# Patient Record
Sex: FEMALE | Race: WHITE | NOT HISPANIC OR LATINO | Employment: OTHER | URBAN - METROPOLITAN AREA
[De-identification: names, ages, dates, MRNs, and addresses within clinical notes are randomized per-mention and may not be internally consistent; named-entity substitution may affect disease eponyms.]

---

## 2023-06-19 ENCOUNTER — TELEPHONE (OUTPATIENT)
Dept: PRIMARY CARE | Facility: CLINIC | Age: 78
End: 2023-06-19
Payer: MEDICARE

## 2023-06-19 DIAGNOSIS — F41.9 ANXIETY: Primary | ICD-10-CM

## 2023-06-19 RX ORDER — VENLAFAXINE 75 MG/1
TABLET ORAL
COMMUNITY
Start: 2020-11-30 | End: 2023-06-21 | Stop reason: SDUPTHER

## 2023-06-19 NOTE — TELEPHONE ENCOUNTER
Patient needs her Venlafaxine sent in to [facility] in [place]. She has an appt coming up July 14 but has really bad poison ivy right now so she would like this refilled before she comes in. Thank you

## 2023-06-21 ENCOUNTER — TELEPHONE (OUTPATIENT)
Dept: PRIMARY CARE | Facility: CLINIC | Age: 78
End: 2023-06-21
Payer: MEDICARE

## 2023-06-21 RX ORDER — VENLAFAXINE 75 MG/1
150 TABLET ORAL NIGHTLY
Qty: 180 TABLET | Refills: 1 | Status: SHIPPED | OUTPATIENT
Start: 2023-06-21 | End: 2023-10-02

## 2023-07-14 ENCOUNTER — OFFICE VISIT (OUTPATIENT)
Dept: PRIMARY CARE | Facility: CLINIC | Age: 78
End: 2023-07-14
Payer: MEDICARE

## 2023-07-14 VITALS
HEART RATE: 76 BPM | HEIGHT: 63 IN | RESPIRATION RATE: 14 BRPM | SYSTOLIC BLOOD PRESSURE: 132 MMHG | WEIGHT: 205 LBS | BODY MASS INDEX: 36.32 KG/M2 | DIASTOLIC BLOOD PRESSURE: 76 MMHG

## 2023-07-14 DIAGNOSIS — Z00.00 ROUTINE MEDICAL EXAM: ICD-10-CM

## 2023-07-14 DIAGNOSIS — C82.90 FOLLICULAR NON-HODGKIN'S LYMPHOMA (MULTI): ICD-10-CM

## 2023-07-14 DIAGNOSIS — Z12.31 BREAST CANCER SCREENING BY MAMMOGRAM: ICD-10-CM

## 2023-07-14 DIAGNOSIS — G25.81 RESTLESS LEG SYNDROME: ICD-10-CM

## 2023-07-14 DIAGNOSIS — E11.9 DIET-CONTROLLED TYPE 2 DIABETES MELLITUS (MULTI): ICD-10-CM

## 2023-07-14 DIAGNOSIS — H53.8 BLURRY VISION, BILATERAL: ICD-10-CM

## 2023-07-14 DIAGNOSIS — F33.1 MODERATE EPISODE OF RECURRENT MAJOR DEPRESSIVE DISORDER (MULTI): ICD-10-CM

## 2023-07-14 DIAGNOSIS — G40.909 SEIZURE DISORDER (MULTI): ICD-10-CM

## 2023-07-14 DIAGNOSIS — G47.00 INSOMNIA, UNSPECIFIED TYPE: Primary | ICD-10-CM

## 2023-07-14 DIAGNOSIS — E66.01 MORBID OBESITY (MULTI): ICD-10-CM

## 2023-07-14 PROBLEM — F41.1 GENERALIZED ANXIETY DISORDER WITH PANIC ATTACKS: Status: ACTIVE | Noted: 2023-07-14

## 2023-07-14 PROBLEM — H50.00 ESOTROPIA: Status: ACTIVE | Noted: 2023-07-14

## 2023-07-14 PROBLEM — R26.81 UNSTEADY GAIT: Status: ACTIVE | Noted: 2023-07-14

## 2023-07-14 PROBLEM — F43.10 PTSD (POST-TRAUMATIC STRESS DISORDER): Status: ACTIVE | Noted: 2023-07-14

## 2023-07-14 PROBLEM — G25.0 BENIGN ESSENTIAL TREMOR: Status: ACTIVE | Noted: 2023-07-14

## 2023-07-14 PROBLEM — H93.13 TINNITUS, SUBJECTIVE, BILATERAL: Status: ACTIVE | Noted: 2023-07-14

## 2023-07-14 PROBLEM — M16.11 PRIMARY OSTEOARTHRITIS OF RIGHT HIP: Status: ACTIVE | Noted: 2023-07-14

## 2023-07-14 PROBLEM — F41.0 GENERALIZED ANXIETY DISORDER WITH PANIC ATTACKS: Status: ACTIVE | Noted: 2023-07-14

## 2023-07-14 PROBLEM — R27.0 ATAXIA: Status: ACTIVE | Noted: 2023-07-14

## 2023-07-14 PROBLEM — F41.9 ANXIETY: Status: ACTIVE | Noted: 2023-07-14

## 2023-07-14 PROBLEM — I25.2 HISTORY OF MYOCARDIAL INFARCTION: Status: ACTIVE | Noted: 2023-07-14

## 2023-07-14 PROBLEM — H90.3 SENSORINEURAL HEARING LOSS, BILATERAL: Status: ACTIVE | Noted: 2023-07-14

## 2023-07-14 PROCEDURE — 1036F TOBACCO NON-USER: CPT | Performed by: FAMILY MEDICINE

## 2023-07-14 PROCEDURE — 1170F FXNL STATUS ASSESSED: CPT | Performed by: FAMILY MEDICINE

## 2023-07-14 PROCEDURE — 1160F RVW MEDS BY RX/DR IN RCRD: CPT | Performed by: FAMILY MEDICINE

## 2023-07-14 PROCEDURE — 99214 OFFICE O/P EST MOD 30 MIN: CPT | Performed by: FAMILY MEDICINE

## 2023-07-14 PROCEDURE — 1159F MED LIST DOCD IN RCRD: CPT | Performed by: FAMILY MEDICINE

## 2023-07-14 PROCEDURE — G0439 PPPS, SUBSEQ VISIT: HCPCS | Performed by: FAMILY MEDICINE

## 2023-07-14 RX ORDER — DOXEPIN HYDROCHLORIDE 10 MG/1
10 CAPSULE ORAL NIGHTLY
Qty: 30 CAPSULE | Refills: 2 | Status: SHIPPED | OUTPATIENT
Start: 2023-07-14 | End: 2023-07-17 | Stop reason: SDUPTHER

## 2023-07-14 RX ORDER — VALPROIC ACID 250 MG/1
CAPSULE, LIQUID FILLED ORAL 2 TIMES DAILY
COMMUNITY
Start: 2016-06-06 | End: 2023-07-14 | Stop reason: ALTCHOICE

## 2023-07-14 RX ORDER — VALPROIC ACID 250 MG/1
CAPSULE, LIQUID FILLED ORAL
COMMUNITY
Start: 2007-06-20 | End: 2024-04-29 | Stop reason: SDUPTHER

## 2023-07-14 RX ORDER — GABAPENTIN 300 MG/1
1 CAPSULE ORAL NIGHTLY
COMMUNITY
Start: 2023-01-16 | End: 2023-10-24 | Stop reason: SDUPTHER

## 2023-07-14 RX ORDER — BUSPIRONE HYDROCHLORIDE 10 MG/1
TABLET ORAL
COMMUNITY
Start: 2022-12-03 | End: 2023-07-14 | Stop reason: ALTCHOICE

## 2023-07-14 ASSESSMENT — PATIENT HEALTH QUESTIONNAIRE - PHQ9
2. FEELING DOWN, DEPRESSED OR HOPELESS: NOT AT ALL
1. LITTLE INTEREST OR PLEASURE IN DOING THINGS: NOT AT ALL
SUM OF ALL RESPONSES TO PHQ9 QUESTIONS 1 AND 2: 0

## 2023-07-14 ASSESSMENT — ACTIVITIES OF DAILY LIVING (ADL)
BATHING: INDEPENDENT
DRESSING: INDEPENDENT
DOING_HOUSEWORK: INDEPENDENT
GROCERY_SHOPPING: NEEDS ASSISTANCE
TAKING_MEDICATION: INDEPENDENT
MANAGING_FINANCES: INDEPENDENT

## 2023-07-14 NOTE — PROGRESS NOTES
"Subjective   Patient ID: Sue Vail is a 77 y.o. female who presents for wellness and fu    HPI   Patient has been compliant with taking all  current medications. No polydipsia, polyuria or significant weight changes. No lower extremities skin lesion. No LE numbness or tingling. Worse  blurry vision lately. Normal appetite. No CP, HA, dizziness, heart palpitation.  pt had trouble falling and staying asleep lately. Pt felt nonrefreshed in am. No snores. No fatigue or gland enlargement. No jaundice. RLS was partially controlled. No confusion, imbalance or memory loss  Review of Systems    Objective   /76   Pulse 76   Resp 14   Ht 1.588 m (5' 2.5\")   Wt 93 kg (205 lb)   BMI 36.90 kg/m²     Physical Exam  NAD, well groomed, eyes: PERRLA, No sclera icterus. neck: supple, no cervical or axillary lymphadenopathy,  lungs: CTA b/l, heart: RRR, No LE edema, normal pedal pulses, abd: soft, no tenderness, BS+, normal strength and sensation  at bilateral lower extremities. No skin lesions at bilateral feet. Good balance. CNII-XII were grossly intact, good judgment and memory. No depressed mood.    Assessment/Plan   Problem List Items Addressed This Visit       Diet-controlled type 2 diabetes mellitus (CMS/HCC)     DMII, well controlled. Cont life style change.  Check A1C, urine albumin. advise eye exam by an OD yearly and checking bilateral feet for skin lesions qhs. Healthy diet and regular exercise. Decrease calorie intake to lose wt.             Relevant Orders    Comprehensive Metabolic Panel    Lipid Panel    Albumin , Urine Random    Hemoglobin A1C    Moderate episode of recurrent major depressive disorder (CMS/HCC)     Pt declined meds or psychology counseling. Will monitor.          Follicular non-Hodgkin's lymphoma (CMS/HCC)     Stable. Fu with with hematology         Restless leg syndrome     Partially controlled. Cont neurontin         Seizure disorder (CMS/HCC)     Controlled. Cont depakote. Fu with " neuro         Blurry vision, bilateral     Ophthalmology eval         Relevant Orders    Referral to Ophthalmology    Comprehensive Metabolic Panel    TSH with reflex to Free T4 if abnormal    Insomnia - Primary     Insomnia, start doxepin trazodone. Recommend psychology counseling.  Recommend good sleep hygiene. Will monitor.           Relevant Medications    doxepin (SINEquan) 10 mg capsule    Other Relevant Orders    CBC    TSH with reflex to Free T4 if abnormal    Referral to Psychology    Morbid obesity (CMS/[facility])     Recommend regular aerobic exercise with low fat and low cholesterol diet. Will monitor weight, blood glucose and cholesterol regularly.  bariatric evaluation           Relevant Orders    Referral to Bariatric Surgery     Other Visit Diagnoses       Routine medical exam        Breast cancer screening by mammogram        Relevant Orders    BI mammo bilateral screening tomosynthesis             Medicare wellness visit: pt was capable of performing all his ADLs and most IADLs. Pt has good memory and cognitive function. pt is morbidly obese. Recommend healthy diet and regular exercise. pt declined to see a nutritionist for eval. Advise eye exam by an OD yearly for glaucoma screen and dental exam every 6 months. check lipids.   will monitor blood pressure, cholesterol levels and weight regularly. Recommend sunscreen application if exposed to the sun when the UV index is over 2. Recommend shingle vaccines, hep B vaccine, tetanus vaccine and covid shot. Pt requested mammogram.  Recommend pt to clear throw rugs at home and keep good lighting at night to avoid falls. Keep hot water for shower below 120F to avoid burn injury.  recommend grab bar at bathtub.   recommend to update living will and DPOA  pt stated that he had been taking all current  medications as prescribed.

## 2023-07-14 NOTE — ASSESSMENT & PLAN NOTE
Insomnia, start doxepin trazodone. Recommend psychology counseling.  Recommend good sleep hygiene. Will monitor.

## 2023-07-14 NOTE — ASSESSMENT & PLAN NOTE
DMII, well controlled. Cont life style change.  Check A1C, urine albumin. advise eye exam by an OD yearly and checking bilateral feet for skin lesions qhs. Healthy diet and regular exercise. Decrease calorie intake to lose wt.

## 2023-07-14 NOTE — ASSESSMENT & PLAN NOTE
Recommend regular aerobic exercise with low fat and low cholesterol diet. Will monitor weight, blood glucose and cholesterol regularly.  bariatric evaluation

## 2023-07-17 ENCOUNTER — TELEPHONE (OUTPATIENT)
Dept: PRIMARY CARE | Facility: CLINIC | Age: 78
End: 2023-07-17
Payer: MEDICARE

## 2023-07-17 DIAGNOSIS — E66.01 MORBID OBESITY (MULTI): Primary | ICD-10-CM

## 2023-07-17 DIAGNOSIS — G47.00 INSOMNIA, UNSPECIFIED TYPE: ICD-10-CM

## 2023-07-17 RX ORDER — DOXEPIN HYDROCHLORIDE 10 MG/1
10 CAPSULE ORAL NIGHTLY
Qty: 90 CAPSULE | Refills: 1 | Status: SHIPPED | OUTPATIENT
Start: 2023-07-17 | End: 2024-05-14 | Stop reason: SDUPTHER

## 2023-07-17 NOTE — TELEPHONE ENCOUNTER
Patient would like a referral put in for nutrition consult instead of bariatric surgery consult. Can we put this in for her? Thank you

## 2023-07-27 ENCOUNTER — LAB (OUTPATIENT)
Dept: LAB | Facility: LAB | Age: 78
End: 2023-07-27
Payer: MEDICARE

## 2023-07-27 DIAGNOSIS — G47.00 INSOMNIA, UNSPECIFIED TYPE: ICD-10-CM

## 2023-07-27 DIAGNOSIS — H53.8 BLURRY VISION, BILATERAL: ICD-10-CM

## 2023-07-27 DIAGNOSIS — E11.9 DIET-CONTROLLED TYPE 2 DIABETES MELLITUS (MULTI): ICD-10-CM

## 2023-07-27 LAB
ALANINE AMINOTRANSFERASE (SGPT) (U/L) IN SER/PLAS: 12 U/L (ref 7–45)
ALBUMIN (G/DL) IN SER/PLAS: 3.7 G/DL (ref 3.4–5)
ALKALINE PHOSPHATASE (U/L) IN SER/PLAS: 109 U/L (ref 33–136)
ANION GAP IN SER/PLAS: 12 MMOL/L (ref 10–20)
ASPARTATE AMINOTRANSFERASE (SGOT) (U/L) IN SER/PLAS: 15 U/L (ref 9–39)
BILIRUBIN TOTAL (MG/DL) IN SER/PLAS: 0.3 MG/DL (ref 0–1.2)
CALCIUM (MG/DL) IN SER/PLAS: 8.5 MG/DL (ref 8.6–10.3)
CARBON DIOXIDE, TOTAL (MMOL/L) IN SER/PLAS: 27 MMOL/L (ref 21–32)
CHLORIDE (MMOL/L) IN SER/PLAS: 107 MMOL/L (ref 98–107)
CHOLESTEROL (MG/DL) IN SER/PLAS: 191 MG/DL (ref 0–199)
CHOLESTEROL IN HDL (MG/DL) IN SER/PLAS: 57.9 MG/DL
CHOLESTEROL/HDL RATIO: 3.3
CREATININE (MG/DL) IN SER/PLAS: 0.66 MG/DL (ref 0.5–1.05)
ERYTHROCYTE DISTRIBUTION WIDTH (RATIO) BY AUTOMATED COUNT: 14.1 % (ref 11.5–14.5)
ERYTHROCYTE MEAN CORPUSCULAR HEMOGLOBIN CONCENTRATION (G/DL) BY AUTOMATED: 32.2 G/DL (ref 32–36)
ERYTHROCYTE MEAN CORPUSCULAR VOLUME (FL) BY AUTOMATED COUNT: 94 FL (ref 80–100)
ERYTHROCYTES (10*6/UL) IN BLOOD BY AUTOMATED COUNT: 4.27 X10E12/L (ref 4–5.2)
ESTIMATED AVERAGE GLUCOSE FOR HBA1C: 131 MG/DL
GFR FEMALE: 89 ML/MIN/1.73M2
GLUCOSE (MG/DL) IN SER/PLAS: 83 MG/DL (ref 74–99)
HEMATOCRIT (%) IN BLOOD BY AUTOMATED COUNT: 40.1 % (ref 36–46)
HEMOGLOBIN (G/DL) IN BLOOD: 12.9 G/DL (ref 12–16)
HEMOGLOBIN A1C/HEMOGLOBIN TOTAL IN BLOOD: 6.2 %
LDL: 112 MG/DL (ref 0–99)
LEUKOCYTES (10*3/UL) IN BLOOD BY AUTOMATED COUNT: 6.3 X10E9/L (ref 4.4–11.3)
PLATELETS (10*3/UL) IN BLOOD AUTOMATED COUNT: 256 X10E9/L (ref 150–450)
POTASSIUM (MMOL/L) IN SER/PLAS: 4.3 MMOL/L (ref 3.5–5.3)
PROTEIN TOTAL: 5.7 G/DL (ref 6.4–8.2)
SODIUM (MMOL/L) IN SER/PLAS: 142 MMOL/L (ref 136–145)
THYROTROPIN (MIU/L) IN SER/PLAS BY DETECTION LIMIT <= 0.05 MIU/L: 4.19 MIU/L (ref 0.44–3.98)
THYROXINE (T4) FREE (NG/DL) IN SER/PLAS: 0.71 NG/DL (ref 0.61–1.12)
TRIGLYCERIDE (MG/DL) IN SER/PLAS: 104 MG/DL (ref 0–149)
UREA NITROGEN (MG/DL) IN SER/PLAS: 10 MG/DL (ref 6–23)
VLDL: 21 MG/DL (ref 0–40)

## 2023-07-27 PROCEDURE — 82043 UR ALBUMIN QUANTITATIVE: CPT

## 2023-07-27 PROCEDURE — 82570 ASSAY OF URINE CREATININE: CPT

## 2023-07-27 PROCEDURE — 84443 ASSAY THYROID STIM HORMONE: CPT

## 2023-07-27 PROCEDURE — 85027 COMPLETE CBC AUTOMATED: CPT

## 2023-07-27 PROCEDURE — 36415 COLL VENOUS BLD VENIPUNCTURE: CPT

## 2023-07-27 PROCEDURE — 80061 LIPID PANEL: CPT

## 2023-07-27 PROCEDURE — 84439 ASSAY OF FREE THYROXINE: CPT

## 2023-07-27 PROCEDURE — 83036 HEMOGLOBIN GLYCOSYLATED A1C: CPT

## 2023-07-27 PROCEDURE — 80053 COMPREHEN METABOLIC PANEL: CPT

## 2023-07-28 LAB
ALBUMIN (MG/L) IN URINE: 10.9 MG/L
ALBUMIN/CREATININE (UG/MG) IN URINE: 24.3 UG/MG CRT (ref 0–30)
CREATININE (MG/DL) IN URINE: 44.9 MG/DL (ref 20–320)

## 2023-08-30 ENCOUNTER — TELEPHONE (OUTPATIENT)
Dept: PRIMARY CARE | Facility: CLINIC | Age: 78
End: 2023-08-30
Payer: MEDICARE

## 2023-08-30 NOTE — TELEPHONE ENCOUNTER
Patient states that the sleeping pill you gave her is too strong for her and would like a lower dose or pill she can cut in half. She also has a constant cough where she is trying to clear mucus, she says she would like to get something for that as she has had it for a while.

## 2023-10-01 DIAGNOSIS — F41.9 ANXIETY: ICD-10-CM

## 2023-10-02 RX ORDER — VENLAFAXINE 75 MG/1
TABLET ORAL
Qty: 180 TABLET | Refills: 1 | Status: SHIPPED | OUTPATIENT
Start: 2023-10-02

## 2023-10-20 PROBLEM — K86.9 LESION OF PANCREAS (HHS-HCC): Status: ACTIVE | Noted: 2023-10-20

## 2023-10-20 PROBLEM — D50.9 IRON DEFICIENCY ANEMIA: Status: ACTIVE | Noted: 2023-10-20

## 2023-10-20 PROBLEM — H50.21 HYPOTROPIA OF RIGHT EYE: Status: ACTIVE | Noted: 2023-10-20

## 2023-10-20 PROBLEM — R63.4 ABNORMAL WEIGHT LOSS: Status: ACTIVE | Noted: 2023-10-20

## 2023-10-20 PROBLEM — H51.8 DIVERGENCE INSUFFICIENCY: Status: ACTIVE | Noted: 2023-10-20

## 2023-10-20 PROBLEM — R90.89 ABNORMAL BRAIN MRI: Status: ACTIVE | Noted: 2023-10-20

## 2023-10-20 PROBLEM — D22.4 NEVUS OF NECK: Status: ACTIVE | Noted: 2023-10-20

## 2023-10-20 PROBLEM — Z79.899 CHRONIC PRESCRIPTION BENZODIAZEPINE USE: Status: ACTIVE | Noted: 2023-10-20

## 2023-10-20 RX ORDER — BLOOD SUGAR DIAGNOSTIC
STRIP MISCELLANEOUS
COMMUNITY

## 2023-10-20 RX ORDER — CHOLECALCIFEROL (VITAMIN D3) 125 MCG
CAPSULE ORAL
COMMUNITY
Start: 2021-12-10

## 2023-10-22 NOTE — PROGRESS NOTES
"Reason for Nutrition Visit:  Pt is a 78 y.o. female referred for   1. Morbid obesity (CMS/HCC)        Pt was referred by Bettie Garza on 7/17/23     Past Medical Hx:  Patient Active Problem List   Diagnosis    Anxiety    Ataxia    Benign essential tremor    Diet-controlled type 2 diabetes mellitus (CMS/HCC)    Esotropia    Generalized anxiety disorder with panic attacks    PTSD (post-traumatic stress disorder)    History of myocardial infarction    Moderate episode of recurrent major depressive disorder (CMS/HCC)    Follicular non-Hodgkin's lymphoma (CMS/HCC)    Primary osteoarthritis of right hip    Restless leg syndrome    Seizure disorder (CMS/HCC)    Sensorineural hearing loss, bilateral    Tinnitus, subjective, bilateral    Unsteady gait    Blurry vision, bilateral    Insomnia    Morbid obesity (CMS/HCC)    Abnormal brain MRI    Hypotropia of right eye    Iron deficiency anemia    Lesion of pancreas    Nevus of neck    Abnormal weight loss    Chronic prescription benzodiazepine use    Divergence insufficiency        Food and Nutrition Hx:  Pt is very fatigued, very tired. She went to [facility] for 6 months and lost 30#, she ate vegetarian, 2015. She ate that way for a while when she returned. Her  has a heart condition and did low fat, low salt, low sugar so she did follow his diet for a while.  She had gastric bypass over 10 years, started at 286# down to 160#. Pt states her weakness has gotten worse will start to sway after standing for 10 min. She will sweat after doing errands. She describes \"crashing\" from eating as well completing errands, specifically potatoes and cookies.  She is eating more chicken now. Does eat cheese as well.      24 Diet Recall:  Meal 1: biscotti with coffee w/whole milk 5-7:30 am  Meal 2: 11:45 am Lunch - tuna w/celery, onions, apples, 1 piece of rye bread  Meal 3:  7:30 pm - leftovers or oatmeal w/nuts,  salads - greens, spinach, radishes, kale, celery, onion, pumpkin seeds, " olive garden dressing, grated parmesan   Snacks: 2 wasa crackers, 1 oz cheddar cheese - after dinner  Beverages: OJ - occasionally    Weight change:    Significant Weight Change: No  CW: (7/14/23) 205#, BMI: 36.9     Lab Results   Component Value Date    HGBA1C 6.2 (A) 07/27/2023    CHOL 191 07/27/2023    LDLF 112 (H) 07/27/2023    TRIG 104 07/27/2023    BUN 10 07/27/2023          Food Preparation: Partner/Spouse  Cooking Skills/Barriers: None reported  Grocery Shopping: Partner/Spouse        Allergies: None  Intolerance: sugar- crashes  Appetite: Poor  Intake: 50-75%  GI Symptoms : None   Swallowing Difficulty: takes big pills and it's been harder recently  Dentition : own    Eating Out Type: Denies/Unknown   Convenience Foods: Denies     Types of Activities: Mostly Sedentary      Sleep duration/quality : 7+ hours and continuous sleep, over 10 hours.   Sleep disorders: none    Supplements: Multivitamin daily      Nutrition Focused Physical Exam:    Performed/Deferred: Deferred as pt visually appears well-nourished with no signs of malnutrition      Malnutrition Present: No    Estimated Energy Needs:    Weight Maintanence: MSJ=1366x1.2  Weight Loss Needs: MSJ: 1639-500      Estimated Needs: 5881-5436 kcal and 80 protein for weight maintenance  1350 for weight loss    Nutrition Diagnosis:    Diagnosis Statement 1:  Diagnosis Status: New  Diagnosis : Physical inactivity related to  current medical complications  as evidenced by  diet and nutrition history recall          Nutrition Interventions:    1)     We reviewed the importance of having consistent meals and snacks throughout the day to improve or maintain good glycemic control and to increase metabolism to aid with weight loss. Pt should aim to consume a meal or snack every 3-4 hours which contains a lean protein (lean chicken, turkey, fish, eggs, low fat yogurt, nuts, peanut butter, bean) and healthy starch (fruits, whole grains)    2)  We reviewed the food  plate method to help improve healthy eating habits. We discussed that half of the plate should contain 2 non-starchy vegetables (all vegetable besides peas, corn and potatoes), 1/4 of the plate should contain lean protein and 1/4 of the plate should contain a healthy starch.       Nutrition Goals:  Nutrition Goals : Carbohydrate consistency  Consistent meal/snack pattern  Consume prescribed supplements  Decrease intake of added sugars    Nutrition Recommendations:  1)  Rec re-starting gastric bypass vitamins - MVI, B12, Vit D, Calcium  2) Look into physical therapy     Educational Handouts: ADA Placemat, High Protein Snack Ideas, and High Protein Meal Ideas

## 2023-10-23 ENCOUNTER — NUTRITION (OUTPATIENT)
Dept: NUTRITION | Facility: CLINIC | Age: 78
End: 2023-10-23
Payer: MEDICARE

## 2023-10-23 VITALS — HEIGHT: 63 IN | BODY MASS INDEX: 36.9 KG/M2

## 2023-10-23 DIAGNOSIS — E66.01 MORBID OBESITY (MULTI): Primary | ICD-10-CM

## 2023-10-23 PROCEDURE — 97802 MEDICAL NUTRITION INDIV IN: CPT | Performed by: FAMILY MEDICINE

## 2023-10-24 ENCOUNTER — TELEPHONE (OUTPATIENT)
Dept: NEUROLOGY | Facility: HOSPITAL | Age: 78
End: 2023-10-24
Payer: MEDICARE

## 2023-10-24 DIAGNOSIS — G25.81 RESTLESS LEG SYNDROME: Primary | ICD-10-CM

## 2023-10-24 RX ORDER — GABAPENTIN 300 MG/1
300 CAPSULE ORAL NIGHTLY
Qty: 30 CAPSULE | Refills: 4 | Status: SHIPPED | OUTPATIENT
Start: 2023-10-24 | End: 2024-03-18

## 2023-11-14 ASSESSMENT — EXTERNAL EXAM - RIGHT EYE: OD_EXAM: NORMAL

## 2023-11-14 ASSESSMENT — EXTERNAL EXAM - LEFT EYE: OS_EXAM: NORMAL

## 2023-11-14 ASSESSMENT — SLIT LAMP EXAM - LIDS
COMMENTS: GOOD POSITION
COMMENTS: GOOD POSITION

## 2023-11-14 NOTE — PROGRESS NOTES
Diplopia  Esotropia  Divergence insufficiency  Hypotropia right eye  -S/p [facility] 12/5/19 (Bruno) - does not feel that surgery improved her diplopia.   -Previously saw Dr. Hernadez for prisms 3/9/20.   -Patient with diplopia mostly controlled with prisms correction. Patient does not wish to change glasses at this time, but will consider changing in the future if unable to tolerate current level of diplopia. Will consider referral back to strabismus surgeon as needed.     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 No history of tobacco use.   -Doesn't drive anymore   -F/u 3-4 months - amsler grid and OCT macula.     Combined form of age-related cataract, right eyeH25.811  Combined form of age-related cataract, left eyeH25.812  -Not visually significant at this time. Monitor.     Myopia  Presbyopia  Amblyopia OD  -Per patient, likely history of amblyopia OD - since childhood OD has never seen as well as OS.   -Current glasses with prisms are 2-3 years old - 15pd base-out prism (BRY) OD and 2BD base-down prism (BD) OS. Over refraction - ET neutralized with 8pd base-out prism (BRY) over OD and 1pd base-out prism (BRY) over OS. Did best with symptomatic improvement in diplopia with 5-6 additional pd base-out prism (BRY) over OD.   -Patient has executive bifocal with current glasses, does not like this. In the future, she would like DVO with prisms. Uses OTC reading glasses for reading - doesn't have diplopia when reading.   -Declines new Rx at this time. In the future, patient would like to see Optometry service for glasses with prism.     No history of intraocular surgery/refractive surgery.   No FH of glaucoma

## 2023-11-15 ENCOUNTER — OFFICE VISIT (OUTPATIENT)
Dept: OPHTHALMOLOGY | Facility: CLINIC | Age: 78
End: 2023-11-15
Payer: MEDICARE

## 2023-11-15 DIAGNOSIS — H25.812 COMBINED FORM OF AGE-RELATED CATARACT, LEFT EYE: ICD-10-CM

## 2023-11-15 DIAGNOSIS — H53.2 DIPLOPIA: Primary | ICD-10-CM

## 2023-11-15 DIAGNOSIS — H51.8 DIVERGENCE INSUFFICIENCY: ICD-10-CM

## 2023-11-15 DIAGNOSIS — H50.21 HYPOTROPIA OF RIGHT EYE: ICD-10-CM

## 2023-11-15 DIAGNOSIS — H35.3131 EARLY DRY STAGE NONEXUDATIVE AGE-RELATED MACULAR DEGENERATION OF BOTH EYES: ICD-10-CM

## 2023-11-15 DIAGNOSIS — H50.00 ESOTROPIA: ICD-10-CM

## 2023-11-15 DIAGNOSIS — H25.811 COMBINED FORM OF AGE-RELATED CATARACT, RIGHT EYE: ICD-10-CM

## 2023-11-15 DIAGNOSIS — H52.13 MYOPIA OF BOTH EYES: ICD-10-CM

## 2023-11-15 DIAGNOSIS — H52.4 PRESBYOPIA: ICD-10-CM

## 2023-11-15 PROCEDURE — 1160F RVW MEDS BY RX/DR IN RCRD: CPT | Performed by: OPHTHALMOLOGY

## 2023-11-15 PROCEDURE — 1159F MED LIST DOCD IN RCRD: CPT | Performed by: OPHTHALMOLOGY

## 2023-11-15 PROCEDURE — 92004 COMPRE OPH EXAM NEW PT 1/>: CPT | Performed by: OPHTHALMOLOGY

## 2023-11-15 PROCEDURE — 1036F TOBACCO NON-USER: CPT | Performed by: OPHTHALMOLOGY

## 2023-11-15 ASSESSMENT — REFRACTION_MANIFEST
OS_SPHERE: -1.00
OS_CYLINDER: -0.50
OD_SPHERE: -1.50
OD_ADD: +3.00
OD_CYLINDER: SPHERE
OS_AXIS: 120
OS_ADD: +3.00

## 2023-11-15 ASSESSMENT — VISUAL ACUITY
OS_CC: 20/25
OD_CC+: +1
CORRECTION_TYPE: GLASSES
METHOD: SNELLEN - LINEAR
OS_CC+: -2
OD_CC: 20/60

## 2023-11-15 ASSESSMENT — ENCOUNTER SYMPTOMS
HEMATOLOGIC/LYMPHATIC NEGATIVE: 0
CARDIOVASCULAR NEGATIVE: 0
NEUROLOGICAL NEGATIVE: 0
RESPIRATORY NEGATIVE: 0
PSYCHIATRIC NEGATIVE: 0
ENDOCRINE NEGATIVE: 0
GASTROINTESTINAL NEGATIVE: 0
MUSCULOSKELETAL NEGATIVE: 0
ALLERGIC/IMMUNOLOGIC NEGATIVE: 0
CONSTITUTIONAL NEGATIVE: 0
EYES NEGATIVE: 0

## 2023-11-15 ASSESSMENT — CUP TO DISC RATIO
OD_RATIO: 0.3
OS_RATIO: 0.3

## 2023-11-15 ASSESSMENT — CONF VISUAL FIELD
OS_INFERIOR_TEMPORAL_RESTRICTION: 0
OD_SUPERIOR_TEMPORAL_RESTRICTION: 0
OS_INFERIOR_NASAL_RESTRICTION: 0
OD_INFERIOR_NASAL_RESTRICTION: 0
OD_NORMAL: 1
OS_SUPERIOR_TEMPORAL_RESTRICTION: 0
OD_SUPERIOR_NASAL_RESTRICTION: 0
OD_INFERIOR_TEMPORAL_RESTRICTION: 0
OS_NORMAL: 1
OS_SUPERIOR_NASAL_RESTRICTION: 0

## 2023-11-15 ASSESSMENT — REFRACTION_WEARINGRX
OD_CYLINDER: SPHERE
OS_CYLINDER: SPHERE
SPECS_TYPE: BIFOCAL
OS_VPRISM: 2BD
OD_SPHERE: -1.25
OS_SPHERE: -1.00

## 2023-11-15 ASSESSMENT — TONOMETRY
OS_IOP_MMHG: 15
IOP_METHOD: GOLDMANN APPLANATION
OD_IOP_MMHG: 15

## 2023-12-11 ENCOUNTER — APPOINTMENT (OUTPATIENT)
Dept: SURGERY | Facility: CLINIC | Age: 78
End: 2023-12-11
Payer: MEDICARE

## 2023-12-12 ENCOUNTER — APPOINTMENT (OUTPATIENT)
Dept: NEUROLOGY | Facility: HOSPITAL | Age: 78
End: 2023-12-12
Payer: MEDICARE

## 2024-02-04 DIAGNOSIS — C82.90 FOLLICULAR NON-HODGKIN'S LYMPHOMA (MULTI): Primary | ICD-10-CM

## 2024-02-07 ENCOUNTER — LAB (OUTPATIENT)
Dept: LAB | Facility: LAB | Age: 79
End: 2024-02-07
Payer: MEDICARE

## 2024-02-07 ENCOUNTER — APPOINTMENT (OUTPATIENT)
Dept: LAB | Facility: HOSPITAL | Age: 79
End: 2024-02-07
Payer: MEDICARE

## 2024-02-07 ENCOUNTER — OFFICE VISIT (OUTPATIENT)
Dept: HEMATOLOGY/ONCOLOGY | Facility: CLINIC | Age: 79
End: 2024-02-07
Payer: MEDICARE

## 2024-02-07 VITALS
OXYGEN SATURATION: 95 % | HEIGHT: 63 IN | RESPIRATION RATE: 16 BRPM | TEMPERATURE: 97.7 F | HEART RATE: 82 BPM | BODY MASS INDEX: 38.59 KG/M2 | DIASTOLIC BLOOD PRESSURE: 89 MMHG | WEIGHT: 217.81 LBS | SYSTOLIC BLOOD PRESSURE: 156 MMHG

## 2024-02-07 DIAGNOSIS — C82.90 FOLLICULAR NON-HODGKIN'S LYMPHOMA (MULTI): ICD-10-CM

## 2024-02-07 DIAGNOSIS — C82.90 FOLLICULAR NON-HODGKIN'S LYMPHOMA (MULTI): Primary | ICD-10-CM

## 2024-02-07 LAB
ALBUMIN SERPL BCP-MCNC: 3.9 G/DL (ref 3.4–5)
ALP SERPL-CCNC: 87 U/L (ref 33–136)
ALT SERPL W P-5'-P-CCNC: 9 U/L (ref 7–45)
ANION GAP SERPL CALC-SCNC: 12 MMOL/L (ref 10–20)
AST SERPL W P-5'-P-CCNC: 13 U/L (ref 9–39)
BASOPHILS # BLD AUTO: 0.05 X10*3/UL (ref 0–0.1)
BASOPHILS NFR BLD AUTO: 0.7 %
BILIRUB SERPL-MCNC: 0.4 MG/DL (ref 0–1.2)
BUN SERPL-MCNC: 14 MG/DL (ref 6–23)
CALCIUM SERPL-MCNC: 9 MG/DL (ref 8.6–10.3)
CHLORIDE SERPL-SCNC: 106 MMOL/L (ref 98–107)
CO2 SERPL-SCNC: 28 MMOL/L (ref 21–32)
CREAT SERPL-MCNC: 0.71 MG/DL (ref 0.5–1.05)
EGFRCR SERPLBLD CKD-EPI 2021: 87 ML/MIN/1.73M*2
EOSINOPHIL # BLD AUTO: 0.27 X10*3/UL (ref 0–0.4)
EOSINOPHIL NFR BLD AUTO: 3.7 %
ERYTHROCYTE [DISTWIDTH] IN BLOOD BY AUTOMATED COUNT: 15.4 % (ref 11.5–14.5)
GLUCOSE SERPL-MCNC: 96 MG/DL (ref 74–99)
HCT VFR BLD AUTO: 40.4 % (ref 36–46)
HGB BLD-MCNC: 12.6 G/DL (ref 12–16)
IMM GRANULOCYTES # BLD AUTO: 0.01 X10*3/UL (ref 0–0.5)
IMM GRANULOCYTES NFR BLD AUTO: 0.1 % (ref 0–0.9)
LYMPHOCYTES # BLD AUTO: 2.48 X10*3/UL (ref 0.8–3)
LYMPHOCYTES NFR BLD AUTO: 34.2 %
MCH RBC QN AUTO: 28.9 PG (ref 26–34)
MCHC RBC AUTO-ENTMCNC: 31.2 G/DL (ref 32–36)
MCV RBC AUTO: 93 FL (ref 80–100)
MONOCYTES # BLD AUTO: 0.68 X10*3/UL (ref 0.05–0.8)
MONOCYTES NFR BLD AUTO: 9.4 %
NEUTROPHILS # BLD AUTO: 3.77 X10*3/UL (ref 1.6–5.5)
NEUTROPHILS NFR BLD AUTO: 51.9 %
NRBC BLD-RTO: 0 /100 WBCS (ref 0–0)
PLATELET # BLD AUTO: 280 X10*3/UL (ref 150–450)
POTASSIUM SERPL-SCNC: 4.7 MMOL/L (ref 3.5–5.3)
PROT SERPL-MCNC: 6 G/DL (ref 6.4–8.2)
RBC # BLD AUTO: 4.36 X10*6/UL (ref 4–5.2)
SODIUM SERPL-SCNC: 141 MMOL/L (ref 136–145)
WBC # BLD AUTO: 7.3 X10*3/UL (ref 4.4–11.3)

## 2024-02-07 PROCEDURE — 80053 COMPREHEN METABOLIC PANEL: CPT

## 2024-02-07 PROCEDURE — 36415 COLL VENOUS BLD VENIPUNCTURE: CPT

## 2024-02-07 PROCEDURE — 1160F RVW MEDS BY RX/DR IN RCRD: CPT | Performed by: INTERNAL MEDICINE

## 2024-02-07 PROCEDURE — 99213 OFFICE O/P EST LOW 20 MIN: CPT | Performed by: INTERNAL MEDICINE

## 2024-02-07 PROCEDURE — 1159F MED LIST DOCD IN RCRD: CPT | Performed by: INTERNAL MEDICINE

## 2024-02-07 PROCEDURE — 1126F AMNT PAIN NOTED NONE PRSNT: CPT | Performed by: INTERNAL MEDICINE

## 2024-02-07 PROCEDURE — 85025 COMPLETE CBC W/AUTO DIFF WBC: CPT

## 2024-02-07 PROCEDURE — 1036F TOBACCO NON-USER: CPT | Performed by: INTERNAL MEDICINE

## 2024-02-07 ASSESSMENT — PATIENT HEALTH QUESTIONNAIRE - PHQ9
2. FEELING DOWN, DEPRESSED OR HOPELESS: NOT AT ALL
SUM OF ALL RESPONSES TO PHQ9 QUESTIONS 1 AND 2: 0
1. LITTLE INTEREST OR PLEASURE IN DOING THINGS: NOT AT ALL

## 2024-02-07 ASSESSMENT — PAIN SCALES - GENERAL: PAINLEVEL: 0-NO PAIN

## 2024-02-07 NOTE — PROGRESS NOTES
Patient Visit Information:   Visit Type: Follow Up Visit      Cancer History:   Treatment Synopsis:    Follicular non-Hodgkin lymphoma, stage IVb diagnosed July 2018: Found have enlarged right neck nodes June 2018.   Needle biopsy nondiagnostic.  PET/CT: Nonspecific changes right neck, otherwise negative.    7/18/18: BM/Bx done by Dr. Freedman: Positive for follicular lymphoma involving 20% of marrow.   8/9/18: Right neck node resections: Reactive nodes.  Not sufficient for diagnosis of lymphoma.  Flow cytometry negative.  Since minimally symptomatic, patient was observed.     1/29/19: CT neck and chest: Nonspecific 0.7 cm right mid lung nodule.  Nonspecific right breast changes.  1.6 cm nodule at pancreatic head.  No other adenopathy.     4/12/19: Started rituximab infusions: Weekly x 4, then every 8 weeks times 12 doses.  10/18/19: To start 3/12 maintenance doses of rituximab  10/30/19: CT chest/abdomen: No evidence of progressive adenopathy/ malignancy.  Stable.  12/13/19: To start fourth of 12 cycles maintenance rituximab  6/5/20: PET CT: Marked decrease right neck adenopathy.  No other evidence of malignancy.  (Deauville 2)  7/27/20: Maintenance rituximab, cycle #8/12.  9/21/20: #9/12 rituximab.  3/10/2021: #12/12 rituximab.  3/10/2020: #12/12 rituximab.  1/11/21: #11/12 rituximab.  3/10/2021: #12/12 rituximab.   7/2/2021: PET/CT: No evidence of malignancy.  Mild uptake in BM c/w.  CBC normal.  Remission.  9/30/2022: CT abdomen pelvis: Hernia.  Stable borderline adenopathy (max 1.9 cm).  Clinically in remission.           History of Present Illness:      ID Statement:    NATALIE MASTERS is a 77 year old Female        Chief Complaint: Continuation of care, follicular  lymphoma   Interval History:    Ms. Masters returns today for follow-up for non-Hodgkin's lymphoma.  She feels relatively well.  She complains of some fatigue and decreased energy.  No B symptom     Past medical history: Follicular non-Hodgkin lymphoma,  stage IV, diagnosed July 2018.  April 2019 she was started on rituximab infusions given  weekly ×4 followed by 1 cycle every 8 weeks . PET/CT 7/2/2021 shows no definite evidence of malignancy.  In remission.     Multiple sclerosis diagnosed in her 30s, history of seizure disorder, last seizure occurred in 2014 when she decreased her anticonvulsant medications.  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  Right axillary node biopsies were  reportedly negative.  No adjuvant therapy given.  History of gastric bypass surgery, Cecilio-en-Y, 7 years ago.  Restless leg syndrome, arthritis.  She denies history of other malignancy, blood disorder, MI, CVA or VT E disease.  Bilateral eye surgery to  correct motor function.  Chronic fatigue syndrome. 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  She is seeing a neurologist.      Family medical history: Mother had bone cancer.  Paternal grandfather had colon cancer.  Multiple paternal relatives had prostate cancer.  Paternal grandfather had cancer of his knee.     Social history: Never smoker.  Drinks alcohol rarely.  No exposure to toxic chemicals.      Review of Systems:   Review of Systems:    Constitutional: No fever, chills, night sweats.  Fatigue.  Head and neck: No headaches or dizziness.  No pain, stiffness.   HEENT: No sore throat, sinusitis.  Hearing is adequate.  History of eye surgery.  Cardiac: No chest pain, palpitations, lightheadedness.   Respiratory: No increased dyspnea, cough, hemoptysis.  Mild TOBIN.  GI: Appetite is good, weight stable.  Has occasional soft bowel movements after eating, but no melena, hematochezia. No abdominal pain, nausea, vomiting.  Genitourinary: No frequency, urgency.  No polyuria, dysuria, hematuria.  Musculoskeletal: Complains of right hip pain, stable.  No other worsening bone or joint pain.  Chronic arthralgias.  Endocrine: History hyperglycemia, followed by endocrinology.  No thyroid disease.  Skin: No  rash, new skin lesions.  Complains of itching, prickly skin.  Neuromuscular: No lightheadedness, dizziness.  History of seizure, MS. reportedly has POTS and ME-CFS                  Allergies and Intolerances:       Allergies:         penicillin: Drug, Hives/Urticaria, Active         selective serotonin reuptake inhibitors: Drug Category, Itching,  Hives/Urticaria, Active         Tape - Adhesive, Bandaids, Paper: Environment, Itching, Hives/Urticaria,  Active         fluoxetine: Drug, Unknown, Active         Adhesive Tape TAPE: Drug, Unknown, Active         Adhesive Bandages MISC: Drug, Unknown, Active       Intolerances:         ropinirole: Drug, Gastritis, Diarrhea, Nausea/Vomiting, GI  Upset, Active     Outpatient Medication Profile:  * Patient Currently Takes Medications as of 05-May-2023 10:26 documented in Structured Notes         traMADol 50 mg oral tablet : Last Dose Taken:  , 1 tab(s) orally every 6 hours, As Needed for pain Dx: G89.18, Start Date: 09-Aug-2018         Vitamin B Complex 100: Last Dose Taken:           Centrum Silver oral tablet: Last Dose Taken:  , 1 tab(s) orally once a  day         calcium citrate: Last Dose Taken:           turmeric 500 mg oral capsule: Last Dose Taken:  , 1 cap(s) orally once  a day         magnesium citrate: Last Dose Taken:           Depakote 250 mg oral delayed release tablet: Last Dose Taken:  , 1 tab(s)  orally 3 times a day         Omega-3: Last Dose Taken:           Vitamin D3 1000 intl units oral tablet: Last Dose Taken:  , 1 tab(s) orally  once a day         Tylenol 500 mg oral tablet: Last Dose Taken:  , 2 tab(s) orally every 6  hours         venlafaxine 75 mg oral tablet: Last Dose Taken:  , 1 tab(s) orally 2 times  a day         valproic acid 250 mg oral capsule: Last Dose Taken:  , orally once a day  (at bedtime)         aspirin 81 mg oral delayed release capsule: Last Dose Taken:  , orally  once a day         pramipexole 0.125 mg oral tablet: Last Dose  Taken:  , orally once a day  (at bedtime)         riTUXimab: Last Dose Taken:  , 375 mg/m2 intravenous over 90 minutes every  8 weeks         famotidine: Last Dose Taken:  , 20 milligram(s) intravenous prior to rituximab         Tylenol 325 mg oral tablet: Last Dose Taken:  , 650 milligram(s) orally   prior to rituximab         dexamethasone: Last Dose Taken:  , 16 milligram(s) intravenous  prior to  rituximab         Benadryl: Last Dose Taken:  , 25 milligram(s) injectable  prior to rituximab         chlorpheniramine 4 mg oral tablet: Last Dose Taken:  , 4 milligram(s) orally   prior to rituximab         Vitamin K1: Last Dose Taken:  , orally once a day         cinnamon extract: Last Dose Taken:  , 2 times a day         tiZANidine 2 mg oral tablet: Last Dose Taken:  , 1 tab(s) orally every  4 hours             Medical History:         H/O malignant melanoma of skin: ICD-10: Z85.820, Status:  Active         Epilepsy: ICD-10: G40.909, Status: Active         Depression, major: ICD-10: F32.9, Status: Active         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, Status: Active         Restless legs syndrome: ICD-10: G25.81, Status: Active         Multiple sclerosis: ICD-10: G35, Status: Active         Follicular lymphoma: ICD-10: C82.90, Status: Active       Surg History:         History of gastric bypass: ICD-10: Z98.84, Status: Active        Social History:   Social Substance History:  ·  Smoking Status never smoker   ·  Additional History     [employer]  retired   (Anup)  (1)           Vitals and Measurements:   Vitals: Temp: 78  HR: 78  RR: 16  BP: 144/89  SPO2%:   98   Measurements: HT(cm): 157.4  WT(kg): 95.9  BSA: 2.04   BMI:  38.7      Physical Exam:      Constitutional: Well developed, awake/alert/oriented  x3.      Eyes: PERRL, EOMI, clear sclera.   ENMT: Wearing a mask .   Head/Neck: No apparent injury.  No mass, adenopathy  or tenderness.  No JVD. Trachea midline.  Surgical scar right neck.  No palpable nodes in the  neck or elsewhere.   Respiratory/Thorax: Thorax symmetric. Clear to auscultation.   No wheezes, rales, rhonchi.   Cardiovascular: Regular, rate and rhythm. No murmur.   No rubs or gallops.   Gastrointestinal: Nondistended.  Protuberant.  Normal  bowel sounds.  Soft, non-tender. No rebound or guarding. No masses palpable.  No palpable hepatomegaly, splenomegaly.   Musculoskeletal: ROM intact.  No joint swelling.  Adequate strength. No significant deformity.   Extremities: Unremarkable extremities.   Neurological: Alert and oriented x3. Senses and cranial  nerves grossly intact. No focal motor deficits noted. No focal sensory deficits.   Lymphatic: No palpably significant lymphadenopathy  in the neck, supraclavicular, axillary areas.   Psychological: Appropriate mood and behavior.   Skin: Warm and dry, no rashes, no suspicious lesions.   Chronic changes.         Lab Results:      Units 6 mo ago  (7/27/23) 9 mo ago  (5/5/23) 1 yr ago  (1/13/23) 1 yr ago  (5/6/22) 2 yr ago  (12/3/21) 2 yr ago  (7/2/21) 2 yr ago  (3/10/21)   WBC  4.4 - 11.3 x10E9/L 6.3 7.1 7.1 7.3 9.6 6.7 7.2   RBC  4.00 - 5.20 x10E12/L 4.27 4.28 4.84 4.72 4.53 4.57 4.42   Hemoglobin  12.0 - 16.0 g/dL 12.9 13.0 14.8 13.6 13.2 12.4 10.8 Low    Hematocrit  36.0 - 46.0 % 40.1 40.5 45.8 42.7 40.8 39.7 35.5 Low    MCV  80 - 100 fL 94 95 95 90 90 87 80   MCHC  32.0 - 36.0 g/dL 32.2 32.1 32.3 31.9 Low  32.4 31.2 Low  30.4 Low    Platelets  150 - 450 x10E9/L 256           ·  Results              Assessment and Plan:   Assessment:    1.  Follicular non-Hodgkin lymphoma, stage IVb (bone marrow), status post induction therapy with rituximab and 12 cycles of maintenance rituximab.  Clinically improved.   PET/CT shows no definite evidence of recurrence.  Minimal uptake in bone marrow may be due to hyperplasia, though residual malignancy cannot be excluded.   CBC and LDH are normal.     2.  History of fatigue, lightheadedness, weakness with prolonged standing,  relieved with rest.  Being managed by neurologist.     3.  History of MS, seizure disorder, arthritis and other medical problems.     4.  History of pancreatic had nodule seen on CT, but not seen on PET/CT.  Likely benign.     5.  History of melanoma s/p resection of right show  6.  Borderline iron deficiency, but no evidence of bleeding.  Normal hemoglobin.  Taking iron tablet daily.     Plan: Findings and follow-up were discussed.  Clinically doing very well patient is asymptomatic I will continue to monitor clinically.  Imaging will be done when patient has symptoms.  Follow-up after 6 months    Greater than 30 minutes spent discussing her condition, natural history of the disease, treatment, laboratory and imaging  results, follow-up and documentation in EMR.

## 2024-02-07 NOTE — PATIENT INSTRUCTIONS
Follow up with Dr. Padron in 6 months.     Lab appointments are no longer scheduled. Please arrive at least 15 minutes before scheduled appointment time for blood work.

## 2024-03-14 ASSESSMENT — CUP TO DISC RATIO
OS_RATIO: 0.3
OD_RATIO: 0.3

## 2024-03-14 ASSESSMENT — SLIT LAMP EXAM - LIDS
COMMENTS: GOOD POSITION
COMMENTS: GOOD POSITION

## 2024-03-14 ASSESSMENT — EXTERNAL EXAM - RIGHT EYE: OD_EXAM: NORMAL

## 2024-03-14 ASSESSMENT — EXTERNAL EXAM - LEFT EYE: OS_EXAM: NORMAL

## 2024-03-15 NOTE — PROGRESS NOTES
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 No history of tobacco use.   -Doesn't drive anymore, but needs to start driving again.   -OCT macula (3/20/24) - SS: 9/10 OU. Normal thickness and contour OU. Intact EZ OU. No edema OU.   Small-med drusen OS>OD. 273/274.  -F/u 6-8 months with retina service.     Diplopia  Esotropia  Divergence insufficiency  Hypotropia right eye  -S/p [facility] 12/5/19 (Brnuo) - does not feel that surgery improved her diplopia.   -Previously saw Dr. Hernadez for prisms 3/9/20.   -Patient with diplopia mostly controlled with prisms correction. Will consider referral back to strabismus surgeon as needed.     Combined form of age-related cataract, right eyeH25.811  Combined form of age-related cataract, left eyeH25.812  -Not visually significant at this time. Monitor.     Myopia  Presbyopia  Amblyopia OD  -Per patient, likely history of amblyopia OD - since childhood OD has never seen as well as OS.   -Current glasses with prisms are 2-3 years old - 15pd base-out prism (BRY) OD and 2BD base-down prism (BD) OS. Over refraction - ET neutralized with 8pd base-out prism (BRY) over OD and 1pd base-out prism (BRY) over OS. Did best with symptomatic improvement in diplopia with 5-6 additional pd base-out prism (BRY) over OD.   -Patient has executive bifocal with current glasses, does not like this. In the future, she would like DVO with prisms. Uses OTC reading glasses for reading - doesn't have diplopia when reading.   -Will refer to Optometry service for repeat refraction with prism (complex prism). Patient prefers no dilation at visit with Optometrist.       No history of intraocular surgery/refractive surgery.   No FH of glaucoma

## 2024-03-18 ENCOUNTER — TELEPHONE (OUTPATIENT)
Dept: PRIMARY CARE | Facility: CLINIC | Age: 79
End: 2024-03-18
Payer: MEDICARE

## 2024-03-18 DIAGNOSIS — G25.81 RESTLESS LEG SYNDROME: ICD-10-CM

## 2024-03-18 RX ORDER — GABAPENTIN 300 MG/1
CAPSULE ORAL
Qty: 30 CAPSULE | Refills: 0 | Status: SHIPPED | OUTPATIENT
Start: 2024-03-18 | End: 2024-04-05 | Stop reason: SDUPTHER

## 2024-03-20 ENCOUNTER — OFFICE VISIT (OUTPATIENT)
Dept: OPHTHALMOLOGY | Facility: CLINIC | Age: 79
End: 2024-03-20
Payer: MEDICARE

## 2024-03-20 DIAGNOSIS — H25.811 COMBINED FORM OF AGE-RELATED CATARACT, RIGHT EYE: ICD-10-CM

## 2024-03-20 DIAGNOSIS — H50.21 HYPOTROPIA OF RIGHT EYE: ICD-10-CM

## 2024-03-20 DIAGNOSIS — H35.3131 EARLY DRY STAGE NONEXUDATIVE AGE-RELATED MACULAR DEGENERATION OF BOTH EYES: Primary | ICD-10-CM

## 2024-03-20 DIAGNOSIS — H25.812 COMBINED FORM OF AGE-RELATED CATARACT, LEFT EYE: ICD-10-CM

## 2024-03-20 DIAGNOSIS — H52.13 MYOPIA OF BOTH EYES: ICD-10-CM

## 2024-03-20 DIAGNOSIS — H51.8 DIVERGENCE INSUFFICIENCY: ICD-10-CM

## 2024-03-20 DIAGNOSIS — H52.4 PRESBYOPIA: ICD-10-CM

## 2024-03-20 DIAGNOSIS — H50.00 ESOTROPIA: ICD-10-CM

## 2024-03-20 DIAGNOSIS — H53.2 DIPLOPIA: ICD-10-CM

## 2024-03-20 PROCEDURE — 92134 CPTRZ OPH DX IMG PST SGM RTA: CPT | Performed by: OPHTHALMOLOGY

## 2024-03-20 PROCEDURE — 99213 OFFICE O/P EST LOW 20 MIN: CPT | Performed by: OPHTHALMOLOGY

## 2024-03-20 ASSESSMENT — REFRACTION_MANIFEST
OS_ADD: +2.50
OS_CYLINDER: -0.50
OS_SPHERE: -2.50
OD_CYLINDER: SPHERE
OD_SPHERE: -1.25
OS_VPRISM: 2BD
OD_ADD: +2.50
OS_AXIS: 120

## 2024-03-20 ASSESSMENT — REFRACTION_FINALRX: OS_VPRISM: 2BD

## 2024-03-20 ASSESSMENT — ENCOUNTER SYMPTOMS
MUSCULOSKELETAL NEGATIVE: 0
CARDIOVASCULAR NEGATIVE: 0
CONSTITUTIONAL NEGATIVE: 0
GASTROINTESTINAL NEGATIVE: 0
PSYCHIATRIC NEGATIVE: 0
NEUROLOGICAL NEGATIVE: 0
ALLERGIC/IMMUNOLOGIC NEGATIVE: 0
RESPIRATORY NEGATIVE: 0
HEMATOLOGIC/LYMPHATIC NEGATIVE: 0
EYES NEGATIVE: 1
ENDOCRINE NEGATIVE: 0

## 2024-03-20 ASSESSMENT — REFRACTION_WEARINGRX
OS_SPHERE: -1.00
SPECS_TYPE: BIFOCAL
OS_VPRISM: 2BD
OD_CYLINDER: SPHERE
OS_CYLINDER: SPHERE
OD_SPHERE: -1.25

## 2024-03-20 ASSESSMENT — TONOMETRY
OS_IOP_MMHG: 12
OD_IOP_MMHG: 13
IOP_METHOD: GOLDMANN APPLANATION

## 2024-03-20 ASSESSMENT — VISUAL ACUITY
CORRECTION_TYPE: GLASSES
OS_CC: 20/200
OD_CC: 20/60-
METHOD: SNELLEN - LINEAR

## 2024-04-05 ENCOUNTER — OFFICE VISIT (OUTPATIENT)
Dept: PRIMARY CARE | Facility: CLINIC | Age: 79
End: 2024-04-05
Payer: MEDICARE

## 2024-04-05 VITALS
WEIGHT: 210 LBS | HEART RATE: 76 BPM | BODY MASS INDEX: 38.64 KG/M2 | HEIGHT: 62 IN | RESPIRATION RATE: 14 BRPM | SYSTOLIC BLOOD PRESSURE: 122 MMHG | DIASTOLIC BLOOD PRESSURE: 67 MMHG

## 2024-04-05 DIAGNOSIS — F41.9 ANXIETY: ICD-10-CM

## 2024-04-05 DIAGNOSIS — Z00.00 ROUTINE MEDICAL EXAM: ICD-10-CM

## 2024-04-05 DIAGNOSIS — E66.01 MORBID OBESITY (MULTI): ICD-10-CM

## 2024-04-05 DIAGNOSIS — G40.909 SEIZURE DISORDER (MULTI): ICD-10-CM

## 2024-04-05 DIAGNOSIS — F32.2 CURRENT SEVERE EPISODE OF MAJOR DEPRESSIVE DISORDER WITHOUT PSYCHOTIC FEATURES WITHOUT PRIOR EPISODE (MULTI): ICD-10-CM

## 2024-04-05 DIAGNOSIS — M25.551 BILATERAL HIP PAIN: ICD-10-CM

## 2024-04-05 DIAGNOSIS — G25.81 RESTLESS LEG SYNDROME: ICD-10-CM

## 2024-04-05 DIAGNOSIS — M25.552 BILATERAL HIP PAIN: ICD-10-CM

## 2024-04-05 DIAGNOSIS — E11.9 DIET-CONTROLLED TYPE 2 DIABETES MELLITUS (MULTI): Primary | ICD-10-CM

## 2024-04-05 PROBLEM — F33.1 MODERATE EPISODE OF RECURRENT MAJOR DEPRESSIVE DISORDER (MULTI): Status: RESOLVED | Noted: 2023-07-14 | Resolved: 2024-04-05

## 2024-04-05 PROBLEM — Z79.899 CHRONIC PRESCRIPTION BENZODIAZEPINE USE: Status: RESOLVED | Noted: 2023-10-20 | Resolved: 2024-04-05

## 2024-04-05 PROBLEM — H91.93 BILATERAL HEARING LOSS: Status: ACTIVE | Noted: 2022-08-04

## 2024-04-05 PROCEDURE — 1159F MED LIST DOCD IN RCRD: CPT | Performed by: FAMILY MEDICINE

## 2024-04-05 PROCEDURE — 99214 OFFICE O/P EST MOD 30 MIN: CPT | Performed by: FAMILY MEDICINE

## 2024-04-05 PROCEDURE — G0439 PPPS, SUBSEQ VISIT: HCPCS | Performed by: FAMILY MEDICINE

## 2024-04-05 PROCEDURE — 1160F RVW MEDS BY RX/DR IN RCRD: CPT | Performed by: FAMILY MEDICINE

## 2024-04-05 PROCEDURE — 1170F FXNL STATUS ASSESSED: CPT | Performed by: FAMILY MEDICINE

## 2024-04-05 PROCEDURE — 1036F TOBACCO NON-USER: CPT | Performed by: FAMILY MEDICINE

## 2024-04-05 PROCEDURE — 1124F ACP DISCUSS-NO DSCNMKR DOCD: CPT | Performed by: FAMILY MEDICINE

## 2024-04-05 RX ORDER — GABAPENTIN 300 MG/1
CAPSULE ORAL
Qty: 100 CAPSULE | Refills: 3 | Status: SHIPPED | OUTPATIENT
Start: 2024-04-05 | End: 2024-06-05 | Stop reason: SDUPTHER

## 2024-04-05 RX ORDER — BUSPIRONE HYDROCHLORIDE 5 MG/1
5 TABLET ORAL 2 TIMES DAILY
Qty: 60 TABLET | Refills: 0 | Status: SHIPPED | OUTPATIENT
Start: 2024-04-05 | End: 2024-05-06 | Stop reason: SDUPTHER

## 2024-04-05 ASSESSMENT — PATIENT HEALTH QUESTIONNAIRE - PHQ9
SUM OF ALL RESPONSES TO PHQ QUESTIONS 1-9: 27
3. TROUBLE FALLING OR STAYING ASLEEP OR SLEEPING TOO MUCH: NEARLY EVERY DAY
SUM OF ALL RESPONSES TO PHQ9 QUESTIONS 1 AND 2: 6
1. LITTLE INTEREST OR PLEASURE IN DOING THINGS: NEARLY EVERY DAY
4. FEELING TIRED OR HAVING LITTLE ENERGY: NEARLY EVERY DAY
2. FEELING DOWN, DEPRESSED OR HOPELESS: NEARLY EVERY DAY
7. TROUBLE CONCENTRATING ON THINGS, SUCH AS READING THE NEWSPAPER OR WATCHING TELEVISION: NEARLY EVERY DAY
9. THOUGHTS THAT YOU WOULD BE BETTER OFF DEAD, OR OF HURTING YOURSELF: NEARLY EVERY DAY
8. MOVING OR SPEAKING SO SLOWLY THAT OTHER PEOPLE COULD HAVE NOTICED. OR THE OPPOSITE, BEING SO FIGETY OR RESTLESS THAT YOU HAVE BEEN MOVING AROUND A LOT MORE THAN USUAL: NEARLY EVERY DAY
5. POOR APPETITE OR OVEREATING: NEARLY EVERY DAY
6. FEELING BAD ABOUT YOURSELF - OR THAT YOU ARE A FAILURE OR HAVE LET YOURSELF OR YOUR FAMILY DOWN: NEARLY EVERY DAY
10. IF YOU CHECKED OFF ANY PROBLEMS, HOW DIFFICULT HAVE THESE PROBLEMS MADE IT FOR YOU TO DO YOUR WORK, TAKE CARE OF THINGS AT HOME, OR GET ALONG WITH OTHER PEOPLE: VERY DIFFICULT

## 2024-04-05 ASSESSMENT — ACTIVITIES OF DAILY LIVING (ADL)
DRESSING: NEEDS ASSISTANCE
BATHING: NEEDS ASSISTANCE
GROCERY_SHOPPING: TOTAL CARE
MANAGING_FINANCES: TOTAL CARE
TAKING_MEDICATION: NEEDS ASSISTANCE
DOING_HOUSEWORK: TOTAL CARE

## 2024-04-05 NOTE — PROGRESS NOTES
"Subjective   Patient ID: Sue Vail is a 78 y.o. female who presents for Medicare Annual Wellness Visit Subsequent (fu).    HPI   Anxiety was controlled. pt had worse depressed mood, fatigue, lack of interests, Poor appetite  but  over eating while on effexor 75mg qd. No HI/SI.  good sleep. No cp, heart palpitation or LE edema. No HA, dizziness, imbalance. B/l hip pain was worse lately. No recent injury. Patient has been compliant with taking all  current medications. No polydipsia, polyuria or significant weight changes. No lower extremities skin lesion. + BLE numbness and  tingling. +double and  blurry vision. No CP, HA, dizziness, heart palpitation. No claudication or falls. No seizure, RLS was controlled.  Review of Systems    Objective   /67   Pulse 76   Resp 14   Ht 1.575 m (5' 2\")   Wt 95.3 kg (210 lb)   BMI 38.41 kg/m²     Physical Exam  NAD, well groomed, eyes: , No sclera icterus. neck: supple, no cervical lymphadenopathy, no thyroid tenderness, nodules or enlargement.  lungs: CTA b/l, heart: RRR, no LE edema, abd: soft, no tenderness, BS+, normal strength and sensation  at bilateral lower extremities. DTR were normal at knees, No dry skin or dry hair. No tremor. Tenderness at b/l hips, Good balance. CNII-XII were grossly intact, + depressed mood, no delusion or hallucination    Assessment/Plan   Problem List Items Addressed This Visit             ICD-10-CM    Anxiety F41.9     Controlled. Cont effexor         Relevant Orders    TSH with reflex to Free T4 if abnormal    Diet-controlled type 2 diabetes mellitus (CMS/[facility]) - Primary E11.9     DMII, controlled without medications. Check A1C, urine albumin. advise eye exam by an OD yearly and checking bilateral feet for skin lesions qhs. Healthy diet and regular exercise. Decrease calorie intake to lose wt.             Relevant Orders    Hemoglobin A1C    Albumin , Urine Random    Lipid Panel    Restless leg syndrome G25.81     Controlled. Cont " neurontin.         Relevant Medications    gabapentin (Neurontin) 300 mg capsule    Seizure disorder (CMS/[facility]) G40.909     Controlled. Cont current seizure med and fu with neurology         Relevant Orders    Referral to Neurology    Morbid obesity (CMS/[facility]) E66.01     Recommend regular aerobic exercise with low fat and low cholesterol diet. Will monitor weight, blood glucose and cholesterol regularly. Pt declined bariatric evaluation           Relevant Orders    Referral to Nutrition Services    Bilateral hip pain M25.551, M25.552    Relevant Orders    XR hips bilateral 3 or 4 VW w pelvis when performed    Current severe episode of major depressive disorder without psychotic features without prior episode (CMS/[facility]) F32.2     Not controlled. Add buspar and cont effexor. Recommend light therapy. Fu in 1 mos         Relevant Medications    busPIRone (Buspar) 5 mg tablet    Other Relevant Orders    Referral to Social Work    Referral to Psychiatry        Medicare wellness visit:  Pt has good memory and cognitive function. pt is morbidly obese. Recommend healthy diet and regular exercise. Advise eye exam by an OD yearly for glaucoma screen and dental exam every 6 months. check lipids.   will monitor blood pressure, cholesterol levels and weight regularly. Recommend sunscreen application if exposed to the sun when the UV index is over 2. Recommend shingle vaccines, rsv and covid shot. Recommend pt to clear throw rugs at home and keep good lighting at night to avoid falls. Keep hot water for shower below 120F to avoid burn injury.  recommend grab bar at bathtub. recommend to update living will and DPOA

## 2024-04-06 NOTE — ASSESSMENT & PLAN NOTE
DMII, controlled without medications. Check A1C, urine albumin. advise eye exam by an OD yearly and checking bilateral feet for skin lesions qhs. Healthy diet and regular exercise. Decrease calorie intake to lose wt.

## 2024-04-24 ENCOUNTER — OFFICE VISIT (OUTPATIENT)
Dept: PRIMARY CARE | Facility: CLINIC | Age: 79
End: 2024-04-24
Payer: MEDICARE

## 2024-04-24 VITALS — RESPIRATION RATE: 14 BRPM | HEART RATE: 76 BPM | SYSTOLIC BLOOD PRESSURE: 126 MMHG | DIASTOLIC BLOOD PRESSURE: 65 MMHG

## 2024-04-24 DIAGNOSIS — J40 BRONCHITIS: Primary | ICD-10-CM

## 2024-04-24 PROCEDURE — 1159F MED LIST DOCD IN RCRD: CPT | Performed by: FAMILY MEDICINE

## 2024-04-24 PROCEDURE — 99213 OFFICE O/P EST LOW 20 MIN: CPT | Performed by: FAMILY MEDICINE

## 2024-04-24 PROCEDURE — 1160F RVW MEDS BY RX/DR IN RCRD: CPT | Performed by: FAMILY MEDICINE

## 2024-04-24 RX ORDER — DOXYCYCLINE 100 MG/1
100 CAPSULE ORAL 2 TIMES DAILY
Qty: 20 CAPSULE | Refills: 0 | Status: SHIPPED | OUTPATIENT
Start: 2024-04-24

## 2024-04-24 RX ORDER — ALBUTEROL SULFATE 90 UG/1
2 AEROSOL, METERED RESPIRATORY (INHALATION) EVERY 4 HOURS PRN
Qty: 8 G | Refills: 5 | Status: SHIPPED | OUTPATIENT
Start: 2024-04-24 | End: 2025-04-24

## 2024-04-24 NOTE — ASSESSMENT & PLAN NOTE
Start antibiotics and albuterol inhaler  as above. call office if symptoms do not improve in 3-4 days.

## 2024-04-24 NOTE — PROGRESS NOTES
Subjective   Patient ID: Sue Vail is a 78 y.o. female who presents for cough     HPI   Patient started to have  cough, phlegm and slight wheezing 3 days ago. No sob, fever or chills. No Cp, heart palpitation, hemoptysis, HA, dizziness, neck stiffness or confusion. Pt has had normal appetite. Symptoms persisted today.      Review of Systems    Objective   /65   Pulse 76   Resp 14     Physical Exam  No  distress, well groomed, eyes: PERRLA, No sclera icterus. No sinus tenderness or nasal discharge, neck: supple, no cervical lymphadenopathy, lungs: mild wheezing b/l, no rales, heart: RRR, abd: soft, no tenderness, BS+, Good balance.     Assessment/Plan   Problem List Items Addressed This Visit             ICD-10-CM    Bronchitis - Primary J40     Start antibiotics and albuterol inhaler  as above. call office if symptoms do not improve in 3-4 days.           Relevant Medications    doxycycline (Vibramycin) 100 mg capsule    albuterol (Ventolin HFA) 90 mcg/actuation inhaler

## 2024-04-29 ENCOUNTER — TELEMEDICINE (OUTPATIENT)
Dept: NEUROLOGY | Facility: HOSPITAL | Age: 79
End: 2024-04-29
Payer: MEDICARE

## 2024-04-29 DIAGNOSIS — F41.9 ANXIETY: ICD-10-CM

## 2024-04-29 DIAGNOSIS — R06.89 GASPING FOR BREATH: ICD-10-CM

## 2024-04-29 DIAGNOSIS — G25.81 RESTLESS LEG SYNDROME: ICD-10-CM

## 2024-04-29 DIAGNOSIS — R27.0 ATAXIA: ICD-10-CM

## 2024-04-29 DIAGNOSIS — G40.909 SEIZURE DISORDER (MULTI): Primary | ICD-10-CM

## 2024-04-29 PROCEDURE — 1159F MED LIST DOCD IN RCRD: CPT | Performed by: NURSE PRACTITIONER

## 2024-04-29 PROCEDURE — 1036F TOBACCO NON-USER: CPT | Performed by: NURSE PRACTITIONER

## 2024-04-29 PROCEDURE — 99214 OFFICE O/P EST MOD 30 MIN: CPT | Mod: 95 | Performed by: NURSE PRACTITIONER

## 2024-04-29 PROCEDURE — 1160F RVW MEDS BY RX/DR IN RCRD: CPT | Performed by: NURSE PRACTITIONER

## 2024-04-29 PROCEDURE — 99214 OFFICE O/P EST MOD 30 MIN: CPT | Performed by: NURSE PRACTITIONER

## 2024-04-29 RX ORDER — VALPROIC ACID 250 MG/1
750 CAPSULE, LIQUID FILLED ORAL NIGHTLY
Qty: 270 CAPSULE | Refills: 3 | Status: SHIPPED | OUTPATIENT
Start: 2024-04-29 | End: 2025-04-29

## 2024-04-29 NOTE — ASSESSMENT & PLAN NOTE
Getting GBP from PCP per review of rx.   Again suspect lumbar radiculopathy contribution, pt c/o BLE pain from hips down.

## 2024-04-29 NOTE — PROGRESS NOTES
"SUBJECTIVE    Sue Vail is a 78 y.o.   female who presents with   Chief Complaint   Patient presents with    Seizures    Restless Legs        Presents for follow up visit  Visit type: virtual visit Virtual or Telephone Consent    An interactive audio and video telecommunication system which permits real time communications between the patient (at the originating site) and provider (at the distant site) was utilized to provide this telehealth service.   Verbal consent was requested and obtained from Sue Vail on this date, 04/29/24 for a telehealth visit.     HISTORY OF PRESENT ILLNESS    RECAP:  Former patient of Dr. Justice, last seen April 2023.   Prev pt of Dr Tomlinson, being seen for tremors, imbalance, sz disorder. States sz since 2 yo, had been incredibly well-controlled until last month or so. States she needed clonazepam prescription. \"Last sz 1 week ago\". She may wake up passed out on the floor. Mouth chewed out. Had been on Depakote for many years. Last clonazepam in 8/2022 or 9/2022. PCP apparently refused to fill it further--this was being filled by her previous PCP. Pt states this was being filled by Dr Tomlinson but records stated otherwise. Pt had been on this at the time she saw Dr Tomlinson in 2018. States she had been on Dilantin. Had gum and teeth problems. On \"[place]\" in the past, had teeth problems. Multiple complaints. Has had double vision. Hearing got worse. Concentration worse. Has POTS. Can't do any tasks. States memory bad from sz and from \"being scrambled\". Shaking of UE. Was not a big deal now. Starts sometimes. Tremors comes and goes. No pattern. States clonazepam was for \"RLS\". Gets in bed. Thighs feel full. Legs feel jumpy. Sleep pattern changed. For years. Has LBP. Feels like electricity. s/p GBP. Discussed doesn't quite sound like primary RLS, may be from lumbar radiculopathy. Knows what pinched nerve looks like and feels like and it's not that. States nobody had figured out the " "imbalance. Nobody had figured out double vision. States falling. Imbalance. Did not do PT--Dr Tomlinson referred. Never gotten around to do it. On venlafaxine 75mg 3x/day. On VPA 250mg 3 caps qhs. s/p gastric bypass--can't take extended release meds. Has lymphoma, s/p rituximab. 2020 MRI brain wwo showed non-specific WM disease, vol loss. Never smoked. Almost never etoh. No street drug use. No marijuana use. I previously advised change VPA to 250mg 2 caps bid, lower venlafaxine if able (to help with RLS), take ferrous sulfate once daily. She reported she wasn't taking GBP then. CBC/CMP/NH3 ok. VPA 42 low. Ferritin 29. During last visit, GBP 300mg qhs started, continue VPA 250mg 3 tabs qhs,and check B12 and refer to PT.    Sz ok. No sz. On VPA 250mg 3 tabs qhs. Not sleepy. No SE.     RLS \"still here\", but \"not a major factor\". \"It's manageable\". On GBP 300mg qhs--thinks has helped. No SE.     Did not do PT--haven't felt good enough to do it.     Brain fog \"lifted 100% now\". Pt unsure why. Discussed, ? due to clonazepam she was on previously.     She is worried about a L forehead bump. 2 mo now. Where she fell. Shrinking now, not painful.     Lymphoma is in remission. Will see doctor in 5/2023.     04/29/24 -     Patient denies any auras, shaking, jerking, convulsions, loss of time, zoning out, waking up with tongue or cheek bites, incontinence or unexplained bruising.    Waking up gasping x hours. States no history of anxiety issues. Concerned may be panic attacks but no other symptoms. Started Buspar about 1 month ago. Has upcoming appt with Dr. Garza this week. No other medication changes. 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     Has MECFS - chronic fatigue syndrome. States she has been \"in bed\" from this x 4 years.    Upcoming xray of hips. States BLE pain has been increasing.     Still having RLS symptoms per pt. States she does not think gabapentin is " helping. States only thing that helped was the controlled substance she was on in the past.     REVIEW OF SYSTEMS:  10 point review of systems performed and is negative except as noted in the HPI.     Past Medical History:   Diagnosis Date    Bitten or stung by nonvenomous insect and other nonvenomous arthropods, initial encounter 07/03/2018    Tick bite    Epilepsy, unspecified, not intractable, without status epilepticus (Multi)     Epilepsy    Localized enlarged lymph nodes 08/31/2018    Cervical lymphadenopathy    Multiple sclerosis (Multi)     History of multiple sclerosis    Old myocardial infarction 12/23/2019    History of myocardial infarction    Personal history of malignant melanoma of skin 04/13/2017    History of malignant melanoma    Personal history of malignant melanoma of skin 11/10/2020    History of malignant melanoma of skin    Personal history of malignant melanoma of skin 11/10/2020    History of malignant melanoma of skin    Personal history of non-Hodgkin lymphomas 12/23/2019    History of lymphoma    Personal history of other diseases of the nervous system and sense organs     History of benign essential tremor    Personal history of other diseases of the nervous system and sense organs 04/24/2018    History of tinnitus    Personal history of other diseases of the nervous system and sense organs 12/17/2019    History of diplopia    Personal history of other endocrine, nutritional and metabolic disease 07/02/2018    History of vitamin D deficiency    Personal history of other mental and behavioral disorders 10/28/2019    History of depression    Urge incontinence 04/13/2017    Urge incontinence of urine       Family medical history includes dementia in father.    Past Surgical History:   Procedure Laterality Date    GASTRIC BYPASS  04/11/2017    Gastric Surgery For Morbid Obesity Bypass With Cecilio-en-Y    OTHER SURGICAL HISTORY  04/11/2017    Axillary Lymphadenectomy    OTHER SURGICAL HISTORY   12/23/2019    Eye surgery    OTHER SURGICAL HISTORY  12/23/2019    Neck surgery       Social History     Substance and Sexual Activity   Drug Use Never     Tobacco Use: Low Risk  (4/29/2024)    Patient History     Smoking Tobacco Use: Never     Smokeless Tobacco Use: Never     Passive Exposure: Not on file     Alcohol Use: Not on file       Current Outpatient Medications   Medication Instructions    albuterol (Ventolin HFA) 90 mcg/actuation inhaler 2 puffs, inhalation, Every 4 hours PRN    blood sugar diagnostic (Contour Next Test Strips) strip miscellaneous    busPIRone (BUSPAR) 5 mg, oral, 2 times daily    cholecalciferol (Vitamin D-3) 125 MCG (5000 UT) capsule Take by mouth q 24 HR.    doxepin (SINEQUAN) 10 mg, oral, Nightly    doxycycline (VIBRAMYCIN) 100 mg, oral, 2 times daily    gabapentin (Neurontin) 300 mg capsule TAKE 1 CAPSULE(300 MG) BY MOUTH EVERY DAY AT BEDTIME    MAGNESIUM ORAL chew    multivit-min/iron/folic acid/K (ADULTS MULTIVITAMIN ORAL) 1 tablet, oral, Daily    valproic acid (DEPAKENE) 750 mg, oral, Nightly    venlafaxine (Effexor) 75 mg tablet TAKE 2 TABLETS(150 MG) BY MOUTH EVERY DAY AT BEDTIME    ZINC ORAL No dose, route, or frequency recorded.         OBJECTIVE    There were no vitals taken for this visit.      Physical Exam  Eyes:      General: Lids are normal.      Extraocular Movements: Extraocular movements intact.   Psychiatric:         Speech: Speech normal.       Neurological Exam  Mental Status  Awake, alert and oriented to person, place and time. Oriented to person, place, time and situation. Recent and remote memory are intact. Speech is normal. Language is fluent with no aphasia. Attention and concentration are normal. Fund of knowledge is appropriate for level of education.    Cranial Nerves  CN III, IV, VI: Extraocular movements intact bilaterally. Normal lids and orbits bilaterally.  CN VII: Full and symmetric facial movement.  CN VIII: Hearing is normal.    Motor   No abnormal  involuntary movements.        MR BRAIN W AND WO IV CONTRAST 01/02/2020    Narrative  MRN: [id number]  Patient Name: NATALIE MASTERS    STUDY:  MRI BRAIN W/WO CONTRAST; 1/2/2020 3:27 pm    INDICATION:  Seizure disorder, lymphoma, abnormal MRI brain with skull  abnormalities.  Evaluate for interim change.. History of  non-Hodgkin's lymphoma    COMPARISON:  CT brain dated 03/29/2018. PET/CT dated 07/13/2018 and CT neck dated  12/20/2019. MRI brain dated 03/29/2018 and CT head dated 04/26/2018    ACCESSION NUMBER(S):  [id number]    ORDERING CLINICIAN:  KAROLINE FAM    TECHNIQUE:  Axial T2, FLAIR, DWI, gradient echo T2 and sagittal and coronal T1  weighted images of brain were acquired. Post contrast T1 weighted  images were acquired after administration of 20 mL MultiHance  gadolinium based intravenous contrast and images were reformatted in  multiple planes.    FINDINGS:  There is no restricted diffusion to suggest acute ischemia.    There are non enhancing confluent and patchy foci of T2 and FLAIR  hyperintensity in the periventricular, subcortical white matter,  posterior limb of the left internal capsule, slightly progressed when  compared to the prior exam dated 03/29/2018. There is no mass effect  or midline shift. There is no abnormal enhancement.    There is diffuse parenchymal volume loss with compensatory dilatation  of ventricles, sulci and basal cisterns, unchanged.    There is mild mucosal thickening in the left maxillary sinus.  Remaining visualized paranasal sinuses are clear. There is partial  empty sella.    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    The calvarial bone marrow is heterogeneous, similar to the prior exam  with foci of diminished signal on T1, FLAIR and T2 weighted imaging.    The paranasal sinuses are clear. There is minimal fluid signal in the  bilateral mastoid air cells, right greater than left.    Impression  1. No  "acute ischemia or enhancing mass or intracranial hemorrhage.  2. Stable nonspecific inhomogeneous signal in the bilateral calvarium.  3. There has been slight progression of T2 and FLAIR hyperintense  signal in the bilateral periventricular and subcortical white matter  which is most likely consistent with chronic microvascular ischemic  disease in the population.    I personally reviewed the images/study and I agree with the findings  as stated. This study was interpreted at [facility], [place], [place].      Lab Results   Component Value Date    WBC 7.3 02/07/2024    RBC 4.36 02/07/2024    HGB 12.6 02/07/2024    HCT 40.4 02/07/2024     02/07/2024     02/07/2024    K 4.7 02/07/2024     02/07/2024    BUN 14 02/07/2024    CREATININE 0.71 02/07/2024    EGFR 87 02/07/2024    CALCIUM 9.0 02/07/2024    ALKPHOS 87 02/07/2024    AST 13 02/07/2024    ALT 9 02/07/2024    PVNXDUVT99 366 02/06/2023    VITD25 25 (A) 07/02/2018    HGBA1C 6.2 (A) 07/27/2023    CHOL 191 07/27/2023    HDL 57.9 07/27/2023    TRIG 104 07/27/2023    TSH 4.19 (H) 07/27/2023          ASSESSMENT & PLAN  Problem List Items Addressed This Visit       Anxiety    Ataxia    Restless leg syndrome    Overview     s/p lowering venlafaxine--helped with overall situation but did not help with RLS  Suspect contribution of possible lumbar radiculopathy--no back pain  Ferritin 29--having constipation on ferrous sulfate 325mg daily  Of note, pt was taking \"5-6\" doses of iron supplement per month at the instruction apparently of hematologist in past  On GBP 300mg qhs, continue  No clonazepam         Current Assessment & Plan     Getting GBP from PCP per review of rx.   Again suspect lumbar radiculopathy contribution, pt c/o BLE pain from hips down.          Relevant Orders    Follow Up In Neurology    Seizure disorder (Multi) - Primary    Overview     Stated hx  s/p clonazepam--stopped by PCP-- not favortable to be " "on this due to age and SE  On VPA 250mg 3 caps qhs, changed to 250mg 2 caps bid but was sleepy  VPA level was low--reports no sz         Current Assessment & Plan     Refills of VPA sent. Level ordered to get done with next PCP labs.          Relevant Medications    valproic acid (Depakene) 250 mg capsule    Other Relevant Orders    Valproic Acid    Follow Up In Neurology     Other Visit Diagnoses       Gasping for breath              C/o prolonged gasping episodes at night. ? Panic attacks but denies \"any other symptoms of panic attacks.\" Recently started Buspar with PCP. Defer changing to PCP. Description is not typical for a sleep related breathing issue such as KENIA. Can consider sleep study in the future, pt defers as well at this time.     FU in 1 year         "

## 2024-05-01 ENCOUNTER — HOSPITAL ENCOUNTER (OUTPATIENT)
Dept: RADIOLOGY | Facility: CLINIC | Age: 79
Discharge: HOME | End: 2024-05-01
Payer: MEDICARE

## 2024-05-01 DIAGNOSIS — M25.552 BILATERAL HIP PAIN: ICD-10-CM

## 2024-05-01 DIAGNOSIS — M25.551 BILATERAL HIP PAIN: ICD-10-CM

## 2024-05-01 PROCEDURE — 73522 X-RAY EXAM HIPS BI 3-4 VIEWS: CPT | Mod: BILATERAL PROCEDURE | Performed by: RADIOLOGY

## 2024-05-01 PROCEDURE — 73522 X-RAY EXAM HIPS BI 3-4 VIEWS: CPT

## 2024-05-02 ENCOUNTER — LAB (OUTPATIENT)
Dept: LAB | Facility: LAB | Age: 79
End: 2024-05-02
Payer: MEDICARE

## 2024-05-02 DIAGNOSIS — E11.9 DIET-CONTROLLED TYPE 2 DIABETES MELLITUS (MULTI): ICD-10-CM

## 2024-05-02 DIAGNOSIS — G40.909 SEIZURE DISORDER (MULTI): ICD-10-CM

## 2024-05-02 DIAGNOSIS — F41.9 ANXIETY: ICD-10-CM

## 2024-05-02 LAB
CHOLEST SERPL-MCNC: 204 MG/DL (ref 0–199)
CHOLESTEROL/HDL RATIO: 3.2
CREAT UR-MCNC: 36.1 MG/DL (ref 20–320)
HDLC SERPL-MCNC: 63.4 MG/DL
LDLC SERPL CALC-MCNC: 120 MG/DL
MICROALBUMIN UR-MCNC: <7 MG/L
MICROALBUMIN/CREAT UR: NORMAL MG/G{CREAT}
NON HDL CHOLESTEROL: 141 MG/DL (ref 0–149)
TRIGL SERPL-MCNC: 104 MG/DL (ref 0–149)
TSH SERPL-ACNC: 2.21 MIU/L (ref 0.44–3.98)
VALPROATE SERPL-MCNC: 24 UG/ML (ref 50–100)
VLDL: 21 MG/DL (ref 0–40)

## 2024-05-02 PROCEDURE — 80164 ASSAY DIPROPYLACETIC ACD TOT: CPT

## 2024-05-02 PROCEDURE — 84443 ASSAY THYROID STIM HORMONE: CPT

## 2024-05-02 PROCEDURE — 82570 ASSAY OF URINE CREATININE: CPT

## 2024-05-02 PROCEDURE — 36415 COLL VENOUS BLD VENIPUNCTURE: CPT

## 2024-05-02 PROCEDURE — 80061 LIPID PANEL: CPT

## 2024-05-02 PROCEDURE — 83036 HEMOGLOBIN GLYCOSYLATED A1C: CPT

## 2024-05-02 PROCEDURE — 82043 UR ALBUMIN QUANTITATIVE: CPT

## 2024-05-03 LAB
EST. AVERAGE GLUCOSE BLD GHB EST-MCNC: 134 MG/DL
HBA1C MFR BLD: 6.3 %

## 2024-05-06 ENCOUNTER — OFFICE VISIT (OUTPATIENT)
Dept: PRIMARY CARE | Facility: CLINIC | Age: 79
End: 2024-05-06
Payer: MEDICARE

## 2024-05-06 VITALS — DIASTOLIC BLOOD PRESSURE: 68 MMHG | HEART RATE: 72 BPM | RESPIRATION RATE: 14 BRPM | SYSTOLIC BLOOD PRESSURE: 132 MMHG

## 2024-05-06 DIAGNOSIS — F32.2 CURRENT SEVERE EPISODE OF MAJOR DEPRESSIVE DISORDER WITHOUT PSYCHOTIC FEATURES WITHOUT PRIOR EPISODE (MULTI): ICD-10-CM

## 2024-05-06 DIAGNOSIS — M25.551 BILATERAL HIP PAIN: Primary | ICD-10-CM

## 2024-05-06 DIAGNOSIS — M25.552 BILATERAL HIP PAIN: Primary | ICD-10-CM

## 2024-05-06 DIAGNOSIS — R06.02 SOB (SHORTNESS OF BREATH): Primary | ICD-10-CM

## 2024-05-06 PROCEDURE — 1159F MED LIST DOCD IN RCRD: CPT | Performed by: FAMILY MEDICINE

## 2024-05-06 PROCEDURE — 99214 OFFICE O/P EST MOD 30 MIN: CPT | Performed by: FAMILY MEDICINE

## 2024-05-06 PROCEDURE — 1160F RVW MEDS BY RX/DR IN RCRD: CPT | Performed by: FAMILY MEDICINE

## 2024-05-06 RX ORDER — BUSPIRONE HYDROCHLORIDE 5 MG/1
5 TABLET ORAL 2 TIMES DAILY
Qty: 200 TABLET | Refills: 0 | Status: SHIPPED | OUTPATIENT
Start: 2024-05-06

## 2024-05-06 NOTE — PROGRESS NOTES
Subjective   Patient ID: Sue Vail is a 78 y.o. female who presents for fu    HPI   pt denies depressed mood or anxiety while on buspar and effexor. No HI/SI. Normal appetite with good sleep. Pt noticed sob lately. Sob improved with rest. + PND, no LE edema. No cp, heart palpitation or LE edema. No HA, dizziness, imbalance.    Review of Systems    Objective   /68   Pulse 72   Resp 14     Physical Exam  NAD, well groomed, No sclera icterus. No eyelid edema,  lungs: CTA b/l, heart: RRR, no JVD, no LE edema, abd: soft, no tenderness, BS+,  Good balance. No depressed mood, flat affect, HI/SI.    Assessment/Plan   Problem List Items Addressed This Visit             ICD-10-CM    Current severe episode of major depressive disorder without psychotic features without prior episode (Multi) F32.2     Depression, well controlled with effexor and buspar. No HI/SI. Cont. the same. f/u in 3 mos           Relevant Medications    busPIRone (Buspar) 5 mg tablet    SOB (shortness of breath) - Primary R06.02     Low salt diet and check labs for eval         Relevant Orders    B-type natriuretic peptide    Comprehensive metabolic panel

## 2024-05-14 DIAGNOSIS — G47.00 INSOMNIA, UNSPECIFIED TYPE: ICD-10-CM

## 2024-05-14 RX ORDER — DOXEPIN HYDROCHLORIDE 10 MG/1
10 CAPSULE ORAL NIGHTLY
Qty: 90 CAPSULE | Refills: 1 | Status: SHIPPED | OUTPATIENT
Start: 2024-05-14 | End: 2025-05-14

## 2024-06-05 ENCOUNTER — TELEPHONE (OUTPATIENT)
Dept: PRIMARY CARE | Facility: CLINIC | Age: 79
End: 2024-06-05
Payer: MEDICARE

## 2024-06-05 DIAGNOSIS — G25.81 RESTLESS LEG SYNDROME: ICD-10-CM

## 2024-06-05 RX ORDER — GABAPENTIN 300 MG/1
CAPSULE ORAL
Qty: 200 CAPSULE | Refills: 0 | Status: SHIPPED | OUTPATIENT
Start: 2024-06-05

## 2024-06-05 NOTE — TELEPHONE ENCOUNTER
Pt stated that her right leg has been in pain lately and she has been taking gabapentin with tylenol and this is allowing her to be able to walk.   Pt stated that she will be running out of gabapentin and would like her refill to reflect the fact that she is taking 2 a day, pls.   gabapentin (Neurontin) 300 mg capsule//Restless leg syndrome  Cinda Gleason

## 2024-06-06 ENCOUNTER — OFFICE VISIT (OUTPATIENT)
Dept: ORTHOPEDIC SURGERY | Facility: CLINIC | Age: 79
End: 2024-06-06
Payer: MEDICARE

## 2024-06-06 VITALS — HEIGHT: 70 IN | WEIGHT: 210 LBS | BODY MASS INDEX: 30.06 KG/M2

## 2024-06-06 DIAGNOSIS — M25.552 BILATERAL HIP PAIN: ICD-10-CM

## 2024-06-06 DIAGNOSIS — M25.551 BILATERAL HIP PAIN: ICD-10-CM

## 2024-06-06 PROCEDURE — 99204 OFFICE O/P NEW MOD 45 MIN: CPT | Performed by: SPECIALIST

## 2024-06-06 PROCEDURE — 1159F MED LIST DOCD IN RCRD: CPT | Performed by: SPECIALIST

## 2024-06-06 NOTE — PROGRESS NOTES
New patient referred by Dr. Garza her PCP for  bilateral hip pain, denies any injury  Left is worse than right   Pain for a year  Xr 5/1/24HPI  Pain location lateral hip and buttock regions.    GENERAL: A/Ox3, NAD. Appears healthy, well nourished  SKIN: no erythema, rashes, or ecchymoses     MUSCULOSKELETAL:  Laterality: Right lateral hip Exam  - ROM, Extension: full, no flexion contracture  - Strength: Abduction 5/5, Flexion 5/5. Abductor pain against resistance: Intact without significant pain  - Palpation:  TTP along greater trochanter, posterolateral border  - Log roll/IR exam: non painful, good IR  - Straight leg raise: Positive  - EHL/PF/DF motor intact  - Gait: normal, negative Trendelenburg  - Special Tests: None    NEUROVASCULAR:  - Neurovascular Status: sensation intact to light touch distally  - Capillary refill brisk at extremities, Bilateral dorsalis pedis pulse 2+    RADIOGRAPHS: Bilateral hip radiographs show very minimal degenerative change, no significant changes from previous radiograph in 2021.    ASSESSMENT probable lumbar spondylosis/mild radiculopathy.    PLAN patient would benefit from assessment of her back with up-to-date x-rays per Dr. Anand.  I do not sense there is significant pathology from her hip joints.    This note was dictated using speech recognition software and was not corrected for spelling or grammatical errors

## 2024-06-07 ENCOUNTER — TELEPHONE (OUTPATIENT)
Dept: PRIMARY CARE | Facility: CLINIC | Age: 79
End: 2024-06-07

## 2024-06-07 ENCOUNTER — APPOINTMENT (OUTPATIENT)
Dept: OPHTHALMOLOGY | Facility: CLINIC | Age: 79
End: 2024-06-07
Payer: MEDICARE

## 2024-06-07 NOTE — TELEPHONE ENCOUNTER
Pt called and stated that she sat down to go to bed last night and started coughing and could not catch her breath.

## 2024-06-25 ENCOUNTER — TELEPHONE (OUTPATIENT)
Dept: PRIMARY CARE | Facility: CLINIC | Age: 79
End: 2024-06-25

## 2024-06-25 ENCOUNTER — APPOINTMENT (OUTPATIENT)
Dept: RADIOLOGY | Facility: HOSPITAL | Age: 79
DRG: 292 | End: 2024-06-25
Payer: MEDICARE

## 2024-06-25 ENCOUNTER — TELEPHONE (OUTPATIENT)
Dept: PRIMARY CARE | Facility: CLINIC | Age: 79
End: 2024-06-25
Payer: MEDICARE

## 2024-06-25 ENCOUNTER — APPOINTMENT (OUTPATIENT)
Dept: CARDIOLOGY | Facility: HOSPITAL | Age: 79
DRG: 292 | End: 2024-06-25
Payer: MEDICARE

## 2024-06-25 ENCOUNTER — HOSPITAL ENCOUNTER (INPATIENT)
Facility: HOSPITAL | Age: 79
LOS: 5 days | Discharge: HOME | End: 2024-06-30
Attending: EMERGENCY MEDICINE | Admitting: INTERNAL MEDICINE
Payer: MEDICARE

## 2024-06-25 DIAGNOSIS — I50.21 ACUTE SYSTOLIC HEART FAILURE (MULTI): Primary | ICD-10-CM

## 2024-06-25 DIAGNOSIS — I42.9 CARDIOMYOPATHY, UNSPECIFIED (MULTI): ICD-10-CM

## 2024-06-25 DIAGNOSIS — R06.02 SOB (SHORTNESS OF BREATH): ICD-10-CM

## 2024-06-25 DIAGNOSIS — R07.89 OTHER CHEST PAIN: ICD-10-CM

## 2024-06-25 PROBLEM — C85.90 LYMPHOMA (MULTI): Status: ACTIVE | Noted: 2023-05-05

## 2024-06-25 PROBLEM — Z85.72 HISTORY OF LYMPHOMA: Status: ACTIVE | Noted: 2024-06-25

## 2024-06-25 PROBLEM — D64.89 OTHER SPECIFIED ANEMIAS: Status: ACTIVE | Noted: 2024-06-25

## 2024-06-25 LAB
ALBUMIN SERPL BCP-MCNC: 3.8 G/DL (ref 3.4–5)
ALP SERPL-CCNC: 76 U/L (ref 33–136)
ALT SERPL W P-5'-P-CCNC: 13 U/L (ref 7–45)
ANION GAP SERPL CALC-SCNC: 12 MMOL/L (ref 10–20)
AST SERPL W P-5'-P-CCNC: 15 U/L (ref 9–39)
BASOPHILS # BLD AUTO: 0.05 X10*3/UL (ref 0–0.1)
BASOPHILS NFR BLD AUTO: 0.7 %
BILIRUB SERPL-MCNC: 0.4 MG/DL (ref 0–1.2)
BNP SERPL-MCNC: 960 PG/ML (ref 0–99)
BUN SERPL-MCNC: 16 MG/DL (ref 6–23)
CALCIUM SERPL-MCNC: 8.6 MG/DL (ref 8.6–10.3)
CARDIAC TROPONIN I PNL SERPL HS: 17 NG/L (ref 0–13)
CARDIAC TROPONIN I PNL SERPL HS: 21 NG/L (ref 0–13)
CHLORIDE SERPL-SCNC: 106 MMOL/L (ref 98–107)
CO2 SERPL-SCNC: 25 MMOL/L (ref 21–32)
CREAT SERPL-MCNC: 0.71 MG/DL (ref 0.5–1.05)
EGFRCR SERPLBLD CKD-EPI 2021: 87 ML/MIN/1.73M*2
EOSINOPHIL # BLD AUTO: 0.25 X10*3/UL (ref 0–0.4)
EOSINOPHIL NFR BLD AUTO: 3.6 %
ERYTHROCYTE [DISTWIDTH] IN BLOOD BY AUTOMATED COUNT: 15.7 % (ref 11.5–14.5)
GLUCOSE SERPL-MCNC: 118 MG/DL (ref 74–99)
HCT VFR BLD AUTO: 35.5 % (ref 36–46)
HGB BLD-MCNC: 11.6 G/DL (ref 12–16)
IMM GRANULOCYTES # BLD AUTO: 0.01 X10*3/UL (ref 0–0.5)
IMM GRANULOCYTES NFR BLD AUTO: 0.1 % (ref 0–0.9)
INR PPP: 1 (ref 0.9–1.1)
LYMPHOCYTES # BLD AUTO: 2.3 X10*3/UL (ref 0.8–3)
LYMPHOCYTES NFR BLD AUTO: 33.6 %
MAGNESIUM SERPL-MCNC: 2.1 MG/DL (ref 1.6–2.4)
MCH RBC QN AUTO: 28.6 PG (ref 26–34)
MCHC RBC AUTO-ENTMCNC: 32.7 G/DL (ref 32–36)
MCV RBC AUTO: 87 FL (ref 80–100)
MONOCYTES # BLD AUTO: 0.57 X10*3/UL (ref 0.05–0.8)
MONOCYTES NFR BLD AUTO: 8.3 %
NEUTROPHILS # BLD AUTO: 3.67 X10*3/UL (ref 1.6–5.5)
NEUTROPHILS NFR BLD AUTO: 53.7 %
NRBC BLD-RTO: 0 /100 WBCS (ref 0–0)
PLATELET # BLD AUTO: 319 X10*3/UL (ref 150–450)
POTASSIUM SERPL-SCNC: 4 MMOL/L (ref 3.5–5.3)
PROT SERPL-MCNC: 6.2 G/DL (ref 6.4–8.2)
PROTHROMBIN TIME: 11.5 SECONDS (ref 9.8–12.8)
RBC # BLD AUTO: 4.06 X10*6/UL (ref 4–5.2)
SODIUM SERPL-SCNC: 139 MMOL/L (ref 136–145)
WBC # BLD AUTO: 6.9 X10*3/UL (ref 4.4–11.3)

## 2024-06-25 PROCEDURE — 99223 1ST HOSP IP/OBS HIGH 75: CPT | Performed by: NURSE PRACTITIONER

## 2024-06-25 PROCEDURE — 85025 COMPLETE CBC W/AUTO DIFF WBC: CPT | Performed by: EMERGENCY MEDICINE

## 2024-06-25 PROCEDURE — 36415 COLL VENOUS BLD VENIPUNCTURE: CPT | Performed by: EMERGENCY MEDICINE

## 2024-06-25 PROCEDURE — 2500000004 HC RX 250 GENERAL PHARMACY W/ HCPCS (ALT 636 FOR OP/ED): Performed by: NURSE PRACTITIONER

## 2024-06-25 PROCEDURE — 83735 ASSAY OF MAGNESIUM: CPT | Performed by: EMERGENCY MEDICINE

## 2024-06-25 PROCEDURE — 71045 X-RAY EXAM CHEST 1 VIEW: CPT

## 2024-06-25 PROCEDURE — 93308 TTE F-UP OR LMTD: CPT | Performed by: EMERGENCY MEDICINE

## 2024-06-25 PROCEDURE — 85610 PROTHROMBIN TIME: CPT | Performed by: EMERGENCY MEDICINE

## 2024-06-25 PROCEDURE — 93005 ELECTROCARDIOGRAM TRACING: CPT

## 2024-06-25 PROCEDURE — 96374 THER/PROPH/DIAG INJ IV PUSH: CPT | Mod: 59

## 2024-06-25 PROCEDURE — 2060000001 HC INTERMEDIATE ICU ROOM DAILY

## 2024-06-25 PROCEDURE — 2500000001 HC RX 250 WO HCPCS SELF ADMINISTERED DRUGS (ALT 637 FOR MEDICARE OP): Performed by: NURSE PRACTITIONER

## 2024-06-25 PROCEDURE — 99285 EMERGENCY DEPT VISIT HI MDM: CPT | Mod: 25

## 2024-06-25 PROCEDURE — 2500000004 HC RX 250 GENERAL PHARMACY W/ HCPCS (ALT 636 FOR OP/ED): Performed by: EMERGENCY MEDICINE

## 2024-06-25 PROCEDURE — 84484 ASSAY OF TROPONIN QUANT: CPT | Mod: 91 | Performed by: EMERGENCY MEDICINE

## 2024-06-25 PROCEDURE — 84484 ASSAY OF TROPONIN QUANT: CPT | Performed by: EMERGENCY MEDICINE

## 2024-06-25 PROCEDURE — 71045 X-RAY EXAM CHEST 1 VIEW: CPT | Mod: FOREIGN READ | Performed by: RADIOLOGY

## 2024-06-25 PROCEDURE — 83880 ASSAY OF NATRIURETIC PEPTIDE: CPT | Performed by: EMERGENCY MEDICINE

## 2024-06-25 PROCEDURE — 80053 COMPREHEN METABOLIC PANEL: CPT | Performed by: EMERGENCY MEDICINE

## 2024-06-25 RX ORDER — ENOXAPARIN SODIUM 100 MG/ML
40 INJECTION SUBCUTANEOUS EVERY 24 HOURS
Status: DISCONTINUED | OUTPATIENT
Start: 2024-06-25 | End: 2024-06-27

## 2024-06-25 RX ORDER — BISACODYL 5 MG
10 TABLET, DELAYED RELEASE (ENTERIC COATED) ORAL DAILY PRN
Status: DISCONTINUED | OUTPATIENT
Start: 2024-06-25 | End: 2024-06-30 | Stop reason: HOSPADM

## 2024-06-25 RX ORDER — GABAPENTIN 300 MG/1
300 CAPSULE ORAL DAILY
Status: DISCONTINUED | OUTPATIENT
Start: 2024-06-25 | End: 2024-06-30 | Stop reason: HOSPADM

## 2024-06-25 RX ORDER — ACETAMINOPHEN 325 MG/1
650 TABLET ORAL EVERY 6 HOURS PRN
Status: DISCONTINUED | OUTPATIENT
Start: 2024-06-25 | End: 2024-06-30 | Stop reason: HOSPADM

## 2024-06-25 RX ORDER — FUROSEMIDE 10 MG/ML
20 INJECTION INTRAMUSCULAR; INTRAVENOUS ONCE
Status: COMPLETED | OUTPATIENT
Start: 2024-06-25 | End: 2024-06-25

## 2024-06-25 RX ORDER — FUROSEMIDE 10 MG/ML
20 INJECTION INTRAMUSCULAR; INTRAVENOUS 2 TIMES DAILY
Status: DISCONTINUED | OUTPATIENT
Start: 2024-06-25 | End: 2024-06-28

## 2024-06-25 RX ORDER — VENLAFAXINE 37.5 MG/1
75 TABLET ORAL NIGHTLY
Status: DISCONTINUED | OUTPATIENT
Start: 2024-06-25 | End: 2024-06-30 | Stop reason: HOSPADM

## 2024-06-25 RX ORDER — POLYETHYLENE GLYCOL 3350 17 G/17G
17 POWDER, FOR SOLUTION ORAL DAILY
Status: DISCONTINUED | OUTPATIENT
Start: 2024-06-25 | End: 2024-06-30 | Stop reason: HOSPADM

## 2024-06-25 RX ORDER — ATORVASTATIN CALCIUM 40 MG/1
80 TABLET, FILM COATED ORAL DAILY
Status: DISCONTINUED | OUTPATIENT
Start: 2024-06-25 | End: 2024-06-30 | Stop reason: HOSPADM

## 2024-06-25 RX ORDER — BISACODYL 10 MG/1
10 SUPPOSITORY RECTAL DAILY PRN
Status: DISCONTINUED | OUTPATIENT
Start: 2024-06-25 | End: 2024-06-30 | Stop reason: HOSPADM

## 2024-06-25 RX ORDER — ACETAMINOPHEN 160 MG/5ML
650 SOLUTION ORAL EVERY 6 HOURS PRN
Status: DISCONTINUED | OUTPATIENT
Start: 2024-06-25 | End: 2024-06-30 | Stop reason: HOSPADM

## 2024-06-25 RX ORDER — DOXEPIN HYDROCHLORIDE 10 MG/1
10 CAPSULE ORAL NIGHTLY
Status: CANCELLED | OUTPATIENT
Start: 2024-06-25

## 2024-06-25 RX ORDER — VALPROIC ACID 250 MG/5ML
750 SOLUTION ORAL NIGHTLY
Status: DISCONTINUED | OUTPATIENT
Start: 2024-06-25 | End: 2024-06-25

## 2024-06-25 RX ORDER — TALC
6 POWDER (GRAM) TOPICAL NIGHTLY PRN
Status: DISCONTINUED | OUTPATIENT
Start: 2024-06-25 | End: 2024-06-30 | Stop reason: HOSPADM

## 2024-06-25 RX ORDER — ACETAMINOPHEN 650 MG/1
650 SUPPOSITORY RECTAL EVERY 6 HOURS PRN
Status: DISCONTINUED | OUTPATIENT
Start: 2024-06-25 | End: 2024-06-30 | Stop reason: HOSPADM

## 2024-06-25 RX ORDER — DIVALPROEX SODIUM 125 MG/1
750 CAPSULE, COATED PELLETS ORAL NIGHTLY
Status: DISCONTINUED | OUTPATIENT
Start: 2024-06-25 | End: 2024-06-30 | Stop reason: HOSPADM

## 2024-06-25 RX ORDER — BUSPIRONE HYDROCHLORIDE 5 MG/1
5 TABLET ORAL 2 TIMES DAILY
Status: DISCONTINUED | OUTPATIENT
Start: 2024-06-25 | End: 2024-06-30 | Stop reason: HOSPADM

## 2024-06-25 SDOH — SOCIAL STABILITY: SOCIAL INSECURITY: HAVE YOU HAD THOUGHTS OF HARMING ANYONE ELSE?: NO

## 2024-06-25 SDOH — SOCIAL STABILITY: SOCIAL INSECURITY: WERE YOU ABLE TO COMPLETE ALL THE BEHAVIORAL HEALTH SCREENINGS?: YES

## 2024-06-25 ASSESSMENT — ACTIVITIES OF DAILY LIVING (ADL)
PATIENT'S MEMORY ADEQUATE TO SAFELY COMPLETE DAILY ACTIVITIES?: YES
LACK_OF_TRANSPORTATION: NO
ASSISTIVE_DEVICE: EYEGLASSES
PATIENT'S MEMORY ADEQUATE TO SAFELY COMPLETE DAILY ACTIVITIES?: YES
ADEQUATE_TO_COMPLETE_ADL: YES
WALKS IN HOME: INDEPENDENT
GROOMING: INDEPENDENT
HEARING - LEFT EAR: FUNCTIONAL
ADEQUATE_TO_COMPLETE_ADL: YES
FEEDING YOURSELF: INDEPENDENT
WALKS IN HOME: INDEPENDENT
FEEDING YOURSELF: INDEPENDENT
JUDGMENT_ADEQUATE_SAFELY_COMPLETE_DAILY_ACTIVITIES: YES
LACK_OF_TRANSPORTATION: NO
GROOMING: INDEPENDENT
HEARING - RIGHT EAR: FUNCTIONAL
JUDGMENT_ADEQUATE_SAFELY_COMPLETE_DAILY_ACTIVITIES: YES
DRESSING YOURSELF: INDEPENDENT
BATHING: INDEPENDENT
DRESSING YOURSELF: INDEPENDENT
HEARING - RIGHT EAR: DIFFICULTY WITH NOISE
HEARING - LEFT EAR: DIFFICULTY WITH NOISE
BATHING: INDEPENDENT
TOILETING: INDEPENDENT
TOILETING: INDEPENDENT

## 2024-06-25 ASSESSMENT — COLUMBIA-SUICIDE SEVERITY RATING SCALE - C-SSRS
6. HAVE YOU EVER DONE ANYTHING, STARTED TO DO ANYTHING, OR PREPARED TO DO ANYTHING TO END YOUR LIFE?: NO
2. HAVE YOU ACTUALLY HAD ANY THOUGHTS OF KILLING YOURSELF?: NO
1. IN THE PAST MONTH, HAVE YOU WISHED YOU WERE DEAD OR WISHED YOU COULD GO TO SLEEP AND NOT WAKE UP?: NO
6. HAVE YOU EVER DONE ANYTHING, STARTED TO DO ANYTHING, OR PREPARED TO DO ANYTHING TO END YOUR LIFE?: NO

## 2024-06-25 ASSESSMENT — ENCOUNTER SYMPTOMS
DIFFICULTY URINATING: 0
ABDOMINAL PAIN: 0
FLANK PAIN: 0
SINUS PAIN: 0
FATIGUE: 1
NECK STIFFNESS: 0
SHORTNESS OF BREATH: 1
POLYDIPSIA: 0
ARTHRALGIAS: 0
DYSPHORIC MOOD: 0
SORE THROAT: 0
ADENOPATHY: 0
SLEEP DISTURBANCE: 0
COUGH: 0
PALPITATIONS: 0
WHEEZING: 0
DIARRHEA: 0
VOICE CHANGE: 0
DIZZINESS: 0
PHOTOPHOBIA: 0
HEMATURIA: 0
CHEST TIGHTNESS: 0
FEVER: 0
CONSTIPATION: 0
ABDOMINAL DISTENTION: 0
CHOKING: 0
SPEECH DIFFICULTY: 0
WOUND: 0
EYE PAIN: 0
BLOOD IN STOOL: 0
EYE ITCHING: 0
APNEA: 0
HEADACHES: 0
NERVOUS/ANXIOUS: 0
SEIZURES: 0
DYSURIA: 0
TROUBLE SWALLOWING: 0
LIGHT-HEADEDNESS: 0
WEAKNESS: 0
APPETITE CHANGE: 0
NAUSEA: 0
HALLUCINATIONS: 0
BACK PAIN: 0
TREMORS: 0
NUMBNESS: 0
POLYPHAGIA: 0
MYALGIAS: 0
CHILLS: 0
NECK PAIN: 0
BRUISES/BLEEDS EASILY: 0
UNEXPECTED WEIGHT CHANGE: 0
FREQUENCY: 0
VOMITING: 0
CONFUSION: 0
COLOR CHANGE: 0
EYE DISCHARGE: 0

## 2024-06-25 ASSESSMENT — PATIENT HEALTH QUESTIONNAIRE - PHQ9
1. LITTLE INTEREST OR PLEASURE IN DOING THINGS: NOT AT ALL
2. FEELING DOWN, DEPRESSED OR HOPELESS: NOT AT ALL
SUM OF ALL RESPONSES TO PHQ9 QUESTIONS 1 & 2: 0

## 2024-06-25 ASSESSMENT — COGNITIVE AND FUNCTIONAL STATUS - GENERAL
PATIENT BASELINE BEDBOUND: NO
DAILY ACTIVITIY SCORE: 24
MOBILITY SCORE: 24

## 2024-06-25 ASSESSMENT — PAIN SCALES - GENERAL
PAINLEVEL_OUTOF10: 0 - NO PAIN

## 2024-06-25 ASSESSMENT — LIFESTYLE VARIABLES
PRESCIPTION_ABUSE_PAST_12_MONTHS: NO
SUBSTANCE_ABUSE_PAST_12_MONTHS: NO
HOW MANY STANDARD DRINKS CONTAINING ALCOHOL DO YOU HAVE ON A TYPICAL DAY: PATIENT DOES NOT DRINK
HOW OFTEN DO YOU HAVE A DRINK CONTAINING ALCOHOL: NEVER
HOW OFTEN DO YOU HAVE 6 OR MORE DRINKS ON ONE OCCASION: NEVER
AUDIT-C TOTAL SCORE: 0
SKIP TO QUESTIONS 9-10: 1
AUDIT-C TOTAL SCORE: 0

## 2024-06-25 ASSESSMENT — PAIN - FUNCTIONAL ASSESSMENT
PAIN_FUNCTIONAL_ASSESSMENT: 0-10
PAIN_FUNCTIONAL_ASSESSMENT: 0-10

## 2024-06-25 NOTE — ED PROVIDER NOTES
HPI   Chief Complaint   Patient presents with    Shortness of Breath       HPI     HISTORY OF PRESENT ILLNESS:  Patient is a 78-year-old female presents the emergency department with progressive shortness of breath has been ongoing for the last 2 months.  Patient states that her primary care physician has been performing a workup which included providing her with a rescue inhaler which had improved her shortness of breath.  She states that it is worse with exertion and relieved with rest.  The patient    Patient states that she did have a prior mild heart attack after the September 11 attack when she was at a speaking engagement in [place].  No other known cardiac medical problems    Past Medical History: Depressions, seizures well-controlled on valproic acid  Social History: Never cigarette smoker    __________________________________________________________  PHYSICAL EXAM:    Appearance: Alert, oriented , cooperative   Skin: Intact,  dry skin, no lesions, rash, petechiae or purpura.   Eyes: PERRLA, EOMs intact,  Conjunctiva pink with no redness or exudates.    HENT: Normocephalic, atraumatic. Nares patent   Neck: Supple. Trachea at midline.   Pulmonary: Lung sounds are clear bilaterally.  There is no rales, rhonchi, or wheezing.  Cardiac: Regular rate and rhythm, no rubs, murmurs, or gallops. No JVD,   Abdomen: Abdomen is soft, nontender, and nondistended.  No palpable organomegaly.  No rebound or guarding.  No CVA tenderness. Nonsurgical abdomen  Genitourinary: Exam deferred.  Musculoskeletal: no edema, pain, cyanosis, or deformity in extremities. Pulses full and equal.   Neurological:  Cranial nerves are grossly intact, grossly normal sensation, no weakness, no focal findings identified.    __________________________________________________________  MEDICAL DECISION MAKING:    Patient was seen and examined. Differential diagnosis for [ ]        Chronic Medical Conditions Significantly Affecting Care:  Depression    External Records Reviewed: I reviewed recent and relevant outside records including: Primary care physician note from May 6, 2020    Al Watts  Emergency Medicine               Tuan Coma Scale Score: 15                     Patient History   Past Medical History:   Diagnosis Date    Bitten or stung by nonvenomous insect and other nonvenomous arthropods, initial encounter 07/03/2018    Tick bite    Epilepsy, unspecified, not intractable, without status epilepticus (Multi)     Epilepsy    Localized enlarged lymph nodes 08/31/2018    Cervical lymphadenopathy    Multiple sclerosis (Multi)     History of multiple sclerosis    Old myocardial infarction 12/23/2019    History of myocardial infarction    Personal history of malignant melanoma of skin 04/13/2017    History of malignant melanoma    Personal history of malignant melanoma of skin 11/10/2020    History of malignant melanoma of skin    Personal history of malignant melanoma of skin 11/10/2020    History of malignant melanoma of skin    Personal history of non-Hodgkin lymphomas 12/23/2019    History of lymphoma    Personal history of other diseases of the nervous system and sense organs     History of benign essential tremor    Personal history of other diseases of the nervous system and sense organs 04/24/2018    History of tinnitus    Personal history of other diseases of the nervous system and sense organs 12/17/2019    History of diplopia    Personal history of other endocrine, nutritional and metabolic disease 07/02/2018    History of vitamin D deficiency    Personal history of other mental and behavioral disorders 10/28/2019    History of depression    Urge incontinence 04/13/2017    Urge incontinence of urine     Past Surgical History:   Procedure Laterality Date    GASTRIC BYPASS  04/11/2017    Gastric Surgery For Morbid Obesity Bypass With Cecilio-en-Y    OTHER SURGICAL HISTORY  04/11/2017    Axillary Lymphadenectomy    OTHER SURGICAL  HISTORY  12/23/2019    Eye surgery    OTHER SURGICAL HISTORY  12/23/2019    Neck surgery     Family History   Problem Relation Name Age of Onset    Breast cancer Mother      Dementia Father      Alzheimer's disease Father      Prostate cancer Father      Tremor Father      Pulmonary embolism Daughter      Pulmonary embolism Daughter      Other (cardiomegaly) Maternal Grandmother       Social History     Tobacco Use    Smoking status: Never    Smokeless tobacco: Never   Vaping Use    Vaping status: Never Used   Substance Use Topics    Alcohol use: Not Currently    Drug use: Never       Physical Exam   ED Triage Vitals [06/25/24 1140]   Temperature Heart Rate Respirations BP   36.2 °C (97.1 °F) (!) 109 16 (!) 150/93      Pulse Ox Temp src Heart Rate Source Patient Position   97 % -- -- --      BP Location FiO2 (%)     -- --       Physical Exam    ED Course & MDM        Medical Decision Making      Procedure    Performed by: Al Watts DO  Authorized by: Al Watts DO    Procedure: Cardiac Ultrasound    Findings:   Views: parasternal long, parasternal short, apical four and subxiphoid  The pericardial space was visualized and was NEGATIVE for a significant pericardial effusion.  Activity: Ventricular contractions were visualized.  LV: LV systolic function was DECREASED.  RV: RV size was NORMAL.    Impression:  Cardiac: The focused cardiac ultrasound exam had ABNORMAL findings as specified.    Comments: Newly severely decreased ejection fraction compared to echo from 5 years ago.       Al Watts DO  06/25/24 1201

## 2024-06-25 NOTE — CARE PLAN
The patient's goals for the shift include      The clinical goals for the shift include Patient will be free from falls       Problem: Fall/Injury  Goal: Not fall by end of shift  Outcome: Progressing  Goal: Be free from injury by end of the shift  Outcome: Progressing  Goal: Verbalize understanding of personal risk factors for fall in the hospital  Outcome: Progressing  Goal: Verbalize understanding of risk factor reduction measures to prevent injury from fall in the home  Outcome: Progressing  Goal: Pace activities to prevent fatigue by end of the shift  Outcome: Progressing     Problem: Heart Failure  Goal: Report improvement of dyspnea/breathlessness this shift  Outcome: Progressing  Goal: Weight from fluid excess reduced over 2-3 days, then stabilize  Outcome: Progressing  Goal: Increase self care and/or family involvement in 24 hours  Outcome: Progressing

## 2024-06-25 NOTE — H&P
History Obtained From: Patient    History Of Present Illness:  Sue Vail is a 78 y.o. female with PMHx s/f melanoma, lymphoma treated with Rituxan reportedly in remission now presenting with shortness of breath.  For the last couple of months patient has had progressively worsening shortness of breath with activity.  She has also had some associated restless when trying to lay flat she is night stating that she becomes very short of breath and gets better when she sits up but she does appear to be somewhat symptomatic when laying down.  She denies any lower extremity edema any increased abdominal girth any chest pain palpitations.  On presentation patient had temperature of 97.1 heart rate 109 respiratory rate 16 blood pressure 150/93 SpO2 97%, lab work shows BN peptide 960 troponin 21 with repeat troponin 17 total protein slightly low at 6.2 glucose 118 remainder of chemistry panel within normal limits INR is 1 CBC has no leukocytosis hemoglobin 11.6 hematocrit 35.5 platelets 319.  Chest x-ray showing mild cardiomegaly and interstitial prominence with compatible edema the EKG shows sinus rhythm with left bundle branch block which is new versus prior EKG in 2018.  The patient was given a dose of IV furosemide and referred for admission due to her new onset of congestive heart failure a bedside echocardiogram was done by the ER provider initial reading from his perspective was there was significant reduced ejection fraction.          ED Course:  ED Course as of 06/25/24 1507   Tue Jun 25, 2024   1257 Patient twelve-lead EKG interpreted by myself shows sinus rhythm, ventricular rate 90, normal OH interval, left axis deviation, left bundle branch present, prolonged QT with QTc of 493 ms, no STEMI/Sgarbossa's criteria negative.  Bundle branch block is new when compared to August 8, 2018 [WJ]   1259 BNP(!): 960  Consistent with new heart failure. [WJ]   1308 XR chest 1 view  Independently reviewed and appeared to have  pulmonary edema. No PTX.  [WJ]      ED Course User Index  [WJ] Al Watts DO         Diagnoses as of 06/25/24 1507   Acute systolic heart failure (Multi)     Relevant Results  Results for orders placed or performed during the hospital encounter of 06/25/24 (from the past 24 hour(s))   CBC and Auto Differential   Result Value Ref Range    WBC 6.9 4.4 - 11.3 x10*3/uL    nRBC 0.0 0.0 - 0.0 /100 WBCs    RBC 4.06 4.00 - 5.20 x10*6/uL    Hemoglobin 11.6 (L) 12.0 - 16.0 g/dL    Hematocrit 35.5 (L) 36.0 - 46.0 %    MCV 87 80 - 100 fL    MCH 28.6 26.0 - 34.0 pg    MCHC 32.7 32.0 - 36.0 g/dL    RDW 15.7 (H) 11.5 - 14.5 %    Platelets 319 150 - 450 x10*3/uL    Neutrophils % 53.7 40.0 - 80.0 %    Immature Granulocytes %, Automated 0.1 0.0 - 0.9 %    Lymphocytes % 33.6 13.0 - 44.0 %    Monocytes % 8.3 2.0 - 10.0 %    Eosinophils % 3.6 0.0 - 6.0 %    Basophils % 0.7 0.0 - 2.0 %    Neutrophils Absolute 3.67 1.60 - 5.50 x10*3/uL    Immature Granulocytes Absolute, Automated 0.01 0.00 - 0.50 x10*3/uL    Lymphocytes Absolute 2.30 0.80 - 3.00 x10*3/uL    Monocytes Absolute 0.57 0.05 - 0.80 x10*3/uL    Eosinophils Absolute 0.25 0.00 - 0.40 x10*3/uL    Basophils Absolute 0.05 0.00 - 0.10 x10*3/uL   Protime-INR   Result Value Ref Range    Protime 11.5 9.8 - 12.8 seconds    INR 1.0 0.9 - 1.1   Comprehensive Metabolic Panel   Result Value Ref Range    Glucose 118 (H) 74 - 99 mg/dL    Sodium 139 136 - 145 mmol/L    Potassium 4.0 3.5 - 5.3 mmol/L    Chloride 106 98 - 107 mmol/L    Bicarbonate 25 21 - 32 mmol/L    Anion Gap 12 10 - 20 mmol/L    Urea Nitrogen 16 6 - 23 mg/dL    Creatinine 0.71 0.50 - 1.05 mg/dL    eGFR 87 >60 mL/min/1.73m*2    Calcium 8.6 8.6 - 10.3 mg/dL    Albumin 3.8 3.4 - 5.0 g/dL    Alkaline Phosphatase 76 33 - 136 U/L    Total Protein 6.2 (L) 6.4 - 8.2 g/dL    AST 15 9 - 39 U/L    Bilirubin, Total 0.4 0.0 - 1.2 mg/dL    ALT 13 7 - 45 U/L   Magnesium   Result Value Ref Range    Magnesium 2.10 1.60 - 2.40 mg/dL    B-Type Natriuretic Peptide   Result Value Ref Range     (H) 0 - 99 pg/mL   Troponin I, High Sensitivity, Initial   Result Value Ref Range    Troponin I, High Sensitivity 21 (H) 0 - 13 ng/L   Troponin, High Sensitivity, 1 Hour   Result Value Ref Range    Troponin I, High Sensitivity 17 (H) 0 - 13 ng/L      XR chest 1 view    Result Date: 6/25/2024  STUDY: Chest Radiograph;  6/25/2024, 13:08 INDICATION: Shortness of breath for 2 months. COMPARISON: None Available ACCESSION NUMBER(S): [id number] ORDERING CLINICIAN: JUDY WATTS TECHNIQUE:  Frontal chest was obtained at 13:08 hours. FINDINGS: CARDIOMEDIASTINAL SILHOUETTE: Heart is mildly enlarged. LUNGS: Interstitial prominence compatible with edema versus pneumonitis. Probable small bilateral pleural effusions.  ABDOMEN: No remarkable upper abdominal findings.  BONES: No acute osseous changes.    Mild cardiomegaly and interstitial prominence compatible with edema versus pneumonitis. Probable small bilateral pleural effusions. Signed by Caleb Chaudhary MD    Point of Care Ultrasound    Result Date: 6/25/2024  Judy Watts DO     6/25/2024  2:04 PM Performed by: Judy Watts DO Authorized by: Judy Watts DO  Procedure: Cardiac Ultrasound Findings:  Views: parasternal long, parasternal short, apical four and subxiphoid The pericardial space was visualized and was NEGATIVE for a significant pericardial effusion. Activity: Ventricular contractions were visualized. LV: LV systolic function was DECREASED. RV: RV size was NORMAL. Impression: Cardiac: The focused cardiac ultrasound exam had ABNORMAL findings as specified. Comments: Severely decreased ejection fraction when compared to the echocardiogram from 5 years ago.     Scheduled medications:    Continuous medications:    PRN medications:        Past Medical History  She has a past medical history of Bitten or stung by nonvenomous insect and other nonvenomous arthropods, initial encounter (07/03/2018),  Epilepsy, unspecified, not intractable, without status epilepticus (Multi), Localized enlarged lymph nodes (08/31/2018), Multiple sclerosis (Multi), 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), Personal history of malignant melanoma of skin (04/13/2017), Personal history of malignant melanoma of skin (11/10/2020), Personal history of malignant melanoma of skin (11/10/2020), 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    Surgical History  She has a past surgical history that includes Other surgical history (04/11/2017); Gastric bypass (04/11/2017); Other surgical history (12/23/2019); and Other surgical history (12/23/2019).     Social History  She reports that she has never smoked. She has never used smokeless tobacco. She reports that she does not currently use alcohol. She reports that she does not use drugs.    Family History  Family History   Problem Relation Name Age of Onset    Breast cancer Mother      Dementia Father      Alzheimer's disease Father      Prostate cancer Father      Tremor Father      Pulmonary embolism Daughter      Pulmonary embolism Daughter      Other (cardiomegaly) Maternal Grandmother          Allergies  Ace inhibitors, Adhesive tape-silicones, Beta-blockers (beta-adrenergic blocking agts), Fluoxetine, Nsaids (non-steroidal anti-inflammatory drug), Olanzapine, Other omega-3s, and Penicillins    Code Status  No Order     Review of Systems   Constitutional:  Positive for fatigue. Negative for appetite change, chills, fever and unexpected weight  change.   HENT:  Negative for congestion, ear discharge, ear pain, mouth sores, nosebleeds, sinus pain, sore throat, trouble swallowing and voice change.    Eyes:  Negative for photophobia, pain, discharge, itching and visual disturbance.   Respiratory:  Positive for shortness of breath. Negative for apnea, cough, choking, chest tightness and wheezing.    Cardiovascular:  Negative for chest pain, palpitations and leg swelling.   Gastrointestinal:  Negative for abdominal distention, abdominal pain, blood in stool, constipation, diarrhea, nausea and vomiting.   Endocrine: Negative for cold intolerance, heat intolerance, polydipsia, polyphagia and polyuria.   Genitourinary:  Negative for decreased urine volume, difficulty urinating, dysuria, flank pain, frequency, hematuria and urgency.   Musculoskeletal:  Negative for arthralgias, back pain, gait problem, myalgias, neck pain and neck stiffness.   Skin:  Negative for color change, pallor and wound.   Allergic/Immunologic: Negative for food allergies and immunocompromised state.   Neurological:  Negative for dizziness, tremors, seizures, syncope, speech difficulty, weakness, light-headedness, numbness and headaches.   Hematological:  Negative for adenopathy. Does not bruise/bleed easily.   Psychiatric/Behavioral:  Negative for confusion, dysphoric mood, hallucinations, sleep disturbance and suicidal ideas. The patient is not nervous/anxious.        Last Recorded Vitals  BP (!) 159/91   Pulse 89   Temp 36.2 °C (97.1 °F)   Resp 16   Wt 95.3 kg (210 lb)   SpO2 97%      Physical Exam  Vitals reviewed.   Constitutional:       General: She is not in acute distress.  HENT:      Head: Normocephalic and atraumatic.      Right Ear: External ear normal.      Left Ear: External ear normal.      Nose: Nose normal.      Mouth/Throat:      Mouth: Mucous membranes are moist.      Pharynx: Oropharynx is clear.   Eyes:      General: No scleral icterus.     Extraocular Movements:  Extraocular movements intact.      Conjunctiva/sclera: Conjunctivae normal.      Pupils: Pupils are equal, round, and reactive to light.   Cardiovascular:      Rate and Rhythm: Normal rate and regular rhythm.      Pulses: Normal pulses.      Heart sounds: Normal heart sounds.   Pulmonary:      Effort: Pulmonary effort is normal. No respiratory distress.      Breath sounds: Normal breath sounds. No wheezing, rhonchi or rales.   Chest:      Chest wall: No tenderness.   Abdominal:      General: Bowel sounds are normal. There is no distension.      Palpations: Abdomen is soft. There is no mass.      Tenderness: There is no abdominal tenderness. There is no rebound.   Musculoskeletal:         General: No swelling or deformity. Normal range of motion.      Cervical back: Normal range of motion.      Right lower leg: Edema present.      Left lower leg: Edema present.   Skin:     General: Skin is warm and dry.      Capillary Refill: Capillary refill takes less than 2 seconds.   Neurological:      General: No focal deficit present.      Mental Status: She is alert and oriented to person, place, and time.      Cranial Nerves: No cranial nerve deficit.      Sensory: No sensory deficit.   Psychiatric:         Mood and Affect: Mood normal.         Behavior: Behavior normal.         Assessment/Plan   Principal Problem:    Acute systolic heart failure (Multi)    Acute systolic CHF new onset  Continue diuretics,   Patient has allergies listed to beta-blockers and ACE inhibitors with resultant hypotension  Echocardiogram and cardiology consult requested    Hx lymphoma treated w Rituxan and now reportedly  in remission    History of seizure disorder restless legs  Continue home medications    Anemia  appears to be intermittent and somewhat chronic  We will monitor for now and defer to outpatient therapy        No new Assessment & Plan notes have been filed under this hospital service since the last note was generated.  Service:  Internal Medicine          Thomas Wise, APRN-CNP    Dragon dictation software was used to dictate this note and thus there may be minor errors in translation/transcription including garbled speech or misspellings. Please contact for clarification if needed.

## 2024-06-25 NOTE — PROGRESS NOTES
Sue Vail is a 78 y.o. female admitted for No Principal Problem: There is no principal problem currently on the Problem List. Please update the Problem List and refresh.. Pharmacy reviewed the patient's [id number] medications and allergies for accuracy.    The list below reflects the PTA list prior to pharmacy medication history. A summary a changes to the PTA medication list has been listed below. Please review each medication in order reconciliation for additional clarification and justification.    Source of information:  T2P    Medications added:    Medications modified:  Gabapentin 300mg bid --> 300mg every day   Magnesium --> every day prn  Venlafaxine 75mg 2c every day --> 75mg every day     Medications to be removed:  Doxycycline 100mg  Multivitamin   Zinc     Medications of concern:      Prior to Admission Medications   Prescriptions Last Dose Informant Patient Reported? Taking?   MAGNESIUM ORAL   Yes No   Sig: chew   ZINC ORAL   Yes No   albuterol (Ventolin HFA) 90 mcg/actuation inhaler   No No   Sig: Inhale 2 puffs every 4 hours if needed for wheezing or shortness of breath.   blood sugar diagnostic (Contour Next Test Strips) strip   Yes No   busPIRone (Buspar) 5 mg tablet   No No   Sig: Take 1 tablet (5 mg) by mouth 2 times a day.   cholecalciferol (Vitamin D-3) 125 MCG (5000 UT) capsule   Yes No   Sig: Take by mouth q 24 HR.   doxepin (SINEquan) 10 mg capsule   No No   Sig: Take 1 capsule (10 mg) by mouth once daily at bedtime.   doxycycline (Vibramycin) 100 mg capsule   No No   Sig: Take 1 capsule (100 mg) by mouth 2 times a day.   gabapentin (Neurontin) 300 mg capsule   No No   Sig: TAKE 1 CAPSULE(300 MG) BY MOUTH bid   multivit-min/iron/folic acid/K (ADULTS MULTIVITAMIN ORAL)   Yes No   Sig: Take 1 tablet by mouth once daily.   valproic acid (Depakene) 250 mg capsule   No No   Sig: Take 3 capsules (750 mg) by mouth once daily at bedtime.   venlafaxine (Effexor) 75 mg tablet   No No   Sig:  TAKE 2 TABLETS(150 MG) BY MOUTH EVERY DAY AT BEDTIME      Facility-Administered Medications: None       Solange Medina

## 2024-06-25 NOTE — ED PROVIDER NOTES
HPI   Chief Complaint   Patient presents with    Shortness of Breath       HPI  HISTORY OF PRESENT ILLNESS:  Patient is a 78-year-old female presents the emergency department with progressive shortness of breath has been ongoing for the last 2 months.  Patient states that her primary care physician has been performing a workup which included providing her with a rescue inhaler which had improved her shortness of breath.  She states that it is worse with exertion and relieved with rest.  The patient    Patient states that she did have a prior mild heart attack after the September 11 attack when she was at a speaking engagement in [place].  No other known cardiac medical problems    Past Medical History: Depressions, seizures well-controlled on valproic acid  Social History: Never cigarette smoker    __________________________________________________________  PHYSICAL EXAM:    Appearance: Alert, oriented , cooperative   Skin: Intact,  dry skin, no lesions, rash, petechiae or purpura.   Eyes: PERRLA, EOMs intact,  Conjunctiva pink with no redness or exudates.    HENT: Normocephalic, atraumatic. Nares patent   Neck: Supple. Trachea at midline.   Pulmonary: Lung sounds are clear bilaterally.  There is no rales, rhonchi, or wheezing.  Cardiac: Regular rate and rhythm, no rubs, murmurs, or gallops. No JVD,   Abdomen: Abdomen is soft, nontender, and nondistended.  No palpable organomegaly.  No rebound or guarding.  No CVA tenderness. Nonsurgical abdomen  Genitourinary: Exam deferred.  Musculoskeletal: no edema, pain, cyanosis, or deformity in extremities. Pulses full and equal.   Neurological:  Cranial nerves are grossly intact, grossly normal sensation, no weakness, no focal findings identified.    __________________________________________________________  MEDICAL DECISION MAKING:    Patient was seen and examined. Differential diagnosis for shortness of breath includes pneumonia, viral illness, new onset heart  failure, arrhythmia.  Patient has been having progressive worsening shortness of breath for the last 2 months that acutely worsened last evening. No recent chest pain episodes that the patient recalls. I performed a point-of-care ultrasound upon my initial evaluation.  Is concerning for severely decreased ejection fraction.  This would explain the patient's symptoms.  EKG showed new left bundle branch block present.  Cardiac workup was performed.  Showed consistent findings with heart failure.  Patient had a BNP elevation of 960.  Patient was given IV dose of Lasix.  [facility] who agreed that patient admitted.    Chronic Medical Conditions Significantly Affecting Care: Depression    External Records Reviewed: I reviewed recent and relevant outside records including: Primary care physician note from May 6, 2020    Al Watts  Emergency Medicine                  [place] Coma Scale Score: 15                     Patient History   Past Medical History:   Diagnosis Date    Bitten or stung by nonvenomous insect and other nonvenomous arthropods, initial encounter 07/03/2018    Tick bite    Epilepsy, unspecified, not intractable, without status epilepticus (Multi)     Epilepsy    Localized enlarged lymph nodes 08/31/2018    Cervical lymphadenopathy    Multiple sclerosis (Multi)     History of multiple sclerosis    Old myocardial infarction 12/23/2019    History of myocardial infarction    Personal history of malignant melanoma of skin 04/13/2017    History of malignant melanoma    Personal history of malignant melanoma of skin 11/10/2020    History of malignant melanoma of skin    Personal history of malignant melanoma of skin 11/10/2020    History of malignant melanoma of skin    Personal history of non-Hodgkin lymphomas 12/23/2019    History of lymphoma    Personal history of other diseases of the nervous system and sense organs     History of benign essential tremor    Personal history of other diseases of  the nervous system and sense organs 04/24/2018    History of tinnitus    Personal history of other diseases of the nervous system and sense organs 12/17/2019    History of diplopia    Personal history of other endocrine, nutritional and metabolic disease 07/02/2018    History of vitamin D deficiency    Personal history of other mental and behavioral disorders 10/28/2019    History of depression    Urge incontinence 04/13/2017    Urge incontinence of urine     Past Surgical History:   Procedure Laterality Date    GASTRIC BYPASS  04/11/2017    Gastric Surgery For Morbid Obesity Bypass With Cecilio-en-Y    OTHER SURGICAL HISTORY  04/11/2017    Axillary Lymphadenectomy    OTHER SURGICAL HISTORY  12/23/2019    Eye surgery    OTHER SURGICAL HISTORY  12/23/2019    Neck surgery     Family History   Problem Relation Name Age of Onset    Breast cancer Mother      Dementia Father      Alzheimer's disease Father      Prostate cancer Father      Tremor Father      Pulmonary embolism Daughter      Pulmonary embolism Daughter      Other (cardiomegaly) Maternal Grandmother       Social History     Tobacco Use    Smoking status: Never    Smokeless tobacco: Never   Vaping Use    Vaping status: Never Used   Substance Use Topics    Alcohol use: Not Currently    Drug use: Never       Physical Exam   ED Triage Vitals [06/25/24 1140]   Temperature Heart Rate Respirations BP   36.2 °C (97.1 °F) (!) 109 16 (!) 150/93      Pulse Ox Temp src Heart Rate Source Patient Position   97 % -- -- --      BP Location FiO2 (%)     -- --       Physical Exam    ED Course & MDM   ED Course as of 06/25/24 1403   Tue Jun 25, 2024   1257 Patient twelve-lead EKG interpreted by myself shows sinus rhythm, ventricular rate 90, normal DC interval, left axis deviation, left bundle branch present, prolonged QT with QTc of 493 ms, no STEMI/Sgarbossa's criteria negative.  Bundle branch block is new when compared to August 8, 2018 [WJ]   1257 BNP(!): 960  Consistent with  new heart failure. [WJ]   1308 XR chest 1 view  Independently reviewed and appeared to have pulmonary edema. No PTX.  [WJ]      ED Course User Index  [WJ] Al Watts DO         Diagnoses as of 06/25/24 1403   Acute systolic heart failure (Multi)       Medical Decision Making      Procedure    Performed by: Al Watts DO  Authorized by: Al Watts DO    Procedure: Cardiac Ultrasound    Findings:   Views: parasternal long, parasternal short, apical four and subxiphoid  The pericardial space was visualized and was NEGATIVE for a significant pericardial effusion.  Activity: Ventricular contractions were visualized.  LV: LV systolic function was DECREASED.  RV: RV size was NORMAL.    Impression:  Cardiac: The focused cardiac ultrasound exam had ABNORMAL findings as specified.    Comments: Severely decreased ejection fraction when compared to the echocardiogram from 5 years ago.       Al Watts DO  06/25/24 1404

## 2024-06-25 NOTE — TELEPHONE ENCOUNTER
Patient stated that she had a particularly hard time breathing last night and the inhaler has been working up until last night. Pt stated that while walking a short way from living room to bedroom she almost collapsed. Pt would like to know she should see a pulmonologist?

## 2024-06-26 ENCOUNTER — APPOINTMENT (OUTPATIENT)
Dept: CARDIOLOGY | Facility: HOSPITAL | Age: 79
End: 2024-06-26
Payer: MEDICARE

## 2024-06-26 LAB
ANION GAP SERPL CALC-SCNC: 11 MMOL/L (ref 10–20)
AORTIC VALVE MEAN GRADIENT: 3 MMHG
AORTIC VALVE PEAK VELOCITY: 1.22 M/S
AV PEAK GRADIENT: 6 MMHG
AVA (PEAK VEL): 2.6 CM2
AVA (VTI): 2.15 CM2
BODY SURFACE AREA: 1.94 M2
BUN SERPL-MCNC: 14 MG/DL (ref 6–23)
CALCIUM SERPL-MCNC: 8.5 MG/DL (ref 8.6–10.3)
CHLORIDE SERPL-SCNC: 108 MMOL/L (ref 98–107)
CO2 SERPL-SCNC: 25 MMOL/L (ref 21–32)
CREAT SERPL-MCNC: 0.77 MG/DL (ref 0.5–1.05)
EGFRCR SERPLBLD CKD-EPI 2021: 79 ML/MIN/1.73M*2
EJECTION FRACTION APICAL 4 CHAMBER: 25.9
EJECTION FRACTION: 22 %
ERYTHROCYTE [DISTWIDTH] IN BLOOD BY AUTOMATED COUNT: 15.7 % (ref 11.5–14.5)
GLUCOSE SERPL-MCNC: 179 MG/DL (ref 74–99)
HCT VFR BLD AUTO: 33.6 % (ref 36–46)
HGB BLD-MCNC: 10.9 G/DL (ref 12–16)
LEFT ATRIUM VOLUME AREA LENGTH INDEX BSA: 31.7 ML/M2
LEFT VENTRICLE INTERNAL DIMENSION DIASTOLE: 4.7 CM (ref 3.5–6)
LEFT VENTRICULAR OUTFLOW TRACT DIAMETER: 2 CM
LV EJECTION FRACTION BIPLANE: 22 %
MCH RBC QN AUTO: 28.5 PG (ref 26–34)
MCHC RBC AUTO-ENTMCNC: 32.4 G/DL (ref 32–36)
MCV RBC AUTO: 88 FL (ref 80–100)
NRBC BLD-RTO: 0 /100 WBCS (ref 0–0)
PLATELET # BLD AUTO: 286 X10*3/UL (ref 150–450)
POTASSIUM SERPL-SCNC: 3.8 MMOL/L (ref 3.5–5.3)
RBC # BLD AUTO: 3.83 X10*6/UL (ref 4–5.2)
RIGHT VENTRICLE FREE WALL PEAK S': 18.1 CM/S
RIGHT VENTRICLE PEAK SYSTOLIC PRESSURE: 49 MMHG
SODIUM SERPL-SCNC: 140 MMOL/L (ref 136–145)
TRICUSPID ANNULAR PLANE SYSTOLIC EXCURSION: 2.3 CM
WBC # BLD AUTO: 6.4 X10*3/UL (ref 4.4–11.3)

## 2024-06-26 PROCEDURE — 97165 OT EVAL LOW COMPLEX 30 MIN: CPT | Mod: GO

## 2024-06-26 PROCEDURE — 97161 PT EVAL LOW COMPLEX 20 MIN: CPT | Mod: GP

## 2024-06-26 PROCEDURE — 99232 SBSQ HOSP IP/OBS MODERATE 35: CPT | Performed by: INTERNAL MEDICINE

## 2024-06-26 PROCEDURE — 99223 1ST HOSP IP/OBS HIGH 75: CPT | Performed by: INTERNAL MEDICINE

## 2024-06-26 PROCEDURE — 85027 COMPLETE CBC AUTOMATED: CPT | Performed by: NURSE PRACTITIONER

## 2024-06-26 PROCEDURE — 80048 BASIC METABOLIC PNL TOTAL CA: CPT | Performed by: NURSE PRACTITIONER

## 2024-06-26 PROCEDURE — 2500000001 HC RX 250 WO HCPCS SELF ADMINISTERED DRUGS (ALT 637 FOR MEDICARE OP): Performed by: INTERNAL MEDICINE

## 2024-06-26 PROCEDURE — 2500000001 HC RX 250 WO HCPCS SELF ADMINISTERED DRUGS (ALT 637 FOR MEDICARE OP): Performed by: NURSE PRACTITIONER

## 2024-06-26 PROCEDURE — 93306 TTE W/DOPPLER COMPLETE: CPT

## 2024-06-26 PROCEDURE — 36415 COLL VENOUS BLD VENIPUNCTURE: CPT | Performed by: NURSE PRACTITIONER

## 2024-06-26 PROCEDURE — 2500000004 HC RX 250 GENERAL PHARMACY W/ HCPCS (ALT 636 FOR OP/ED): Performed by: NURSE PRACTITIONER

## 2024-06-26 PROCEDURE — 97530 THERAPEUTIC ACTIVITIES: CPT | Mod: GO

## 2024-06-26 PROCEDURE — 93306 TTE W/DOPPLER COMPLETE: CPT | Performed by: INTERNAL MEDICINE

## 2024-06-26 PROCEDURE — 97530 THERAPEUTIC ACTIVITIES: CPT | Mod: GP

## 2024-06-26 PROCEDURE — 2500000004 HC RX 250 GENERAL PHARMACY W/ HCPCS (ALT 636 FOR OP/ED): Performed by: INTERNAL MEDICINE

## 2024-06-26 PROCEDURE — 2500000001 HC RX 250 WO HCPCS SELF ADMINISTERED DRUGS (ALT 637 FOR MEDICARE OP): Performed by: STUDENT IN AN ORGANIZED HEALTH CARE EDUCATION/TRAINING PROGRAM

## 2024-06-26 PROCEDURE — 2060000001 HC INTERMEDIATE ICU ROOM DAILY

## 2024-06-26 RX ORDER — ISOSORBIDE DINITRATE 10 MG/1
10 TABLET ORAL
Status: DISCONTINUED | OUTPATIENT
Start: 2024-06-26 | End: 2024-06-30 | Stop reason: HOSPADM

## 2024-06-26 RX ORDER — HYDRALAZINE HYDROCHLORIDE 10 MG/1
10 TABLET, FILM COATED ORAL 3 TIMES DAILY
Status: DISCONTINUED | OUTPATIENT
Start: 2024-06-26 | End: 2024-06-30 | Stop reason: HOSPADM

## 2024-06-26 RX ORDER — DOXEPIN HYDROCHLORIDE 10 MG/1
10 CAPSULE ORAL NIGHTLY
Status: DISCONTINUED | OUTPATIENT
Start: 2024-06-26 | End: 2024-06-30 | Stop reason: HOSPADM

## 2024-06-26 ASSESSMENT — PAIN - FUNCTIONAL ASSESSMENT
PAIN_FUNCTIONAL_ASSESSMENT: 0-10

## 2024-06-26 ASSESSMENT — PAIN SCALES - GENERAL
PAINLEVEL_OUTOF10: 0 - NO PAIN
PAINLEVEL_OUTOF10: 3

## 2024-06-26 ASSESSMENT — COGNITIVE AND FUNCTIONAL STATUS - GENERAL
DAILY ACTIVITIY SCORE: 24
CLIMB 3 TO 5 STEPS WITH RAILING: TOTAL
WALKING IN HOSPITAL ROOM: A LITTLE
MOBILITY SCORE: 20

## 2024-06-26 ASSESSMENT — ACTIVITIES OF DAILY LIVING (ADL)
BATHING_ASSISTANCE: INDEPENDENT
ADL_ASSISTANCE: INDEPENDENT
ADL_ASSISTANCE: INDEPENDENT

## 2024-06-26 ASSESSMENT — PAIN DESCRIPTION - LOCATION: LOCATION: HEAD

## 2024-06-26 NOTE — CONSULTS
"Inpatient consult to Cardiology  Consult performed by: Kate Cazares MD  Consult ordered by: Thomas Wise, APRN-CNP  Reason for consult: acute systolic heart failure        History Of Present Illness:    Sue Vail is a 78 y.o. female presenting with shortness of breath.  The patient has a history of melanoma, lymphoma treated with Rituxan (from [id number]) reportedly in remission now presenting with shortness of breath.      The patient tells me years ago (in the 2000's) she had a cardiac workup including a nuclear stress test, after which she was asked \"when did you have a heart attack?\"  She was told then she had a scar on her heart, but tells me she did not receive any stents or heart surgery.  She does recall on 9/11 she had a very stressful event while on a business trip, and is wondering if she had a \"heart attack\" at that time.  She otherwise denies any prior history of CHF, MI, CAD.    She did have an echo in our system in 2018 which demonstrated an LVEF of 55-60%.    The patient tells me that since she received rituximab between [id number].  Following this she developed severe fatigue to the point that she is virtually bedridden.  In the last two months, however she lso developed progressively worsening shortness of breath with associated orthopnea, PND.  Denies LE edema.  No chest pain/pressure.  Prior to presentation she was so short of breath while feeding the dog and doing the dishes that she thought she was having a heart attack.  On presentation to ED , /93.  Sat 97% on RA.  HSTI 21 > 17.  HB 11.6, Plt 319.  CXR demonstrated mild cardiomegaly and interstitial prominence with compatible edema.  EKG demonstrated LBBB (new since prior EKG 2018).  The patient rec'd IV lasix and was admitted for further management.    TTE 6/26/24 demonstrated:   1. Multiple segmental abnormalities exist. See findings. (RCA distribution, entire septum, entire apex are akinetic, basal and mid anterior " wall, basal mid anterolateral wall, and basal mid inferolateral wall HK)   2. The left ventricular systolic function is severely decreased, with a Sorenson's biplane calculated ejection fraction of 22%.   3. There are multiple wall motion abnormalities.   4. Spectral Doppler shows an abnormal pattern of left ventricular diastolic filling.   5. There is normal right ventricular global systolic function.   6. Moderate mitral valve regurgitation.   7. Moderately elevated right ventricular systolic pressure.    TTE 2018:  CONCLUSIONS:   1. The left ventricular systolic function is normal with a 55-60% estimated ejection fraction.   2. Spectral Doppler shows an impaired relaxation pattern of left ventricular diastolic filling.       Fhx:  maternal grandmother with enlarged heart  SocHx:  lifetime nonsmoker, rare EtOH, denies illicit drug use    ROS:  The remainder of the review of systems was obtained, as was negative as pertains to the chief complaint.      Last Recorded Vitals:  Vitals:    06/25/24 2315 06/26/24 0332 06/26/24 0926 06/26/24 1204   BP: 138/82 157/87 144/86 (!) 145/91   BP Location: Right arm Left arm Left arm Left arm   Patient Position: Lying Lying Lying Lying   Pulse: 90 99 91 85   Resp: 20 20 20 18   Temp: 36.2 °C (97.2 °F) 36.4 °C (97.6 °F) 36.1 °C (96.9 °F) 36.4 °C (97.5 °F)   TempSrc: Temporal Temporal Temporal Temporal   SpO2: 95% 98% 94% 93%   Weight:  86.2 kg (190 lb 0.6 oz)     Height:           Last Labs:  CBC - 6/26/2024:  4:23 AM  6.4 10.9 286    33.6      CMP - 6/26/2024:  4:23 AM  8.5 6.2 15 --- 0.4   _ 3.8 13 76      PTT - No results in last year.  1.0   11.5 _     Troponin I, High Sensitivity   Date/Time Value Ref Range Status   06/25/2024 01:36 PM 17 (H) 0 - 13 ng/L Final   06/25/2024 12:22 PM 21 (H) 0 - 13 ng/L Final     BNP   Date/Time Value Ref Range Status   06/25/2024 12:22  (H) 0 - 99 pg/mL Final     Hemoglobin A1C   Date/Time Value Ref Range Status   05/02/2024 11:17 AM 6.3  (H) see below % Final   07/27/2023 11:13 AM 6.2 (A) % Final     Comment:          Diagnosis of Diabetes-Adults   Non-Diabetic: < or = 5.6%   Increased risk for developing diabetes: 5.7-6.4%   Diagnostic of diabetes: > or = 6.5%  .       Monitoring of Diabetes                Age (y)     Therapeutic Goal (%)   Adults:          >18           <7.0   Pediatrics:    13-18           <7.5                   7-12           <8.0                   0- 6            7.5-8.5   American Diabetes Association. Diabetes Care 33(S1), Jan 2010.   11/02/2021 03:08 PM 5.8 (A) % Final     Comment:          Diagnosis of Diabetes-Adults   Non-Diabetic: < or = 5.6%   Increased risk for developing diabetes: 5.7-6.4%   Diagnostic of diabetes: > or = 6.5%  .       Monitoring of Diabetes                Age (y)     Therapeutic Goal (%)   Adults:          >18           <7.0   Pediatrics:    13-18           <7.5                   7-12           <8.0                   0- 6            7.5-8.5   American Diabetes Association. Diabetes Care 33(S1), Jan 2010.       LDL Calculated   Date/Time Value Ref Range Status   05/02/2024 11:17  (H) <=99 mg/dL Final     Comment:                                 Near   Borderline      AGE      Desirable  Optimal    High     High     Very High     0-19 Y     0 - 109     ---    110-129   >/= 130     ----    20-24 Y     0 - 119     ---    120-159   >/= 160     ----      >24 Y     0 -  99   100-129  130-159   160-189     >/=190       VLDL   Date/Time Value Ref Range Status   05/02/2024 11:17 AM 21 0 - 40 mg/dL Final   07/27/2023 11:13 AM 21 0 - 40 mg/dL Final      Last I/O:  I/O last 3 completed shifts:  In: 320 (3.7 mL/kg) [P.O.:320]  Out: 1550 (18 mL/kg) [Urine:1550 (0.5 mL/kg/hr)]  Weight: 86.2 kg     Past Cardiology Tests (Last 3 Years):  EKG:  ECG 12 Lead 06/25/2024 (Preliminary)    Echo:  Transthoracic echo (TTE) complete 06/26/2024    Ejection Fractions:  EF   Date/Time Value Ref Range Status   06/26/2024  08:30 AM 22 %      Cath:  No results found for this or any previous visit from the past 1095 days.    Stress Test:  No results found for this or any previous visit from the past 1095 days.    Cardiac Imaging:  No results found for this or any previous visit from the past 1095 days.      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    Past Surgical History:  She has a past surgical history that includes Other surgical history (04/11/2017); Gastric bypass (04/11/2017); Other surgical history (12/23/2019); and Other surgical history (12/23/2019).      Social History:  She reports that she has never smoked. She has never used smokeless tobacco. She reports that she does not currently use alcohol. She reports that she does not use drugs.    Family History:  Family History   Problem Relation Name Age of Onset    Breast cancer Mother      Dementia Father      Alzheimer's disease Father      Prostate cancer Father      Tremor Father      Pulmonary  embolism Daughter      Pulmonary embolism Daughter      Other (cardiomegaly) Maternal Grandmother          Allergies:  Ace inhibitors, Adhesive tape-silicones, Beta-blockers (beta-adrenergic blocking agts), Fluoxetine, Nsaids (non-steroidal anti-inflammatory drug), Olanzapine, Other omega-3s, and Penicillins    Inpatient Medications:  Scheduled medications   Medication Dose Route Frequency    atorvastatin  80 mg oral Daily    busPIRone  5 mg oral BID    divalproex sprinkle  750 mg oral Nightly    enoxaparin  40 mg subcutaneous q24h    furosemide  20 mg intravenous BID    gabapentin  300 mg oral Daily    polyethylene glycol  17 g oral Daily    venlafaxine  75 mg oral Nightly     PRN medications   Medication    acetaminophen    Or    acetaminophen    Or    acetaminophen    bisacodyl    bisacodyl    melatonin     Continuous Medications   Medication Dose Last Rate     Outpatient Medications:  Current Outpatient Medications   Medication Instructions    albuterol (Ventolin HFA) 90 mcg/actuation inhaler 2 puffs, inhalation, Every 4 hours PRN    blood sugar diagnostic (Contour Next Test Strips) strip miscellaneous    busPIRone (BUSPAR) 5 mg, oral, 2 times daily    cholecalciferol (Vitamin D-3) 125 MCG (5000 UT) capsule Take by mouth q 24 HR.    doxepin (SINEQUAN) 10 mg, oral, Nightly    doxycycline (VIBRAMYCIN) 100 mg, oral, 2 times daily    gabapentin (Neurontin) 300 mg capsule TAKE 1 CAPSULE(300 MG) BY MOUTH bid    MAGNESIUM ORAL 1 tablet, oral, Nightly PRN, chew    valproic acid (DEPAKENE) 750 mg, oral, Nightly    venlafaxine (Effexor) 75 mg tablet TAKE 2 TABLETS(150 MG) BY MOUTH EVERY DAY AT BEDTIME       Physical Exam:  Physical Exam  HENT:      Head: Normocephalic.      Mouth/Throat:      Mouth: Mucous membranes are moist.   Eyes:      Extraocular Movements: Extraocular movements intact.   Cardiovascular:      Rate and Rhythm: Normal rate.      Comments: +2/6 systolic murmur at apex  + JVD above clavicle at 45  "deg  Pulmonary:      Comments: Mild decreased BS at bases  Abdominal:      Palpations: Abdomen is soft.   Musculoskeletal:      Cervical back: Neck supple.   Skin:     General: Skin is warm.   Neurological:      General: No focal deficit present.      Mental Status: She is alert.   Psychiatric:         Mood and Affect: Mood normal.            Assessment/Plan   Acute systolic HF EF 22%:  TTE with multiple WMA.  Unclear duration, however TTE in 2018 demonstrated low normal LV fcn.  Unclear etiology, however patient reporting significant change (fatigue) after being treated with rituximab (last in 2021).  We had a long discussion about causes of cardiomyopathy, including CAD.  I recommended a RLHC/cor angio for further evaluation once she is able to lie flat.  She has documented intolerances/allergies to both ACE inhibitors and beta blockers, both with side effects of \"hypotension.\"  -diuresis with lasix 20mg IV BID  -strict I/O's and daily weights  -serial Cr  -monitor and replete lytes  -initiate isordil/hydralazine for afterload reduction  -keep NPO in case of RLHC/cor angio tomorrow (should be comfortable lying flat)  -she would benefit from follow up in heart failure clinic, and potentially seeing an advanced HF cardiologist for assistance in optimal medical management  -once she is initiated on GDMT, recommend rpt TTE in 3 mos and evaluation for ? ICD for primary prevention    Peripheral IV 06/25/24 18 G Left Antecubital (Active)   Site Assessment Clean;Dry;Intact 06/26/24 1132   Dressing Type Transparent 06/26/24 1132   Line Status Saline locked;Flushed 06/26/24 1132   Dressing Status Clean;Dry;Occlusive 06/26/24 1132   Number of days: 1       Code Status:  Full Code      Kate Cazares MD  "

## 2024-06-26 NOTE — PROGRESS NOTES
06/26/24 1001   Discharge Planning   Living Arrangements Spouse/significant other   Support Systems Spouse/significant other   Assistance Needed min assist   Type of Residence Private residence   Home or Post Acute Services None   Patient expects to be discharged to: Home   Does the patient need discharge transport arranged? No     Met with patient and spouse at bedside, introduced self and role as RN TCC. Patient lives at home with spouse. She is independent in her own care, showers, light cleaning, manages her own medications, she has access to them without difficulty. Spouse assists with transportation to doctors, stores, etc. Patient is admitted for new Dx of CHF, she is interested in [facility] post hospital stay to assist in managing her new Dx. Will place referral in [facility]. Patient denies other discharge needs at this time. TCC to continue to follow.

## 2024-06-26 NOTE — PROGRESS NOTES
Occupational Therapy    Evaluation    Patient Name: Sue Vail  MRN: [id number]  Today's Date: 6/26/2024  Time Calculation  Start Time: 1455  Stop Time: 1531  Time Calculation (min): 36 min        Assessment:  OT Assessment: OT  eval completed. Pt independent with ADLs no further OT acute needs at this time  Prognosis: Excellent  Barriers to Discharge: None  End of Session Communication: Bedside nurse  End of Session Patient Position: Bed, 3 rail up, Alarm on  Prognosis: Excellent  Barriers to Discharge: None    Past Medical History:   Diagnosis Date    Bitten or stung by nonvenomous insect and other nonvenomous arthropods, initial encounter 07/03/2018    Tick bite    Epilepsy, unspecified, not intractable, without status epilepticus (Multi)     Epilepsy    Localized enlarged lymph nodes 08/31/2018    Cervical lymphadenopathy    Multiple sclerosis (Multi)     History of multiple sclerosis    Old myocardial infarction 12/23/2019    History of myocardial infarction    Personal history of malignant melanoma of skin 04/13/2017    History of malignant melanoma    Personal history of malignant melanoma of skin 11/10/2020    History of malignant melanoma of skin    Personal history of malignant melanoma of skin 11/10/2020    History of malignant melanoma of skin    Personal history of non-Hodgkin lymphomas 12/23/2019    History of lymphoma    Personal history of other diseases of the nervous system and sense organs     History of benign essential tremor    Personal history of other diseases of the nervous system and sense organs 04/24/2018    History of tinnitus    Personal history of other diseases of the nervous system and sense organs 12/17/2019    History of diplopia    Personal history of other endocrine, nutritional and metabolic disease 07/02/2018    History of vitamin D deficiency    Personal history of other mental and behavioral disorders 10/28/2019    History of depression    Urge incontinence 04/13/2017    Urge  incontinence of urine     Past Surgical History:   Procedure Laterality Date    GASTRIC BYPASS  04/11/2017    Gastric Surgery For Morbid Obesity Bypass With Cecilio-en-Y    OTHER SURGICAL HISTORY  04/11/2017    Axillary Lymphadenectomy    OTHER SURGICAL HISTORY  12/23/2019    Eye surgery    OTHER SURGICAL HISTORY  12/23/2019    Neck surgery       Plan:  No Skilled OT: Independent with ADLs  Equipment Recommended upon Discharge: Wheeled walker (or rollator)  OT - OK to Discharge: Yes (Ok to dc to next level of care once medically cleared)       Subjective   Current Problem:  1. Acute systolic heart failure (Multi)  Referral to [facility]    Case Request Cath Lab: Left And Right Heart Cath, With LV    Case Request Cath Lab: Left And Right Heart Cath, With LV      2. SOB (shortness of breath)  Transthoracic echo (TTE) complete    Transthoracic echo (TTE) complete      3. Other chest pain  Transthoracic echo (TTE) complete        General:  General  Reason for Referral: Impaired ADLs and Functional Mobility - 77 y/o female presenting with shortness of breath.  For the last couple of months patient has had progressively worsening shortness of breath with activity. (Dx of acute systolic heart failure with EF 22%, moderate mitral valve regurgitation per transthoracic echo. Hemoglobin 10.9.)  Referred By: Fay Hogan MD  Past Medical History Relevant to Rehab: PMH: Melanoma, lymphoma treated with Rituxan (from [id number]) reportedly in remission now  Family/Caregiver Present: Yes (Spouse)  Caregiver Feedback: confirm PLOF information as well as provided additional info to assist with DC planning  Co-Treatment: PT  Co-Treatment Reason: Co-eval to maximize safety with mobility &  conserve energy  Prior to Session Communication: Bedside nurse (RN approved therapy)  Patient Position Received: Bed, 3 rail up, Alarm on (Upon arrival sitting at EOB, Gowned, IV)  General Comment: Pt  seen in room 2014. Pt alert and  "motivated to participate in therapy. Pt eager to ambulate.  Precautions:  Hearing/Visual Limitations: [place] & Double vision  Precautions Comment: Fluid restriction 2L.  Vital Signs:  Heart Rate:  (at rest HR in the 90's  after functional amb HR increase to 120's - 130 returns to 90's with seated rest break recovery time 2  minutes)  SpO2:  (oxygen sat remain above 90's however Mod TOBIN  during short distance amb w/out AD and Mild TOBIN during amb with FWW. Pt express \" much easier\" ambulating with AD)  Pain:  Pain Assessment  Pain Assessment: 0-10  0-10 (Numeric) Pain Score: 0 - No pain    Objective   Cognition:  Overall Cognitive Status: Within Functional Limits           Home Living:  Type of Home: House  Lives With: Spouse  Home Adaptive Equipment: None  Home Layout: One level  Bathroom Shower/Tub: Tub/shower unit  Bathroom Equipment: None  Prior Function:  Level of [place]: Independent with ADLs and functional transfers, Independent with homemaking with ambulation  Receives Help From: Family  ADL Assistance: Independent (sponge baths only)  Homemaking Assistance: Needs assistance (spouse does most chores however pt reports she cooks occasionally)  Driving/Transportation:  (spouse)  Pet Care:  (both pt and spouse)  Ambulatory Assistance: Independent  IADL History:  Mode of Transportation: Family (spouse drives)  ADL:  Eating Assistance: Independent (anticipated)  Grooming Assistance: Independent (anticipated)  Bathing Assistance: Independent (anticipated in seated position + sponge bathing)  UE Dressing Assistance: Independent (anticipated)  LE Dressing Assistance: Independent  LE Dressing Deficit: Don/doff R sock, Don/doff L sock, Don/doff R shoe, Don/doff L shoe (seated at EOB don /doff socks using fig 4 technique)  Toileting Assistance with Device: Independent (pt reports she been tp bathroom toileting self without diffculty)  Activity Tolerance:  Endurance: Decreased tolerance for upright activites  Activity " Tolerance Comments: Pt. independent with use of compensatory breathing strategies during recovery periods  Rate of Perceived Exertion (RPE): 10/10 after ambulation without device . 5-6/10 after ambulation with WW  Bed Mobility/Transfers: Bed Mobility  Bed Mobility: Yes (Supine <> sit independent.)    Transfers  Transfer: Yes (Sit <> stand MOD I.cues for hand placement)      Functional Mobility:     Sitting Balance:  Static Sitting Balance  Static Sitting-Balance Support: Feet supported, No upper extremity supported  Static Sitting-Level of Assistance: Independent  Standing Balance:  Static Standing Balance  Static Standing-Balance Support: Bilateral upper extremity supported (w/ FWW)  Static Standing-Level of Assistance: Independent   Modalities:     Vision:Vision - Basic Assessment  Current Vision: Other (Comment) (Double vision since fall years ago as a result of seizure (corrected by glasses appt in Sept to address))  Sensation:  Light Touch: No apparent deficits  Strength:  Strength Comments: BUE WFL  Perception:  Inattention/Neglect: Appears intact  Coordination:  Movements are Fluid and Coordinated: Yes   Hand Function:  Gross Grasp: Functional  Coordination: Functional  Extremities: RUE   RUE : Within Functional Limits and LUE   LUE: Within Functional Limits        Outcome Measures:[facility] Daily Activity  Putting on and taking off regular lower body clothing: None  Bathing (including washing, rinsing, drying): None  Putting on and taking off regular upper body clothing: None  Toileting, which includes using toilet, bedpan or urinal: None  Taking care of personal grooming such as brushing teeth: None  Eating Meals: None  Daily Activity - Total Score: 24        Education Documentation  Body Mechanics, taught by Luz Maria Reyna OT at 6/26/2024  5:24 PM.  Learner: Patient  Readiness: Acceptance  Method: Demonstration  Response: Needs Reinforcement    Precautions, taught by Luz Maria Reyna OT at 6/26/2024  5:24  PM.  Learner: Patient  Readiness: Acceptance  Method: Demonstration  Response: Needs Reinforcement    Education Comments  No comments found.        OP EDUCATION:       Goals: No further Acute OT needs at this time- Pt independent with ADLs    ANNA Garcia, OTR/L

## 2024-06-26 NOTE — PROGRESS NOTES
Social work consult placed for discharge planning. SW reviewed pt's chart and communicated with TCC. No SW needs foreseen at this time. SW signing off; available upon request.    ERIC Thomas ([id number])   Care Transitions    Consult placed to case management for home care. SW reviewed pt's chart and communicated with T[facility]. Per T[facility], pt interested in [facility] and [facility] to place referral. No SW needs forseen at this time. SW signing off; available upon request.    ERIC Thomas ([id number])   Care Transitions

## 2024-06-26 NOTE — CARE PLAN
The patient's goals for the shift include      The clinical goals for the shift include Pt will report less SOB with exertion.    Over the shift, the patient did not make progress toward the following goals. Barriers to progression include history of SOB with exertion. Recommendations to address these barriers include maintain the Pt as a standby assist to monitor for SOB.

## 2024-06-26 NOTE — PROGRESS NOTES
Physical Therapy    Physical Therapy Evaluation    Patient Name: Sue Vail  MRN: [id number]  Today's Date: 6/26/2024   Time Calculation  Start Time: 1453  Stop Time: 1531  Time Calculation (min): 38 min  2014/2014-A    Assessment/Plan   PT Assessment  PT Assessment Results: Decreased endurance, Decreased mobility  Rehab Prognosis: Good  Barriers to Discharge: None  Evaluation/Treatment Tolerance: Patient tolerated treatment well, Patient limited by fatigue  Medical Staff Made Aware: Yes  End of Session Communication: Bedside nurse  Assessment Comment: Patient reports increasing SOB and is open to therapist recommendations re: improving activity tolerance with use of assistive device. Patient would benefit from PT intervention to trial a rollator and stair negotiation; for training in energy conservation techniques.  End of Session Patient Position: Bed, 3 rail up, Alarm on  IP OR SWING BED PT PLAN  Inpatient or Swing Bed: Inpatient     06/26/24 1453   PT Plan   Treatment/Interventions Gait training;Stair training;Therapeutic activity   PT Plan Ongoing PT   PT Frequency 3 times per week   PT Discharge Recommendations Low intensity level of continued care  (Possible outpatient cardiac or aquatic PT)   Equipment Recommended upon Discharge Wheeled walker  (or rollator)   PT Recommended Transfer Status Independent   PT - OK to Discharge Yes  (Once medically cleared.)   Subjective     Current Problem:  1. Acute systolic heart failure (Multi)  Referral to [facility]      2. SOB (shortness of breath)  Transthoracic echo (TTE) complete    Transthoracic echo (TTE) complete      3. Other chest pain  Transthoracic echo (TTE) complete        Patient Active Problem List   Diagnosis    Anxiety    Ataxia    Benign essential tremor    Diet-controlled type 2 diabetes mellitus (Multi)    Esotropia    Generalized anxiety disorder with panic attacks    PTSD (post-traumatic stress disorder)    History of myocardial infarction  "   Follicular non-Hodgkin's lymphoma (Multi)    Primary osteoarthritis of right hip    Restless leg syndrome    Seizure disorder (Multi)    Sensorineural hearing loss, bilateral    Tinnitus, subjective, bilateral    Unsteady gait    Blurry vision, bilateral    Insomnia    Morbid obesity (Multi)    Abnormal brain MRI    Hypotropia of right eye    Iron deficiency anemia    Lesion of pancreas ([facility]HS-HCC)    Nevus of neck    Abnormal weight loss    Divergence insufficiency    Diplopia    Combined form of age-related cataract, right eye    Myopia of both eyes    Combined form of age-related cataract, left eye    Presbyopia    Early dry stage nonexudative age-related macular degeneration of both eyes    Bilateral hearing loss    Bilateral hip pain    Current severe episode of major depressive disorder without psychotic features without prior episode (Multi)    Bronchitis    SOB (shortness of breath)    Acute systolic heart failure (Multi)    History of malignant melanoma of skin    Hx of gastric bypass    Lymphoma (Multi)    History of lymphoma    Other specified anemias       General Visit Information:  General  Reason for Referral: Decreased mobility due to endurance deficit.  Referred By: Fay Hogan MD  Past Medical History Relevant to Rehab: 79 y/o female admitted with SOB increasing over past 2 months, but noted over past 3 years. Dx of acute systolic heart failure with EF 22%, moderate mitral valve regurgitation per transthoracic echo. Hemoglobin 10.9.  Family/Caregiver Present: Yes (Spouse)  Caregiver Feedback: Very supportive of patient.  Co-Treatment: OT  Co-Treatment Reason: To conserve energy based on current complaints and diagnoses.  Prior to Session Communication: Bedside nurse  Patient Position Received: Bed, 3 rail up, Alarm on  General Comment: Patient alert, motivated to participate in PT - \"bored\" from sitting in hospital room.    Home Living:  Home Living  Type of Home: House  Lives With: " Spouse  Home Adaptive Equipment: None  Home Layout: One level  Bathroom Shower/Tub: Tub/shower unit  Bathroom Equipment: None  Home Living Comments: Patient reports small living space; not enough room for a walker- after WW trial and therapist asking about moving furniture for more room for a walker, patient affirmed that could be an option.    Prior Level of Function:  Prior Function Per Pt/Caregiver Report  Level of [place]: Independent with ADLs and functional transfers, Independent with homemaking with ambulation  Receives Help From: Family  ADL Assistance: Independent  Ambulatory Assistance: Independent  Prior Function Comments: Patient reports that spouse provides transportation, cares for 5 dogs. Pt cares for cats, I ADLs, light meal prep. Furniture walks.    Precautions:  Precautions  Precautions Comment: Fluid restriction 2L. Strict I & O. Hemoglobin 10.9 - low activity tolerance. Double vision (assisted by glasses - next appt October) since seizure and fall a few years ago.    Vital Signs:  Vital Signs  Heart Rate: (!) 133 (After ambulation 100 feet without device; gasping for breath. With VC for pursed lip breathing, able to control and calm breathing - -pt vocalized surprise with improved control. HR decreased to 94 BPM with seated rest.  after amb with WW; no gasp.)  Heart Rate Source: Monitor  SpO2: 94 %  Patient Position: Sitting  Objective     Pain:  Pain Assessment  Pain Assessment: 0-10  0-10 (Numeric) Pain Score: 0 - No pain    Cognition:  Cognition  Overall Cognitive Status: Within Functional Limits    General Assessments:  General Observation  General Observation: Instructed pt in pursed lip breathing after ambulation which caused gasping. Pt calmed breathing, voiced surprise that technique was so helpful. Educated pt in potential benefits of use of a rollator. Patient asking about swimming - referred patient to cardiologist re: cardiac rehab or pool therapy and discussed benefits of  WW vs rollator for energy conservation. Pt surprised about improvement in breathing with use of WW.   Activity Tolerance  Endurance: Decreased tolerance for upright activites  Rate of Perceived Exertion (RPE): 10/10 after ambulation without device (100 feet). 5-6/10 after ambulation with WW (100 feet).  Sensation  Light Touch: No apparent deficits  Strength  Strength Comments: B LE WNL  Perception  Inattention/Neglect: Appears intact  Coordination  Movements are Fluid and Coordinated: Yes     Static Sitting Balance  Static Sitting-Balance Support: No upper extremity supported  Static Sitting-Level of Assistance: Independent  Dynamic Sitting Balance  Dynamic Sitting-Balance Support: No upper extremity supported  Dynamic Sitting-Balance: Forward lean, Reaching for objects (S/MOD I.)  Static Standing Balance  Static Standing-Balance Support: No upper extremity supported  Static Standing-Level of Assistance: Close supervision    Functional Assessments:     Bed Mobility  Bed Mobility: Yes (Supine <> sit independent.)  Transfers  Transfer: Yes (Sit <> stand MOD I.  Supervision with use of WW, VC for push to stand.)  Ambulation/Gait Training  Ambulation/Gait Training Performed:  (Ambulated 100 feet without device with pt gasping for breath, excessive bilateral weight shift and increased NISHANT, SBA/S. Ambulated 100 feet with WW without gasping, improved fluidity of gait, S/MOD I.)          Extremity/Trunk Assessments:        RLE   RLE : Within Functional Limits  LLE   LLE : Within Functional Limits    Outcome Measures:     [facility] Basic Mobility  Turning from your back to your side while in a flat bed without using bedrails: None  Moving from lying on your back to sitting on the side of a flat bed without using bedrails: None  Moving to and from bed to chair (including a wheelchair): None  Standing up from a chair using your arms (e.g. wheelchair or bedside chair): None  To walk in hospital room: A little  Climbing 3-5 steps  with railing: Total  Basic Mobility - Total Score: 20                                        Goals:  Encounter Problems       Encounter Problems (Active)       Pain - Adult          To improve activity tolerance for improved quality of mobility:       Patient will trial rollator and compare with WW ambulating 100+ feet with S/MOD I using pursed lip breathing and KIMBERLY RPE < or = 5/10.       Start:  06/26/24    Expected End:  07/10/24            Patient will negotiate 4 steps with 1-2 rail (determining if helpful for SOB) and S/MOD I. She will complete the TUG in < or = 15 seconds with device and S/MOD I to reduce fall risk.       Start:  06/26/24    Expected End:  07/10/24                 Education Documentation  Mobility Training, taught by Stephanie Diana PT at 6/26/2024  4:17 PM.  Learner: Significant Other, Patient  Readiness: Eager  Method: Explanation  Response: Verbalizes Understanding    Education Comments  No comments found.

## 2024-06-26 NOTE — PROGRESS NOTES
Natalie Vail is a 78 y.o. female on day 1 of admission presenting with Acute systolic heart failure (Multi).      Subjective   Patient seen earlier this morning.  She states she has progressively worsening of her shortness of breath.  She states has been ongoing for past couple months.  He denies any chest pain fever chills nausea vomiting or cough at this time.       Objective     Last Recorded Vitals  BP (!) 145/91 (BP Location: Left arm, Patient Position: Lying) Comment: RN informed  Pulse 85   Temp 36.4 °C (97.5 °F) (Temporal)   Resp 18   Wt 86.2 kg (190 lb 0.6 oz)   SpO2 93%   Intake/Output last 3 Shifts:    Intake/Output Summary (Last 24 hours) at 6/26/2024 1309  Last data filed at 6/26/2024 1204  Gross per 24 hour   Intake 918 ml   Output 2400 ml   Net -1482 ml       Admission Weight  Weight: 95.3 kg (210 lb) (06/25/24 1140)    Daily Weight  06/26/24 : 86.2 kg (190 lb 0.6 oz)    Image Results  Transthoracic echo (TTE) [place] [address]       Phone [phone] Fax [phone]    TRANSTHORACIC ECHOCARDIOGRAM REPORT    Patient Name:      NATALIE VAIL            Reading Physician:    [id number] Bryn Polo DO  Study Date:        6/26/2024             Ordering Provider:    [id number] GABBIE ALBA  MRN/PID:           [id number]              Fellow:  Accession#:        [id number]          Nurse:                Li Sagastume  Date of Birth/Age: 1945 / 78 years  Sonographer:          Viky Christopher [facility]  Gender:            F                     Additional Staff:  Height:            157.48 cm             Admit Date:           6/25/2024  Weight:            95.26 kg              Admission Status:     Inpatient -                                                                 Routine  BSA / BMI:         1.95 m2 / 38.41  kg/m2 Department Location:  [facility] Echo                                                                 Lab  Blood Pressure: 153 /93 mmHg    Study Type:    TRANSTHORACIC ECHO (TTE) COMPLETE  Diagnosis/ICD: Other chest pain-R07.89  Indication:    Chest Pain  CPT Codes:     Echo Complete w Full Doppler-[id number]   Study Detail: The following Echo studies were performed: 2D, M-Mode, Doppler and                color flow. Optison used as a contrast agent for endocardial                border definition. Total contrast used for this procedure was 3 mL                via IV push.       PHYSICIAN INTERPRETATION:  Left Ventricle: The left ventricular systolic function is severely decreased, with a Sorenson's biplane calculated ejection fraction of 22%. There are multiple wall motion abnormalities. The left ventricular cavity size is normal. The left ventricular septal wall thickness is normal. There is normal left ventricular posterior wall thickness. Spectral Doppler shows an abnormal pattern of left ventricular diastolic filling.  LV Wall Scoring:  The RCA distribution, entire septum, and entire apex are akinetic. The basal and  mid anterior wall, basal and mid anterolateral wall, and basal and mid  inferolateral wall are hypokinetic.    Left Atrium: The left atrium is upper limits of normal in size.  Right Ventricle: The right ventricle is normal in size. There is normal right ventricular global systolic function.  Right Atrium: The right atrium is normal in size.  Aortic Valve: The aortic valve is trileaflet. The aortic valve dimensionless index is 0.69. There is trace to mild aortic valve regurgitation. The peak instantaneous gradient of the aortic valve is 6.0 mmHg. The mean gradient of the aortic valve is 3.0 mmHg.  Mitral Valve: The mitral valve is mildly thickened. There is moderate mitral valve regurgitation.  Tricuspid Valve: The tricuspid valve is structurally normal. There is mild tricuspid regurgitation. The  Doppler estimated RVSP is moderately elevated at 49.0 mmHg.  Pulmonic Valve: The pulmonic valve is structurally normal. There is no indication of pulmonic valve regurgitation.  Pericardium: There is no pericardial effusion noted.  Aorta: The aortic root is normal.       CONCLUSIONS:   1. Multiple segmental abnormalities exist. See findings.   2. The left ventricular systolic function is severely decreased, with a Sorenson's biplane calculated ejection fraction of 22%.   3. There are multiple wall motion abnormalities.   4. Spectral Doppler shows an abnormal pattern of left ventricular diastolic filling.   5. There is normal right ventricular global systolic function.   6. Moderate mitral valve regurgitation.   7. Moderately elevated right ventricular systolic pressure.    QUANTITATIVE DATA SUMMARY:  2D MEASUREMENTS:                           Normal Ranges:  Ao Root d:     3.20 cm   (2.0-3.7cm)  LAs:           3.60 cm   (2.7-4.0cm)  IVSd:          1.20 cm   (0.6-1.1cm)  LVPWd:         0.90 cm   (0.6-1.1cm)  LVIDd:         4.70 cm   (3.9-5.9cm)  LVIDs:         4.40 cm  LV Mass Index: 90.1 g/m2  LV % FS        6.4 %    LA VOLUME:                                Normal Ranges:  LA Vol A4C:        52.0 ml    (22+/-6mL/m2)  LA Vol A2C:        68.9 ml  LA Vol BP:         61.9 ml  LA Vol Index A4C:  26.7ml/m2  LA Vol Index A2C:  35.3 ml/m2  LA Vol Index BP:   31.7 ml/m2  LA Area A4C:       19.5 cm2  LA Area A2C:       21.7 cm2  LA Major Axis A4C: 6.2 cm  LA Major Axis A2C: 5.8 cm  LA Volume Index:   25.3 ml/m2    RA VOLUME BY A/L METHOD:                        Normal Ranges:  RA Area A4C: 12.5 cm2    AORTA MEASUREMENTS:                       Normal Ranges:  Ao Sinus, d: 3.20 cm (2.1-3.5cm)  Asc Ao, d:   3.11 cm (2.1-3.4cm)    LV SYSTOLIC FUNCTION BY 2D PLANIMETRY (MOD):                       Normal Ranges:  EF-A4C View:    26 % (>=55%)  EF-A2C View:    22 %  EF-Biplane:     22 %  LV EF Reported: 22 %    LV DIASTOLIC  FUNCTION:                          Normal Ranges:  MV Peak E:    1.54 m/s  (0.7-1.2 m/s)  MV e'         0.103 m/s (>8.0)  MV lateral e' 0.13 m/s  MV medial e'  0.07 m/s  E/e' Ratio:   14.97     (<8.0)    MITRAL VALVE:                  Normal Ranges:  MV DT: 138 msec (150-240msec)    MITRAL INSUFFICIENCY:                            Normal Ranges:  PISA Radius:  0.5 cm  MR VTI:       159.00 cm  MR Vmax:      484.00 cm/s  MR Alias Ángel: 38.5 cm/s  MR Volume:    19.87 ml  MR Flow Rt:   60.48 ml/s  MR EROA:      0.12 cm2    AORTIC VALVE:                                    Normal Ranges:  AoV Vmax:                1.22 m/s (<=1.7m/s)  AoV Peak P.0 mmHg (<20mmHg)  AoV Mean PG:             3.0 mmHg (1.7-11.5mmHg)  LVOT Max Ángel:            1.01 m/s (<=1.1m/s)  AoV VTI:                 22.60 cm (18-25cm)  LVOT VTI:                15.50 cm  LVOT Diameter:           2.00 cm  (1.8-2.4cm)  AoV Area, VTI:           2.15 cm2 (2.5-5.5cm2)  AoV Area,Vmax:           2.60 cm2 (2.5-4.5cm2)  AoV Dimensionless Index: 0.69       RIGHT VENTRICLE:  RV Basal 3.00 cm  RV Mid   2.50 cm  RV Major 7.6 cm  TAPSE:   23.3 mm  RV s'    0.18 m/s    TRICUSPID VALVE/RVSP:                              Normal Ranges:  Peak TR Velocity: 3.39 m/s  RV Syst Pressure: 49.0 mmHg (< 30mmHg)  IVC Diam:         1.60 cm       [id number] Bryn Polo DO  Electronically signed on 2024 at 10:25:55 AM       Wall Scoring       ** Final **  ECG 12 Lead  Sinus rhythm  Left bundle branch block      Physical Exam  Constitutional:       Appearance: She is obese.   HENT:      Head: Normocephalic and atraumatic.      Nose: Nose normal.      Mouth/Throat:      Mouth: Mucous membranes are dry.   Eyes:      Extraocular Movements: Extraocular movements intact.      Conjunctiva/sclera: Conjunctivae normal.   Cardiovascular:      Rate and Rhythm: Normal rate and regular rhythm.      Pulses: Normal pulses.      Heart sounds: Normal heart sounds.   Pulmonary:      Effort:  Pulmonary effort is normal.      Breath sounds: Normal breath sounds.   Abdominal:      General: Bowel sounds are normal.      Palpations: Abdomen is soft.   Musculoskeletal:         General: Normal range of motion.      Cervical back: Normal range of motion and neck supple.      Right lower leg: Edema present.      Left lower leg: Edema present.   Skin:     General: Skin is warm and dry.   Neurological:      General: No focal deficit present.      Mental Status: She is alert and oriented to person, place, and time. Mental status is at baseline.   Psychiatric:         Mood and Affect: Mood normal.         Behavior: Behavior normal.         Thought Content: Thought content normal.         Judgment: Judgment normal.         Relevant Results               Assessment/Plan   This patient currently has cardiac telemetry ordered; if you would like to modify or discontinue the telemetry order, click here to go to the orders activity to modify/discontinue the order.        Sue Vail is a 78 y.o. female with PMHx s/f melanoma, lymphoma treated with Rituxan reportedly in remission now presenting with shortness of breath.  For the last couple of months patient has had progressively worsening shortness of breath with activity.       Principal Problem:    Acute systolic heart failure (Multi)  Active Problems:    Seizure disorder (Multi)    History of lymphoma    Other specified anemias    Acute systolic CHF new onset  -Echo done today shows EF of 22%.  There are multiple wall motion abnormalities.  There is also left ventricular diastolic dysfunction.  Also moderate mitral valve regurgitation.  Echo from 2018 showed preserved left ventricular ejection fraction  -Patient has allergies listed to beta-blockers and ACE inhibitors with resultant hypotension  -Continue with IV Lasix twice a day  -Strict JAXSON's  -Monitor renal functions and replace lites as indicated.  -Further workup per cardiology.       Hx lymphoma treated w Rituxan  and now reportedly  in remission     History of seizure disorder restless legs  Continue home medications     Normocytic anemia  appears to be intermittent and somewhat chronic  Check iron studies    VTE prophylaxis-Lovenox              Fay Hogan MD

## 2024-06-27 ENCOUNTER — APPOINTMENT (OUTPATIENT)
Dept: CARDIOLOGY | Facility: HOSPITAL | Age: 79
DRG: 292 | End: 2024-06-27
Payer: MEDICARE

## 2024-06-27 LAB
ANION GAP SERPL CALC-SCNC: 12 MMOL/L (ref 10–20)
ATRIAL RATE: 102 BPM
BASE EXCESS BLDA CALC-SCNC: 2.1 MMOL/L (ref -2–3)
BASE EXCESS BLDMV CALC-SCNC: 1.9 MMOL/L (ref -2–3)
BASE EXCESS BLDV CALC-SCNC: 1 MMOL/L (ref -2–3)
BODY TEMPERATURE: 37 DEGREES CELSIUS
BUN SERPL-MCNC: 15 MG/DL (ref 6–23)
CALCIUM SERPL-MCNC: 8.6 MG/DL (ref 8.6–10.3)
CHLORIDE SERPL-SCNC: 105 MMOL/L (ref 98–107)
CO2 SERPL-SCNC: 26 MMOL/L (ref 21–32)
CREAT SERPL-MCNC: 0.76 MG/DL (ref 0.5–1.05)
EGFRCR SERPLBLD CKD-EPI 2021: 80 ML/MIN/1.73M*2
FERRITIN SERPL-MCNC: 23 NG/ML (ref 8–150)
GLUCOSE SERPL-MCNC: 127 MG/DL (ref 74–99)
HCO3 BLDA-SCNC: 25.5 MMOL/L (ref 22–26)
HCO3 BLDMV-SCNC: 26.6 MMOL/L (ref 22–26)
HCO3 BLDV-SCNC: 26 MMOL/L (ref 22–26)
IRON SATN MFR SERPL: 8 % (ref 25–45)
IRON SERPL-MCNC: 33 UG/DL (ref 35–150)
MAGNESIUM SERPL-MCNC: 2.01 MG/DL (ref 1.6–2.4)
OXYHGB MFR BLDA: 96.7 % (ref 94–98)
OXYHGB MFR BLDMV: 59.7 % (ref 45–75)
OXYHGB MFR BLDV: 59.4 % (ref 45–75)
P AXIS: 18 DEGREES
P OFFSET: 180 MS
P ONSET: 120 MS
PCO2 BLDA: 35 MM HG (ref 38–42)
PCO2 BLDMV: 41 MM HG (ref 41–51)
PCO2 BLDV: 42 MM HG (ref 41–51)
PH BLDA: 7.47 PH (ref 7.38–7.42)
PH BLDMV: 7.42 PH (ref 7.33–7.43)
PH BLDV: 7.4 PH (ref 7.33–7.43)
PO2 BLDA: 94 MM HG (ref 85–95)
PO2 BLDMV: 40 MM HG (ref 35–45)
PO2 BLDV: 41 MM HG (ref 35–45)
POTASSIUM SERPL-SCNC: 3.6 MMOL/L (ref 3.5–5.3)
PR INTERVAL: 184 MS
Q ONSET: 212 MS
QRS COUNT: 17 BEATS
QRS DURATION: 148 MS
QT INTERVAL: 396 MS
QTC CALCULATION(BAZETT): 516 MS
QTC FREDERICIA: 472 MS
R AXIS: -40 DEGREES
SAO2 % BLDA: 99 % (ref 94–100)
SAO2 % BLDMV: 61 % (ref 45–75)
SAO2 % BLDV: 61 % (ref 45–75)
SODIUM SERPL-SCNC: 139 MMOL/L (ref 136–145)
T AXIS: 104 DEGREES
T OFFSET: 410 MS
TIBC SERPL-MCNC: 415 UG/DL (ref 240–445)
UIBC SERPL-MCNC: 382 UG/DL (ref 110–370)
VENTRICULAR RATE: 102 BPM

## 2024-06-27 PROCEDURE — 82728 ASSAY OF FERRITIN: CPT | Performed by: INTERNAL MEDICINE

## 2024-06-27 PROCEDURE — 2500000004 HC RX 250 GENERAL PHARMACY W/ HCPCS (ALT 636 FOR OP/ED): Performed by: NURSE PRACTITIONER

## 2024-06-27 PROCEDURE — 2500000001 HC RX 250 WO HCPCS SELF ADMINISTERED DRUGS (ALT 637 FOR MEDICARE OP): Performed by: NURSE PRACTITIONER

## 2024-06-27 PROCEDURE — 82805 BLOOD GASES W/O2 SATURATION: CPT

## 2024-06-27 PROCEDURE — 93460 R&L HRT ART/VENTRICLE ANGIO: CPT | Performed by: INTERNAL MEDICINE

## 2024-06-27 PROCEDURE — 99152 MOD SED SAME PHYS/QHP 5/>YRS: CPT | Performed by: INTERNAL MEDICINE

## 2024-06-27 PROCEDURE — 93005 ELECTROCARDIOGRAM TRACING: CPT

## 2024-06-27 PROCEDURE — 2500000001 HC RX 250 WO HCPCS SELF ADMINISTERED DRUGS (ALT 637 FOR MEDICARE OP): Performed by: INTERNAL MEDICINE

## 2024-06-27 PROCEDURE — C1751 CATH, INF, PER/CENT/MIDLINE: HCPCS | Performed by: INTERNAL MEDICINE

## 2024-06-27 PROCEDURE — 82805 BLOOD GASES W/O2 SATURATION: CPT | Mod: 91

## 2024-06-27 PROCEDURE — 2020000001 HC ICU ROOM DAILY

## 2024-06-27 PROCEDURE — 93010 ELECTROCARDIOGRAM REPORT: CPT | Performed by: INTERNAL MEDICINE

## 2024-06-27 PROCEDURE — 2500000004 HC RX 250 GENERAL PHARMACY W/ HCPCS (ALT 636 FOR OP/ED): Performed by: INTERNAL MEDICINE

## 2024-06-27 PROCEDURE — 99153 MOD SED SAME PHYS/QHP EA: CPT | Performed by: INTERNAL MEDICINE

## 2024-06-27 PROCEDURE — 83540 ASSAY OF IRON: CPT | Performed by: INTERNAL MEDICINE

## 2024-06-27 PROCEDURE — 2550000001 HC RX 255 CONTRASTS: Performed by: INTERNAL MEDICINE

## 2024-06-27 PROCEDURE — 2720000007 HC OR 272 NO HCPCS: Performed by: INTERNAL MEDICINE

## 2024-06-27 PROCEDURE — C1894 INTRO/SHEATH, NON-LASER: HCPCS | Performed by: INTERNAL MEDICINE

## 2024-06-27 PROCEDURE — B2111ZZ FLUOROSCOPY OF MULTIPLE CORONARY ARTERIES USING LOW OSMOLAR CONTRAST: ICD-10-PCS | Performed by: INTERNAL MEDICINE

## 2024-06-27 PROCEDURE — 4A023N8 MEASUREMENT OF CARDIAC SAMPLING AND PRESSURE, BILATERAL, PERCUTANEOUS APPROACH: ICD-10-PCS | Performed by: INTERNAL MEDICINE

## 2024-06-27 PROCEDURE — 36415 COLL VENOUS BLD VENIPUNCTURE: CPT | Performed by: INTERNAL MEDICINE

## 2024-06-27 PROCEDURE — 2500000002 HC RX 250 W HCPCS SELF ADMINISTERED DRUGS (ALT 637 FOR MEDICARE OP, ALT 636 FOR OP/ED): Performed by: INTERNAL MEDICINE

## 2024-06-27 PROCEDURE — 2500000005 HC RX 250 GENERAL PHARMACY W/O HCPCS: Performed by: INTERNAL MEDICINE

## 2024-06-27 PROCEDURE — 80048 BASIC METABOLIC PNL TOTAL CA: CPT | Performed by: INTERNAL MEDICINE

## 2024-06-27 PROCEDURE — 99233 SBSQ HOSP IP/OBS HIGH 50: CPT | Performed by: INTERNAL MEDICINE

## 2024-06-27 PROCEDURE — 83735 ASSAY OF MAGNESIUM: CPT | Performed by: STUDENT IN AN ORGANIZED HEALTH CARE EDUCATION/TRAINING PROGRAM

## 2024-06-27 RX ORDER — LORAZEPAM 0.5 MG/1
0.5 TABLET ORAL EVERY 6 HOURS PRN
Status: DISCONTINUED | OUTPATIENT
Start: 2024-06-27 | End: 2024-06-30 | Stop reason: HOSPADM

## 2024-06-27 RX ORDER — METOPROLOL TARTRATE 25 MG/1
12.5 TABLET, FILM COATED ORAL 2 TIMES DAILY
Status: DISCONTINUED | OUTPATIENT
Start: 2024-06-27 | End: 2024-06-28

## 2024-06-27 RX ORDER — SODIUM CHLORIDE 9 MG/ML
1000 INJECTION, SOLUTION INTRAVENOUS ONCE AS NEEDED
Status: DISCONTINUED | OUTPATIENT
Start: 2024-06-27 | End: 2024-06-30 | Stop reason: HOSPADM

## 2024-06-27 RX ORDER — LIDOCAINE HYDROCHLORIDE 20 MG/ML
INJECTION, SOLUTION INFILTRATION; PERINEURAL AS NEEDED
Status: DISCONTINUED | OUTPATIENT
Start: 2024-06-27 | End: 2024-06-27 | Stop reason: HOSPADM

## 2024-06-27 RX ORDER — SODIUM CHLORIDE 9 MG/ML
75 INJECTION, SOLUTION INTRAVENOUS CONTINUOUS
Status: ACTIVE | OUTPATIENT
Start: 2024-06-27 | End: 2024-06-27

## 2024-06-27 RX ORDER — NITROGLYCERIN 20 MG/100ML
INJECTION INTRAVENOUS CONTINUOUS PRN
Status: COMPLETED | OUTPATIENT
Start: 2024-06-27 | End: 2024-06-27

## 2024-06-27 RX ORDER — NITROGLYCERIN 20 MG/100ML
5-200 INJECTION INTRAVENOUS CONTINUOUS
Status: DISCONTINUED | OUTPATIENT
Start: 2024-06-27 | End: 2024-06-28

## 2024-06-27 RX ORDER — FERROUS SULFATE 325(65) MG
65 TABLET ORAL
Status: DISCONTINUED | OUTPATIENT
Start: 2024-06-27 | End: 2024-06-30 | Stop reason: HOSPADM

## 2024-06-27 RX ORDER — ONDANSETRON HYDROCHLORIDE 2 MG/ML
INJECTION, SOLUTION INTRAVENOUS AS NEEDED
Status: DISCONTINUED | OUTPATIENT
Start: 2024-06-27 | End: 2024-06-27 | Stop reason: HOSPADM

## 2024-06-27 RX ORDER — MORPHINE SULFATE 2 MG/ML
2 INJECTION, SOLUTION INTRAMUSCULAR; INTRAVENOUS EVERY 6 HOURS PRN
Status: DISCONTINUED | OUTPATIENT
Start: 2024-06-27 | End: 2024-06-30 | Stop reason: HOSPADM

## 2024-06-27 RX ORDER — FENTANYL CITRATE 50 UG/ML
INJECTION, SOLUTION INTRAMUSCULAR; INTRAVENOUS AS NEEDED
Status: DISCONTINUED | OUTPATIENT
Start: 2024-06-27 | End: 2024-06-27 | Stop reason: HOSPADM

## 2024-06-27 RX ORDER — MIDAZOLAM HYDROCHLORIDE 1 MG/ML
INJECTION, SOLUTION INTRAMUSCULAR; INTRAVENOUS AS NEEDED
Status: DISCONTINUED | OUTPATIENT
Start: 2024-06-27 | End: 2024-06-27 | Stop reason: HOSPADM

## 2024-06-27 RX ORDER — POTASSIUM CHLORIDE 750 MG/1
20 TABLET, FILM COATED, EXTENDED RELEASE ORAL ONCE
Status: COMPLETED | OUTPATIENT
Start: 2024-06-27 | End: 2024-06-27

## 2024-06-27 RX ORDER — METOPROLOL SUCCINATE 25 MG/1
12.5 TABLET, EXTENDED RELEASE ORAL DAILY
Status: DISCONTINUED | OUTPATIENT
Start: 2024-06-27 | End: 2024-06-27

## 2024-06-27 ASSESSMENT — COGNITIVE AND FUNCTIONAL STATUS - GENERAL
MOBILITY SCORE: 20
DAILY ACTIVITIY SCORE: 24
CLIMB 3 TO 5 STEPS WITH RAILING: A LITTLE
WALKING IN HOSPITAL ROOM: TOTAL

## 2024-06-27 ASSESSMENT — PAIN - FUNCTIONAL ASSESSMENT
PAIN_FUNCTIONAL_ASSESSMENT: 0-10

## 2024-06-27 ASSESSMENT — PAIN SCALES - GENERAL
PAINLEVEL_OUTOF10: 3
PAINLEVEL_OUTOF10: 0 - NO PAIN
PAINLEVEL_OUTOF10: 8
PAINLEVEL_OUTOF10: 0 - NO PAIN
PAINLEVEL_OUTOF10: 3
PAINLEVEL_OUTOF10: 0 - NO PAIN

## 2024-06-27 ASSESSMENT — PAIN DESCRIPTION - LOCATION: LOCATION: HEAD

## 2024-06-27 NOTE — CARE PLAN
The patient's goals for the shift include      The clinical goals for the shift include Pt will tolerate activity without complaints of SOB.    Over the shift, the patient did not make progress toward the following goals. Barriers to progression include dyspnea with exertion. Recommendations to address these barriers include use of diuretics.

## 2024-06-27 NOTE — PROGRESS NOTES
Patient was seen by PT OT, recommendations for low level therapy, patient will be utilizing [facility] [place], she would also like  Home Care as well. Discharging doctor to place internal referral once medically ready to discharge. TCC to continue to follow.

## 2024-06-27 NOTE — POST-PROCEDURE NOTE
Physician Transition of Care Summary  Invasive Cardiovascular Lab    Procedure Date: 6/27/2024  Attending:    * Kate Cazares - Primary  Resident/Fellow/Other Assistant: Surgeons and Role:  * No surgeons found with a matching role *    Indications:   Pre-op Diagnosis     * Acute systolic heart failure (Multi) [I50.21]    Post-procedure diagnosis:   Post-op Diagnosis     * Acute systolic heart failure (Multi) [I50.21]    Procedure(s):   Left And Right Heart Cath, With LV  [id number] - FL R & L HRT CATH W/NJX L VENTRICULOG IMG S&I        Procedure Findings:   no signficant CAD.  Elevated filling pressures, normal CO.  SVT > 2000    Description of the Procedure:   L&RHC  RCFA>manual pressure   RCFV>manual pressure   Vasovagal response during manual hold, small hematoma. Given 150 mL bolus, zofran 4 mg for nausea.     Complications:   None     Stents/Implants:   Implants       No implant documentation for this case.            Anticoagulation/Antiplatelet Plan:   NA    Estimated Blood Loss:   5 mL    Anesthesia: Moderate Sedation Anesthesia Staff: No anesthesia staff entered.    Any Specimen(s) Removed:   No specimens collected during this procedure.    Disposition:   ICU-close monitoring of right groin site     Recs:  nonischemic CM  Recommend adjusting PO meds, diuresis  Refer to Dr. Antonieta Hewitt for NICM  Wean nitroglycerin as tolerated. Keep systolic BP less than 160 mmHg   Femostop @ 60 mmHg for 4 hours       Electronically signed by: PRISCILA Chacon, 6/27/2024 1:53 PM

## 2024-06-27 NOTE — PROGRESS NOTES
Physical Therapy                 Therapy Communication Note    Patient Name: Sue Vail  MRN: [id number]  Today's Date: 6/27/2024     Discipline: Physical Therapy    Missed Visit Reason: Missed Visit Reason:  (patient at cardiac cath)    Missed Time: Attempt    Comment:13:24 patient still out of room at  procedure

## 2024-06-27 NOTE — PROGRESS NOTES
Sue Vail is a 78 y.o. female on day 2 of admission presenting with Acute systolic heart failure (Multi).      Subjective     Sue Vail is a 78 y.o. female with PMHx s/f melanoma, lymphoma treated with Rituxan reportedly in remission now presenting with shortness of breath.  For the last couple of months patient has had progressively worsening shortness of breath with activity.  She has also had some associated restless when trying to lay flat she is night stating that she becomes very short of breath and gets better when she sits up but she does appear to be somewhat symptomatic when laying down.  She denies any lower extremity edema any increased abdominal girth any chest pain palpitations.  On presentation patient had temperature of 97.1 heart rate 109 respiratory rate 16 blood pressure 150/93 SpO2 97%, lab work shows BN peptide 960 troponin 21 with repeat troponin 17 total protein slightly low at 6.2 glucose 118 remainder of chemistry panel within normal limits INR is 1 CBC has no leukocytosis hemoglobin 11.6 hematocrit 35.5 platelets 319.  Chest x-ray showing mild cardiomegaly and interstitial prominence with compatible edema the EKG shows sinus rhythm with left bundle branch block which is new versus prior EKG in 2018.  The patient was given a dose of IV furosemide and referred for admission due to her new onset of congestive heart failure a bedside echocardiogram was done by the ER provider initial reading from his perspective was there was significant reduced ejection fraction.   Patient was admitted with a diagnosis of acute on chronic congestive heart failure-started on appropriate medication.     06/27: Patient continues to have shortness of breath, no chest pain at rest, no fever or chills, no nausea or vomiting.  Cardiology on board and planning for left/right heart cath today    Objective     Last Recorded Vitals  /77   Pulse 76   Temp 36.9 °C (98.5 °F) (Temporal)   Resp 12   Wt 86.2 kg  (190 lb 0.6 oz)   SpO2 95%   Intake/Output last 3 Shifts:    Intake/Output Summary (Last 24 hours) at 6/27/2024 1407  Last data filed at 6/27/2024 1241  Gross per 24 hour   Intake 760 ml   Output 2230 ml   Net -1470 ml       Admission Weight  Weight: 95.3 kg (210 lb) (06/25/24 1140)    Daily Weight  06/27/24 : 86.2 kg (190 lb 0.6 oz)    Image Results  Electrocardiogram, 12-lead PRN ACS symtpoms  Sinus tachycardia  Left axis deviation  Left bundle branch block  Abnormal ECG  When compared with ECG of 25-JUN-2024 12:10,  PREVIOUS ECG IS PRESENT  Confirmed by Bryn Polo ([id number]) on 6/27/2024 8:12:20 AM      Physical Exam  Constitutional:       Appearance: She is obese.   HENT:      Head: Normocephalic and atraumatic.      Nose: Nose normal.      Mouth/Throat:      Mouth: Mucous membranes are dry.   Eyes:      Extraocular Movements: Extraocular movements intact.      Conjunctiva/sclera: Conjunctivae normal.   Cardiovascular:      Rate and Rhythm: Normal rate and regular rhythm.      Pulses: Normal pulses.      Heart sounds: Normal heart sounds.   Pulmonary:      Effort: Pulmonary effort is normal.      Breath sounds: Normal breath sounds.   Abdominal:      General: Bowel sounds are normal.      Palpations: Abdomen is soft.   Musculoskeletal:         General: Normal range of motion.      Cervical back: Normal range of motion and neck supple.      Right lower leg: Edema present.      Left lower leg: Edema present.   Skin:     General: Skin is warm and dry.   Neurological:      General: No focal deficit present.      Mental Status: She is alert and oriented to person, place, and time. Mental status is at baseline.   Psychiatric:         Mood and Affect: Mood normal.         Behavior: Behavior normal.         Thought Content: Thought content normal.         Judgment: Judgment normal.         Relevant Results               Assessment/Plan   This patient currently has cardiac telemetry ordered; if you would like to modify or  discontinue the telemetry order, click here to go to the orders activity to modify/discontinue the order.        Sue Vail is a 78 y.o. female with PMHx s/f melanoma, lymphoma treated with Rituxan reportedly in remission now presenting with shortness of breath.  For the last couple of months patient has had progressively worsening shortness of breath with activity.       Principal Problem:    Acute systolic heart failure (Multi)  Active Problems:    Seizure disorder (Multi)    History of lymphoma    Other specified anemias    Acute systolic CHF new onset  -Echo done  on admission shows EF of 22%.  There are multiple wall motion abnormalities.  There is also left ventricular diastolic dysfunction.  Also moderate mitral valve regurgitation.  Echo from 2018 showed preserved left ventricular ejection fraction  -Continue with IV Lasix twice a day,  -Strict JAXSON's  -Monitor renal functions and replace lites as indicated.  -Cardiology on board-plan for left/right heart cath today    Hx lymphoma treated w Rituxan and now reportedly  in remission     History of seizure disorder\ restless legs  Continue home medications     Normocytic anemia  appears to be intermittent and somewhat chronic  Check iron studies    VTE prophylaxis-Lovenox              Ezequiel Castillo MD

## 2024-06-28 LAB
ALBUMIN SERPL BCP-MCNC: 3.4 G/DL (ref 3.4–5)
ANION GAP SERPL CALC-SCNC: 11 MMOL/L (ref 10–20)
ATRIAL RATE: 67 BPM
BUN SERPL-MCNC: 21 MG/DL (ref 6–23)
CALCIUM SERPL-MCNC: 8.3 MG/DL (ref 8.6–10.3)
CHLORIDE SERPL-SCNC: 105 MMOL/L (ref 98–107)
CO2 SERPL-SCNC: 26 MMOL/L (ref 21–32)
CREAT SERPL-MCNC: 1.01 MG/DL (ref 0.5–1.05)
EGFRCR SERPLBLD CKD-EPI 2021: 57 ML/MIN/1.73M*2
ERYTHROCYTE [DISTWIDTH] IN BLOOD BY AUTOMATED COUNT: 15.3 % (ref 11.5–14.5)
GLUCOSE SERPL-MCNC: 103 MG/DL (ref 74–99)
HCT VFR BLD AUTO: 33.1 % (ref 36–46)
HGB BLD-MCNC: 10.6 G/DL (ref 12–16)
MAGNESIUM SERPL-MCNC: 2.04 MG/DL (ref 1.6–2.4)
MCH RBC QN AUTO: 28.2 PG (ref 26–34)
MCHC RBC AUTO-ENTMCNC: 32 G/DL (ref 32–36)
MCV RBC AUTO: 88 FL (ref 80–100)
NRBC BLD-RTO: 0 /100 WBCS (ref 0–0)
P AXIS: 9 DEGREES
P OFFSET: 181 MS
P ONSET: 123 MS
PHOSPHATE SERPL-MCNC: 5.6 MG/DL (ref 2.5–4.9)
PLATELET # BLD AUTO: 273 X10*3/UL (ref 150–450)
POTASSIUM SERPL-SCNC: 4.2 MMOL/L (ref 3.5–5.3)
PR INTERVAL: 174 MS
Q ONSET: 210 MS
QRS COUNT: 11 BEATS
QRS DURATION: 152 MS
QT INTERVAL: 488 MS
QTC CALCULATION(BAZETT): 515 MS
QTC FREDERICIA: 506 MS
R AXIS: -50 DEGREES
RBC # BLD AUTO: 3.76 X10*6/UL (ref 4–5.2)
SODIUM SERPL-SCNC: 138 MMOL/L (ref 136–145)
T AXIS: 106 DEGREES
T OFFSET: 454 MS
VENTRICULAR RATE: 67 BPM
WBC # BLD AUTO: 7.9 X10*3/UL (ref 4.4–11.3)

## 2024-06-28 PROCEDURE — 2500000001 HC RX 250 WO HCPCS SELF ADMINISTERED DRUGS (ALT 637 FOR MEDICARE OP): Performed by: INTERNAL MEDICINE

## 2024-06-28 PROCEDURE — 2500000004 HC RX 250 GENERAL PHARMACY W/ HCPCS (ALT 636 FOR OP/ED): Performed by: NURSE PRACTITIONER

## 2024-06-28 PROCEDURE — 2060000001 HC INTERMEDIATE ICU ROOM DAILY

## 2024-06-28 PROCEDURE — 36415 COLL VENOUS BLD VENIPUNCTURE: CPT | Performed by: INTERNAL MEDICINE

## 2024-06-28 PROCEDURE — 2500000001 HC RX 250 WO HCPCS SELF ADMINISTERED DRUGS (ALT 637 FOR MEDICARE OP): Performed by: NURSE PRACTITIONER

## 2024-06-28 PROCEDURE — 97116 GAIT TRAINING THERAPY: CPT | Mod: GP,CQ

## 2024-06-28 PROCEDURE — 97530 THERAPEUTIC ACTIVITIES: CPT | Mod: GP,CQ

## 2024-06-28 PROCEDURE — 85027 COMPLETE CBC AUTOMATED: CPT | Performed by: INTERNAL MEDICINE

## 2024-06-28 PROCEDURE — 83735 ASSAY OF MAGNESIUM: CPT | Performed by: INTERNAL MEDICINE

## 2024-06-28 PROCEDURE — 99232 SBSQ HOSP IP/OBS MODERATE 35: CPT | Performed by: NURSE PRACTITIONER

## 2024-06-28 PROCEDURE — 99233 SBSQ HOSP IP/OBS HIGH 50: CPT | Performed by: INTERNAL MEDICINE

## 2024-06-28 PROCEDURE — 80069 RENAL FUNCTION PANEL: CPT | Performed by: NURSE PRACTITIONER

## 2024-06-28 RX ORDER — CARVEDILOL 3.12 MG/1
3.12 TABLET ORAL 2 TIMES DAILY
Status: DISCONTINUED | OUTPATIENT
Start: 2024-06-28 | End: 2024-06-30 | Stop reason: HOSPADM

## 2024-06-28 RX ORDER — FUROSEMIDE 20 MG/1
20 TABLET ORAL DAILY
Status: DISCONTINUED | OUTPATIENT
Start: 2024-06-29 | End: 2024-06-30 | Stop reason: HOSPADM

## 2024-06-28 ASSESSMENT — PAIN SCALES - GENERAL
PAINLEVEL_OUTOF10: 0 - NO PAIN

## 2024-06-28 ASSESSMENT — COGNITIVE AND FUNCTIONAL STATUS - GENERAL
WALKING IN HOSPITAL ROOM: A LITTLE
CLIMB 3 TO 5 STEPS WITH RAILING: A LITTLE
MOBILITY SCORE: 20
MOVING TO AND FROM BED TO CHAIR: A LITTLE
STANDING UP FROM CHAIR USING ARMS: A LITTLE

## 2024-06-28 ASSESSMENT — PAIN - FUNCTIONAL ASSESSMENT
PAIN_FUNCTIONAL_ASSESSMENT: 0-10

## 2024-06-28 NOTE — PROGRESS NOTES
"[facility] Heart and Vascular Cardiology  Patient Name: Sue Vail  Patient : 1945  Room/Bed: -A    HPI:  Sue Vail is a 78 y.o. female presenting with shortness of breath.  The patient has a history of melanoma, lymphoma treated with Rituxan (from [id number]) reportedly in remission now presenting with shortness of breath.       The patient tells me years ago (in the ) she had a cardiac workup including a nuclear stress test, after which she was asked \"when did you have a heart attack?\"  She was told then she had a scar on her heart, but tells me she did not receive any stents or heart surgery.  She does recall on  she had a very stressful event while on a business trip, and is wondering if she had a \"heart attack\" at that time.  She otherwise denies any prior history of CHF, MI, CAD.    She did have an echo in our system in 2018 which demonstrated an LVEF of 55-60%.     The patient tells me that since she received rituximab between 6329-8322.  Following this she developed severe fatigue to the point that she is virtually bedridden.  In the last two months, however she lso developed progressively worsening shortness of breath with associated orthopnea, PND.  Denies LE edema.  No chest pain/pressure.  Prior to presentation she was so short of breath while feeding the dog and doing the dishes that she thought she was having a heart attack.  On presentation to ED , /93.  Sat 97% on RA.  HSTI 21 > 17.  HB 11.6, Plt 319.  CXR demonstrated mild cardiomegaly and interstitial prominence with compatible edema.  EKG demonstrated LBBB (new since prior EKG 2018).  The patient rec'd IV lasix and was admitted for further management.    Allergies:  Ace inhibitors, Adhesive tape-silicones, Beta-blockers (beta-adrenergic blocking agts), Fluoxetine, Nsaids (non-steroidal anti-inflammatory drug), Olanzapine, Other omega-3s, and Penicillins    Subjective Data:  24:  Denies any chest " "pain/pressure.  Chronic orthopnea and TOBIN that she feels is similar.  No BLE edema.  SR on telemetry with HR 65 bpm.    Overnight Events:  None    Objective Data:  Vitals:    06/28/24 0815 06/28/24 0846 06/28/24 0900 06/28/24 1000   BP: 110/57 112/61 95/52 (!) 85/48   BP Location:       Patient Position:       Pulse: 71 71 66 86   Resp: 16 17 12 18   Temp:       TempSrc:       SpO2: 95% 93% 92% 95%   Weight:       Height:           Last Labs:  Results from last 7 days   Lab Units 06/28/24  0437 06/26/24  0423 06/25/24  1222   WBC AUTO x10*3/uL 7.9 6.4 6.9   HEMOGLOBIN g/dL 10.6* 10.9* 11.6*   HEMATOCRIT % 33.1* 33.6* 35.5*   PLATELETS AUTO x10*3/uL 273 286 319       Results from last 7 days   Lab Units 06/28/24  0437 06/27/24  0501 06/26/24  0423 06/25/24  1222   SODIUM mmol/L 138 139 140 139   POTASSIUM mmol/L 4.2 3.6 3.8 4.0   CHLORIDE mmol/L 105 105 108* 106   CO2 mmol/L 26 26 25 25   BUN mg/dL 21 15 14 16   CREATININE mg/dL 1.01 0.76 0.77 0.71   CALCIUM mg/dL 8.3* 8.6 8.5* 8.6   PROTEIN TOTAL g/dL  --   --   --  6.2*   BILIRUBIN TOTAL mg/dL  --   --   --  0.4   ALK PHOS U/L  --   --   --  76   ALT U/L  --   --   --  13   AST U/L  --   --   --  15   GLUCOSE mg/dL 103* 127* 179* 118*       Results from last 7 days   Lab Units 06/28/24  0437 06/27/24  0501 06/25/24  1222   MAGNESIUM mg/dL 2.04 2.01 2.10       No results found for: \"LDLF\"     Lab Results   Component Value Date     (H) 06/25/2024       Lab Results   Component Value Date    TROPHS 17 (H) 06/25/2024    TROPHS 21 (H) 06/25/2024        No results found for: \"TSH\", \"FT4\"    Results from last 7 days   Lab Units 06/25/24  1222   INR  1.0     Lab Results   Component Value Date    HGBA1C 6.3 (H) 05/02/2024       Intake/Output    Intake/Output Summary (Last 24 hours) at 6/28/2024 1103  Last data filed at 6/27/2024 2130  Gross per 24 hour   Intake 340.5 ml   Output 5 ml   Net 335.5 ml      I/O last 2 completed shifts:  In: 700.5 (8.1 mL/kg) [P.O.:360; " I.V.:340.5 (4 mL/kg)]  Out: 455 (5.3 mL/kg) [Urine:450 (0.2 mL/kg/hr); Blood:5]  Weight: 86.2 kg     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), Personal history of malignant melanoma of skin (04/13/2017), Personal history of malignant melanoma of skin (11/10/2020), Personal history of malignant melanoma of skin (11/10/2020), 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    Past Surgical History:  She has a past surgical history that includes Other surgical history (04/11/2017); Gastric bypass (04/11/2017); Other surgical history (12/23/2019); and Other surgical history (12/23/2019).      Social History:  She reports that she has never smoked. She has never used smokeless tobacco. She reports that she does not currently use alcohol. She reports that she does not use drugs.    Family History:  Family History   Problem Relation Name Age of Onset    Breast cancer Mother      Dementia Father      Alzheimer's disease Father      Prostate cancer Father      Tremor Father      Pulmonary embolism Daughter      Pulmonary embolism Daughter      Other (cardiomegaly) Maternal Grandmother         Outpatient Medications  No current facility-administered medications on file prior to  encounter.     Current Outpatient Medications on File Prior to Encounter   Medication Sig Dispense Refill    albuterol (Ventolin HFA) 90 mcg/actuation inhaler Inhale 2 puffs every 4 hours if needed for wheezing or shortness of breath. 8 g 5    busPIRone (Buspar) 5 mg tablet Take 1 tablet (5 mg) by mouth 2 times a day. 200 tablet 0    cholecalciferol (Vitamin D-3) 125 MCG (5000 UT) capsule Take by mouth q 24 HR.      doxepin (SINEquan) 10 mg capsule Take 1 capsule (10 mg) by mouth once daily at bedtime. 90 capsule 1    gabapentin (Neurontin) 300 mg capsule TAKE 1 CAPSULE(300 MG) BY MOUTH bid (Patient taking differently: Take 1 capsule (300 mg) by mouth once daily.) 200 capsule 0    MAGNESIUM ORAL Take 1 tablet by mouth as needed at bedtime. chew      valproic acid (Depakene) 250 mg capsule Take 3 capsules (750 mg) by mouth once daily at bedtime. 270 capsule 3    venlafaxine (Effexor) 75 mg tablet TAKE 2 TABLETS(150 MG) BY MOUTH EVERY DAY AT BEDTIME (Patient taking differently: Take 1 tablet (75 mg) by mouth once daily at bedtime.) 180 tablet 1    blood sugar diagnostic (Contour Next Test Strips) strip       doxycycline (Vibramycin) 100 mg capsule Take 1 capsule (100 mg) by mouth 2 times a day. (Patient not taking: Reported on 6/25/2024) 20 capsule 0    [DISCONTINUED] multivit-min/iron/folic acid/K (ADULTS MULTIVITAMIN ORAL) Take 1 tablet by mouth once daily.      [DISCONTINUED] ZINC ORAL          Scheduled medications  atorvastatin, 80 mg, oral, Daily  busPIRone, 5 mg, oral, BID  divalproex sprinkle, 750 mg, oral, Nightly  doxepin, 10 mg, oral, Nightly  ferrous sulfate (325 mg ferrous sulfate), 65 mg of iron, oral, Daily with breakfast  furosemide, 20 mg, intravenous, BID  gabapentin, 300 mg, oral, Daily  hydrALAZINE, 10 mg, oral, TID  isosorbide dinitrate, 10 mg, oral, TID  metoprolol tartrate, 12.5 mg, oral, BID  polyethylene glycol, 17 g, oral, Daily  venlafaxine, 75 mg, oral, Nightly      Continuous  medications  nitroglycerin in 5 % dextrose, 5-200 mcg/min, Last Rate: Stopped (06/27/24 1920)      PRN medications  PRN medications: acetaminophen **OR** acetaminophen **OR** acetaminophen, bisacodyl, bisacodyl, LORazepam, melatonin, morphine, oxygen, sodium chloride 0.9%   Medications Prior to Admission   Medication Sig Dispense Refill Last Dose    albuterol (Ventolin HFA) 90 mcg/actuation inhaler Inhale 2 puffs every 4 hours if needed for wheezing or shortness of breath. 8 g 5 6/25/2024    busPIRone (Buspar) 5 mg tablet Take 1 tablet (5 mg) by mouth 2 times a day. 200 tablet 0 6/24/2024    cholecalciferol (Vitamin D-3) 125 MCG (5000 UT) capsule Take by mouth q 24 HR.   6/24/2024    doxepin (SINEquan) 10 mg capsule Take 1 capsule (10 mg) by mouth once daily at bedtime. 90 capsule 1 6/24/2024    gabapentin (Neurontin) 300 mg capsule TAKE 1 CAPSULE(300 MG) BY MOUTH bid (Patient taking differently: Take 1 capsule (300 mg) by mouth once daily.) 200 capsule 0 6/24/2024    MAGNESIUM ORAL Take 1 tablet by mouth as needed at bedtime. chew   Past Week    valproic acid (Depakene) 250 mg capsule Take 3 capsules (750 mg) by mouth once daily at bedtime. 270 capsule 3 6/24/2024    venlafaxine (Effexor) 75 mg tablet TAKE 2 TABLETS(150 MG) BY MOUTH EVERY DAY AT BEDTIME (Patient taking differently: Take 1 tablet (75 mg) by mouth once daily at bedtime.) 180 tablet 1 6/24/2024    blood sugar diagnostic (Contour Next Test Strips) strip    More than a month    doxycycline (Vibramycin) 100 mg capsule Take 1 capsule (100 mg) by mouth 2 times a day. (Patient not taking: Reported on 6/25/2024) 20 capsule 0 Not Taking       Echo 6/26/24:    1. Multiple segmental abnormalities exist. See findings.   2. The left ventricular systolic function is severely decreased, with a Sorenson's biplane calculated ejection fraction of 22%.   3. There are multiple wall motion abnormalities.   4. Spectral Doppler shows an abnormal pattern of left ventricular  "diastolic filling.   5. There is normal right ventricular global systolic function.   6. Moderate mitral valve regurgitation.   7. Moderately elevated right ventricular systolic pressure.  EF   Date/Time Value Ref Range Status   06/26/2024 08:30 AM 22 %         R/[facility] 6/27/24- Final report PENDING:    Physical Exam:  Vitals reviewed.   Constitutional:       Appearance: Not in distress. Chronically ill-appearing.   Neck:      Vascular: No carotid bruit.   Pulmonary:      Effort: Pulmonary effort is normal.      Breath sounds: Normal breath sounds.   Cardiovascular:      PMI at left midclavicular line. Normal rate. Regular rhythm. S1 with normal intensity. S2 with normal intensity.       Murmurs: There is no murmur.      Comments: Right groin cath site without swelling, hematoma or bleeding.  Dressing dry and intact.  No bruit noted. Mild ecchymosis to site.  Edema:     Peripheral edema absent.   Abdominal:      General: Bowel sounds are normal.   Neurological:      Mental Status: Alert and oriented to person, place and time.         Assessment/Plan:   Acute systolic HF EF 22%:  TTE with multiple WMA.  Unclear duration, however TTE in 2018 demonstrated low normal LV fcn.  Unclear etiology, however patient reporting significant change (fatigue) after being treated with rituximab (last in 2021).  We had a long discussion about causes of cardiomyopathy, including CAD.  I recommended a RLHC/cor angio for further evaluation once she is able to lie flat.  She has documented intolerances/allergies to both ACE inhibitors and beta blockers, both with side effects of \"hypotension.\"  -diuresis with lasix 20mg IV BID  -strict I/O's and daily weights  -serial Cr  -monitor and replete lytes  -initiate isordil/hydralazine for afterload reduction  -keep NPO in case of RLHC/cor angio tomorrow (should be comfortable lying flat)  -she would benefit from follow up in heart failure clinic, and potentially seeing an advanced HF cardiologist " for assistance in optimal medical management  -once she is initiated on GDMT, recommend rpt TTE in 3 mos and evaluation for ? ICD for primary prevention    6/28/24: S/p R/LHC yesterday and final report is pending in EMR. Post procedure note from cath lab NP - no signficant CAD. Elevated filling pressures, normal CO. SVR > 2000.  Renal function stable.  She continues to have orthopnea and TOBIN.  Brief episode of hypotension after morning medications, but patient was asymptomatic and BP has improved.  Continue hydralazine, isosorbide as ordered.  Recommend changing to metoprolol tartrate to carvedilol. She has had gastric bypass in the past so will not use metoprolol succinate.  Okay from cardiac standpoint to transfer to SDU. Discussed with Dr. Kate Cazares and will stop furosemide IV d/t intermittent hypotension.  Will start furosemide 20 mg daily PO tomorrow.  Consider adding ACEi/ARB/ARNI tomorrow if BP will tolerate (has allergy listed d/t hypotension).    Code Status  Full Code      Danyell Mixon, APRN-CNP

## 2024-06-28 NOTE — PROGRESS NOTES
Physical Therapy    Physical Therapy Treatment    Patient Name: Sue Vail  MRN: [id number]  Today's Date: 6/28/2024  Time Calculation  Start Time: 1310  Stop Time: 1334  Time Calculation (min): 24 min    Assessment/Plan   PT Assessment  PT Assessment Results: Decreased strength, Decreased endurance, Impaired balance, Decreased mobility  End of Session Communication: Bedside nurse  Assessment Comment: patient increased gait  back to 120 feet, did have one LOb requiring min assist to correct, was unsteady on feet initially but improved with gait  End of Session Patient Position: Bed, 3 rail up, Alarm off, not on at start of session  PT Plan  Inpatient/Swing Bed or Outpatient: Inpatient  PT Plan  Treatment/Interventions: Bed mobility, Transfer training, Gait training, Therapeutic activity  PT Plan: Ongoing PT  PT Frequency: 3 times per week  PT Discharge Recommendations: Low intensity level of continued care (Possible outpatient cardiac or aquatic PT)  Equipment Recommended upon Discharge: Wheeled walker (or rollator)  PT Recommended Transfer Status: Independent  PT - OK to Discharge: Yes (Once medically cleared.)      General Visit Information:   PT  Visit  PT Received On: 06/28/24  Response to Previous Treatment: Patient reporting fatigue but able to participate.  General  Prior to Session Communication: Bedside nurse  Patient Position Received: Bed, 3 rail up, Alarm on  General Comment: patient ok for therapy by nurse and ok to disconnect monitor forgait and bathroom.  present    Subjective   Precautions:     Vital Signs:       Objective   Pain:  Pain Assessment  Pain Assessment: 0-10  0-10 (Numeric) Pain Score:  (no complaints of pain)  Cognition:  Cognition  Overall Cognitive Status: Within Functional Limits  Coordination:     Postural Control:     Extremity/Trunk Assessments:    Activity Tolerance:  Activity Tolerance  Endurance: Tolerates 30 min exercise with multiple rests  Treatments:  Therapeutic  Exercise  Therapeutic Exercise Performed: No    Therapeutic Activity  Therapeutic Activity Performed: Yes  Therapeutic Activity 1: static standing to wash hands x2 mins. static standing with one hand onwall to fix sox with min assist due to one smal LoB    Bed Mobility  Bed Mobility: Yes  Bed Mobility 1  Bed Mobility 1: Supine to sitting, Sitting to supine  Level of Assistance 1: Set up, Distant supervision (requires assitance to remove  monitor lines , BP cuff etc)    Ambulation/Gait Training  Ambulation/Gait Training Performed: Yes  Ambulation/Gait Training 1  Comments/Distance (ft) 1: gait training without device 10 feet in and out of bathroom wiht cGA,  then gait trianing additional 120 feet around IUC with CGa plus min assist for one LOB to correct  Transfers  Transfer: Yes  Transfer 1  Transfer From 1: Sit to  Transfer to 1: Stand, Toilet, Bed  Transfer Level of Assistance 1: Contact guard    Outcome Measures:  [facility] Basic Mobility  Turning from your back to your side while in a flat bed without using bedrails: None  Moving from lying on your back to sitting on the side of a flat bed without using bedrails: None  Moving to and from bed to chair (including a wheelchair): A little  Standing up from a chair using your arms (e.g. wheelchair or bedside chair): A little  To walk in hospital room: A little  Climbing 3-5 steps with railing: A little  Basic Mobility - Total Score: 20    Education Documentation  Mobility Training, taught by Gladis Horton PTA at 6/28/2024  1:44 PM.  Learner: Patient  Readiness: Acceptance  Method: Explanation  Response: Verbalizes Understanding    Education Comments  No comments found.        OP EDUCATION:       Encounter Problems       Encounter Problems (Active)       Pain - Adult          To improve activity tolerance for improved quality of mobility:       Patient will trial rollator and compare with WW ambulating 100+ feet with S/MOD I using pursed lip breathing and KIMBERLY RPE < or =  5/10. (Met)       Start:  06/26/24    Expected End:  07/10/24    Resolved:  06/28/24         Patient will negotiate 4 steps with 1-2 rail (determining if helpful for SOB) and S/MOD I. She will complete the TUG in < or = 15 seconds with device and S/MOD I to reduce fall risk. (Progressing)       Start:  06/26/24    Expected End:  07/10/24

## 2024-06-28 NOTE — PROGRESS NOTES
Sue Vail is a 78 y.o. female on day 3 of admission presenting with Acute systolic heart failure (Multi).      Subjective     Sue Vail is a 78 y.o. female with PMHx s/f melanoma, lymphoma treated with Rituxan reportedly in remission now presenting with shortness of breath.  For the last couple of months patient has had progressively worsening shortness of breath with activity.  She has also had some associated restless when trying to lay flat she is night stating that she becomes very short of breath and gets better when she sits up but she does appear to be somewhat symptomatic when laying down.  She denies any lower extremity edema any increased abdominal girth any chest pain palpitations.  On presentation patient had temperature of 97.1 heart rate 109 respiratory rate 16 blood pressure 150/93 SpO2 97%, lab work shows BN peptide 960 troponin 21 with repeat troponin 17 total protein slightly low at 6.2 glucose 118 remainder of chemistry panel within normal limits INR is 1 CBC has no leukocytosis hemoglobin 11.6 hematocrit 35.5 platelets 319.  Chest x-ray showing mild cardiomegaly and interstitial prominence with compatible edema the EKG shows sinus rhythm with left bundle branch block which is new versus prior EKG in 2018.  The patient was given a dose of IV furosemide and referred for admission due to her new onset of congestive heart failure a bedside echocardiogram was done by the ER provider initial reading from his perspective was there was significant reduced ejection fraction.   Patient was admitted with a diagnosis of acute on chronic congestive heart failure-started on appropriate medication.     06/27: Patient continues to have shortness of breath, no chest pain at rest, no fever or chills, no nausea or vomiting.  Cardiology on board and planning for left/right heart cath today  06/28: Patient was evaluated this a.m., shortness of breath is improving, slept better last night.  No chest pain at rest.   Left heart cath shows no significant CAD.    Objective     Last Recorded Vitals  /51   Pulse 68   Temp 37 °C (98.6 °F)   Resp 21   Wt 86.2 kg (190 lb 0.6 oz)   SpO2 93%   Intake/Output last 3 Shifts:    Intake/Output Summary (Last 24 hours) at 6/28/2024 1310  Last data filed at 6/27/2024 2130  Gross per 24 hour   Intake 340.5 ml   Output --   Net 340.5 ml       Admission Weight  Weight: 95.3 kg (210 lb) (06/25/24 1140)    Daily Weight  06/27/24 : 86.2 kg (190 lb 0.6 oz)    Image Results  ECG 12 Lead  Normal sinus rhythm  Left axis deviation  Left bundle branch block  Abnormal ECG  When compared with ECG of 27-JUN-2024 06:53,  Vent. rate has decreased BY  35 BPM      Physical Exam  Constitutional:       Appearance: She is obese.   HENT:      Head: Normocephalic and atraumatic.      Nose: Nose normal.      Mouth/Throat:      Mouth: Mucous membranes are dry.   Eyes:      Extraocular Movements: Extraocular movements intact.      Conjunctiva/sclera: Conjunctivae normal.   Cardiovascular:      Rate and Rhythm: Normal rate and regular rhythm.      Pulses: Normal pulses.      Heart sounds: Normal heart sounds.   Pulmonary:      Effort: Pulmonary effort is normal.      Breath sounds: Normal breath sounds.   Abdominal:      General: Bowel sounds are normal.      Palpations: Abdomen is soft.   Musculoskeletal:         General: Normal range of motion.      Cervical back: Normal range of motion and neck supple.      Right lower leg: Edema present.      Left lower leg: Edema present.   Skin:     General: Skin is warm and dry.   Neurological:      General: No focal deficit present.      Mental Status: She is alert and oriented to person, place, and time. Mental status is at baseline.   Psychiatric:         Mood and Affect: Mood normal.         Behavior: Behavior normal.         Thought Content: Thought content normal.         Judgment: Judgment normal.         Relevant Results               Assessment/Plan   This  patient currently has cardiac telemetry ordered; if you would like to modify or discontinue the telemetry order, click here to go to the orders activity to modify/discontinue the order.        Sue Vail is a 78 y.o. female with PMHx s/f melanoma, lymphoma treated with Rituxan reportedly in remission now presenting with shortness of breath.  For the last couple of months patient has had progressively worsening shortness of breath with activity.       Principal Problem:    Acute systolic heart failure (Multi)  Active Problems:    Seizure disorder (Multi)    History of lymphoma    Other specified anemias    Acute systolic CHF new onset  -Echo done  on admission shows EF of 22%.  There are multiple wall motion abnormalities.  There is also left ventricular diastolic dysfunction.  Also moderate mitral valve regurgitation.  Echo from 2018 showed preserved left ventricular ejection fraction  -Continue with IV Lasix twice a day,  -Strict JAXSON's  -Monitor renal functions and replace lites as indicated.  -Cardiology on board-underwent left heart cath on 06/27 showing normal coronaries    Nonischemic cardiomyopathy  Continue on goal-directed medical therapy  Cardiology follow-up    Hx lymphoma treated w Rituxan and now reportedly  in remission     History of seizure disorder\ restless legs  Continue home medications     Normocytic anemia  appears to be intermittent and somewhat chronic  Anemia workup suggestive of iron deficiency  On iron supplementation    VTE prophylaxis-Lovenox              Ezequiel Castillo MD

## 2024-06-28 NOTE — CARE PLAN
The patient's goals for the shift include      The clinical goals for the shift include Pt's cath site will be free from complications this shift    Pt's cath site remained free from complications all shift. Pt was weaned of nitroglycerin drip at start of shift and remained off all shift.     Problem: Fall/Injury  Goal: Not fall by end of shift  Outcome: Progressing  Goal: Be free from injury by end of the shift  Outcome: Progressing  Goal: Verbalize understanding of personal risk factors for fall in the hospital  Outcome: Progressing  Goal: Verbalize understanding of risk factor reduction measures to prevent injury from fall in the home  Outcome: Progressing  Goal: Pace activities to prevent fatigue by end of the shift  Outcome: Progressing     Problem: Heart Failure  Goal: Report improvement of dyspnea/breathlessness this shift  Outcome: Progressing  Goal: Weight from fluid excess reduced over 2-3 days, then stabilize  Outcome: Progressing  Goal: Increase self care and/or family involvement in 24 hours  Outcome: Progressing     Problem: Nutrition  Goal: Oral intake greater 75%  Outcome: Progressing  Goal: BG  mg/dL  Outcome: Progressing  Goal: Lab values WNL  Outcome: Progressing  Goal: Electrolytes WNL  Outcome: Progressing  Goal: Maintain stable weight  Outcome: Progressing  Goal: Reduce weight from edema/fluid  Outcome: Progressing     Problem: Pain - Adult  Goal: Verbalizes/displays adequate comfort level or baseline comfort level  Outcome: Progressing     Problem: Safety - Adult  Goal: Free from fall injury  Outcome: Progressing     Problem: Discharge Planning  Goal: Discharge to home or other facility with appropriate resources  Outcome: Progressing     Problem: Chronic Conditions and Co-morbidities  Goal: Patient's chronic conditions and co-morbidity symptoms are monitored and maintained or improved  Outcome: Progressing     Problem: Diabetes  Goal: Achieve decreasing blood glucose levels by end of  shift  Outcome: Progressing  Goal: Increase stability of blood glucose readings by end of shift  Outcome: Progressing  Goal: Maintain electrolyte levels within acceptable range throughout shift  Outcome: Progressing  Goal: Maintain glucose levels >70mg/dl to <250mg/dl throughout shift  Outcome: Progressing  Goal: No changes in neurological exam by end of shift  Outcome: Progressing  Goal: Learn about and adhere to nutrition recommendations by end of shift  Outcome: Progressing  Goal: Vital signs within normal range for age by end of shift  Outcome: Progressing  Goal: Increase self care and/or family involovement by end of shift  Outcome: Progressing

## 2024-06-29 LAB
ANION GAP SERPL CALC-SCNC: 12 MMOL/L (ref 10–20)
ATRIAL RATE: 91 BPM
BUN SERPL-MCNC: 24 MG/DL (ref 6–23)
CALCIUM SERPL-MCNC: 8.1 MG/DL (ref 8.6–10.3)
CHLORIDE SERPL-SCNC: 105 MMOL/L (ref 98–107)
CO2 SERPL-SCNC: 22 MMOL/L (ref 21–32)
CREAT SERPL-MCNC: 0.71 MG/DL (ref 0.5–1.05)
EGFRCR SERPLBLD CKD-EPI 2021: 87 ML/MIN/1.73M*2
GLUCOSE SERPL-MCNC: 106 MG/DL (ref 74–99)
P AXIS: 22 DEGREES
POTASSIUM SERPL-SCNC: 4.4 MMOL/L (ref 3.5–5.3)
PR INTERVAL: 179 MS
Q ONSET: 249 MS
QRS COUNT: 15 BEATS
QRS DURATION: 152 MS
QT INTERVAL: 403 MS
QTC CALCULATION(BAZETT): 493 MS
QTC FREDERICIA: 461 MS
R AXIS: -34 DEGREES
SODIUM SERPL-SCNC: 135 MMOL/L (ref 136–145)
T AXIS: 120 DEGREES
T OFFSET: 451 MS
VENTRICULAR RATE: 90 BPM

## 2024-06-29 PROCEDURE — 2500000001 HC RX 250 WO HCPCS SELF ADMINISTERED DRUGS (ALT 637 FOR MEDICARE OP): Performed by: INTERNAL MEDICINE

## 2024-06-29 PROCEDURE — 99232 SBSQ HOSP IP/OBS MODERATE 35: CPT | Performed by: PHYSICIAN ASSISTANT

## 2024-06-29 PROCEDURE — 36415 COLL VENOUS BLD VENIPUNCTURE: CPT | Performed by: INTERNAL MEDICINE

## 2024-06-29 PROCEDURE — 99233 SBSQ HOSP IP/OBS HIGH 50: CPT | Performed by: INTERNAL MEDICINE

## 2024-06-29 PROCEDURE — 2500000001 HC RX 250 WO HCPCS SELF ADMINISTERED DRUGS (ALT 637 FOR MEDICARE OP): Performed by: PHYSICIAN ASSISTANT

## 2024-06-29 PROCEDURE — 2060000001 HC INTERMEDIATE ICU ROOM DAILY

## 2024-06-29 PROCEDURE — 80048 BASIC METABOLIC PNL TOTAL CA: CPT | Performed by: INTERNAL MEDICINE

## 2024-06-29 ASSESSMENT — ENCOUNTER SYMPTOMS
WEAKNESS: 0
SHORTNESS OF BREATH: 1
DYSURIA: 0
PALPITATIONS: 0
WHEEZING: 0
ORTHOPNEA: 0
ABDOMINAL PAIN: 0
DIARRHEA: 0
NAUSEA: 0
FEVER: 0
VOMITING: 0

## 2024-06-29 ASSESSMENT — PAIN SCALES - GENERAL
PAINLEVEL_OUTOF10: 1
PAINLEVEL_OUTOF10: 0 - NO PAIN

## 2024-06-29 ASSESSMENT — COGNITIVE AND FUNCTIONAL STATUS - GENERAL
WALKING IN HOSPITAL ROOM: A LITTLE
CLIMB 3 TO 5 STEPS WITH RAILING: A LITTLE
STANDING UP FROM CHAIR USING ARMS: A LITTLE
MOBILITY SCORE: 20
DAILY ACTIVITIY SCORE: 24
MOVING TO AND FROM BED TO CHAIR: A LITTLE

## 2024-06-29 ASSESSMENT — PAIN - FUNCTIONAL ASSESSMENT: PAIN_FUNCTIONAL_ASSESSMENT: 0-10

## 2024-06-29 NOTE — PROGRESS NOTES
"[facility]  Patient Name: Sue Vail  Patient : 1945  Room/Bed: -A    HPI:  Sue Vail is a 78 y.o. female presenting with shortness of breath.  The patient has a history of melanoma, lymphoma treated with Rituxan (from [id number]) reportedly in remission now presenting with shortness of breath.       The patient tells me years ago (in the ) she had a cardiac workup including a nuclear stress test, after which she was asked \"when did you have a heart attack?\"  She was told then she had a scar on her heart, but tells me she did not receive any stents or heart surgery.  She does recall on  she had a very stressful event while on a business trip, and is wondering if she had a \"heart attack\" at that time.  She otherwise denies any prior history of CHF, MI, CAD.    She did have an echo in our system in 2018 which demonstrated an LVEF of 55-60%.     The patient tells me that since she received rituximab between [id number].  Following this she developed severe fatigue to the point that she is virtually bedridden.  In the last two months, however she lso developed progressively worsening shortness of breath with associated orthopnea, PND.  Denies LE edema.  No chest pain/pressure.  Prior to presentation she was so short of breath while feeding the dog and doing the dishes that she thought she was having a heart attack.  On presentation to ED , /93.  Sat 97% on RA.  HSTI 21 > 17.  HB 11.6, Plt 319.  CXR demonstrated mild cardiomegaly and interstitial prominence with compatible edema.  EKG demonstrated LBBB (new since prior EKG ).  The patient rec'd IV lasix and was admitted for further management.    Allergies:  Ace inhibitors, Adhesive tape-silicones, Beta-blockers (beta-adrenergic blocking agts), Fluoxetine, Nsaids (non-steroidal anti-inflammatory drug), Olanzapine, Other omega-3s, and Penicillins    Subjective Data:  24:  Denies any chest " "pain/pressure.  Chronic orthopnea and TOBIN that she feels is similar.  No BLE edema.  SR on telemetry with HR 65 bpm.  6-29-24: Overall patient is feeling better with her breathing.  She has been laying on her back without significant shortness of breath.  Admits that she has not been very active in her room.  Telemetry sinus rhythm at 73.  Overnight Events:  None    Objective Data:  Vitals:    06/28/24 2255 06/28/24 2338 06/29/24 0345 06/29/24 0724   BP: 121/77 115/66 126/67 125/77   BP Location: Left arm Left arm Left arm Left arm   Patient Position: Lying Lying Lying Lying   Pulse: 82 84 76 90   Resp:  17 17 18   Temp:  37.2 °C (99 °F) 36.4 °C (97.5 °F) 37.1 °C (98.8 °F)   TempSrc:  Temporal Temporal Temporal   SpO2:  94% 95% 94%   Weight:   98.3 kg (216 lb 11.4 oz)    Height:           Last Labs:  Results from last 7 days   Lab Units 06/28/24  0437 06/26/24  0423 06/25/24  1222   WBC AUTO x10*3/uL 7.9 6.4 6.9   HEMOGLOBIN g/dL 10.6* 10.9* 11.6*   HEMATOCRIT % 33.1* 33.6* 35.5*   PLATELETS AUTO x10*3/uL 273 286 319       Results from last 7 days   Lab Units 06/29/24  0346 06/28/24  0437 06/27/24  0501 06/26/24  0423 06/25/24  1222   SODIUM mmol/L 135* 138 139   < > 139   POTASSIUM mmol/L 4.4 4.2 3.6   < > 4.0   CHLORIDE mmol/L 105 105 105   < > 106   CO2 mmol/L 22 26 26   < > 25   BUN mg/dL 24* 21 15   < > 16   CREATININE mg/dL 0.71 1.01 0.76   < > 0.71   CALCIUM mg/dL 8.1* 8.3* 8.6   < > 8.6   PROTEIN TOTAL g/dL  --   --   --   --  6.2*   BILIRUBIN TOTAL mg/dL  --   --   --   --  0.4   ALK PHOS U/L  --   --   --   --  76   ALT U/L  --   --   --   --  13   AST U/L  --   --   --   --  15   GLUCOSE mg/dL 106* 103* 127*   < > 118*    < > = values in this interval not displayed.       Results from last 7 days   Lab Units 06/28/24  0437 06/27/24  0501 06/25/24  1222   MAGNESIUM mg/dL 2.04 2.01 2.10       No results found for: \"LDLF\"     No results found for: \"BNP\"      No results found for: \"TROPHS\"       No results " "found for: \"TSH\", \"FT4\"    Results from last 7 days   Lab Units 06/25/24  1222   INR  1.0     Lab Results   Component Value Date    HGBA1C 6.3 (H) 05/02/2024       Intake/Output  No intake or output data in the 24 hours ending 06/29/24 0802     No intake/output data recorded.    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    Past Surgical History:  She has a past surgical history that includes Other surgical history (04/11/2017); Gastric bypass (04/11/2017); Other surgical history (12/23/2019); and Other surgical history (12/23/2019).      Social History:  She reports that she has never smoked. She has never used smokeless tobacco. She reports that she does not currently use alcohol. She reports that she does not use drugs.    Family History:  Family History   Problem Relation Name Age of Onset    Breast cancer Mother      Dementia Father      Alzheimer's disease Father      Prostate cancer Father      Tremor Father      Pulmonary embolism " Daughter      Pulmonary embolism Daughter      Other (cardiomegaly) Maternal Grandmother         Scheduled medications  atorvastatin, 80 mg, oral, Daily  busPIRone, 5 mg, oral, BID  carvedilol, 3.125 mg, oral, BID  divalproex sprinkle, 750 mg, oral, Nightly  doxepin, 10 mg, oral, Nightly  ferrous sulfate (325 mg ferrous sulfate), 65 mg of iron, oral, Daily with breakfast  furosemide, 20 mg, oral, Daily  gabapentin, 300 mg, oral, Daily  hydrALAZINE, 10 mg, oral, TID  isosorbide dinitrate, 10 mg, oral, TID  polyethylene glycol, 17 g, oral, Daily  venlafaxine, 75 mg, oral, Nightly          Medications Prior to Admission   Medication Sig Dispense Refill Last Dose    albuterol (Ventolin HFA) 90 mcg/actuation inhaler Inhale 2 puffs every 4 hours if needed for wheezing or shortness of breath. 8 g 5 6/25/2024    busPIRone (Buspar) 5 mg tablet Take 1 tablet (5 mg) by mouth 2 times a day. 200 tablet 0 6/24/2024    cholecalciferol (Vitamin D-3) 125 MCG (5000 UT) capsule Take by mouth q 24 HR.   6/24/2024    doxepin (SINEquan) 10 mg capsule Take 1 capsule (10 mg) by mouth once daily at bedtime. 90 capsule 1 6/24/2024    gabapentin (Neurontin) 300 mg capsule TAKE 1 CAPSULE(300 MG) BY MOUTH bid (Patient taking differently: Take 1 capsule (300 mg) by mouth once daily.) 200 capsule 0 6/24/2024    MAGNESIUM ORAL Take 1 tablet by mouth as needed at bedtime. chew   Past Week    valproic acid (Depakene) 250 mg capsule Take 3 capsules (750 mg) by mouth once daily at bedtime. 270 capsule 3 6/24/2024    venlafaxine (Effexor) 75 mg tablet TAKE 2 TABLETS(150 MG) BY MOUTH EVERY DAY AT BEDTIME (Patient taking differently: Take 1 tablet (75 mg) by mouth once daily at bedtime.) 180 tablet 1 6/24/2024    blood sugar diagnostic (Contour Next Test Strips) strip    More than a month    doxycycline (Vibramycin) 100 mg capsule Take 1 capsule (100 mg) by mouth 2 times a day. (Patient not taking: Reported on 6/25/2024) 20 capsule 0 Not Taking        Echo 6/26/24:    1. Multiple segmental abnormalities exist. See findings.   2. The left ventricular systolic function is severely decreased, with a Sorenson's biplane calculated ejection fraction of 22%.   3. There are multiple wall motion abnormalities.   4. Spectral Doppler shows an abnormal pattern of left ventricular diastolic filling.   5. There is normal right ventricular global systolic function.   6. Moderate mitral valve regurgitation.   7. Moderately elevated right ventricular systolic pressure.  EF   Date/Time Value Ref Range Status   06/26/2024 08:30 AM 22 %         R/LHC 6/27/24-Procedure Findings:   no signficant CAD.  Elevated filling pressures, normal CO.  SVT > 2000    Review of Systems   Constitutional: Negative for fever and malaise/fatigue.   Cardiovascular:  Negative for chest pain, orthopnea and palpitations.   Respiratory:  Positive for shortness of breath. Negative for wheezing.         Although still some mild shortness of breath overall she does feel better.   Skin:  Negative for itching and rash.   Gastrointestinal:  Negative for abdominal pain, diarrhea, nausea and vomiting.   Genitourinary:  Negative for dysuria.   Neurological:  Negative for weakness.         Physical Exam:  Vitals reviewed.   Constitutional:       Appearance: Not in distress. Chronically ill-appearing.   Neck:      Vascular: No carotid bruit.   Pulmonary:      Effort: Pulmonary effort is normal.      Breath sounds: Normal breath sounds.   Cardiovascular:      PMI at left midclavicular line. Normal rate. Regular rhythm. S1 with normal intensity. S2 with normal intensity.       Murmurs: There is no murmur.      Comments: Right groin cath site without swelling, hematoma or bleeding.  Dressing dry and intact.  No bruit noted. Mild ecchymosis to site.  Edema:     Peripheral edema absent.   Abdominal:      General: Bowel sounds are normal.   Neurological:      Mental Status: Alert and oriented to person, place and time.  "        Assessment/Plan:   Acute systolic HF EF 22%:  TTE with multiple WMA.  Unclear duration, however TTE in 2018 demonstrated low normal LV fcn.  Unclear etiology, however patient reporting significant change (fatigue) after being treated with rituximab (last in 2021).  We had a long discussion about causes of cardiomyopathy, including CAD.  I recommended a RLHC/cor angio for further evaluation once she is able to lie flat.  She has documented intolerances/allergies to both ACE inhibitors and beta blockers, both with side effects of \"hypotension.\"  -diuresis with lasix 20mg IV BID  -strict I/O's and daily weights  -serial Cr  -monitor and replete lytes  -initiate isordil/hydralazine for afterload reduction  -keep NPO in case of RLHC/cor angio tomorrow (should be comfortable lying flat)  -she would benefit from follow up in heart failure clinic, and potentially seeing an advanced HF cardiologist for assistance in optimal medical management  -once she is initiated on GDMT, recommend rpt TTE in 3 mos and evaluation for ? ICD for primary prevention    6/28/24: S/p R/LHC yesterday and final report is pending in EMR. Post procedure note from cath lab NP - no signficant CAD. Elevated filling pressures, normal CO. SVR > 2000.  Renal function stable.  She continues to have orthopnea and TOBIN.  Brief episode of hypotension after morning medications, but patient was asymptomatic and BP has improved.  Continue hydralazine, isosorbide as ordered.  Recommend changing to metoprolol tartrate to carvedilol. She has had gastric bypass in the past so will not use metoprolol succinate.  Okay from cardiac standpoint to transfer to SDU. Discussed with Dr. Kate Cazares and will stop furosemide IV d/t intermittent hypotension.  Will start furosemide 20 mg daily PO tomorrow.  Consider adding ACEi/ARB/ARNI tomorrow if BP will tolerate (has allergy listed d/t hypotension).  6-29-24: Overall patient is feeling improved.  She had a lay flat " in bed without significant shortness of breath and she is not requiring oxygen.  She denies chest pain or anginal symptoms.  Her telemetry shows sinus rhythm.  Blood pressures are still up in the 120s so we will add Entresto to her regimen considering the EF of 20%.  Again heart catheterization showed no obstructive CAD.  Will add the Entresto monitor vitals and repeat labs in the a.m.    Recommendations--add lowest dose of Entresto and monitor vitals and labs.  If she continues to do well and remains well compensated could consider discharge on Sunday.      Caleb Trotter PA-C   6/29/2024  8:07 AM

## 2024-06-29 NOTE — CARE PLAN
The patient's goals for the shift include      The clinical goals for the shift include No bleeding or hematma from cath site      Problem: Fall/Injury  Goal: Not fall by end of shift  Outcome: Progressing  Goal: Be free from injury by end of the shift  Outcome: Progressing  Goal: Verbalize understanding of personal risk factors for fall in the hospital  Outcome: Progressing  Goal: Verbalize understanding of risk factor reduction measures to prevent injury from fall in the home  Outcome: Progressing  Goal: Pace activities to prevent fatigue by end of the shift  Outcome: Progressing     Problem: Heart Failure  Goal: Report improvement of dyspnea/breathlessness this shift  Outcome: Progressing  Goal: Weight from fluid excess reduced over 2-3 days, then stabilize  Outcome: Progressing  Goal: Increase self care and/or family involvement in 24 hours  Outcome: Progressing     Problem: Nutrition  Goal: Oral intake greater 75%  Outcome: Progressing  Goal: BG  mg/dL  Outcome: Progressing  Goal: Lab values WNL  Outcome: Progressing  Goal: Electrolytes WNL  Outcome: Progressing  Goal: Maintain stable weight  Outcome: Progressing  Goal: Reduce weight from edema/fluid  Outcome: Progressing     Problem: Pain - Adult  Goal: Verbalizes/displays adequate comfort level or baseline comfort level  Outcome: Progressing     Problem: Safety - Adult  Goal: Free from fall injury  Outcome: Progressing     Problem: Discharge Planning  Goal: Discharge to home or other facility with appropriate resources  Outcome: Progressing     Problem: Chronic Conditions and Co-morbidities  Goal: Patient's chronic conditions and co-morbidity symptoms are monitored and maintained or improved  Outcome: Progressing     Problem: Diabetes  Goal: Achieve decreasing blood glucose levels by end of shift  Outcome: Progressing  Goal: Increase stability of blood glucose readings by end of shift  Outcome: Progressing  Goal: Decrease in ketones present in urine by  end of shift  Outcome: Progressing  Goal: Maintain electrolyte levels within acceptable range throughout shift  Outcome: Progressing  Goal: Maintain glucose levels >70mg/dl to <250mg/dl throughout shift  Outcome: Progressing  Goal: No changes in neurological exam by end of shift  Outcome: Progressing  Goal: Learn about and adhere to nutrition recommendations by end of shift  Outcome: Progressing  Goal: Vital signs within normal range for age by end of shift  Outcome: Progressing  Goal: Increase self care and/or family involovement by end of shift  Outcome: Progressing  Goal: Receive DSME education by end of shift  Outcome: Progressing

## 2024-06-29 NOTE — PROGRESS NOTES
Sue Vail is a 78 y.o. female on day 4 of admission presenting with Acute systolic heart failure (Multi).      Subjective     Sue Vail is a 78 y.o. female with PMHx s/f melanoma, lymphoma treated with Rituxan reportedly in remission now presenting with shortness of breath.  For the last couple of months patient has had progressively worsening shortness of breath with activity.  She has also had some associated restless when trying to lay flat she is night stating that she becomes very short of breath and gets better when she sits up but she does appear to be somewhat symptomatic when laying down.  She denies any lower extremity edema any increased abdominal girth any chest pain palpitations.  On presentation patient had temperature of 97.1 heart rate 109 respiratory rate 16 blood pressure 150/93 SpO2 97%, lab work shows BN peptide 960 troponin 21 with repeat troponin 17 total protein slightly low at 6.2 glucose 118 remainder of chemistry panel within normal limits INR is 1 CBC has no leukocytosis hemoglobin 11.6 hematocrit 35.5 platelets 319.  Chest x-ray showing mild cardiomegaly and interstitial prominence with compatible edema the EKG shows sinus rhythm with left bundle branch block which is new versus prior EKG in 2018.  The patient was given a dose of IV furosemide and referred for admission due to her new onset of congestive heart failure a bedside echocardiogram was done by the ER provider initial reading from his perspective was there was significant reduced ejection fraction.   Patient was admitted with a diagnosis of acute on chronic congestive heart failure-started on appropriate medication.     06/27: Patient continues to have shortness of breath, no chest pain at rest, no fever or chills, no nausea or vomiting.  Cardiology on board and planning for left/right heart cath today  06/28: Patient was evaluated this a.m., shortness of breath is improving, slept better last night.  No chest pain at rest.   Left heart cath shows no significant CAD.  06/29: Patient is feeling better, shortness of breath is improving, no nausea or vomiting.    Objective     Last Recorded Vitals  /77 (BP Location: Left arm, Patient Position: Lying)   Pulse 90   Temp 37.1 °C (98.8 °F) (Temporal)   Resp 18   Wt 98.3 kg (216 lb 11.4 oz)   SpO2 94%   Intake/Output last 3 Shifts:    Intake/Output Summary (Last 24 hours) at 6/29/2024 0955  Last data filed at 6/29/2024 0916  Gross per 24 hour   Intake 240 ml   Output --   Net 240 ml       Admission Weight  Weight: 95.3 kg (210 lb) (06/25/24 1140)    Daily Weight  06/29/24 : 98.3 kg (216 lb 11.4 oz)    Image Results  ECG 12 Lead  Normal sinus rhythm  Left axis deviation  Left bundle branch block  Abnormal ECG  When compared with ECG of 27-JUN-2024 06:53,  Vent. rate has decreased BY  35 BPM      Physical Exam  Constitutional:       Appearance: She is obese.   HENT:      Head: Normocephalic and atraumatic.      Nose: Nose normal.      Mouth/Throat:      Mouth: Mucous membranes are dry.   Eyes:      Extraocular Movements: Extraocular movements intact.      Conjunctiva/sclera: Conjunctivae normal.   Cardiovascular:      Rate and Rhythm: Normal rate and regular rhythm.      Pulses: Normal pulses.      Heart sounds: Normal heart sounds.   Pulmonary:      Effort: Pulmonary effort is normal.      Breath sounds: Normal breath sounds.   Abdominal:      General: Bowel sounds are normal.      Palpations: Abdomen is soft.   Musculoskeletal:         General: Normal range of motion.      Cervical back: Normal range of motion and neck supple.      Right lower leg: Edema present.      Left lower leg: Edema present.   Skin:     General: Skin is warm and dry.   Neurological:      General: No focal deficit present.      Mental Status: She is alert and oriented to person, place, and time. Mental status is at baseline.   Psychiatric:         Mood and Affect: Mood normal.         Behavior: Behavior normal.          Thought Content: Thought content normal.         Judgment: Judgment normal.         Relevant Results               Assessment/Plan   This patient currently has cardiac telemetry ordered; if you would like to modify or discontinue the telemetry order, click here to go to the orders activity to modify/discontinue the order.        Sue Vail is a 78 y.o. female with PMHx s/f melanoma, lymphoma treated with Rituxan reportedly in remission now presenting with shortness of breath.  For the last couple of months patient has had progressively worsening shortness of breath with activity.       Principal Problem:    Acute systolic heart failure (Multi)  Active Problems:    Seizure disorder (Multi)    History of lymphoma    Other specified anemias    Acute systolic CHF new onset  -Echo done  on admission shows EF of 22%.  There are multiple wall motion abnormalities.  There is also left ventricular diastolic dysfunction.  Also moderate mitral valve regurgitation.  Echo from 2018 showed preserved left ventricular ejection fraction  -Started on IV Lasix-switch to oral on 06/29  -Strict JAXSON's  -Monitor renal functions and replace lites as indicated.  -Cardiology on board-underwent left heart cath on 06/27 showing normal coronaries  -Discharge planning in a.m. if patient condition continues to improve and cleared by cardiology    Nonischemic cardiomyopathy  Continue on goal-directed medical therapy  Cardiology follow-up    Hx lymphoma treated w Rituxan and now reportedly  in remission     History of seizure disorder\ restless legs  Continue home medications     Normocytic anemia  appears to be intermittent and somewhat chronic  Anemia workup suggestive of iron deficiency  On iron supplementation    VTE prophylaxis-Lovenox              Ezequiel Castillo MD

## 2024-06-29 NOTE — CARE PLAN
The clinical goals for the shift include Pt will be hemodynamically stable       Problem: Fall/Injury  Goal: Not fall by end of shift  Outcome: Progressing  Goal: Be free from injury by end of the shift  Outcome: Progressing  Goal: Verbalize understanding of personal risk factors for fall in the hospital  Outcome: Progressing  Goal: Verbalize understanding of risk factor reduction measures to prevent injury from fall in the home  Outcome: Progressing  Goal: Pace activities to prevent fatigue by end of the shift  Outcome: Progressing     Problem: Heart Failure  Goal: Report improvement of dyspnea/breathlessness this shift  Outcome: Progressing  Goal: Weight from fluid excess reduced over 2-3 days, then stabilize  Outcome: Progressing  Goal: Increase self care and/or family involvement in 24 hours  Outcome: Progressing     Problem: Nutrition  Goal: Oral intake greater 75%  Outcome: Progressing  Goal: BG  mg/dL  Outcome: Progressing  Goal: Lab values WNL  Outcome: Progressing  Goal: Electrolytes WNL  Outcome: Progressing  Goal: Maintain stable weight  Outcome: Progressing  Goal: Reduce weight from edema/fluid  Outcome: Progressing     Problem: Pain - Adult  Goal: Verbalizes/displays adequate comfort level or baseline comfort level  Outcome: Progressing     Problem: Safety - Adult  Goal: Free from fall injury  Outcome: Progressing     Problem: Discharge Planning  Goal: Discharge to home or other facility with appropriate resources  Outcome: Progressing     Problem: Chronic Conditions and Co-morbidities  Goal: Patient's chronic conditions and co-morbidity symptoms are monitored and maintained or improved  Outcome: Progressing     Problem: Diabetes  Goal: Achieve decreasing blood glucose levels by end of shift  Outcome: Progressing  Goal: Increase stability of blood glucose readings by end of shift  Outcome: Progressing  Goal: Decrease in ketones present in urine by end of shift  Outcome: Progressing  Goal:  Maintain electrolyte levels within acceptable range throughout shift  Outcome: Progressing  Goal: Maintain glucose levels >70mg/dl to <250mg/dl throughout shift  Outcome: Progressing  Goal: No changes in neurological exam by end of shift  Outcome: Progressing  Goal: Learn about and adhere to nutrition recommendations by end of shift  Outcome: Progressing  Goal: Vital signs within normal range for age by end of shift  Outcome: Progressing  Goal: Increase self care and/or family involovement by end of shift  Outcome: Progressing  Goal: Receive DSME education by end of shift  Outcome: Progressing        Cimzia Counseling:  I discussed with the patient the risks of Cimzia including but not limited to immunosuppression, allergic reactions and infections.  The patient understands that monitoring is required including a PPD at baseline and must alert us or the primary physician if symptoms of infection or other concerning signs are noted. Erythromycin Counseling:  I discussed with the patient the risks of erythromycin including but not limited to GI upset, allergic reaction, drug rash, diarrhea, increase in liver enzymes, and yeast infections. Calcipotriene Pregnancy And Lactation Text: This medication has not been proven safe during pregnancy. It is unknown if this medication is excreted in breast milk. Methotrexate Counseling:  Patient counseled regarding adverse effects of methotrexate including but not limited to nausea, vomiting, abnormalities in liver function tests. Patients may develop mouth sores, rash, diarrhea, and abnormalities in blood counts. The patient understands that monitoring is required including LFT's and blood counts.  There is a rare possibility of scarring of the liver and lung problems that can occur when taking methotrexate. Persistent nausea, loss of appetite, pale stools, dark urine, cough, and shortness of breath should be reported immediately. Patient advised to discontinue methotrexate treatment at least three months before attempting to become pregnant.  I discussed the need for folate supplements while taking methotrexate.  These supplements can decrease side effects during methotrexate treatment. The patient verbalized understanding of the proper use and possible adverse effects of methotrexate.  All of the patient's questions and concerns were addressed. Minoxidil Counseling: Minoxidil is a topical medication which can increase blood flow where it is applied. It is uncertain how this medication increases hair growth. Side effects are uncommon and include stinging and allergic reactions. Doxepin Counseling:  Patient advised that the medication is sedating and not to drive a car after taking this medication. Patient informed of potential adverse effects including but not limited to dry mouth, urinary retention, and blurry vision.  The patient verbalized understanding of the proper use and possible adverse effects of doxepin.  All of the patient's questions and concerns were addressed. Propranolol Counseling:  I discussed with the patient the risks of propranolol including but not limited to low heart rate, low blood pressure, low blood sugar, restlessness and increased cold sensitivity. They should call the office if they experience any of these side effects. Adbry Counseling: I discussed with the patient the risks of tralokinumab including but not limited to eye infection and irritation, cold sores, injection site reactions, worsening of asthma, allergic reactions and increased risk of parasitic infection.  Live vaccines should be avoided while taking tralokinumab. The patient understands that monitoring is required and they must alert us or the primary physician if symptoms of infection or other concerning signs are noted. Odomzo Pregnancy And Lactation Text: This medication is Pregnancy Category X and is absolutely contraindicated during pregnancy. It is unknown if it is excreted in breast milk. Topical Clindamycin Pregnancy And Lactation Text: This medication is Pregnancy Category B and is considered safe during pregnancy. It is unknown if it is excreted in breast milk. Benzoyl Peroxide Pregnancy And Lactation Text: This medication is Pregnancy Category C. It is unknown if benzoyl peroxide is excreted in breast milk. Zyclara Pregnancy And Lactation Text: This medication is Pregnancy Category C. It is unknown if this medication is excreted in breast milk. Clindamycin Pregnancy And Lactation Text: This medication can be used in pregnancy if certain situations. Clindamycin is also present in breast milk. Sski Pregnancy And Lactation Text: This medication is Pregnancy Category D and isn't considered safe during pregnancy. It is excreted in breast milk. Ketoconazole Pregnancy And Lactation Text: This medication is Pregnancy Category C and it isn't know if it is safe during pregnancy. It is also excreted in breast milk and breast feeding isn't recommended. Cellcept Counseling:  I discussed with the patient the risks of mycophenolate mofetil including but not limited to infection/immunosuppression, GI upset, hypokalemia, hypercholesterolemia, bone marrow suppression, lymphoproliferative disorders, malignancy, GI ulceration/bleed/perforation, colitis, interstitial lung disease, kidney failure, progressive multifocal leukoencephalopathy, and birth defects.  The patient understands that monitoring is required including a baseline creatinine and regular CBC testing. In addition, patient must alert us immediately if symptoms of infection or other concerning signs are noted. Rinvoq Pregnancy And Lactation Text: Based on animal studies, Rinvoq may cause embryo-fetal harm when administered to pregnant women.  The medication should not be used in pregnancy.  Breastfeeding is not recommended during treatment and for 6 days after the last dose. Dupixent Counseling: I discussed with the patient the risks of dupilumab including but not limited to eye infection and irritation, cold sores, injection site reactions, worsening of asthma, allergic reactions and increased risk of parasitic infection.  Live vaccines should be avoided while taking dupilumab. Dupilumab will also interact with certain medications such as warfarin and cyclosporine. The patient understands that monitoring is required and they must alert us or the primary physician if symptoms of infection or other concerning signs are noted. Griseofulvin Pregnancy And Lactation Text: This medication is Pregnancy Category X and is known to cause serious birth defects. It is unknown if this medication is excreted in breast milk but breast feeding should be avoided. SSKI Counseling:  I discussed with the patient the risks of SSKI including but not limited to thyroid abnormalities, metallic taste, GI upset, fever, headache, acne, arthralgias, paraesthesias, lymphadenopathy, easy bleeding, arrhythmias, and allergic reaction. Simponi Pregnancy And Lactation Text: The risk during pregnancy and breastfeeding is uncertain with this medication. Opioid Pregnancy And Lactation Text: These medications can lead to premature delivery and should be avoided during pregnancy. These medications are also present in breast milk in small amounts. Humira Counseling:  I discussed with the patient the risks of adalimumab including but not limited to myelosuppression, immunosuppression, autoimmune hepatitis, demyelinating diseases, lymphoma, and serious infections.  The patient understands that monitoring is required including a PPD at baseline and must alert us or the primary physician if symptoms of infection or other concerning signs are noted. Birth Control Pills Pregnancy And Lactation Text: This medication should be avoided if pregnant and for the first 30 days post-partum. Minoxidil Pregnancy And Lactation Text: This medication has not been assigned a Pregnancy Risk Category but animal studies failed to show danger with the topical medication. It is unknown if the medication is excreted in breast milk. Hydroxyzine Pregnancy And Lactation Text: This medication is not safe during pregnancy and should not be taken. It is also excreted in breast milk and breast feeding isn't recommended. Cantharidin Pregnancy And Lactation Text: The use of this medication during pregnancy or lactation is not recommended as there is insufficient data. Oxybutynin Counseling:  I discussed with the patient the risks of oxybutynin including but not limited to skin rash, drowsiness, dry mouth, difficulty urinating, and blurred vision. Taltz Counseling: I discussed with the patient the risks of ixekizumab including but not limited to immunosuppression, serious infections, worsening of inflammatory bowel disease and drug reactions.  The patient understands that monitoring is required including a PPD at baseline and must alert us or the primary physician if symptoms of infection or other concerning signs are noted. Otezla Counseling: The side effects of Otezla were discussed with the patient, including but not limited to worsening or new depression, weight loss, diarrhea, nausea, upper respiratory tract infection, and headache. Patient instructed to call the office should any adverse effect occur.  The patient verbalized understanding of the proper use and possible adverse effects of Otezla.  All the patient's questions and concerns were addressed. Skyrizi Counseling: I discussed with the patient the risks of risankizumab-rzaa including but not limited to immunosuppression, and serious infections.  The patient understands that monitoring is required including a PPD at baseline and must alert us or the primary physician if symptoms of infection or other concerning signs are noted. Cephalexin Pregnancy And Lactation Text: This medication is Pregnancy Category B and considered safe during pregnancy.  It is also excreted in breast milk but can be used safely for shorter doses. Rinvoq Counseling: I discussed with the patient the risks of Rinvoq therapy including but not limited to upper respiratory tract infections, shingles, cold sores, bronchitis, nausea, cough, fever, acne, and headache. Live vaccines should be avoided.  This medication has been linked to serious infections; higher rate of mortality; malignancy and lymphoproliferative disorders; major adverse cardiovascular events; thrombosis; thrombocytopenia, anemia, and neutropenia; lipid elevations; liver enzyme elevations; and gastrointestinal perforations. Cellcept Pregnancy And Lactation Text: This medication is Pregnancy Category D and isn't considered safe during pregnancy. It is unknown if this medication is excreted in breast milk. Quinolones Pregnancy And Lactation Text: This medication is Pregnancy Category C and it isn't know if it is safe during pregnancy. It is also excreted in breast milk. Acitretin Counseling:  I discussed with the patient the risks of acitretin including but not limited to hair loss, dry lips/skin/eyes, liver damage, hyperlipidemia, depression/suicidal ideation, photosensitivity.  Serious rare side effects can include but are not limited to pancreatitis, pseudotumor cerebri, bony changes, clot formation/stroke/heart attack.  Patient understands that alcohol is contraindicated since it can result in liver toxicity and significantly prolong the elimination of the drug by many years. Azelaic Acid Pregnancy And Lactation Text: This medication is considered safe during pregnancy and breast feeding. Cyclosporine Counseling:  I discussed with the patient the risks of cyclosporine including but not limited to hypertension, gingival hyperplasia,myelosuppression, immunosuppression, liver damage, kidney damage, neurotoxicity, lymphoma, and serious infections. The patient understands that monitoring is required including baseline blood pressure, CBC, CMP, lipid panel and uric acid, and then 1-2 times monthly CMP and blood pressure. Dupixent Pregnancy And Lactation Text: This medication likely crosses the placenta but the risk for the fetus is uncertain. This medication is excreted in breast milk. Azelaic Acid Counseling: Patient counseled that medicine may cause skin irritation and to avoid applying near the eyes.  In the event of skin irritation, the patient was advised to reduce the amount of the drug applied or use it less frequently.   The patient verbalized understanding of the proper use and possible adverse effects of azelaic acid.  All of the patient's questions and concerns were addressed. Enbrel Counseling:  I discussed with the patient the risks of etanercept including but not limited to myelosuppression, immunosuppression, autoimmune hepatitis, demyelinating diseases, lymphoma, and infections.  The patient understands that monitoring is required including a PPD at baseline and must alert us or the primary physician if symptoms of infection or other concerning signs are noted. Prednisone Pregnancy And Lactation Text: This medication is Pregnancy Category C and it isn't know if it is safe during pregnancy. This medication is excreted in breast milk. Isotretinoin Pregnancy And Lactation Text: This medication is Pregnancy Category X and is considered extremely dangerous during pregnancy. It is unknown if it is excreted in breast milk. Carac Counseling:  I discussed with the patient the risks of Carac including but not limited to erythema, scaling, itching, weeping, crusting, and pain. Quinolones Counseling:  I discussed with the patient the risks of fluoroquinolones including but not limited to GI upset, allergic reaction, drug rash, diarrhea, dizziness, photosensitivity, yeast infections, liver function test abnormalities, tendonitis/tendon rupture. Colchicine Pregnancy And Lactation Text: This medication is Pregnancy Category C and isn't considered safe during pregnancy. It is excreted in breast milk. Cibinqo Pregnancy And Lactation Text: It is unknown if this medication will adversely affect pregnancy or breast feeding.  You should not take this medication if you are currently pregnant or planning a pregnancy or while breastfeeding. Ivermectin Counseling:  Patient instructed to take medication on an empty stomach with a full glass of water.  Patient informed of potential adverse effects including but not limited to nausea, diarrhea, dizziness, itching, and swelling of the extremities or lymph nodes.  The patient verbalized understanding of the proper use and possible adverse effects of ivermectin.  All of the patient's questions and concerns were addressed. Albendazole Pregnancy And Lactation Text: This medication is Pregnancy Category C and it isn't known if it is safe during pregnancy. It is also excreted in breast milk. Winlevi Counseling:  I discussed with the patient the risks of topical clascoterone including but not limited to erythema, scaling, itching, and stinging. Patient voiced their understanding. Isotretinoin Counseling: Patient should get monthly blood tests, not donate blood, not drive at night if vision affected, not share medication, and not undergo elective surgery for 6 months after tx completed. Side effects reviewed, pt to contact office should one occur. Otezla Pregnancy And Lactation Text: This medication is Pregnancy Category C and it isn't known if it is safe during pregnancy. It is unknown if it is excreted in breast milk. Tazorac Counseling:  Patient advised that medication is irritating and drying.  Patient may need to apply sparingly and wash off after an hour before eventually leaving it on overnight.  The patient verbalized understanding of the proper use and possible adverse effects of tazorac.  All of the patient's questions and concerns were addressed. Birth Control Pills Counseling: Birth Control Pill Counseling: I discussed with the patient the potential side effects of OCPs including but not limited to increased risk of stroke, heart attack, thrombophlebitis, deep venous thrombosis, hepatic adenomas, breast changes, GI upset, headaches, and depression.  The patient verbalized understanding of the proper use and possible adverse effects of OCPs. All of the patient's questions and concerns were addressed. Klisyri Pregnancy And Lactation Text: It is unknown if this medication can harm a developing fetus or if it is excreted in breast milk. Opioid Counseling: I discussed with the patient the potential side effects of opioids including but not limited to addiction, altered mental status, and depression. I stressed avoiding alcohol, benzodiazepines, muscle relaxants and sleep aids unless specifically okayed by a physician. The patient verbalized understanding of the proper use and possible adverse effects of opioids. All of the patient's questions and concerns were addressed. They were instructed to flush the remaining pills down the toilet if they did not need them for pain. 5-Fu Pregnancy And Lactation Text: This medication is Pregnancy Category X and contraindicated in pregnancy and in women who may become pregnant. It is unknown if this medication is excreted in breast milk. Cosentyx Pregnancy And Lactation Text: This medication is Pregnancy Category B and is considered safe during pregnancy. It is unknown if this medication is excreted in breast milk. Imiquimod Counseling:  I discussed with the patient the risks of imiquimod including but not limited to erythema, scaling, itching, weeping, crusting, and pain.  Patient understands that the inflammatory response to imiquimod is variable from person to person and was educated regarded proper titration schedule.  If flu-like symptoms develop, patient knows to discontinue the medication and contact us. Include Pregnancy/Lactation Warning?: No Cibinqo Counseling: I discussed with the patient the risks of Cibinqo therapy including but not limited to common cold, nausea, headache, cold sores, increased blood CPK levels, dizziness, UTIs, fatigue, acne, and vomitting. Live vaccines should be avoided.  This medication has been linked to serious infections; higher rate of mortality; malignancy and lymphoproliferative disorders; major adverse cardiovascular events; thrombosis; thrombocytopenia and lymphopenia; lipid elevations; and retinal detachment. Finasteride Male Counseling: Finasteride Counseling:  I discussed with the patient the risks of use of finasteride including but not limited to decreased libido, decreased ejaculate volume, gynecomastia, and depression. Women should not handle medication.  All of the patient's questions and concerns were addressed. Simponi Counseling:  I discussed with the patient the risks of golimumab including but not limited to myelosuppression, immunosuppression, autoimmune hepatitis, demyelinating diseases, lymphoma, and serious infections.  The patient understands that monitoring is required including a PPD at baseline and must alert us or the primary physician if symptoms of infection or other concerning signs are noted. Rituxan Counseling:  I discussed with the patient the risks of Rituxan infusions. Side effects can include infusion reactions, severe drug rashes including mucocutaneous reactions, reactivation of latent hepatitis and other infections and rarely progressive multifocal leukoencephalopathy.  All of the patient's questions and concerns were addressed. Minocycline Pregnancy And Lactation Text: This medication is Pregnancy Category D and not consider safe during pregnancy. It is also excreted in breast milk. Dutasteride Pregnancy And Lactation Text: This medication is absolutely contraindicated in women, especially during pregnancy and breast feeding. Feminization of male fetuses is possible if taking while pregnant. Aklief counseling:  Patient advised to apply a pea-sized amount only at bedtime and wait 30 minutes after washing their face before applying.  If too drying, patient may add a non-comedogenic moisturizer.  The most commonly reported side effects including irritation, redness, scaling, dryness, stinging, burning, itching, and increased risk of sunburn.  The patient verbalized understanding of the proper use and possible adverse effects of retinoids.  All of the patient's questions and concerns were addressed. Azathioprine Counseling:  I discussed with the patient the risks of azathioprine including but not limited to myelosuppression, immunosuppression, hepatotoxicity, lymphoma, and infections.  The patient understands that monitoring is required including baseline LFTs, Creatinine, possible TPMP genotyping and weekly CBCs for the first month and then every 2 weeks thereafter.  The patient verbalized understanding of the proper use and possible adverse effects of azathioprine.  All of the patient's questions and concerns were addressed. Qbrexza Pregnancy And Lactation Text: There is no available data on Qbrexza use in pregnant women.  There is no available data on Qbrexza use in lactation. Albendazole Counseling:  I discussed with the patient the risks of albendazole including but not limited to cytopenia, kidney damage, nausea/vomiting and severe allergy.  The patient understands that this medication is being used in an off-label manner. Valtrex Pregnancy And Lactation Text: this medication is Pregnancy Category B and is considered safe during pregnancy. This medication is not directly found in breast milk but it's metabolite acyclovir is present. 5-Fu Counseling: 5-Fluorouracil Counseling:  I discussed with the patient the risks of 5-fluorouracil including but not limited to erythema, scaling, itching, weeping, crusting, and pain. Topical Ketoconazole Counseling: Patient counseled that this medication may cause skin irritation or allergic reactions.  In the event of skin irritation, the patient was advised to reduce the amount of the drug applied or use it less frequently.   The patient verbalized understanding of the proper use and possible adverse effects of ketoconazole.  All of the patient's questions and concerns were addressed. Acitretin Pregnancy And Lactation Text: This medication is Pregnancy Category X and should not be given to women who are pregnant or may become pregnant in the future. This medication is excreted in breast milk. Cyclophosphamide Pregnancy And Lactation Text: This medication is Pregnancy Category D and it isn't considered safe during pregnancy. This medication is excreted in breast milk. Nsaids Pregnancy And Lactation Text: These medications are considered safe up to 30 weeks gestation. It is excreted in breast milk. Azithromycin Counseling:  I discussed with the patient the risks of azithromycin including but not limited to GI upset, allergic reaction, drug rash, diarrhea, and yeast infections. Sarecycline Counseling: Patient advised regarding possible photosensitivity and discoloration of the teeth, skin, lips, tongue and gums.  Patient instructed to avoid sunlight, if possible.  When exposed to sunlight, patients should wear protective clothing, sunglasses, and sunscreen.  The patient was instructed to call the office immediately if the following severe adverse effects occur:  hearing changes, easy bruising/bleeding, severe headache, or vision changes.  The patient verbalized understanding of the proper use and possible adverse effects of sarecycline.  All of the patient's questions and concerns were addressed. Oral Minoxidil Counseling- I discussed with the patient the risks of oral minoxidil including but not limited to shortness of breath, swelling of the feet or ankles, dizziness, lightheadedness, unwanted hair growth and allergic reaction.  The patient verbalized understanding of the proper use and possible adverse effects of oral minoxidil.  All of the patient's questions and concerns were addressed. Rhofade Counseling: Rhofade is a topical medication which can decrease superficial blood flow where applied. Side effects are uncommon and include stinging, redness and allergic reactions. Glycopyrrolate Counseling:  I discussed with the patient the risks of glycopyrrolate including but not limited to skin rash, drowsiness, dry mouth, difficulty urinating, and blurred vision. Xolair Counseling:  Patient informed of potential adverse effects including but not limited to fever, muscle aches, rash and allergic reactions.  The patient verbalized understanding of the proper use and possible adverse effects of Xolair.  All of the patient's questions and concerns were addressed. Cimzia Pregnancy And Lactation Text: This medication crosses the placenta but can be considered safe in certain situations. Cimzia may be excreted in breast milk. Erivedge Counseling- I discussed with the patient the risks of Erivedge including but not limited to nausea, vomiting, diarrhea, constipation, weight loss, changes in the sense of taste, decreased appetite, muscle spasms, and hair loss.  The patient verbalized understanding of the proper use and possible adverse effects of Erivedge.  All of the patient's questions and concerns were addressed. Dapsone Counseling: I discussed with the patient the risks of dapsone including but not limited to hemolytic anemia, agranulocytosis, rashes, methemoglobinemia, kidney failure, peripheral neuropathy, headaches, GI upset, and liver toxicity.  Patients who start dapsone require monitoring including baseline LFTs and weekly CBCs for the first month, then every month thereafter.  The patient verbalized understanding of the proper use and possible adverse effects of dapsone.  All of the patient's questions and concerns were addressed. Wartpeel Counseling:  I discussed with the patient the risks of Wartpeel including but not limited to erythema, scaling, itching, weeping, crusting, and pain. Solaraze Counseling:  I discussed with the patient the risks of Solaraze including but not limited to erythema, scaling, itching, weeping, crusting, and pain. Methotrexate Pregnancy And Lactation Text: This medication is Pregnancy Category X and is known to cause fetal harm. This medication is excreted in breast milk. Niacinamide Counseling: I recommended taking niacin or niacinamide, also know as vitamin B3, twice daily. Recent evidence suggests that taking vitamin B3 (500 mg twice daily) can reduce the risk of actinic keratoses and non-melanoma skin cancers. Side effects of vitamin B3 include flushing and headache. Bactrim Counseling:  I discussed with the patient the risks of sulfa antibiotics including but not limited to GI upset, allergic reaction, drug rash, diarrhea, dizziness, photosensitivity, and yeast infections.  Rarely, more serious reactions can occur including but not limited to aplastic anemia, agranulocytosis, methemoglobinemia, blood dyscrasias, liver or kidney failure, lung infiltrates or desquamative/blistering drug rashes. Hydroquinone Counseling:  Patient advised that medication may result in skin irritation, lightening (hypopigmentation), dryness, and burning.  In the event of skin irritation, the patient was advised to reduce the amount of the drug applied or use it less frequently.  Rarely, spots that are treated with hydroquinone can become darker (pseudoochronosis).  Should this occur, patient instructed to stop medication and call the office. The patient verbalized understanding of the proper use and possible adverse effects of hydroquinone.  All of the patient's questions and concerns were addressed. Odomzo Counseling- I discussed with the patient the risks of Odomzo including but not limited to nausea, vomiting, diarrhea, constipation, weight loss, changes in the sense of taste, decreased appetite, muscle spasms, and hair loss.  The patient verbalized understanding of the proper use and possible adverse effects of Odomzo.  All of the patient's questions and concerns were addressed. Metronidazole Counseling:  I discussed with the patient the risks of metronidazole including but not limited to seizures, nausea/vomiting, a metallic taste in the mouth, nausea/vomiting and severe allergy. Doxycycline Counseling:  Patient counseled regarding possible photosensitivity and increased risk for sunburn.  Patient instructed to avoid sunlight, if possible.  When exposed to sunlight, patients should wear protective clothing, sunglasses, and sunscreen.  The patient was instructed to call the office immediately if the following severe adverse effects occur:  hearing changes, easy bruising/bleeding, severe headache, or vision changes.  The patient verbalized understanding of the proper use and possible adverse effects of doxycycline.  All of the patient's questions and concerns were addressed. Erythromycin Pregnancy And Lactation Text: This medication is Pregnancy Category B and is considered safe during pregnancy. It is also excreted in breast milk. Detail Level: Simple Dutasteride Male Counseling: Dustasteride Counseling:  I discussed with the patient the risks of use of dutasteride including but not limited to decreased libido, decreased ejaculate volume, and gynecomastia. Women who can become pregnant should not handle medication.  All of the patient's questions and concerns were addressed. Tremfya Counseling: I discussed with the patient the risks of guselkumab including but not limited to immunosuppression, serious infections, and drug reactions.  The patient understands that monitoring is required including a PPD at baseline and must alert us or the primary physician if symptoms of infection or other concerning signs are noted. Minocycline Counseling: Patient advised regarding possible photosensitivity and discoloration of the teeth, skin, lips, tongue and gums.  Patient instructed to avoid sunlight, if possible.  When exposed to sunlight, patients should wear protective clothing, sunglasses, and sunscreen.  The patient was instructed to call the office immediately if the following severe adverse effects occur:  hearing changes, easy bruising/bleeding, severe headache, or vision changes.  The patient verbalized understanding of the proper use and possible adverse effects of minocycline.  All of the patient's questions and concerns were addressed. Clindamycin Counseling: I counseled the patient regarding use of clindamycin as an antibiotic for prophylactic and/or therapeutic purposes. Clindamycin is active against numerous classes of bacteria, including skin bacteria. Side effects may include nausea, diarrhea, gastrointestinal upset, rash, hives, yeast infections, and in rare cases, colitis. Topical Retinoid counseling:  Patient advised to apply a pea-sized amount only at bedtime and wait 30 minutes after washing their face before applying.  If too drying, patient may add a non-comedogenic moisturizer. The patient verbalized understanding of the proper use and possible adverse effects of retinoids.  All of the patient's questions and concerns were addressed. Cimetidine Pregnancy And Lactation Text: This medication is Pregnancy Category B and is considered safe during pregnancy. It is also excreted in breast milk and breast feeding isn't recommended. Tranexamic Acid Pregnancy And Lactation Text: It is unknown if this medication is safe during pregnancy or breast feeding. Xelrickyz Pregnancy And Lactation Text: This medication is Pregnancy Category D and is not considered safe during pregnancy.  The risk during breast feeding is also uncertain. Griseofulvin Counseling:  I discussed with the patient the risks of griseofulvin including but not limited to photosensitivity, cytopenia, liver damage, nausea/vomiting and severe allergy.  The patient understands that this medication is best absorbed when taken with a fatty meal (e.g., ice cream or french fries). Mirvaso Pregnancy And Lactation Text: This medication has not been assigned a Pregnancy Risk Category. It is unknown if the medication is excreted in breast milk. Nsaids Counseling: NSAID Counseling: I discussed with the patient that NSAIDs should be taken with food. Prolonged use of NSAIDs can result in the development of stomach ulcers.  Patient advised to stop taking NSAIDs if abdominal pain occurs.  The patient verbalized understanding of the proper use and possible adverse effects of NSAIDs.  All of the patient's questions and concerns were addressed. Doxepin Pregnancy And Lactation Text: This medication is Pregnancy Category C and it isn't known if it is safe during pregnancy. It is also excreted in breast milk and breast feeding isn't recommended. Ilumya Counseling: I discussed with the patient the risks of tildrakizumab including but not limited to immunosuppression, malignancy, posterior leukoencephalopathy syndrome, and serious infections.  The patient understands that monitoring is required including a PPD at baseline and must alert us or the primary physician if symptoms of infection or other concerning signs are noted. Bexarotene Pregnancy And Lactation Text: This medication is Pregnancy Category X and should not be given to women who are pregnant or may become pregnant. This medication should not be used if you are breast feeding. Elidel Counseling: Patient may experience a mild burning sensation during topical application. Elidel is not approved in children less than 2 years of age. There have been case reports of hematologic and skin malignancies in patients using topical calcineurin inhibitors although causality is questionable. Colchicine Counseling:  Patient counseled regarding adverse effects including but not limited to stomach upset (nausea, vomiting, stomach pain, or diarrhea).  Patient instructed to limit alcohol consumption while taking this medication.  Colchicine may reduce blood counts especially with prolonged use.  The patient understands that monitoring of kidney function and blood counts may be required, especially at baseline. The patient verbalized understanding of the proper use and possible adverse effects of colchicine.  All of the patient's questions and concerns were addressed. Qbrexza Counseling:  I discussed with the patient the risks of Qbrexza including but not limited to headache, mydriasis, blurred vision, dry eyes, nasal dryness, dry mouth, dry throat, dry skin, urinary hesitation, and constipation.  Local skin reactions including erythema, burning, stinging, and itching can also occur. Clofazimine Counseling:  I discussed with the patient the risks of clofazimine including but not limited to skin and eye pigmentation, liver damage, nausea/vomiting, gastrointestinal bleeding and allergy. Thalidomide Counseling: I discussed with the patient the risks of thalidomide including but not limited to birth defects, anxiety, weakness, chest pain, dizziness, cough and severe allergy. Arava Counseling:  Patient counseled regarding adverse effects of Arava including but not limited to nausea, vomiting, abnormalities in liver function tests. Patients may develop mouth sores, rash, diarrhea, and abnormalities in blood counts. The patient understands that monitoring is required including LFTs and blood counts.  There is a rare possibility of scarring of the liver and lung problems that can occur when taking methotrexate. Persistent nausea, loss of appetite, pale stools, dark urine, cough, and shortness of breath should be reported immediately. Patient advised to discontinue Arava treatment and consult with a physician prior to attempting conception. The patient will have to undergo a treatment to eliminate Arava from the body prior to conception. Eucrisa Counseling: Patient may experience a mild burning sensation during topical application. Eucrisa is not approved in children less than 2 years of age. Terbinafine Counseling: Patient counseling regarding adverse effects of terbinafine including but not limited to headache, diarrhea, rash, upset stomach, liver function test abnormalities, itching, taste/smell disturbance, nausea, abdominal pain, and flatulence.  There is a rare possibility of liver failure that can occur when taking terbinafine.  The patient understands that a baseline LFT and kidney function test may be required. The patient verbalized understanding of the proper use and possible adverse effects of terbinafine.  All of the patient's questions and concerns were addressed. Dapsone Pregnancy And Lactation Text: This medication is Pregnancy Category C and is not considered safe during pregnancy or breast feeding. Bexarotene Counseling:  I discussed with the patient the risks of bexarotene including but not limited to hair loss, dry lips/skin/eyes, liver abnormalities, hyperlipidemia, pancreatitis, depression/suicidal ideation, photosensitivity, drug rash/allergic reactions, hypothyroidism, anemia, leukopenia, infection, cataracts, and teratogenicity.  Patient understands that they will need regular blood tests to check lipid profile, liver function tests, white blood cell count, thyroid function tests and pregnancy test if applicable. Tazorac Pregnancy And Lactation Text: This medication is not safe during pregnancy. It is unknown if this medication is excreted in breast milk. Ketoconazole Counseling:   Patient counseled regarding improving absorption with orange juice.  Adverse effects include but are not limited to breast enlargement, headache, diarrhea, nausea, upset stomach, liver function test abnormalities, taste disturbance, and stomach pain.  There is a rare possibility of liver failure that can occur when taking ketoconazole. The patient understands that monitoring of LFTs may be required, especially at baseline. The patient verbalized understanding of the proper use and possible adverse effects of ketoconazole.  All of the patient's questions and concerns were addressed. Libtayo Pregnancy And Lactation Text: This medication is contraindicated in pregnancy and when breast feeding. Oral Minoxidil Pregnancy And Lactation Text: This medication should only be used when clearly needed if you are pregnant, attempting to become pregnant or breast feeding. Topical Sulfur Applications Counseling: Topical Sulfur Counseling: Patient counseled that this medication may cause skin irritation or allergic reactions.  In the event of skin irritation, the patient was advised to reduce the amount of the drug applied or use it less frequently.   The patient verbalized understanding of the proper use and possible adverse effects of topical sulfur application.  All of the patient's questions and concerns were addressed. Cephalexin Counseling: I counseled the patient regarding use of cephalexin as an antibiotic for prophylactic and/or therapeutic purposes. Cephalexin (commonly prescribed under brand name Keflex) is a cephalosporin antibiotic which is active against numerous classes of bacteria, including most skin bacteria. Side effects may include nausea, diarrhea, gastrointestinal upset, rash, hives, yeast infections, and in rare cases, hepatitis, kidney disease, seizures, fever, confusion, neurologic symptoms, and others. Patients with severe allergies to penicillin medications are cautioned that there is about a 10% incidence of cross-reactivity with cephalosporins. When possible, patients with penicillin allergies should use alternatives to cephalosporins for antibiotic therapy. Xeljanz Counseling: I discussed with the patient the risks of Xeljanz therapy including increased risk of infection, liver issues, headache, diarrhea, or cold symptoms. Live vaccines should be avoided. They were instructed to call if they have any problems. Azithromycin Pregnancy And Lactation Text: This medication is considered safe during pregnancy and is also secreted in breast milk. Prednisone Counseling:  I discussed with the patient the risks of prolonged use of prednisone including but not limited to weight gain, insomnia, osteoporosis, mood changes, diabetes, susceptibility to infection, glaucoma and high blood pressure.  In cases where prednisone use is prolonged, patients should be monitored with blood pressure checks, serum glucose levels and an eye exam.  Additionally, the patient may need to be placed on GI prophylaxis, PCP prophylaxis, and calcium and vitamin D supplementation and/or a bisphosphonate.  The patient verbalized understanding of the proper use and the possible adverse effects of prednisone.  All of the patient's questions and concerns were addressed. Olumiant Pregnancy And Lactation Text: Based on animal studies, Olumiant may cause embryo-fetal harm when administered to pregnant women.  The medication should not be used in pregnancy.  Breastfeeding is not recommended during treatment. Finasteride Pregnancy And Lactation Text: This medication is absolutely contraindicated during pregnancy. It is unknown if it is excreted in breast milk. Klisyri Counseling:  I discussed with the patient the risks of Klisyri including but not limited to erythema, scaling, itching, weeping, crusting, and pain. Picato Counseling:  I discussed with the patient the risks of Picato including but not limited to erythema, scaling, itching, weeping, crusting, and pain. Metronidazole Pregnancy And Lactation Text: This medication is Pregnancy Category B and considered safe during pregnancy.  It is also excreted in breast milk. Rifampin Counseling: I discussed with the patient the risks of rifampin including but not limited to liver damage, kidney damage, red-orange body fluids, nausea/vomiting and severe allergy. Fluconazole Counseling:  Patient counseled regarding adverse effects of fluconazole including but not limited to headache, diarrhea, nausea, upset stomach, liver function test abnormalities, taste disturbance, and stomach pain.  There is a rare possibility of liver failure that can occur when taking fluconazole.  The patient understands that monitoring of LFTs and kidney function test may be required, especially at baseline. The patient verbalized understanding of the proper use and possible adverse effects of fluconazole.  All of the patient's questions and concerns were addressed. High Dose Vitamin A Counseling: Side effects reviewed, pt to contact office should one occur. Niacinamide Pregnancy And Lactation Text: These medications are considered safe during pregnancy. Siliq Counseling:  I discussed with the patient the risks of Siliq including but not limited to new or worsening depression, suicidal thoughts and behavior, immunosuppression, malignancy, posterior leukoencephalopathy syndrome, and serious infections.  The patient understands that monitoring is required including a PPD at baseline and must alert us or the primary physician if symptoms of infection or other concerning signs are noted. There is also a special program designed to monitor depression which is required with Siliq. Spironolactone Pregnancy And Lactation Text: This medication can cause feminization of the male fetus and should be avoided during pregnancy. The active metabolite is also found in breast milk. Opzelura Pregnancy And Lactation Text: There is insufficient data to evaluate drug-associated risk for major birth defects, miscarriage, or other adverse maternal or fetal outcomes.  There is a pregnancy registry that monitors pregnancy outcomes in pregnant persons exposed to the medication during pregnancy.  It is unknown if this medication is excreted in breast milk.  Do not breastfeed during treatment and for about 4 weeks after the last dose. Glycopyrrolate Pregnancy And Lactation Text: This medication is Pregnancy Category B and is considered safe during pregnancy. It is unknown if it is excreted breast milk. Cimetidine Counseling:  I discussed with the patient the risks of Cimetidine including but not limited to gynecomastia, headache, diarrhea, nausea, drowsiness, arrhythmias, pancreatitis, skin rashes, psychosis, bone marrow suppression and kidney toxicity. Adbry Pregnancy And Lactation Text: It is unknown if this medication will adversely affect pregnancy or breast feeding. Infliximab Counseling:  I discussed with the patient the risks of infliximab including but not limited to myelosuppression, immunosuppression, autoimmune hepatitis, demyelinating diseases, lymphoma, and serious infections.  The patient understands that monitoring is required including a PPD at baseline and must alert us or the primary physician if symptoms of infection or other concerning signs are noted. Calcipotriene Counseling:  I discussed with the patient the risks of calcipotriene including but not limited to erythema, scaling, itching, and irritation. Olumiant Counseling: I discussed with the patient the risks of Olumiant therapy including but not limited to upper respiratory tract infections, shingles, cold sores, and nausea. Live vaccines should be avoided.  This medication has been linked to serious infections; higher rate of mortality; malignancy and lymphoproliferative disorders; major adverse cardiovascular events; thrombosis; gastrointestinal perforations; neutropenia; lymphopenia; anemia; liver enzyme elevations; and lipid elevations. Rifampin Pregnancy And Lactation Text: This medication is Pregnancy Category C and it isn't know if it is safe during pregnancy. It is also excreted in breast milk and should not be used if you are breast feeding. Opzelura Counseling:  I discussed with the patient the risks of Opzelura including but not limited to nasopharngitis, bronchitis, ear infection, eosinophila, hives, diarrhea, folliculitis, tonsillitis, and rhinorrhea.  Taken orally, this medication has been linked to serious infections; higher rate of mortality; malignancy and lymphoproliferative disorders; major adverse cardiovascular events; thrombosis; thrombocytopenia, anemia, and neutropenia; and lipid elevations. Topical Clindamycin Counseling: Patient counseled that this medication may cause skin irritation or allergic reactions.  In the event of skin irritation, the patient was advised to reduce the amount of the drug applied or use it less frequently.   The patient verbalized understanding of the proper use and possible adverse effects of clindamycin.  All of the patient's questions and concerns were addressed. Rituxan Pregnancy And Lactation Text: This medication is Pregnancy Category C and it isn't know if it is safe during pregnancy. It is unknown if this medication is excreted in breast milk but similar antibodies are known to be excreted. Cosentyx Counseling:  I discussed with the patient the risks of Cosentyx including but not limited to worsening of Crohn's disease, immunosuppression, allergic reactions and infections.  The patient understands that monitoring is required including a PPD at baseline and must alert us or the primary physician if symptoms of infection or other concerning signs are noted. Tetracycline Counseling: Patient counseled regarding possible photosensitivity and increased risk for sunburn.  Patient instructed to avoid sunlight, if possible.  When exposed to sunlight, patients should wear protective clothing, sunglasses, and sunscreen.  The patient was instructed to call the office immediately if the following severe adverse effects occur:  hearing changes, easy bruising/bleeding, severe headache, or vision changes.  The patient verbalized understanding of the proper use and possible adverse effects of tetracycline.  All of the patient's questions and concerns were addressed. Patient understands to avoid pregnancy while on therapy due to potential birth defects. Tranexamic Acid Counseling:  Patient advised of the small risk of bleeding problems with tranexamic acid. They were also instructed to call if they developed any nausea, vomiting or diarrhea. All of the patient's questions and concerns were addressed. Doxycycline Pregnancy And Lactation Text: This medication is Pregnancy Category D and not consider safe during pregnancy. It is also excreted in breast milk but is considered safe for shorter treatment courses. Gabapentin Counseling: I discussed with the patient the risks of gabapentin including but not limited to dizziness, somnolence, fatigue and ataxia. Libtayo Counseling- I discussed with the patient the risks of Libtayo including but not limited to nausea, vomiting, diarrhea, and bone or muscle pain.  The patient verbalized understanding of the proper use and possible adverse effects of Libtayo.  All of the patient's questions and concerns were addressed. Mirvaso Counseling: Mirvaso is a topical medication which can decrease superficial blood flow where applied. Side effects are uncommon and include stinging, redness and allergic reactions. Hydroxychloroquine Counseling:  I discussed with the patient that a baseline ophthalmologic exam is needed at the start of therapy and every year thereafter while on therapy. A CBC may also be warranted for monitoring.  The side effects of this medication were discussed with the patient, including but not limited to agranulocytosis, aplastic anemia, seizures, rashes, retinopathy, and liver toxicity. Patient instructed to call the office should any adverse effect occur.  The patient verbalized understanding of the proper use and possible adverse effects of Plaquenil.  All the patient's questions and concerns were addressed. Benzoyl Peroxide Counseling: Patient counseled that medicine may cause skin irritation and bleach clothing.  In the event of skin irritation, the patient was advised to reduce the amount of the drug applied or use it less frequently.   The patient verbalized understanding of the proper use and possible adverse effects of benzoyl peroxide.  All of the patient's questions and concerns were addressed. Cyclophosphamide Counseling:  I discussed with the patient the risks of cyclophosphamide including but not limited to hair loss, hormonal abnormalities, decreased fertility, abdominal pain, diarrhea, nausea and vomiting, bone marrow suppression and infection. The patient understands that monitoring is required while taking this medication. Topical Sulfur Applications Pregnancy And Lactation Text: This medication is Pregnancy Category C and has an unknown safety profile during pregnancy. It is unknown if this topical medication is excreted in breast milk. Stelara Counseling:  I discussed with the patient the risks of ustekinumab including but not limited to immunosuppression, malignancy, posterior leukoencephalopathy syndrome, and serious infections.  The patient understands that monitoring is required including a PPD at baseline and must alert us or the primary physician if symptoms of infection or other concerning signs are noted. Itraconazole Counseling:  I discussed with the patient the risks of itraconazole including but not limited to liver damage, nausea/vomiting, neuropathy, and severe allergy.  The patient understands that this medication is best absorbed when taken with acidic beverages such as non-diet cola or ginger ale.  The patient understands that monitoring is required including baseline LFTs and repeat LFTs at intervals.  The patient understands that they are to contact us or the primary physician if concerning signs are noted. Zyclara Counseling:  I discussed with the patient the risks of imiquimod including but not limited to erythema, scaling, itching, weeping, crusting, and pain.  Patient understands that the inflammatory response to imiquimod is variable from person to person and was educated regarded proper titration schedule.  If flu-like symptoms develop, patient knows to discontinue the medication and contact us. Drysol Counseling:  I discussed with the patient the risks of drysol/aluminum chloride including but not limited to skin rash, itching, irritation, burning. Xolair Pregnancy And Lactation Text: This medication is Pregnancy Category B and is considered safe during pregnancy. This medication is excreted in breast milk. High Dose Vitamin A Pregnancy And Lactation Text: High dose vitamin A therapy is contraindicated during pregnancy and breast feeding. Spironolactone Counseling: Patient advised regarding risks of diarrhea, abdominal pain, hyperkalemia, birth defects (for female patients), liver toxicity and renal toxicity. The patient may need blood work to monitor liver and kidney function and potassium levels while on therapy. The patient verbalized understanding of the proper use and possible adverse effects of spironolactone.  All of the patient's questions and concerns were addressed. Valtrex Counseling: I discussed with the patient the risks of valacyclovir including but not limited to kidney damage, nausea, vomiting and severe allergy.  The patient understands that if the infection seems to be worsening or is not improving, they are to call. Hydroxychloroquine Pregnancy And Lactation Text: This medication has been shown to cause fetal harm but it isn't assigned a Pregnancy Risk Category. There are small amounts excreted in breast milk. Protopic Counseling: Patient may experience a mild burning sensation during topical application. Protopic is not approved in children less than 2 years of age. There have been case reports of hematologic and skin malignancies in patients using topical calcineurin inhibitors although causality is questionable. Bactrim Pregnancy And Lactation Text: This medication is Pregnancy Category D and is known to cause fetal risk.  It is also excreted in breast milk. Solaraze Pregnancy And Lactation Text: This medication is Pregnancy Category B and is considered safe. There is some data to suggest avoiding during the third trimester. It is unknown if this medication is excreted in breast milk. Protopic Pregnancy And Lactation Text: This medication is Pregnancy Category C. It is unknown if this medication is excreted in breast milk when applied topically. Winlevi Pregnancy And Lactation Text: This medication is considered safe during pregnancy and breastfeeding. Hydroxyzine Counseling: Patient advised that the medication is sedating and not to drive a car after taking this medication.  Patient informed of potential adverse effects including but not limited to dry mouth, urinary retention, and blurry vision.  The patient verbalized understanding of the proper use and possible adverse effects of hydroxyzine.  All of the patient's questions and concerns were addressed. Propranolol Pregnancy And Lactation Text: This medication is Pregnancy Category C and it isn't known if it is safe during pregnancy. It is excreted in breast milk. Aklief Pregnancy And Lactation Text: It is unknown if this medication is safe to use during pregnancy.  It is unknown if this medication is excreted in breast milk.  Breastfeeding women should use the topical cream on the smallest area of the skin for the shortest time needed while breastfeeding.  Do not apply to nipple and areola.

## 2024-06-29 NOTE — NURSING NOTE
Report received from DONG RN.    With rounds, patient sitting on side of bed, able to reposition self independently.  Patient is alert and oriented and able to make needs known.  Denies CP and SOB.  Updated on plan of care.  Patient verbalizes understanding.  Denies needs at this time.

## 2024-06-30 VITALS
RESPIRATION RATE: 18 BRPM | BODY MASS INDEX: 39.88 KG/M2 | TEMPERATURE: 97.5 F | HEART RATE: 70 BPM | SYSTOLIC BLOOD PRESSURE: 109 MMHG | OXYGEN SATURATION: 96 % | WEIGHT: 216.71 LBS | HEIGHT: 62 IN | DIASTOLIC BLOOD PRESSURE: 66 MMHG

## 2024-06-30 DIAGNOSIS — D50.9 IRON DEFICIENCY ANEMIA, UNSPECIFIED IRON DEFICIENCY ANEMIA TYPE: Primary | ICD-10-CM

## 2024-06-30 PROBLEM — I50.21 ACUTE SYSTOLIC HEART FAILURE (MULTI): Status: RESOLVED | Noted: 2024-06-25 | Resolved: 2024-06-30

## 2024-06-30 LAB
ANION GAP SERPL CALC-SCNC: 9 MMOL/L (ref 10–20)
BUN SERPL-MCNC: 16 MG/DL (ref 6–23)
CALCIUM SERPL-MCNC: 8.1 MG/DL (ref 8.6–10.3)
CHLORIDE SERPL-SCNC: 107 MMOL/L (ref 98–107)
CO2 SERPL-SCNC: 25 MMOL/L (ref 21–32)
CREAT SERPL-MCNC: 0.67 MG/DL (ref 0.5–1.05)
EGFRCR SERPLBLD CKD-EPI 2021: 89 ML/MIN/1.73M*2
ERYTHROCYTE [DISTWIDTH] IN BLOOD BY AUTOMATED COUNT: 15.1 % (ref 11.5–14.5)
GLUCOSE SERPL-MCNC: 111 MG/DL (ref 74–99)
HCT VFR BLD AUTO: 32.8 % (ref 36–46)
HGB BLD-MCNC: 10.8 G/DL (ref 12–16)
MAGNESIUM SERPL-MCNC: 2.1 MG/DL (ref 1.6–2.4)
MCH RBC QN AUTO: 28.6 PG (ref 26–34)
MCHC RBC AUTO-ENTMCNC: 32.9 G/DL (ref 32–36)
MCV RBC AUTO: 87 FL (ref 80–100)
NRBC BLD-RTO: 0 /100 WBCS (ref 0–0)
PLATELET # BLD AUTO: 283 X10*3/UL (ref 150–450)
POTASSIUM SERPL-SCNC: 3.7 MMOL/L (ref 3.5–5.3)
RBC # BLD AUTO: 3.78 X10*6/UL (ref 4–5.2)
SODIUM SERPL-SCNC: 137 MMOL/L (ref 136–145)
WBC # BLD AUTO: 8.2 X10*3/UL (ref 4.4–11.3)

## 2024-06-30 PROCEDURE — 83735 ASSAY OF MAGNESIUM: CPT | Performed by: PHYSICIAN ASSISTANT

## 2024-06-30 PROCEDURE — 2500000001 HC RX 250 WO HCPCS SELF ADMINISTERED DRUGS (ALT 637 FOR MEDICARE OP): Performed by: INTERNAL MEDICINE

## 2024-06-30 PROCEDURE — 80048 BASIC METABOLIC PNL TOTAL CA: CPT | Performed by: PHYSICIAN ASSISTANT

## 2024-06-30 PROCEDURE — 99232 SBSQ HOSP IP/OBS MODERATE 35: CPT | Performed by: PHYSICIAN ASSISTANT

## 2024-06-30 PROCEDURE — 85027 COMPLETE CBC AUTOMATED: CPT | Performed by: INTERNAL MEDICINE

## 2024-06-30 PROCEDURE — 36415 COLL VENOUS BLD VENIPUNCTURE: CPT | Performed by: INTERNAL MEDICINE

## 2024-06-30 PROCEDURE — 2500000001 HC RX 250 WO HCPCS SELF ADMINISTERED DRUGS (ALT 637 FOR MEDICARE OP): Performed by: PHYSICIAN ASSISTANT

## 2024-06-30 PROCEDURE — 99239 HOSP IP/OBS DSCHRG MGMT >30: CPT | Performed by: INTERNAL MEDICINE

## 2024-06-30 RX ORDER — CARVEDILOL 3.12 MG/1
3.12 TABLET ORAL 2 TIMES DAILY
Qty: 60 TABLET | Refills: 0 | Status: SHIPPED | OUTPATIENT
Start: 2024-06-30

## 2024-06-30 RX ORDER — HYDRALAZINE HYDROCHLORIDE 10 MG/1
10 TABLET, FILM COATED ORAL 3 TIMES DAILY
Qty: 90 TABLET | Refills: 0 | Status: SHIPPED | OUTPATIENT
Start: 2024-06-30

## 2024-06-30 RX ORDER — FUROSEMIDE 20 MG/1
20 TABLET ORAL DAILY
Qty: 30 TABLET | Refills: 0 | Status: SHIPPED | OUTPATIENT
Start: 2024-06-30

## 2024-06-30 RX ORDER — ISOSORBIDE DINITRATE 10 MG/1
10 TABLET ORAL
Qty: 90 TABLET | Refills: 0 | Status: SHIPPED | OUTPATIENT
Start: 2024-06-30

## 2024-06-30 RX ORDER — ATORVASTATIN CALCIUM 80 MG/1
80 TABLET, FILM COATED ORAL DAILY
Qty: 30 TABLET | Refills: 0 | Status: SHIPPED | OUTPATIENT
Start: 2024-06-30

## 2024-06-30 ASSESSMENT — ENCOUNTER SYMPTOMS
ABDOMINAL PAIN: 0
NAUSEA: 0
WHEEZING: 0
VOMITING: 0
WEAKNESS: 0
DYSURIA: 0
SHORTNESS OF BREATH: 0
ORTHOPNEA: 0
DIARRHEA: 0
PALPITATIONS: 0
FEVER: 0

## 2024-06-30 ASSESSMENT — PAIN - FUNCTIONAL ASSESSMENT: PAIN_FUNCTIONAL_ASSESSMENT: 0-10

## 2024-06-30 ASSESSMENT — PAIN SCALES - GENERAL: PAINLEVEL_OUTOF10: 0 - NO PAIN

## 2024-06-30 NOTE — PROGRESS NOTES
"[facility]  Patient Name: Sue Vail  Patient : 1945  Room/Bed: -A    HPI:  Sue Vail is a 78 y.o. female presenting with shortness of breath.  The patient has a history of melanoma, lymphoma treated with Rituxan (from [id number]) reportedly in remission now presenting with shortness of breath.       The patient tells me years ago (in the ) she had a cardiac workup including a nuclear stress test, after which she was asked \"when did you have a heart attack?\"  She was told then she had a scar on her heart, but tells me she did not receive any stents or heart surgery.  She does recall on  she had a very stressful event while on a business trip, and is wondering if she had a \"heart attack\" at that time.  She otherwise denies any prior history of CHF, MI, CAD.    She did have an echo in our system in 2018 which demonstrated an LVEF of 55-60%.     The patient tells me that since she received rituximab between 4459-4405.  Following this she developed severe fatigue to the point that she is virtually bedridden.  In the last two months, however she lso developed progressively worsening shortness of breath with associated orthopnea, PND.  Denies LE edema.  No chest pain/pressure.  Prior to presentation she was so short of breath while feeding the dog and doing the dishes that she thought she was having a heart attack.  On presentation to ED , /93.  Sat 97% on RA.  HSTI 21 > 17.  HB 11.6, Plt 319.  CXR demonstrated mild cardiomegaly and interstitial prominence with compatible edema.  EKG demonstrated LBBB (new since prior EKG ).  The patient rec'd IV lasix and was admitted for further management.    Allergies:  Ace inhibitors, Adhesive tape-silicones, Beta-blockers (beta-adrenergic blocking agts), Fluoxetine, Nsaids (non-steroidal anti-inflammatory drug), Olanzapine, Other omega-3s, and Penicillins    Subjective Data:  24:  Denies any chest " pain/pressure.  Chronic orthopnea and TOBIN that she feels is similar.  No BLE edema.  SR on telemetry with HR 65 bpm.  6-29-24: Overall patient is feeling better with her breathing.  She has been laying on her back without significant shortness of breath.  Admits that she has not been very active in her room.  Telemetry sinus rhythm at 73.  6-30-24: Patient feels well with no shortness of breath orthopnea or PND.  No chest pain.  Telemetry is sinus rhythm at 70.  She would like to go home.  Will arrange close outpatient CHF clinic follow-up.  Electrolytes and renal function are within normal limits.    Overnight Events:  None    Objective Data:  Vitals:    06/29/24 1534 06/29/24 1920 06/29/24 2321 06/30/24 0341   BP: 97/62 142/79 124/74 128/76   BP Location: Left arm Left arm Left arm Left arm   Patient Position: Lying Lying Lying Lying   Pulse: 86 92 81 69   Resp: 17 17 18 17   Temp: 36.6 °C (97.8 °F) 36.5 °C (97.7 °F) 36.6 °C (97.8 °F) 36.8 °C (98.2 °F)   TempSrc: Temporal Temporal Temporal Temporal   SpO2: 95% 94% 93% 95%   Weight:       Height:           Last Labs:  Results from last 7 days   Lab Units 06/30/24  0514 06/28/24  0437 06/26/24  0423   WBC AUTO x10*3/uL 8.2 7.9 6.4   HEMOGLOBIN g/dL 10.8* 10.6* 10.9*   HEMATOCRIT % 32.8* 33.1* 33.6*   PLATELETS AUTO x10*3/uL 283 273 286       Results from last 7 days   Lab Units 06/30/24  0514 06/29/24  0346 06/28/24  0437 06/26/24  0423 06/25/24  1222   SODIUM mmol/L 137 135* 138   < > 139   POTASSIUM mmol/L 3.7 4.4 4.2   < > 4.0   CHLORIDE mmol/L 107 105 105   < > 106   CO2 mmol/L 25 22 26   < > 25   BUN mg/dL 16 24* 21   < > 16   CREATININE mg/dL 0.67 0.71 1.01   < > 0.71   CALCIUM mg/dL 8.1* 8.1* 8.3*   < > 8.6   PROTEIN TOTAL g/dL  --   --   --   --  6.2*   BILIRUBIN TOTAL mg/dL  --   --   --   --  0.4   ALK PHOS U/L  --   --   --   --  76   ALT U/L  --   --   --   --  13   AST U/L  --   --   --   --  15   GLUCOSE mg/dL 111* 106* 103*   < > 118*    < > = values  "in this interval not displayed.       Results from last 7 days   Lab Units 06/30/24  0514 06/28/24  0437 06/27/24  0501   MAGNESIUM mg/dL 2.10 2.04 2.01       No results found for: \"LDLF\"     No results found for: \"BNP\"      No results found for: \"TROPHS\"       No results found for: \"TSH\", \"FT4\"    Results from last 7 days   Lab Units 06/25/24  1222   INR  1.0     Lab Results   Component Value Date    HGBA1C 6.3 (H) 05/02/2024       Intake/Output    Intake/Output Summary (Last 24 hours) at 6/30/2024 0759  Last data filed at 6/29/2024 1534  Gross per 24 hour   Intake 720 ml   Output --   Net 720 ml        I/O last 2 completed shifts:  In: 720 (7.3 mL/kg) [P.O.:720]  Out: - (0 mL/kg)   Weight: 98.3 kg     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), Personal history of malignant melanoma of skin (04/13/2017), Personal history of malignant melanoma of skin (11/10/2020), Personal history of malignant melanoma of skin (11/10/2020), 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    Past Surgical History:  She has a past surgical history that includes Other surgical history (04/11/2017); Gastric bypass (04/11/2017); Other surgical history (12/23/2019); and Other surgical history " (12/23/2019).      Social History:  She reports that she has never smoked. She has never used smokeless tobacco. She reports that she does not currently use alcohol. She reports that she does not use drugs.    Family History:  Family History   Problem Relation Name Age of Onset    Breast cancer Mother      Dementia Father      Alzheimer's disease Father      Prostate cancer Father      Tremor Father      Pulmonary embolism Daughter      Pulmonary embolism Daughter      Other (cardiomegaly) Maternal Grandmother         Scheduled medications  atorvastatin, 80 mg, oral, Daily  busPIRone, 5 mg, oral, BID  carvedilol, 3.125 mg, oral, BID  divalproex sprinkle, 750 mg, oral, Nightly  doxepin, 10 mg, oral, Nightly  ferrous sulfate (325 mg ferrous sulfate), 65 mg of iron, oral, Daily with breakfast  furosemide, 20 mg, oral, Daily  gabapentin, 300 mg, oral, Daily  hydrALAZINE, 10 mg, oral, TID  isosorbide dinitrate, 10 mg, oral, TID  polyethylene glycol, 17 g, oral, Daily  sacubitriL-valsartan, 1 tablet, oral, BID  venlafaxine, 75 mg, oral, Nightly          Medications Prior to Admission   Medication Sig Dispense Refill Last Dose    albuterol (Ventolin HFA) 90 mcg/actuation inhaler Inhale 2 puffs every 4 hours if needed for wheezing or shortness of breath. 8 g 5 6/25/2024    busPIRone (Buspar) 5 mg tablet Take 1 tablet (5 mg) by mouth 2 times a day. 200 tablet 0 6/24/2024    cholecalciferol (Vitamin D-3) 125 MCG (5000 UT) capsule Take by mouth q 24 HR.   6/24/2024    doxepin (SINEquan) 10 mg capsule Take 1 capsule (10 mg) by mouth once daily at bedtime. 90 capsule 1 6/24/2024    gabapentin (Neurontin) 300 mg capsule TAKE 1 CAPSULE(300 MG) BY MOUTH bid (Patient taking differently: Take 1 capsule (300 mg) by mouth once daily.) 200 capsule 0 6/24/2024    MAGNESIUM ORAL Take 1 tablet by mouth as needed at bedtime. chew   Past Week    valproic acid (Depakene) 250 mg capsule Take 3 capsules (750 mg) by mouth once daily at bedtime.  270 capsule 3 6/24/2024    venlafaxine (Effexor) 75 mg tablet TAKE 2 TABLETS(150 MG) BY MOUTH EVERY DAY AT BEDTIME (Patient taking differently: Take 1 tablet (75 mg) by mouth once daily at bedtime.) 180 tablet 1 6/24/2024    blood sugar diagnostic (Contour Next Test Strips) strip    More than a month    doxycycline (Vibramycin) 100 mg capsule Take 1 capsule (100 mg) by mouth 2 times a day. (Patient not taking: Reported on 6/25/2024) 20 capsule 0 Not Taking       Echo 6/26/24:    1. Multiple segmental abnormalities exist. See findings.   2. The left ventricular systolic function is severely decreased, with a Sorenson's biplane calculated ejection fraction of 22%.   3. There are multiple wall motion abnormalities.   4. Spectral Doppler shows an abnormal pattern of left ventricular diastolic filling.   5. There is normal right ventricular global systolic function.   6. Moderate mitral valve regurgitation.   7. Moderately elevated right ventricular systolic pressure.  EF   Date/Time Value Ref Range Status   06/26/2024 08:30 AM 22 %         R/LHC 6/27/24-Procedure Findings:   no signficant CAD.  Elevated filling pressures, normal CO.  SVT > 2000    Review of Systems   Constitutional: Negative for fever and malaise/fatigue.   Cardiovascular:  Negative for chest pain, orthopnea and palpitations.   Respiratory:  Negative for shortness of breath and wheezing.         Overall she feels her breathing is back to baseline   Skin:  Negative for itching and rash.   Gastrointestinal:  Negative for abdominal pain, diarrhea, nausea and vomiting.   Genitourinary:  Negative for dysuria.   Neurological:  Negative for weakness.         Physical Exam:  Vitals reviewed.   Constitutional:       Appearance: Not in distress.   Neck:      Vascular: No carotid bruit.   Pulmonary:      Effort: Pulmonary effort is normal.      Breath sounds: Normal breath sounds. No wheezing. No rales.   Cardiovascular:      PMI at left midclavicular line. Normal  "rate. Regular rhythm. S1 with normal intensity. S2 with normal intensity.       Murmurs: There is no murmur.   Edema:     Peripheral edema absent.   Abdominal:      General: Bowel sounds are normal.   Neurological:      Mental Status: Alert and oriented to person, place and time.         Assessment/Plan:   Acute systolic HF EF 22%:  TTE with multiple WMA.  Unclear duration, however TTE in 2018 demonstrated low normal LV fcn.  Unclear etiology, however patient reporting significant change (fatigue) after being treated with rituximab (last in 2021).  We had a long discussion about causes of cardiomyopathy, including CAD.  I recommended a RLHC/cor angio for further evaluation once she is able to lie flat.  She has documented intolerances/allergies to both ACE inhibitors and beta blockers, both with side effects of \"hypotension.\"  -diuresis with lasix 20mg IV BID  -strict I/O's and daily weights  -serial Cr  -monitor and replete lytes  -initiate isordil/hydralazine for afterload reduction  -keep NPO in case of RLHC/cor angio tomorrow (should be comfortable lying flat)  -she would benefit from follow up in heart failure clinic, and potentially seeing an advanced HF cardiologist for assistance in optimal medical management  -once she is initiated on GDMT, recommend rpt TTE in 3 mos and evaluation for ? ICD for primary prevention    6/28/24: S/p R/LHC yesterday and final report is pending in EMR. Post procedure note from cath lab NP - no signficant CAD. Elevated filling pressures, normal CO. SVR > 2000.  Renal function stable.  She continues to have orthopnea and TOBIN.  Brief episode of hypotension after morning medications, but patient was asymptomatic and BP has improved.  Continue hydralazine, isosorbide as ordered.  Recommend changing to metoprolol tartrate to carvedilol. She has had gastric bypass in the past so will not use metoprolol succinate.  Okay from cardiac standpoint to transfer to SDU. Discussed with Dr." Kate Cazares and will stop furosemide IV d/t intermittent hypotension.  Will start furosemide 20 mg daily PO tomorrow.  Consider adding ACEi/ARB/ARNI tomorrow if BP will tolerate (has allergy listed d/t hypotension).  6-29-24: Overall patient is feeling improved.  She had a lay flat in bed without significant shortness of breath and she is not requiring oxygen.  She denies chest pain or anginal symptoms.  Her telemetry shows sinus rhythm.  Blood pressures are still up in the 120s so we will add Entresto to her regimen considering the EF of 20%.  Again heart catheterization showed no obstructive CAD.  Will add the Entresto monitor vitals and repeat labs in the a.m.  6-30-24: Patient has responded to medical therapy very well with resolution of shortness of breath and at this time her respiratory status is back to baseline.  We added low-dose Entresto and blood pressure is very well-controlled with normal electrolytes and renal function.  She appears stable for discharge from cardiac standpoint on current medical regimen and we will arrange close outpatient follow-up in the CHF clinic within 7 days of discharge.  Patient advised to avoid excess salt and fluid in her diet to prevent shortness of breath and retention of fluid.    Recommendations--patient appears stable for discharge from cardiac standpoint will arrange close outpatient cardiology follow-up.    Caleb Trotter PA-C   6/30/2024  7:59 AM

## 2024-06-30 NOTE — CARE PLAN
The patient's goals for the shift include      The clinical goals for the shift include No bleeding or hematma from cath site    Over the shift, the patient did not make progress toward the following goals. Barriers to progression include . Recommendations to address these barriers include .

## 2024-06-30 NOTE — PROGRESS NOTES
Patient was discharged home today. Discharge summary was reviewed. Called patient today. Patient is doing well and has been taking all medications which I reviewed. Advise patient to call office asap if there is any medical concerns and to follow up with specialist if recommended. Patient is going to call office to have a follow up  in office within  14 days.

## 2024-06-30 NOTE — DISCHARGE SUMMARY
Discharge Diagnosis  Acute systolic heart failure (Multi)  Nonischemic cardiomyopathy      This discharge took greater than 35 minutes.    Test Results Pending At Discharge  Pending Labs       No current pending labs.            Hospital Course   Sue Vail is a 78 y.o. female with PMHx s/f melanoma, lymphoma treated with Rituxan reportedly in remission now presenting with shortness of breath.  For the last couple of months patient has had progressively worsening shortness of breath with activity.  She has also had some associated restless when trying to lay flat she is night stating that she becomes very short of breath and gets better when she sits up but she does appear to be somewhat symptomatic when laying down.  She denies any lower extremity edema any increased abdominal girth any chest pain palpitations.  On presentation patient had temperature of 97.1 heart rate 109 respiratory rate 16 blood pressure 150/93 SpO2 97%, lab work shows BN peptide 960 troponin 21 with repeat troponin 17 total protein slightly low at 6.2 glucose 118 remainder of chemistry panel within normal limits INR is 1 CBC has no leukocytosis hemoglobin 11.6 hematocrit 35.5 platelets 319.  Chest x-ray showing mild cardiomegaly and interstitial prominence with compatible edema the EKG shows sinus rhythm with left bundle branch block which is new versus prior EKG in 2018.  The patient was given a dose of IV furosemide and referred for admission due to her new onset of congestive heart failure a bedside echocardiogram was done by the ER provider initial reading from his perspective was there was significant reduced ejection fraction.   Patient was admitted with a diagnosis of acute on chronic congestive heart failure-started on appropriate medication.      06/27: Patient continues to have shortness of breath, no chest pain at rest, no fever or chills, no nausea or vomiting.  Cardiology on board and planning for left/right heart cath  today  06/28: Patient was evaluated this a.m., shortness of breath is improving, slept better last night.  No chest pain at rest.  Left heart cath shows no significant CAD.  06/29: Patient is feeling better, shortness of breath is improving, no nausea or vomiting.  06/30: Patient condition significantly improved with treatment.  Cleared by cardiology for discharge.  She will be discharged home on Lasix, Entresto, statin.  She will continue her home medication.  Follow-up with cardiology as an outpatient    Pertinent Physical Exam At Time of Discharge  Physical Exam  Patient is awake and orient, not in apparent distress  Eyes: PERRLA, no conjunctival congestion  Chest: Bilateral Air entry, no crackles or wheezing  Heart: s1S2 regular, no murmur  Abdomen: Soft, non tender, BS present  Ext:    Home Medications     Medication List      START taking these medications     atorvastatin 80 mg tablet; Commonly known as: Lipitor; Take 1 tablet (80   mg) by mouth once daily.   carvedilol 3.125 mg tablet; Commonly known as: Coreg; Take 1 tablet   (3.125 mg) by mouth 2 times a day.   furosemide 20 mg tablet; Commonly known as: Lasix; Take 1 tablet (20 mg)   by mouth once daily.   isosorbide dinitrate 10 mg tablet; Commonly known as: Isordil; Take 1   tablet (10 mg) by mouth 3 times a day.   sacubitriL-valsartan 24-26 mg tablet; Commonly known as: Entresto; Take   1 tablet by mouth 2 times a day.     CHANGE how you take these medications     gabapentin 300 mg capsule; Commonly known as: Neurontin; TAKE 1   CAPSULE(300 MG) BY MOUTH bid; What changed: how much to take, how to take   this, when to take this, additional instructions   venlafaxine 75 mg tablet; Commonly known as: Effexor; TAKE 2 TABLETS(150   MG) BY MOUTH EVERY DAY AT BEDTIME; What changed: See the new instructions.     CONTINUE taking these medications     albuterol 90 mcg/actuation inhaler; Commonly known as: Ventolin HFA;   Inhale 2 puffs every 4 hours if needed  for wheezing or shortness of   breath.   busPIRone 5 mg tablet; Commonly known as: Buspar; Take 1 tablet (5 mg)   by mouth 2 times a day.   cholecalciferol 125 MCG (5000 UT) capsule; Commonly known as: Vitamin   D-3   Contour Next Test Strips strip; Generic drug: blood sugar diagnostic   doxepin 10 mg capsule; Commonly known as: SINEquan; Take 1 capsule (10   mg) by mouth once daily at bedtime.   MAGNESIUM ORAL   valproic acid 250 mg capsule; Commonly known as: Depakene; Take 3   capsules (750 mg) by mouth once daily at bedtime.     STOP taking these medications     doxycycline 100 mg capsule; Commonly known as: Vibramycin       Outpatient Follow-Up  No follow-ups on file.     Ezequiel Castillo MD  6/30/2024  8:25 AM

## 2024-07-01 ENCOUNTER — PATIENT OUTREACH (OUTPATIENT)
Dept: HOME HEALTH SERVICES | Age: 79
End: 2024-07-01
Payer: MEDICARE

## 2024-07-01 LAB
ATRIAL RATE: 67 BPM
P AXIS: 9 DEGREES
P OFFSET: 181 MS
P ONSET: 123 MS
PR INTERVAL: 174 MS
Q ONSET: 210 MS
QRS COUNT: 11 BEATS
QRS DURATION: 152 MS
QT INTERVAL: 488 MS
QTC CALCULATION(BAZETT): 515 MS
QTC FREDERICIA: 506 MS
R AXIS: -50 DEGREES
T AXIS: 106 DEGREES
T OFFSET: 454 MS
VENTRICULAR RATE: 67 BPM

## 2024-07-01 SDOH — SOCIAL STABILITY: SOCIAL NETWORK: HOW OFTEN DO YOU ATTEND CHURCH OR RELIGIOUS SERVICES?: NEVER

## 2024-07-01 SDOH — SOCIAL STABILITY: SOCIAL NETWORK: HOW OFTEN DO YOU ATTENT MEETINGS OF THE CLUB OR ORGANIZATION YOU BELONG TO?: 1 TO 4 TIMES PER YEAR

## 2024-07-01 SDOH — SOCIAL STABILITY: SOCIAL NETWORK: HOW OFTEN DO YOU GET TOGETHER WITH FRIENDS OR RELATIVES?: ONCE A WEEK

## 2024-07-01 SDOH — SOCIAL STABILITY: SOCIAL NETWORK: IN A TYPICAL WEEK, HOW MANY TIMES DO YOU TALK ON THE PHONE WITH FAMILY, FRIENDS, OR NEIGHBORS?: ONCE A WEEK

## 2024-07-01 SDOH — ECONOMIC STABILITY: INCOME INSECURITY: IN THE PAST 12 MONTHS, HAS THE ELECTRIC, GAS, OIL, OR WATER COMPANY THREATENED TO SHUT OFF SERVICE IN YOUR HOME?: NO

## 2024-07-01 SDOH — SOCIAL STABILITY: SOCIAL INSECURITY: WITHIN THE LAST YEAR, HAVE YOU BEEN HUMILIATED OR EMOTIONALLY ABUSED IN OTHER WAYS BY YOUR PARTNER OR EX-PARTNER?: NO

## 2024-07-01 SDOH — HEALTH STABILITY: MENTAL HEALTH
HOW OFTEN DO YOU NEED TO HAVE SOMEONE HELP YOU WHEN YOU READ INSTRUCTIONS, PAMPHLETS, OR OTHER WRITTEN MATERIAL FROM YOUR DOCTOR OR PHARMACY?: NEVER

## 2024-07-01 SDOH — SOCIAL STABILITY: SOCIAL INSECURITY
WITHIN THE LAST YEAR, HAVE TO BEEN RAPED OR FORCED TO HAVE ANY KIND OF SEXUAL ACTIVITY BY YOUR PARTNER OR EX-PARTNER?: NO

## 2024-07-01 SDOH — ECONOMIC STABILITY: FOOD INSECURITY: WITHIN THE PAST 12 MONTHS, THE FOOD YOU BOUGHT JUST DIDN'T LAST AND YOU DIDN'T HAVE MONEY TO GET MORE.: NEVER TRUE

## 2024-07-01 SDOH — SOCIAL STABILITY: SOCIAL NETWORK: ARE YOU MARRIED, WIDOWED, DIVORCED, SEPARATED, NEVER MARRIED, OR LIVING WITH A PARTNER?: MARRIED

## 2024-07-01 SDOH — SOCIAL STABILITY: SOCIAL INSECURITY: WITHIN THE LAST YEAR, HAVE YOU BEEN AFRAID OF YOUR PARTNER OR EX-PARTNER?: NO

## 2024-07-01 SDOH — SOCIAL STABILITY: SOCIAL NETWORK
DO YOU BELONG TO ANY CLUBS OR ORGANIZATIONS SUCH AS CHURCH GROUPS UNIONS, FRATERNAL OR ATHLETIC GROUPS, OR SCHOOL GROUPS?: NO

## 2024-07-01 SDOH — HEALTH STABILITY: MENTAL HEALTH
STRESS IS WHEN SOMEONE FEELS TENSE, NERVOUS, ANXIOUS, OR CAN'T SLEEP AT NIGHT BECAUSE THEIR MIND IS TROUBLED. HOW STRESSED ARE YOU?: ONLY A LITTLE

## 2024-07-01 SDOH — ECONOMIC STABILITY: FOOD INSECURITY: WITHIN THE PAST 12 MONTHS, YOU WORRIED THAT YOUR FOOD WOULD RUN OUT BEFORE YOU GOT MONEY TO BUY MORE.: NEVER TRUE

## 2024-07-01 SDOH — SOCIAL STABILITY: SOCIAL INSECURITY
WITHIN THE LAST YEAR, HAVE YOU BEEN KICKED, HIT, SLAPPED, OR OTHERWISE PHYSICALLY HURT BY YOUR PARTNER OR EX-PARTNER?: NO

## 2024-07-01 SDOH — HEALTH STABILITY: PHYSICAL HEALTH: ON AVERAGE, HOW MANY MINUTES DO YOU ENGAGE IN EXERCISE AT THIS LEVEL?: 0 MIN

## 2024-07-01 SDOH — HEALTH STABILITY: PHYSICAL HEALTH: ON AVERAGE, HOW MANY DAYS PER WEEK DO YOU ENGAGE IN MODERATE TO STRENUOUS EXERCISE (LIKE A BRISK WALK)?: 0 DAYS

## 2024-07-02 ENCOUNTER — OFFICE VISIT (OUTPATIENT)
Dept: PRIMARY CARE | Facility: CLINIC | Age: 79
End: 2024-07-02
Payer: MEDICARE

## 2024-07-02 ENCOUNTER — PATIENT OUTREACH (OUTPATIENT)
Dept: HOME HEALTH SERVICES | Age: 79
End: 2024-07-02
Payer: MEDICARE

## 2024-07-02 VITALS
HEART RATE: 76 BPM | SYSTOLIC BLOOD PRESSURE: 126 MMHG | RESPIRATION RATE: 14 BRPM | DIASTOLIC BLOOD PRESSURE: 76 MMHG | BODY MASS INDEX: 37.54 KG/M2 | HEIGHT: 62 IN | WEIGHT: 204 LBS

## 2024-07-02 DIAGNOSIS — F32.2 CURRENT SEVERE EPISODE OF MAJOR DEPRESSIVE DISORDER WITHOUT PSYCHOTIC FEATURES WITHOUT PRIOR EPISODE (MULTI): ICD-10-CM

## 2024-07-02 DIAGNOSIS — I50.21 ACUTE SYSTOLIC HEART FAILURE (MULTI): ICD-10-CM

## 2024-07-02 DIAGNOSIS — I50.9 CONGESTIVE HEART FAILURE, UNSPECIFIED HF CHRONICITY, UNSPECIFIED HEART FAILURE TYPE (MULTI): Primary | ICD-10-CM

## 2024-07-02 DIAGNOSIS — E66.01 MORBID OBESITY (MULTI): ICD-10-CM

## 2024-07-02 DIAGNOSIS — D50.8 OTHER IRON DEFICIENCY ANEMIA: ICD-10-CM

## 2024-07-02 PROCEDURE — 1111F DSCHRG MED/CURRENT MED MERGE: CPT | Performed by: FAMILY MEDICINE

## 2024-07-02 PROCEDURE — 99495 TRANSJ CARE MGMT MOD F2F 14D: CPT | Performed by: FAMILY MEDICINE

## 2024-07-02 RX ORDER — FERROUS SULFATE 325(65) MG
325 TABLET, DELAYED RELEASE (ENTERIC COATED) ORAL
Qty: 90 TABLET | Refills: 3 | Status: SHIPPED | OUTPATIENT
Start: 2024-07-02 | End: 2025-07-02

## 2024-07-02 NOTE — PROGRESS NOTES
Daily Call Note:  [facility] daily call complete.  Pt reports she is feeling well.  B/P 110/65  Denies CP/SOB.  Verified upcoming weekly [facility] call.      Pt Education:  POC  Barriers:   Topics for Daily Review:   Pt demonstrates clear understanding: Yes    Daily Weight:  204.4 lbs  There were no vitals filed for this visit.   Last 3 Weights:  Wt Readings from Last 7 Encounters:   06/29/24 98.3 kg (216 lb 11.4 oz)   06/06/24 95.3 kg (210 lb)   04/05/24 95.3 kg (210 lb)   02/07/24 98.8 kg (217 lb 13 oz)   07/14/23 93 kg (205 lb)   11/08/22 95.4 kg (210 lb 6.4 oz)   08/30/22 95.3 kg (210 lb)       Masimo Device: No   Masimo Clinical Impression:     Virtual Visits--Scheduled (Most Recent Date at Top)  Follow up Appointments  Recent Visits  No visits were found meeting these conditions.  Showing recent visits within past 30 days and meeting all other requirements  Future Appointments  Date Type Provider Dept   07/03/24 Appointment Joanna Garza MD PhD [facility]   08/06/24 Appointment Joanna Garza MD PhD [facility]   Showing future appointments within next 90 days and meeting all other requirements       Frequency of RN Calls & Virtual Visits per Team Agreement: Healthy at Home Frequency: Daily    Medication issues Addressed (what was done):     Follow up appointments scheduled by [facility] Staff:   Referrals made by [facility] staff:

## 2024-07-03 ENCOUNTER — APPOINTMENT (OUTPATIENT)
Dept: PRIMARY CARE | Facility: CLINIC | Age: 79
End: 2024-07-03
Payer: MEDICARE

## 2024-07-03 ENCOUNTER — PATIENT OUTREACH (OUTPATIENT)
Dept: HOME HEALTH SERVICES | Age: 79
End: 2024-07-03

## 2024-07-03 ENCOUNTER — DOCUMENTATION (OUTPATIENT)
Dept: CARE COORDINATION | Facility: CLINIC | Age: 79
End: 2024-07-03

## 2024-07-03 VITALS — SYSTOLIC BLOOD PRESSURE: 132 MMHG | WEIGHT: 204.8 LBS | BODY MASS INDEX: 37.46 KG/M2 | DIASTOLIC BLOOD PRESSURE: 80 MMHG

## 2024-07-03 RX ORDER — FERROUS SULFATE 325(65) MG
325 TABLET, DELAYED RELEASE (ENTERIC COATED) ORAL
Qty: 90 TABLET | Refills: 1 | Status: SHIPPED | OUTPATIENT
Start: 2024-07-03

## 2024-07-03 NOTE — PROGRESS NOTES
"Subjective   Patient ID: Sue Vail is a 78 y.o. female who presents for hospital fu    HPI   Patient has been compliant with taking all  current medications.  No blurry vision. Normal appetite. + exertional sob, No dark stools, abd pain, sob at rest, pnd, orthopnea, CP, HA, dizziness, heart palpitation. No claudication or cold LE.  Mild  LE edema. No imbalance or falls. Good mood.     Review of Systems    Objective   /76   Pulse 76   Resp 14   Ht 1.575 m (5' 2\")   Wt 92.5 kg (204 lb)   BMI 37.31 kg/m²     Physical Exam  NAD, well groomed, No sclera icterus. neck: supple, no cervical or axillary lymphadenopathy,  lungs: CTA b/l, heart: RRR, cap refill was less than 2 secs, no jvd, minimum  LE edema, normal pedal pulses, abd: soft, no tenderness, BS+,   Good balance. CNII-XII were grossly intact, good judgment and memory. No depressed mood.    Assessment/Plan   Problem List Items Addressed This Visit             ICD-10-CM    Morbid obesity (Multi) E66.01     Recommend regular aerobic exercise with low fat and low cholesterol diet. Will monitor weight, blood glucose and cholesterol regularly.          Relevant Orders    Referral to Nutrition Services    Iron deficiency anemia D50.9     On iron pills. Recent hgb was stable. Will monitor.         Relevant Medications    ferrous sulfate 325 (65 Fe) MG EC tablet    Current severe episode of major depressive disorder without psychotic features without prior episode (Multi) F32.2     controlled with effexor and buspar. No HI/SI. Cont. the same.            Congestive heart failure (Multi) - Primary I50.9     Low salt diet. Cont current meds. Fu with cradiologist               "

## 2024-07-03 NOTE — PROGRESS NOTES
Patient currently enrolled in the healthy at home program.  CM will continue to follow case until services have been completed for any further CM services.      KIERRA Yin, RN   [phone]   [facility] Department

## 2024-07-03 NOTE — PROGRESS NOTES
Daily Call Note: Daily call complete. Patient states she feels better but that her energy level has decreased since being home from the hospital. She felt pretty good and had energy when she got home and now feels like she is getting back into her old routine of getting up and having a cup of coffee and going back to bed, she does not want to go back to this routine. She wants to establish a more active lifestyle. I actively listened and asked if she could think of anything that she could do to change that routine, she states that she has trails and could go for a walk with her dog. We discussed health coaching services and she would love to participate, she express thanks and sounds very motivated. I will email waiver and wellness assessment. Initial [facility] 7/8/24 at 1330, verbalized understanding. No other questions at this time.      /80   Wt 92.9 kg (204 lb 12.8 oz)   BMI 37.46 kg/m²       Pt Education: per POC  Barriers: none  Topics for Daily Review: BP, weight  Pt demonstrates clear understanding: Yes    Daily Weight:  There were no vitals filed for this visit.   Last 3 Weights:  Wt Readings from Last 7 Encounters:   07/02/24 92.5 kg (204 lb)   06/29/24 98.3 kg (216 lb 11.4 oz)   06/06/24 95.3 kg (210 lb)   04/05/24 95.3 kg (210 lb)   02/07/24 98.8 kg (217 lb 13 oz)   07/14/23 93 kg (205 lb)   11/08/22 95.4 kg (210 lb 6.4 oz)       Masimo Device: No   Masimo Clinical Impression: n/a    Virtual Visits--Scheduled (Most Recent Date at Top)  Follow up Appointments  Recent Visits  Date Type Provider Dept   07/02/24 Office Visit Joanna Garza MD PhD [facility]   Showing recent visits within past 30 days and meeting all other requirements  Future Appointments  Date Type Provider Dept   08/06/24 Appointment Joanna Garza MD PhD [facility]   Showing future appointments within next 90 days and meeting all other requirements       Frequency of RN Calls & Virtual Visits per Team Agreement: Healthy at  Home Frequency: Daily    Medication issues Addressed (what was done): none    Follow up appointments scheduled by [facility] Staff: none  Referrals made by [facility] staff: none

## 2024-07-05 ENCOUNTER — PATIENT OUTREACH (OUTPATIENT)
Dept: HOME HEALTH SERVICES | Age: 79
End: 2024-07-05
Payer: MEDICARE

## 2024-07-05 NOTE — PROGRESS NOTES
Daily Call Note:   Patient called [facility] At Home to report weight and Blood pressure.   Patient states she is doing fine.     Patient is interested in Cardiac Rehab. Patient states one of the doctor's mentioned it to her when she was hospitalized.  I advised patient that we will discuss it with the [facility] Provider during the initial [facility] 7/8/24 @ 13:30.  Patient agrees with plan.     Patient has no questions, concerns, or needs at this time.        Weight 205.3     /77   HR 73     Pt Education:   Barriers:   Topics for Daily Review:   Pt demonstrates clear understanding:     Daily Weight:  There were no vitals filed for this visit.   Last 3 Weights:  Wt Readings from Last 7 Encounters:   07/03/24 92.9 kg (204 lb 12.8 oz)   07/02/24 92.5 kg (204 lb)   06/29/24 98.3 kg (216 lb 11.4 oz)   06/06/24 95.3 kg (210 lb)   04/05/24 95.3 kg (210 lb)   02/07/24 98.8 kg (217 lb 13 oz)   07/14/23 93 kg (205 lb)       Masimo Device:    Masimo Clinical Impression:     Virtual Visits--Scheduled (Most Recent Date at Top)  Follow up Appointments  Recent Visits  Date Type Provider Dept   07/02/24 Office Visit Joanna Garza MD PhD [facility]   Showing recent visits within past 30 days and meeting all other requirements  Future Appointments  Date Type Provider Dept   08/06/24 Appointment Joanna Garza MD PhD [facility]   Showing future appointments within next 90 days and meeting all other requirements       Frequency of RN Calls & Virtual Visits per Team Agreement:     Medication issues Addressed (what was done):     Follow up appointments scheduled by [facility] Staff:   Referrals made by [facility] staff:     Daily Call Note:     Pt Education:   Barriers: Topics for Daily Review:   Pt demonstrates clear understanding:     Daily Weight:  There were no vitals filed for this visit.   Last 3 Weights:  Wt Readings from Last 7 Encounters:   07/03/24 92.9 kg (204 lb 12.8 oz)   07/02/24 92.5 kg (204 lb)   06/29/24 98.3 kg (216 lb 11.4  oz)   06/06/24 95.3 kg (210 lb)   04/05/24 95.3 kg (210 lb)   02/07/24 98.8 kg (217 lb 13 oz)   07/14/23 93 kg (205 lb)       Masimo Device:    Masimo Clinical Impression:     Virtual Visits--Scheduled (Most Recent Date at Top)  Follow up Appointments  Recent Visits  Date Type Provider Dept   07/02/24 Office Visit Joanna Garza MD PhD [facility]   Showing recent visits within past 30 days and meeting all other requirements  Future Appointments  Date Type Provider Dept   08/06/24 Appointment Joanna Garza MD PhD [facility]   Showing future appointments within next 90 days and meeting all other requirements       Frequency of RN Calls & Virtual Visits per Team Agreement:     Medication issues Addressed (what was done):     Follow up appointments scheduled by [facility] Staff:   Referrals made by [facility] staff:

## 2024-07-06 ENCOUNTER — PATIENT OUTREACH (OUTPATIENT)
Dept: HOME HEALTH SERVICES | Age: 79
End: 2024-07-06
Payer: MEDICARE

## 2024-07-06 VITALS
HEART RATE: 74 BPM | BODY MASS INDEX: 37.62 KG/M2 | WEIGHT: 205.7 LBS | SYSTOLIC BLOOD PRESSURE: 120 MMHG | DIASTOLIC BLOOD PRESSURE: 69 MMHG

## 2024-07-06 NOTE — PROGRESS NOTES
Daily call completed. Pt is doing better sob is improving daily. No cp of cp or edema. Vitals are in flowsheet. Pt is having diarrhea, I placed it as a task to address in [facility] on Monday. Pt think it is from her new medication she started since leaving hospitals. Pt is on mag and zinc, either or both could be the cause of it. Pt is encouraged to drink fluids to stay hydrate and eat foods with fibers to bulk up stool. [facility] confirmed no there concerns.

## 2024-07-07 DIAGNOSIS — E11.9 DIET-CONTROLLED TYPE 2 DIABETES MELLITUS (MULTI): Primary | ICD-10-CM

## 2024-07-07 DIAGNOSIS — G40.909 SEIZURE DISORDER (MULTI): ICD-10-CM

## 2024-07-07 DIAGNOSIS — I50.9 CONGESTIVE HEART FAILURE, UNSPECIFIED HF CHRONICITY, UNSPECIFIED HEART FAILURE TYPE (MULTI): ICD-10-CM

## 2024-07-08 ENCOUNTER — PATIENT OUTREACH (OUTPATIENT)
Dept: HOME HEALTH SERVICES | Age: 79
End: 2024-07-08

## 2024-07-08 ENCOUNTER — APPOINTMENT (OUTPATIENT)
Dept: PHARMACY | Facility: HOSPITAL | Age: 79
End: 2024-07-08
Payer: MEDICARE

## 2024-07-08 ENCOUNTER — APPOINTMENT (OUTPATIENT)
Dept: CARE COORDINATION | Age: 79
End: 2024-07-08
Payer: MEDICARE

## 2024-07-08 VITALS
SYSTOLIC BLOOD PRESSURE: 124 MMHG | WEIGHT: 204.7 LBS | HEART RATE: 83 BPM | BODY MASS INDEX: 37.44 KG/M2 | DIASTOLIC BLOOD PRESSURE: 86 MMHG

## 2024-07-08 DIAGNOSIS — G40.909 SEIZURE DISORDER (MULTI): ICD-10-CM

## 2024-07-08 DIAGNOSIS — E11.9 DIET-CONTROLLED TYPE 2 DIABETES MELLITUS (MULTI): ICD-10-CM

## 2024-07-08 DIAGNOSIS — I50.9 CONGESTIVE HEART FAILURE, UNSPECIFIED HF CHRONICITY, UNSPECIFIED HEART FAILURE TYPE (MULTI): ICD-10-CM

## 2024-07-08 NOTE — PROGRESS NOTES
Daily Call Note: [facility] Initial with NABEEL Milligan, PharmD, Dr. RENATO Guzman  The patient stated she is feeling quite well finally.  Patient had some questions about her medications which were reviewed with her by the pharmacist, who educated her on the medications, new additions and former medications.  Reports her breathing is so much better - no peripheral edema. The patient requested a referral for cardiac rehab - referral to be placed by Dr. Guzman. The patient also had complaint of diarrhea - Dr. Guzman placed order for stool pathogen panel - patient educated on picking up kit from the lab and returning sample correctly labeled    Barriers:   Topics for Daily Review:   Pt demonstrates clear understanding: Yes  /86   Pulse 83   Wt 92.9 kg (204 lb 11.2 oz)   BMI 37.44 kg/m²     Daily Weight:  There were no vitals filed for this visit.   Last 3 Weights:  Wt Readings from Last 7 Encounters:   07/06/24 93.3 kg (205 lb 11.2 oz)   07/05/24 93.1 kg (205 lb 4.8 oz)   07/03/24 92.9 kg (204 lb 12.8 oz)   07/02/24 92.5 kg (204 lb)   06/29/24 98.3 kg (216 lb 11.4 oz)   06/06/24 95.3 kg (210 lb)   04/05/24 95.3 kg (210 lb)       Masimo Device:    Masimo Clinical Impression:     Virtual Visits--Scheduled (Most Recent Date at Top)  Follow up Appointments  Recent Visits  Date Type Provider Dept   07/02/24 Office Visit Joanna Garza MD PhD [facility]   Showing recent visits within past 30 days and meeting all other requirements  Future Appointments  Date Type Provider Dept   08/06/24 Appointment Joanna Garza MD PhD [facility]   Showing future appointments within next 90 days and meeting all other requirements       Frequency of RN Calls & Virtual Visits per Team Agreement: Healthy at Home Frequency: Daily    Medication issues Addressed (what was done):     Follow up appointments scheduled by [facility] Staff:   Referrals made by [facility] staff:

## 2024-07-08 NOTE — PROGRESS NOTES
Pharmacy Post-Discharge Visit    Sue Vail is a 78 y.o. female was referred to Clinical Pharmacy Team to complete a post-discharge medication optimization and monitoring visit.  The patient was referred for their CHF management while also now enrolled in our Healthy [facility] Home Virtual Clinic.     Admission Date: 6/25/24  Discharge Date: 6/30/24    Referring Provider: Katya Guzman MD  PCP: Joanna Garza MD PhD - last visit: 7/2/24, next visit: 8/6      Subjective   Allergies   Allergen Reactions    Ace Inhibitors Unknown     Hypotension, Hypotension    Adhesive Tape-Silicones Unknown     Adhesive Tape TAPE and bandages    Beta-Blockers (Beta-Adrenergic Blocking Agts) Unknown     Hypotension, Hypotension    Fluoxetine Unknown     cns, cns    Nsaids (Non-Steroidal Anti-Inflammatory Drug) Unknown    Olanzapine Unknown    Other Omega-3s Unknown     all ssri    [id number] [place] [id number] [address]  [address] [address]  Phone: [phone] Fax: [phone]    [facility] [place] [id number] [facility]  [address] [facility] [address]  Phone: [phone] Fax: [phone]      Medication System Management:  Affordability/Accessibility: did not discuss today   Adherence/Organization: no concerns       Social History     Social History Narrative    Not on file        Notable Medication changes following discharge:  Start: lipitor, coreg, lasix, isosorbide, entresto and hydralazine   Stop: none  Change: gabapentin     HPI  CHF ASSESSMENT  Staging:  Most recent ejection fraction: 22%  NYHA Stage: II  ACC/AHA Stage: C    Symptom Assessment:  Weight changes/edema?: Yes - down 3 lbs from admission weight   Dyspnea?: None  Dizziness/syncope/palpitations?: No    Current Regimen:  ARNI/ACEi/ARB: Yes - entresto   Beta Blocker: Yes - carvedilol   MRA: No  SGLT2i: No    Other therapy:  Lasix   Hydralazine    Isosorbide dinitrate     Secondary Prevention:  The ASCVD Risk score (Carmine MARCOS, et al., 2019) failed to calculate for the following reasons:    The patient has a prior MI or stroke diagnosis    Aspirin 81mg? no  Statin?: Yes - atorvastatin   HTN?: No     Review of Systems        Objective     There were no vitals taken for this visit.   BP Readings from Last 4 Encounters:   07/06/24 120/69   07/05/24 132/77   07/03/24 132/80   07/02/24 126/76      There were no vitals filed for this visit.     LAB  Lab Results   Component Value Date    BILITOT 0.4 06/25/2024    CALCIUM 8.1 (L) 06/30/2024    CO2 25 06/30/2024     06/30/2024    CREATININE 0.67 06/30/2024    GLUCOSE 111 (H) 06/30/2024    ALKPHOS 76 06/25/2024    K 3.7 06/30/2024    PROT 6.2 (L) 06/25/2024     06/30/2024    AST 15 06/25/2024    ALT 13 06/25/2024    BUN 16 06/30/2024    ANIONGAP 9 (L) 06/30/2024    MG 2.10 06/30/2024    PHOS 5.6 (H) 06/28/2024     05/05/2023    ALBUMIN 3.4 06/28/2024    GFRF 89 07/27/2023     Lab Results   Component Value Date    TRIG 104 05/02/2024    CHOL 204 (H) 05/02/2024    LDLCALC 120 (H) 05/02/2024    HDL 63.4 05/02/2024     Lab Results   Component Value Date    HGBA1C 6.3 (H) 05/02/2024         Current Outpatient Medications   Medication Instructions    albuterol (Ventolin HFA) 90 mcg/actuation inhaler 2 puffs, inhalation, Every 4 hours PRN    atorvastatin (LIPITOR) 80 mg, oral, Daily    blood sugar diagnostic (Contour Next Test Strips) strip miscellaneous    busPIRone (BUSPAR) 5 mg, oral, 2 times daily    carvedilol (COREG) 3.125 mg, oral, 2 times daily    cholecalciferol (Vitamin D-3) 125 MCG (5000 UT) capsule Take by mouth q 24 HR.    doxepin (SINEQUAN) 10 mg, oral, Nightly    ferrous sulfate 325 (65 Fe) MG EC tablet 1 tablet, oral, Daily with breakfast, Do not crush, chew, or split.    ferrous sulfate 325 (65 Fe) MG EC tablet 1 tablet, oral, Daily with breakfast, Do not crush, chew, or split.     furosemide (LASIX) 20 mg, oral, Daily    gabapentin (Neurontin) 300 mg capsule TAKE 1 CAPSULE(300 MG) BY MOUTH bid    hydrALAZINE (APRESOLINE) 10 mg, oral, 3 times daily    isosorbide dinitrate (ISORDIL) 10 mg, oral, 3 times daily (0900,1400,1900)    MAGNESIUM ORAL 1 tablet, oral, Nightly PRN, chew    sacubitriL-valsartan (Entresto) 24-26 mg tablet 1 tablet, oral, 2 times daily    valproic acid (DEPAKENE) 750 mg, oral, Nightly    venlafaxine (Effexor) 75 mg tablet TAKE 2 TABLETS(150 MG) BY MOUTH EVERY DAY AT BEDTIME        HISTORICAL PHARMACOTHERAPY  N/a    DRUG INTERACTIONS  None at time of review      Assessment/Plan       Seizures   No activity since being home   Continue with VPA  CHF  Most recent EF was 22% while admitted   Current GDMT --> carvedilol and entresto   Would also benefit from SGLT2i and MRA  Will look into test claims for jardiance   Lasix daily   Continue to log BP's and weights daily   She is also interested in cardiac rehab so Dr. Guzman will place referral     Follow Up: 1 week     Continue all meds under the continuation of care with the referring provider and clinical pharmacy team.    Michael Milligan, Elly     Verbal consent to manage patient's drug therapy was obtained from the patient. They were informed they may decline to participate or withdraw from participation in pharmacy services at any time.

## 2024-07-09 ENCOUNTER — PATIENT OUTREACH (OUTPATIENT)
Dept: HOME HEALTH SERVICES | Age: 79
End: 2024-07-09
Payer: MEDICARE

## 2024-07-09 NOTE — PROGRESS NOTES
"Daily Call Note:       Daily call completed .  Pt states she just sometimes feels this \"wave of weakness\" that causes her to be tired and jittery.  She did receive a call from a Nurse from [facility] who will be  setting her up with a mobile meal service.  Referral for cardiac rehab placed yesterday, and pt hasn't received a call yet.  Told to give it a couple days for them to reach out and then we can always help.  Pt states she is still having diarrhea, will go to lab today to  kit.  Pt also had to reschedule her next [facility] call to 7/17 @ 1000.        Pt Education:   Barriers:   Topics for Daily Review:   Pt demonstrates clear understanding:     Daily Weight:  There were no vitals filed for this visit.   Last 3 Weights:  Wt Readings from Last 7 Encounters:   07/08/24 92.9 kg (204 lb 11.2 oz)   07/06/24 93.3 kg (205 lb 11.2 oz)   07/05/24 93.1 kg (205 lb 4.8 oz)   07/03/24 92.9 kg (204 lb 12.8 oz)   07/02/24 92.5 kg (204 lb)   06/29/24 98.3 kg (216 lb 11.4 oz)   06/06/24 95.3 kg (210 lb)       Masimo Device:    Masimo Clinical Impression:     Virtual Visits--Scheduled (Most Recent Date at Top)  Follow up Appointments  Recent Visits  Date Type Provider Dept   07/02/24 Office Visit Joanna Garza MD PhD [facility]   Showing recent visits within past 30 days and meeting all other requirements  Future Appointments  Date Type Provider Dept   08/06/24 Appointment Joanna Garza MD PhD [facility]   Showing future appointments within next 90 days and meeting all other requirements       Frequency of RN Calls & Virtual Visits per Team Agreement:     Medication issues Addressed (what was done):     Follow up appointments scheduled by [facility] Staff:   Referrals made by [facility] staff:             "

## 2024-07-11 VITALS
WEIGHT: 207.5 LBS | DIASTOLIC BLOOD PRESSURE: 82 MMHG | SYSTOLIC BLOOD PRESSURE: 142 MMHG | BODY MASS INDEX: 37.95 KG/M2 | HEART RATE: 87 BPM

## 2024-07-11 NOTE — PROGRESS NOTES
Spoke with pt for daily call. Yesterday Wt 206.1, 128/78, HR 92, Today wt 207.5, /82, HR 87. Pt would like out assistance with getting cardiac rehab set up, prefers female provider and prefers afternoon visits.       Patient's Address:   [address] [place] [address]  **  If this is not the address patient will receive services - alert team and address in EMR**       Patient Contacts:  Extended Emergency Contact Information  Primary Emergency Contact: Anup Adam  Home Phone: [phone]  Relation: Spouse                                Patient's Preferred Phone: [phone]  Patient's E-mail: [email or web address]

## 2024-07-13 ENCOUNTER — PATIENT OUTREACH (OUTPATIENT)
Dept: HOME HEALTH SERVICES | Age: 79
End: 2024-07-13
Payer: MEDICARE

## 2024-07-15 ENCOUNTER — APPOINTMENT (OUTPATIENT)
Dept: PHARMACY | Facility: HOSPITAL | Age: 79
End: 2024-07-15
Payer: MEDICARE

## 2024-07-15 ENCOUNTER — APPOINTMENT (OUTPATIENT)
Dept: CARE COORDINATION | Age: 79
End: 2024-07-15
Payer: MEDICARE

## 2024-07-15 ENCOUNTER — PATIENT OUTREACH (OUTPATIENT)
Dept: HOME HEALTH SERVICES | Age: 79
End: 2024-07-15

## 2024-07-15 ENCOUNTER — DOCUMENTATION (OUTPATIENT)
Dept: HOME HEALTH SERVICES | Age: 79
End: 2024-07-15

## 2024-07-15 VITALS
BODY MASS INDEX: 38.35 KG/M2 | DIASTOLIC BLOOD PRESSURE: 70 MMHG | SYSTOLIC BLOOD PRESSURE: 133 MMHG | WEIGHT: 209.7 LBS | HEART RATE: 71 BPM

## 2024-07-15 PROBLEM — I42.8 NON-ISCHEMIC CARDIOMYOPATHY (MULTI): Status: ACTIVE | Noted: 2024-07-15

## 2024-07-15 PROBLEM — I50.22 CHRONIC SYSTOLIC HEART FAILURE (MULTI): Status: ACTIVE | Noted: 2024-07-15

## 2024-07-15 RX ORDER — ISOSORBIDE DINITRATE 10 MG/1
TABLET ORAL
Qty: 90 TABLET | Refills: 0 | Status: SHIPPED | OUTPATIENT
Start: 2024-07-15

## 2024-07-15 NOTE — PROGRESS NOTES
7/12/24 1330- Initial health caching session completed. Reviewed consent, introductions complete and reviewed wellness assessment. Next health coaching session scheduled 7/17/24 at 1300.

## 2024-07-15 NOTE — PROGRESS NOTES
Daily Call Note: Daily call complete. Patient is feeling better, she got a good night sleep. She got [employer] delivered and states these are very helpful. She reports pain in legs, right hip to knee and left hip, worse in the morning. She states arthritis does run in her family and will discuss with PCP at upcoming appointment. She recently ordered and received an anti-inflammatory cook book. Reviewed upcoming appointments and next [facility] 7/17/24 at 1000, verbalized understanding. Reminded of next health coaching session 7/17/24 at 1300. No other questions at this time.      /70   Pulse 71   Wt 95.1 kg (209 lb 11.2 oz)   BMI 38.35 kg/m²       Pt Education: per [facility]  Barriers: none  Topics for Daily Review: BP  Pt demonstrates clear understanding: Yes    Daily Weight:  Vitals:    07/15/24 1101   Weight: 95.1 kg (209 lb 11.2 oz)      Last 3 Weights:  Wt Readings from Last 7 Encounters:   07/15/24 95.1 kg (209 lb 11.2 oz)   07/08/24 92.9 kg (204 lb 11.2 oz)   07/06/24 93.3 kg (205 lb 11.2 oz)   07/11/24 94.1 kg (207 lb 8 oz)   07/03/24 92.9 kg (204 lb 12.8 oz)   07/02/24 92.5 kg (204 lb)   06/29/24 98.3 kg (216 lb 11.4 oz)       Masimo Device: No   Masimo Clinical Impression: n/a    Virtual Visits--Scheduled (Most Recent Date at Top)  Follow up Appointments  Recent Visits  Date Type Provider Dept   07/02/24 Office Visit Joanna Garza MD PhD Do [facility]   Showing recent visits within past 30 days and meeting all other requirements  Future Appointments  Date Type Provider Dept   08/06/24 Appointment Joanna Garza MD PhD Do [facility]   Showing future appointments within next 90 days and meeting all other requirements       Frequency of RN Calls & Virtual Visits per Team Agreement: Healthy at Home Frequency: Daily    Medication issues Addressed (what was done): none    Follow up appointments scheduled by [facility] Staff: none  Referrals made by [facility] staff: none

## 2024-07-16 ENCOUNTER — PATIENT OUTREACH (OUTPATIENT)
Dept: HOME HEALTH SERVICES | Age: 79
End: 2024-07-16
Payer: MEDICARE

## 2024-07-16 VITALS
SYSTOLIC BLOOD PRESSURE: 128 MMHG | HEART RATE: 82 BPM | BODY MASS INDEX: 38.19 KG/M2 | DIASTOLIC BLOOD PRESSURE: 77 MMHG | WEIGHT: 208.8 LBS

## 2024-07-16 NOTE — PROGRESS NOTES
Daily call completed patient feels good today she has changed some of her eating habits and is now having her larger meal at lunch and eating lighter for supper she feelks better this am. Vitals Weight 208.8 /77 and HR 82 no medication needs questions and concerns addressed

## 2024-07-17 ENCOUNTER — APPOINTMENT (OUTPATIENT)
Dept: CARE COORDINATION | Age: 79
End: 2024-07-17
Payer: MEDICARE

## 2024-07-17 ENCOUNTER — DOCUMENTATION (OUTPATIENT)
Dept: HOME HEALTH SERVICES | Age: 79
End: 2024-07-17

## 2024-07-17 ENCOUNTER — APPOINTMENT (OUTPATIENT)
Dept: PHARMACY | Facility: HOSPITAL | Age: 79
End: 2024-07-17
Payer: MEDICARE

## 2024-07-17 ENCOUNTER — PATIENT OUTREACH (OUTPATIENT)
Dept: HOME HEALTH SERVICES | Age: 79
End: 2024-07-17

## 2024-07-17 DIAGNOSIS — I50.21 ACUTE SYSTOLIC HEART FAILURE (MULTI): ICD-10-CM

## 2024-07-17 DIAGNOSIS — G40.909 SEIZURE DISORDER (MULTI): ICD-10-CM

## 2024-07-17 DIAGNOSIS — I50.9 CONGESTIVE HEART FAILURE, UNSPECIFIED HF CHRONICITY, UNSPECIFIED HEART FAILURE TYPE (MULTI): ICD-10-CM

## 2024-07-17 DIAGNOSIS — E11.9 DIET-CONTROLLED TYPE 2 DIABETES MELLITUS (MULTI): Primary | ICD-10-CM

## 2024-07-17 NOTE — PROGRESS NOTES
Pharmacy Post-Discharge Visit - Follow Up     Sue Vail is a 79 y.o. female was referred to Clinical Pharmacy Team to complete a post-discharge medication optimization and monitoring visit.  The patient was referred for their CHF management while also enrolled in [facility].     Referring Provider: Bryon Baig*  PCP: Joanna Garza MD PhD - last visit: 7/2/24, next visit: 8/6      Subjective   Allergies   Allergen Reactions    Ace Inhibitors Unknown     Hypotension, Hypotension    Adhesive Tape-Silicones Unknown     Adhesive Tape TAPE and bandages    Beta-Blockers (Beta-Adrenergic Blocking Agts) Unknown     Hypotension, Hypotension    Fluoxetine Unknown     cns, cns    Nsaids (Non-Steroidal Anti-Inflammatory Drug) Unknown    Olanzapine Unknown    Other Omega-3s Unknown     all ssri    [id number] - [place] [id number] [place] [address] [address]  [address] [address]  Phone: [phone] Fax: [phone]    [facility]) [facility] [address] [facility]  [address] [facility] [address]  Phone: [phone] Fax: [phone]      Medication System Management:  Affordability/Accessibility: try to enroll into [facility]  Adherence/Organization: no issues       Social History     Social History Narrative    Not on file          HPI  CHF ASSESSMENT  Staging:  Most recent ejection fraction: 22%  NYHA Stage: II  ACC/AHA Stage: C     Symptom Assessment:  Weight changes/edema?: Yes - up 1 lb from last visit   Dyspnea?: None  Dizziness/syncope/palpitations?: No     Current Regimen:  ARNI/ACEi/ARB: Yes - entresto   Beta Blocker: Yes - carvedilol   MRA: No  SGLT2i: No     Other therapy:  Lasix   Hydralazine   Isosorbide dinitrate      Secondary Prevention:  The ASCVD Risk score (Carmine MARCOS, et al., 2019) failed to calculate for the following reasons:    The patient has a prior MI or stroke diagnosis     Aspirin 81mg? no  Statin?:  Yes - atorvastatin   HTN?: No     Review of Systems        Objective     There were no vitals taken for this visit.   BP Readings from Last 4 Encounters:   07/16/24 128/77   07/15/24 133/70   07/08/24 124/86   07/06/24 120/69      There were no vitals filed for this visit.     LAB  Lab Results   Component Value Date    BILITOT 0.4 06/25/2024    CALCIUM 8.1 (L) 06/30/2024    CO2 25 06/30/2024     06/30/2024    CREATININE 0.67 06/30/2024    GLUCOSE 111 (H) 06/30/2024    ALKPHOS 76 06/25/2024    K 3.7 06/30/2024    PROT 6.2 (L) 06/25/2024     06/30/2024    AST 15 06/25/2024    ALT 13 06/25/2024    BUN 16 06/30/2024    ANIONGAP 9 (L) 06/30/2024    MG 2.10 06/30/2024    PHOS 5.6 (H) 06/28/2024     05/05/2023    ALBUMIN 3.4 06/28/2024    GFRF 89 07/27/2023     Lab Results   Component Value Date    TRIG 104 05/02/2024    CHOL 204 (H) 05/02/2024    LDLCALC 120 (H) 05/02/2024    HDL 63.4 05/02/2024     Lab Results   Component Value Date    HGBA1C 6.3 (H) 05/02/2024         Current Outpatient Medications   Medication Instructions    albuterol (Ventolin HFA) 90 mcg/actuation inhaler 2 puffs, inhalation, Every 4 hours PRN    atorvastatin (LIPITOR) 80 mg, oral, Daily    busPIRone (BUSPAR) 5 mg, oral, 2 times daily    carvedilol (COREG) 3.125 mg, oral, 2 times daily    cholecalciferol (Vitamin D-3) 125 MCG (5000 UT) capsule Take by mouth q 24 HR.    doxepin (SINEQUAN) 10 mg, oral, Nightly    ferrous sulfate 325 (65 Fe) MG EC tablet 1 tablet, oral, Daily with breakfast, Do not crush, chew, or split.    furosemide (LASIX) 20 mg, oral, Daily    gabapentin (Neurontin) 300 mg capsule TAKE 1 CAPSULE(300 MG) BY MOUTH bid    hydrALAZINE (APRESOLINE) 10 mg, oral, 3 times daily    isosorbide dinitrate (Isordil) 10 mg tablet TAKE 1 TABLET(10 MG) BY MOUTH THREE TIMES DAILY    sacubitriL-valsartan (Entresto) 24-26 mg tablet 1 tablet, oral, 2 times daily    valproic acid (DEPAKENE) 750 mg, oral, Nightly    venlafaxine  (Effexor) 75 mg tablet TAKE 2 TABLETS(150 MG) BY MOUTH EVERY DAY AT BEDTIME            Assessment/Plan   Problem List Items Addressed This Visit    None    Seizures   No activity since being home   Continue with VPA  CHF  Most recent EF was 22% while admitted   Current GDMT --> carvedilol and entresto   Try to enroll into PAP for entresto   Would also benefit from SGLT2i and MRA  Jardiance will be around $50.00 per month also so would like to get approved for PAP first before starting   Lasix daily   Continue to log BP's and weights daily   She is also going to be starting cardiac rehab too     PAP:  -confirmed she is only SS now for annual income  -gave info for her to send back both hers and her husbands SS benefit forms to my email   -once received I will submit info for enrollment     Follow Up: 1 week     Continue all meds under the continuation of care with the referring provider and clinical pharmacy team.    Michael Milligan, PharmD     Verbal consent to manage patient's drug therapy was obtained from the patient. They were informed they may decline to participate or withdraw from participation in pharmacy services at any time.    Opt out

## 2024-07-17 NOTE — PROGRESS NOTES
Health Coaching session #2 completed. Discussed wellness assessment, focused on physical dimension, developed smart goal and scheduled next session for 7/23/24 at 1230.

## 2024-07-17 NOTE — PROGRESS NOTES
Daily Call Note: [facility] Weekly call with Dr. TARSHA Valdivia and PharmD NABEEL Milligan on the call. The patient was awakened by the call and has not had a chance to take weight or vital signs.   Patient reports that she is breathing well - does not becoming SOB as easily, but when she does become SOB from activity she recovers quickly. She stated that she feels her medications have helped her tremendously. Her only concern is the deconditioning that has occurred post illness, and she has weakness of the legs.  The patient confirmed that she will be starting cardiac rehab soon - Dr. Valdivia educated her on cardiac rehab and how this will address the deconditioning/weakness as well. When asked about her former complaints of diarrhea, she stated she has changed her dietary habits and the diarrhea has almost resolved, but it depends on what she eats.   MD asked her about the potential of starting SGL2 Jardiance and patient assistance program which was addressed by the pharmacist as well. The patient can get Entresto and Jardiance cost if approved.    Barriers: no  Topics for Daily Review:   Pt demonstrates clear understanding: Yes      Daily Weight:  There were no vitals filed for this visit. The patient has not measured today.  Last 3 Weights:  Wt Readings from Last 7 Encounters:   07/16/24 94.7 kg (208 lb 12.8 oz)   07/15/24 95.1 kg (209 lb 11.2 oz)   07/08/24 92.9 kg (204 lb 11.2 oz)   07/06/24 93.3 kg (205 lb 11.2 oz)   07/11/24 94.1 kg (207 lb 8 oz)   07/03/24 92.9 kg (204 lb 12.8 oz)   07/02/24 92.5 kg (204 lb)       Masimo Device: No   Masimo Clinical Impression:     Virtual Visits--Scheduled (Most Recent Date at Top)  Follow up Appointments  Recent Visits  Date Type Provider Dept   07/02/24 Office Visit Joanna Garza MD PhD Do Main [facility]   Showing recent visits within past 30 days and meeting all other requirements  Future Appointments  Date Type Provider Dept   08/06/24 Appointment Joanna Garza MD PhD Do Main  [facility]   Showing future appointments within next 90 days and meeting all other requirements       Frequency of RN Calls & Virtual Visits per Team Agreement: Healthy at Home Frequency: Daily - changed frequency today to [facility]    Medication issues Addressed (what was done): Prescription Assistance Program    Follow up appointments scheduled by [facility] Staff:   Referrals made by [facility] staff:

## 2024-07-18 ENCOUNTER — APPOINTMENT (OUTPATIENT)
Dept: PHYSICAL MEDICINE AND REHAB | Facility: CLINIC | Age: 79
End: 2024-07-18
Payer: MEDICARE

## 2024-07-18 VITALS — SYSTOLIC BLOOD PRESSURE: 117 MMHG | HEART RATE: 78 BPM | DIASTOLIC BLOOD PRESSURE: 67 MMHG | RESPIRATION RATE: 20 BRPM

## 2024-07-18 DIAGNOSIS — M25.552 LEFT HIP PAIN: ICD-10-CM

## 2024-07-18 DIAGNOSIS — G89.29 CHRONIC MIDLINE LOW BACK PAIN WITH SCIATICA, SCIATICA LATERALITY UNSPECIFIED: Primary | ICD-10-CM

## 2024-07-18 DIAGNOSIS — M54.40 CHRONIC MIDLINE LOW BACK PAIN WITH SCIATICA, SCIATICA LATERALITY UNSPECIFIED: Primary | ICD-10-CM

## 2024-07-18 PROCEDURE — 99203 OFFICE O/P NEW LOW 30 MIN: CPT | Performed by: PHYSICAL MEDICINE & REHABILITATION

## 2024-07-18 ASSESSMENT — PAIN SCALES - GENERAL: PAINLEVEL: 1

## 2024-07-18 NOTE — PROGRESS NOTES
Physical Medicine and Rehabilitation MSK Consult  07/18/24       Chief Complaint:  Low back pain     HPI:  Sue Vail is a  79 y.o. F who presents to the clinic today  for evaluation of low back pain.  Onset: 2-3 years ago  Location: left groin  Radiation: inside thigh, rarely into thigh but more on R   Quality: achy  Duration: constant and gets better as the day goes on  Aggravating factors:  morning, immobility, laying  Alleviating factors: walking  Severity: 1  Numbness/Tingling: No  Bowel or bladder incontinence: No  Pt's current medication regimen includes: tylenol prn w some relief  Gabapentin 300 mg for restless legs, some relief     Treatment to date:  Physical therapy: No  Medications taken to date for this complaint include the following:   As above  Prior injections: No         Not using an assistive device   No recent falls. Furniture walks at home, has been like this for a while.    States overall she has been very inactive due to fatigue and tired.  Of note she has pmh of follicular B cell lymphoma first dx in 2018 sp rituximab infusions since 4/2019 through 2021. Has been in remission, overall stable blood work.       Imaging  Reviewed 07/18/24    Xr hips 5/2024  FINDINGS:  Bony structures:  Intact      Joint spaces:  The joint spaces are fairly well maintained. However  there is mild osteophytosis predominantly inferiorly at the hip joint  spaces bilaterally. On the right this is similar to the prior exam.  The sacroiliac joints and pubic symphysis are maintained. There is  mild spondylosis of the visualized lumbar spine.      Soft tissues:  Unremarkable without significant edema or radiodense  foreign body      Other:  None significant      IMPRESSION:  Mild osteoarthritis at the hips bilaterally.     Past Medical History:   Diagnosis Date    Bitten or stung by nonvenomous insect and other nonvenomous arthropods, initial encounter 07/03/2018    Tick bite    Epilepsy, unspecified, not intractable,  without status epilepticus (Multi)     Epilepsy    Localized enlarged lymph nodes 08/31/2018    Cervical lymphadenopathy    Multiple sclerosis (Multi)     History of multiple sclerosis    Old myocardial infarction 12/23/2019    History of myocardial infarction    Personal history of malignant melanoma of skin 04/13/2017    History of malignant melanoma    Personal history of malignant melanoma of skin 11/10/2020    History of malignant melanoma of skin    Personal history of malignant melanoma of skin 11/10/2020    History of malignant melanoma of skin    Personal history of non-Hodgkin lymphomas 12/23/2019    History of lymphoma    Personal history of other diseases of the nervous system and sense organs     History of benign essential tremor    Personal history of other diseases of the nervous system and sense organs 04/24/2018    History of tinnitus    Personal history of other diseases of the nervous system and sense organs 12/17/2019    History of diplopia    Personal history of other endocrine, nutritional and metabolic disease 07/02/2018    History of vitamin D deficiency    Personal history of other mental and behavioral disorders 10/28/2019    History of depression    Urge incontinence 04/13/2017    Urge incontinence of urine        Past Surgical History:   Procedure Laterality Date    CARDIAC CATHETERIZATION N/A 6/27/2024    Procedure: Left And Right Heart Cath, With LV;  Surgeon: Kate Cazares MD;  Location: [facility];  Service: Cardiovascular;  Laterality: N/A;    GASTRIC BYPASS  04/11/2017    Gastric Surgery For Morbid Obesity Bypass With Cecilio-en-Y    OTHER SURGICAL HISTORY  04/11/2017    Axillary Lymphadenectomy    OTHER SURGICAL HISTORY  12/23/2019    Eye surgery    OTHER SURGICAL HISTORY  12/23/2019    Neck surgery        Patient Active Problem List    Diagnosis Date Noted    Chronic systolic heart failure (Multi) 07/15/2024    Non-ischemic cardiomyopathy (Multi) 07/15/2024     Congestive heart failure (Multi) 07/02/2024    History of lymphoma 06/25/2024    Other specified anemias 06/25/2024    SOB (shortness of breath) 05/06/2024    Bronchitis 04/24/2024    Bilateral hip pain 04/05/2024    Current severe episode of major depressive disorder without psychotic features without prior episode (Multi) 04/05/2024    Diplopia 11/15/2023    Combined form of age-related cataract, right eye 11/15/2023    Myopia of both eyes 11/15/2023    Combined form of age-related cataract, left eye 11/15/2023    Presbyopia 11/15/2023    Early dry stage nonexudative age-related macular degeneration of both eyes 11/15/2023    Abnormal brain MRI 10/20/2023    Hypotropia of right eye 10/20/2023    Iron deficiency anemia 10/20/2023    Lesion of pancreas (HHS-HCC) 10/20/2023    Nevus of neck 10/20/2023    Abnormal weight loss 10/20/2023    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    Anxiety 07/14/2023    Ataxia 07/14/2023    Benign essential tremor 07/14/2023    Diet-controlled type 2 diabetes mellitus (Multi) 07/14/2023    Esotropia 07/14/2023    Generalized anxiety disorder with panic attacks 07/14/2023    PTSD (post-traumatic stress disorder) 07/14/2023    History of myocardial infarction 07/14/2023    Follicular non-Hodgkin's lymphoma (Multi) 07/14/2023    Primary osteoarthritis of right hip 07/14/2023    Restless leg syndrome 07/14/2023    Seizure disorder (Multi) 07/14/2023    Sensorineural hearing loss, bilateral 07/14/2023    Tinnitus, subjective, bilateral 07/14/2023    Unsteady gait 07/14/2023    Blurry vision, bilateral 07/14/2023    Insomnia 07/14/2023    Morbid obesity (Multi) 07/14/2023    Lymphoma (Multi) 05/05/2023    Bilateral hearing loss 08/04/2022    Hx of gastric bypass 10/16/2015    History of malignant melanoma of skin 11/05/2005   Epilepsy  CHF  Follicular lymphoma  Gastric bypass history     Family History   Problem Relation Name Age of Onset    Breast cancer Mother      Dementia Father       Alzheimer's disease Father      Prostate cancer Father      Tremor Father      Pulmonary embolism Daughter      Pulmonary embolism Daughter      Other (cardiomegaly) Maternal Grandmother          Current Outpatient Medications   Medication Sig Dispense Refill    albuterol (Ventolin HFA) 90 mcg/actuation inhaler Inhale 2 puffs every 4 hours if needed for wheezing or shortness of breath. 8 g 5    atorvastatin (Lipitor) 80 mg tablet Take 1 tablet (80 mg) by mouth once daily. 30 tablet 0    busPIRone (Buspar) 5 mg tablet Take 1 tablet (5 mg) by mouth 2 times a day. 200 tablet 0    carvedilol (Coreg) 3.125 mg tablet Take 1 tablet (3.125 mg) by mouth 2 times a day. 60 tablet 0    cholecalciferol (Vitamin D-3) 125 MCG (5000 UT) capsule Take by mouth q 24 HR.      doxepin (SINEquan) 10 mg capsule Take 1 capsule (10 mg) by mouth once daily at bedtime. 90 capsule 1    empagliflozin (Jardiance) 10 mg Take 1 tablet (10 mg) by mouth once daily. 90 tablet 3    ferrous sulfate 325 (65 Fe) MG EC tablet Take 1 tablet by mouth once daily with breakfast. Do not crush, chew, or split. 90 tablet 1    furosemide (Lasix) 20 mg tablet Take 1 tablet (20 mg) by mouth once daily. 30 tablet 0    gabapentin (Neurontin) 300 mg capsule TAKE 1 CAPSULE(300 MG) BY MOUTH bid (Patient taking differently: Take 1 capsule (300 mg) by mouth once daily.) 200 capsule 0    hydrALAZINE (Apresoline) 10 mg tablet Take 1 tablet (10 mg) by mouth 3 times a day. 90 tablet 0    isosorbide dinitrate (Isordil) 10 mg tablet TAKE 1 TABLET(10 MG) BY MOUTH THREE TIMES DAILY 90 tablet 0    sacubitriL-valsartan (Entresto) 24-26 mg tablet Take 1 tablet by mouth 2 times a day. 180 tablet 3    valproic acid (Depakene) 250 mg capsule Take 3 capsules (750 mg) by mouth once daily at bedtime. 270 capsule 3    venlafaxine (Effexor) 75 mg tablet TAKE 2 TABLETS(150 MG) BY MOUTH EVERY DAY AT BEDTIME (Patient taking differently: Take 1 tablet (75 mg) by mouth once daily at bedtime.)  180 tablet 1     No current facility-administered medications for this visit.        Allergies   Allergen Reactions    Ace Inhibitors Unknown     Hypotension, Hypotension    Adhesive Tape-Silicones Unknown     Adhesive Tape TAPE and bandages    Beta-Blockers (Beta-Adrenergic Blocking Agts) Unknown     Hypotension, Hypotension    Fluoxetine Unknown     cns, cns    Nsaids (Non-Steroidal Anti-Inflammatory Drug) Unknown    Olanzapine Unknown    Other Omega-3s Unknown     all ssri    Penicillins Hives        Social History     Socioeconomic History    Marital status: Significant Other   Tobacco Use    Smoking status: Never    Smokeless tobacco: Never   Vaping Use    Vaping status: Never Used   Substance and Sexual Activity    Alcohol use: Yes     Comment: 1 or 2 drinks a month    Drug use: Never     Social Determinants of Health     Financial Resource Strain: Low Risk  (6/25/2024)    Overall Financial Resource Strain (CARDIA)     Difficulty of Paying Living Expenses: Not very hard   Food Insecurity: No Food Insecurity (7/1/2024)    Hunger Vital Sign     Worried About Running Out of Food in the Last Year: Never true     Ran Out of Food in the Last Year: Never true   Transportation Needs: No Transportation Needs (6/25/2024)    PRAPARE - Transportation     Lack of Transportation (Medical): No     Lack of Transportation (Non-Medical): No   Physical Activity: Inactive (7/1/2024)    Exercise Vital Sign     Days of Exercise per Week: 0 days     Minutes of Exercise per Session: 0 min   Stress: No Stress Concern Present (7/1/2024)    [employer] - Occupational Stress Questionnaire     Feeling of Stress : Only a little   Social Connections: Moderately Isolated (7/1/2024)    Social Connection and Isolation Panel [NHANES]     Frequency of Communication with Friends and Family: Once a week     Frequency of Social Gatherings with Friends and Family: Once a week     Attends Orthodoxy Services: Never      Active Member of Clubs or Organizations: No     Attends Club or Organization Meetings: 1 to 4 times per year     Marital Status:    Intimate Partner Violence: Not At Risk (7/1/2024)    Humiliation, Afraid, Rape, and Kick questionnaire     Fear of Current or Ex-Partner: No     Emotionally Abused: No     Physically Abused: No     Sexually Abused: No   Housing Stability: Low Risk  (6/25/2024)    Housing Stability Vital Sign     Unable to Pay for Housing in the Last Year: No     Number of Places Lived in the Last Year: 1     Unstable Housing in the Last Year: No      Lives w   Trend forecaster  Denies alcohol or drug use    Review of Systems:  Constitutional:  Denies fever or chills, malaise, weight changes.   Eyes:  Denies change in visual acuity   HENT:  Denies nasal congestion or sore throat   Respiratory:  Denies cough or shortness of breath   Cardiovascular:  Denies chest pain or edema   GI:  Denies abdominal pain, nausea, vomiting, bloody stools or diarrhea   :  Denies dysuria   Integument:  Denies rash   Neurologic:  As per HPI  MSK: Per above HPI  Endocrine:  Denies polyuria or polydipsia   Lymphatic:  Denies swollen glands   Psychiatric:  Denies depression or anxiety            PHYSICAL EXAM:  /67 (BP Location: Right arm, Patient Position: Sitting)   Pulse 78   Resp 20   LMP  (LMP Unknown)     General:  NAD, well developed, F      Psychiatric: appropriate mood & affect.   Cardiovascular:  Normal pedal pulses; no LE edema.  Respiratory:  Normal rate; unlabored breathing.  Skin:  Intact; no erythema; no ecchymosis or rash.  Lymphatic:  No lymphadenopathy or lymphedema.  NEURO:  Alert and appropriate. Speech fluent, conversing appropriately.   Motor:    Rt: HF 4/5, KE 4/5, KF 5/5, DF 5/5, EHL 5/5, PF 5/5.    Lt: HF 5/5, KE 5/5, KF 5/5, DF 5/5, EHL 5/5, PF 5/5.  Sensation:     Light touch: intact in the b/l L3-S1 dermatomes.    PP: intact in the b/l L3-S1 dermatomes  Reflexes:     Right:   patellar 2+, achilles 2+,     Left: patellar 2+, achilles 2+,     Babinski's downgoing b/l; no clonus  Gait: antalgic w cane     MSK:  Inspection reveals no evidence of a pelvic obliqity   Spinal range of motion: Flexion to 40° degrees, Extension of 10 degrees.  There is tenderness over the  L paraspinals.   Special tests:    Straight leg raise: -bl    Slump test: -bl    Facet loading: -bl          Impression: Sue Vail is a 79 y.o. F w pmh of htn, hlp, DM, CHF, epilepsy, gastric bypasss,  follicular B cell lymphoma first dx in 2018 sp rituximab infusions since 4/2019 through 2021. Has been in remission,presenting with midline back pain as well as hip pain, likely has some aspects of L radiculitis but hip can also be driving some of the pain,      Plan:  Encounter Diagnoses   Name Primary?    Chronic midline low back pain with sciatica, sciatica laterality unspecified Yes    Left hip pain        Orders Placed This Encounter   Procedures    XR lumbar spine complete 4+ views    Referral to Physical Therapy     Xr l spine  Referral to PT for core rom hep  3. No oral nsaids due to cardio history, as well as gastric bypass   4. continue tylenol prn and gabapentin at night      Thank you for the consultation.     Jena Wong MD  Physical Medicine and Rehabilitation

## 2024-07-18 NOTE — PROGRESS NOTES
Ref by DR. Storm for hip pain that is in her Groin.  Mostly left inner groin area that gets better thru the day with activity.

## 2024-07-19 ENCOUNTER — PATIENT OUTREACH (OUTPATIENT)
Dept: HOME HEALTH SERVICES | Age: 79
End: 2024-07-19
Payer: MEDICARE

## 2024-07-19 VITALS
DIASTOLIC BLOOD PRESSURE: 73 MMHG | HEART RATE: 83 BPM | WEIGHT: 208.1 LBS | BODY MASS INDEX: 38.06 KG/M2 | SYSTOLIC BLOOD PRESSURE: 132 MMHG

## 2024-07-19 PROCEDURE — RXMED WILLOW AMBULATORY MEDICATION CHARGE

## 2024-07-19 NOTE — PROGRESS NOTES
Daily Call Note: Daily call complete.Patient is doing well. She got approved for the Jardiance through the PAP and patient called the pharmacy and had it transferred to her local pharmacy and it was over $100, so she had it switched back and she will call them to have it delivered. Next [facility] 7/24/24 at 1000, verbalized understanding. No other questions at this time.    /73   Pulse 83   Wt 94.4 kg (208 lb 1.6 oz)   LMP  (LMP Unknown)   BMI 38.06 kg/m²       Pt Education: per POC  Barriers: none  Topics for Daily Review: BP, weight  Pt demonstrates clear understanding: Yes    Daily Weight:  Vitals:    07/19/24 1340   Weight: 94.4 kg (208 lb 1.6 oz)      Last 3 Weights:  Wt Readings from Last 7 Encounters:   07/19/24 94.4 kg (208 lb 1.6 oz)   07/16/24 94.7 kg (208 lb 12.8 oz)   07/15/24 95.1 kg (209 lb 11.2 oz)   07/08/24 92.9 kg (204 lb 11.2 oz)   07/06/24 93.3 kg (205 lb 11.2 oz)   07/11/24 94.1 kg (207 lb 8 oz)   07/03/24 92.9 kg (204 lb 12.8 oz)       Masimo Device: No   Masimo Clinical Impression: n/a    Virtual Visits--Scheduled (Most Recent Date at Top)  Follow up Appointments  Recent Visits  Date Type Provider Dept   07/02/24 Office Visit Joanna Garza MD PhD Do [facility]   Showing recent visits within past 30 days and meeting all other requirements  Future Appointments  Date Type Provider Dept   08/06/24 Appointment Joanna Garza MD PhD [facility]   Showing future appointments within next 90 days and meeting all other requirements       Frequency of RN Calls & Virtual Visits per Team Agreement: Healthy at Home Frequency: Daily    Medication issues Addressed (what was done): none    Follow up appointments scheduled by [facility] Staff: none  Referrals made by [facility] staff: none

## 2024-07-20 ENCOUNTER — PATIENT OUTREACH (OUTPATIENT)
Dept: HOME HEALTH SERVICES | Age: 79
End: 2024-07-20
Payer: MEDICARE

## 2024-07-22 ENCOUNTER — APPOINTMENT (OUTPATIENT)
Dept: NUTRITION | Facility: CLINIC | Age: 79
End: 2024-07-22
Payer: MEDICARE

## 2024-07-22 ENCOUNTER — PATIENT OUTREACH (OUTPATIENT)
Dept: CARE COORDINATION | Age: 79
End: 2024-07-22

## 2024-07-22 ENCOUNTER — TELEPHONE (OUTPATIENT)
Dept: PRIMARY CARE | Facility: CLINIC | Age: 79
End: 2024-07-22

## 2024-07-22 VITALS
SYSTOLIC BLOOD PRESSURE: 145 MMHG | WEIGHT: 209 LBS | BODY MASS INDEX: 38.23 KG/M2 | HEART RATE: 68 BPM | DIASTOLIC BLOOD PRESSURE: 88 MMHG

## 2024-07-22 DIAGNOSIS — I50.21 ACUTE SYSTOLIC HEART FAILURE (MULTI): ICD-10-CM

## 2024-07-22 PROCEDURE — RXMED WILLOW AMBULATORY MEDICATION CHARGE

## 2024-07-22 RX ORDER — CARVEDILOL 3.12 MG/1
3.12 TABLET ORAL 2 TIMES DAILY
Qty: 200 TABLET | Refills: 3 | Status: SHIPPED | OUTPATIENT
Start: 2024-07-22

## 2024-07-22 RX ORDER — FUROSEMIDE 20 MG/1
20 TABLET ORAL DAILY
Qty: 100 TABLET | Refills: 3 | Status: SHIPPED | OUTPATIENT
Start: 2024-07-22

## 2024-07-22 NOTE — TELEPHONE ENCOUNTER
sacubitriL-valsartan (Entresto) 24-26 mg per tablet 1 tablet //180 for 3 months    carvedilol (Coreg) 3.125 mg tablet//Take 1 tablet (3.125 mg) by mouth 2 times a day    furosemide (Lasix) tablet 20 mg//  [facility] #[id number]

## 2024-07-22 NOTE — PROGRESS NOTES
"Daily Call Note:   Pt stated she had a \"bad night\", felt depressed, but better this morning. She stated she \"over ate\" yesterday and that made her feel depressed. Today she has a more positive attitude she stated. She stated she has pain in the right and left groin area, but when she starts to move around it is not as bad. Pt has [facility] 7/24 and would like advice from the pharmacist regarding the \"time\" of some of her pills. Pt stated she puts them in her pillbox, and feels she is quite organized with this, but wanted to talk to the pharmacist regarding maybe changing some time intervals. Right now she feels good, but wants some input regarding this. No further concerns.    Topics for Daily Review: Med's, overall how she is feeling  Pt demonstrates clear understanding: Yes    Daily VS:  /88   Pulse 68   Wt 94.8 kg (209 lb)   LMP  (LMP Unknown)   BMI 38.23 kg/m²        Last 3 Weights:  Wt Readings from Last 7 Encounters:   07/19/24 94.4 kg (208 lb 1.6 oz)   07/16/24 94.7 kg (208 lb 12.8 oz)   07/15/24 95.1 kg (209 lb 11.2 oz)   07/08/24 92.9 kg (204 lb 11.2 oz)   07/06/24 93.3 kg (205 lb 11.2 oz)   07/11/24 94.1 kg (207 lb 8 oz)   07/03/24 92.9 kg (204 lb 12.8 oz)       Masimo Device: No       Virtual Visits--Scheduled (Most Recent Date at Top)  Follow up Appointments  Recent Visits  Date Type Provider Dept   07/02/24 Office Visit Joanna Garza MD PhD [facility]   Showing recent visits within past 30 days and meeting all other requirements  Future Appointments  Date Type Provider Dept   08/06/24 Appointment Joanna Garza MD PhD [facility]   Showing future appointments within next 90 days and meeting all other requirements       Frequency of RN Calls & Virtual Visits per Team Agreement: Healthy at Home Frequency: Daily    Medication issues Addressed (what was done):     Follow up appointments scheduled by [facility] Staff: 7/23 @          "

## 2024-07-23 ENCOUNTER — PHARMACY VISIT (OUTPATIENT)
Dept: PHARMACY | Facility: CLINIC | Age: 79
End: 2024-07-23
Payer: COMMERCIAL

## 2024-07-24 ENCOUNTER — PATIENT OUTREACH (OUTPATIENT)
Dept: HOME HEALTH SERVICES | Age: 79
End: 2024-07-24

## 2024-07-24 ENCOUNTER — APPOINTMENT (OUTPATIENT)
Dept: NUTRITION | Facility: CLINIC | Age: 79
End: 2024-07-24
Payer: MEDICARE

## 2024-07-24 ENCOUNTER — CLINICAL SUPPORT (OUTPATIENT)
Dept: CARDIAC REHAB | Facility: HOSPITAL | Age: 79
End: 2024-07-24
Payer: MEDICARE

## 2024-07-24 ENCOUNTER — APPOINTMENT (OUTPATIENT)
Dept: CARE COORDINATION | Age: 79
End: 2024-07-24
Payer: MEDICARE

## 2024-07-24 ENCOUNTER — APPOINTMENT (OUTPATIENT)
Dept: PHARMACY | Facility: HOSPITAL | Age: 79
End: 2024-07-24
Payer: MEDICARE

## 2024-07-24 VITALS
WEIGHT: 212.4 LBS | SYSTOLIC BLOOD PRESSURE: 96 MMHG | OXYGEN SATURATION: 93 % | BODY MASS INDEX: 39.08 KG/M2 | HEART RATE: 82 BPM | HEIGHT: 62 IN | DIASTOLIC BLOOD PRESSURE: 54 MMHG

## 2024-07-24 VITALS
SYSTOLIC BLOOD PRESSURE: 114 MMHG | WEIGHT: 208.1 LBS | BODY MASS INDEX: 38.06 KG/M2 | HEART RATE: 108 BPM | DIASTOLIC BLOOD PRESSURE: 71 MMHG

## 2024-07-24 DIAGNOSIS — G40.909 SEIZURE DISORDER (MULTI): ICD-10-CM

## 2024-07-24 DIAGNOSIS — I50.9 CONGESTIVE HEART FAILURE, UNSPECIFIED HF CHRONICITY, UNSPECIFIED HEART FAILURE TYPE (MULTI): ICD-10-CM

## 2024-07-24 DIAGNOSIS — E11.9 DIET-CONTROLLED TYPE 2 DIABETES MELLITUS (MULTI): Primary | ICD-10-CM

## 2024-07-24 DIAGNOSIS — I50.20 HFREF (HEART FAILURE WITH REDUCED EJECTION FRACTION) (MULTI): Primary | ICD-10-CM

## 2024-07-24 ASSESSMENT — PATIENT HEALTH QUESTIONNAIRE - PHQ9
8. MOVING OR SPEAKING SO SLOWLY THAT OTHER PEOPLE COULD HAVE NOTICED. OR THE OPPOSITE, BEING SO FIGETY OR RESTLESS THAT YOU HAVE BEEN MOVING AROUND A LOT MORE THAN USUAL: NEARLY EVERY DAY
2. FEELING DOWN, DEPRESSED OR HOPELESS: NEARLY EVERY DAY
9. THOUGHTS THAT YOU WOULD BE BETTER OFF DEAD, OR OF HURTING YOURSELF: NOT AT ALL
1. LITTLE INTEREST OR PLEASURE IN DOING THINGS: NOT AT ALL
10. IF YOU CHECKED OFF ANY PROBLEMS, HOW DIFFICULT HAVE THESE PROBLEMS MADE IT FOR YOU TO DO YOUR WORK, TAKE CARE OF THINGS AT HOME, OR GET ALONG WITH OTHER PEOPLE: EXTREMELY DIFFICULT
7. TROUBLE CONCENTRATING ON THINGS, SUCH AS READING THE NEWSPAPER OR WATCHING TELEVISION: NEARLY EVERY DAY
4. FEELING TIRED OR HAVING LITTLE ENERGY: NEARLY EVERY DAY
6. FEELING BAD ABOUT YOURSELF - OR THAT YOU ARE A FAILURE OR HAVE LET YOURSELF OR YOUR FAMILY DOWN: NOT AT ALL
3. TROUBLE FALLING OR STAYING ASLEEP: NOT AT ALL
SUM OF ALL RESPONSES TO PHQ QUESTIONS 1-9: 15
5. POOR APPETITE OR OVEREATING: NEARLY EVERY DAY
SUM OF ALL RESPONSES TO PHQ9 QUESTIONS 1 & 2: 3

## 2024-07-24 ASSESSMENT — ENCOUNTER SYMPTOMS
DEPRESSION: 1
OCCASIONAL FEELINGS OF UNSTEADINESS: 1
LOSS OF SENSATION IN FEET: 0

## 2024-07-24 ASSESSMENT — PAIN SCALES - GENERAL: PAINLEVEL: 6

## 2024-07-24 NOTE — PROGRESS NOTES
Daily Call Note: Weekly HHVC complete with Dr. Baig and Michael Milligan, PharmD. Patient is doing good. She denies CP, SOB and LE swelling. She states she has hip pain and makes it difficult to move or be physical. She is starting cardiac rehab today. She explains that she has a condition called POTS and after 15 minutes of activity, she becomes very sweaty, shaky and uncomfortable. Dr. Baig recommends that she wear knee high compression stockings, she also recommends reclined aerobic exercises and suggests that cardiac rehab tailor an exercise program for her. Patient asked if it would be ok for her to start an in-home program for heart failure with modifications for doing while seated that she found on you tube, Dr. Barnard suggests she review that program today at cardiac rehab and they could tailor a program for her, she verbalized understanding. Patient also asked if she would qualify for ozempic or wagovy for weight loss, Michael will look at her insurance and see if they are covered or if she can get through PAP. Patient also asked about life expectancy with heart failure, Dr. Baig provided explanation and information. [facility]24 @ 1000. No other questions at this time.      /71   Pulse 108   Wt 94.4 kg (208 lb 1.6 oz)   LMP  (LMP Unknown)   BMI 38.06 kg/m²          Pt Education: per POC  Barriers: none  Topics for Daily Review: BP, weight  Pt demonstrates clear understanding: Yes    Daily Weight:  There were no vitals filed for this visit.   Last 3 Weights:  Wt Readings from Last 7 Encounters:   07/22/24 94.8 kg (209 lb)   07/19/24 94.4 kg (208 lb 1.6 oz)   07/16/24 94.7 kg (208 lb 12.8 oz)   07/15/24 95.1 kg (209 lb 11.2 oz)   07/08/24 92.9 kg (204 lb 11.2 oz)   07/06/24 93.3 kg (205 lb 11.2 oz)   07/11/24 94.1 kg (207 lb 8 oz)       Masimo Device: No   Masimo Clinical Impression: n/a    Virtual Visits--Scheduled (Most Recent Date at Top)  Follow up  Appointments  Recent Visits  Date Type Provider Dept   07/02/24 Office Visit Joanna aGrza MD PhD Do [facility]   Showing recent visits within past 30 days and meeting all other requirements  Future Appointments  Date Type Provider Dept   08/06/24 Appointment Joanna Garza MD PhD Do [facility]   Showing future appointments within next 90 days and meeting all other requirements       Frequency of RN Calls & Virtual Visits per Team Agreement: Healthy at Home Frequency: M/W/F    Medication issues Addressed (what was done): see note    Follow up appointments scheduled by [facility] Staff: none  Referrals made by [facility] staff: none

## 2024-07-24 NOTE — PROGRESS NOTES
Pharmacy Post-Discharge Visit - Follow Up     Sue Vail is a 79 y.o. female was referred to Clinical Pharmacy Team to complete a post-discharge medication optimization and monitoring visit.  The patient was referred for their CHF management while also enrolled in [facility].     Referring Provider: Bryon Baig*  PCP: Joanna Garza MD PhD - last visit: 7/2/24, next visit: 8/6      Subjective   Allergies   Allergen Reactions    Ace Inhibitors Unknown     Hypotension, Hypotension    Adhesive Tape-Silicones Unknown     Adhesive Tape TAPE and bandages    Beta-Blockers (Beta-Adrenergic Blocking Agts) Unknown     Hypotension, Hypotension    Fluoxetine Unknown     cns, cns    Nsaids (Non-Steroidal Anti-Inflammatory Drug) Unknown    Olanzapine Unknown    Other Omega-3s Unknown     all ssri    [facility] #[id number] [place] [id number]  [address] [facility] [address]  [address] [address]  [facility] [address]  Phone: [phone] Fax: [phone]      Medication System Management:  Affordability/Accessibility: approved for PAP  Adherence/Organization: no issues       Social History     Social History Narrative    Not on file          HPI  CHF ASSESSMENT  Staging:  Most recent ejection fraction: 22%  NYHA Stage: II  ACC/AHA Stage: C     Symptom Assessment:  Weight changes/edema?: No - weight has been stable around 208 lbs   Dyspnea?: None  Dizziness/syncope/palpitations?: No     Current Regimen:  ARNI/ACEi/ARB: Yes - entresto   Beta Blocker: Yes - carvedilol   MRA: No  SGLT2i: No     Other therapy:  Lasix   Hydralazine   Isosorbide dinitrate      Secondary Prevention:  The ASCVD Risk score (Carmine MARCOS, et al., 2019) failed to calculate for the following reasons:    The patient has a prior MI or stroke diagnosis     Aspirin 81mg? no  Statin?: Yes - atorvastatin   HTN?: No     Review of Systems        Objective     LMP  (LMP Unknown)    BP Readings from Last 4 Encounters:    07/22/24 145/88   07/19/24 132/73   07/18/24 117/67   07/16/24 128/77      There were no vitals filed for this visit.     LAB  Lab Results   Component Value Date    BILITOT 0.4 06/25/2024    CALCIUM 8.1 (L) 06/30/2024    CO2 25 06/30/2024     06/30/2024    CREATININE 0.67 06/30/2024    GLUCOSE 111 (H) 06/30/2024    ALKPHOS 76 06/25/2024    K 3.7 06/30/2024    PROT 6.2 (L) 06/25/2024     06/30/2024    AST 15 06/25/2024    ALT 13 06/25/2024    BUN 16 06/30/2024    ANIONGAP 9 (L) 06/30/2024    MG 2.10 06/30/2024    PHOS 5.6 (H) 06/28/2024     05/05/2023    ALBUMIN 3.4 06/28/2024    GFRF 89 07/27/2023     Lab Results   Component Value Date    TRIG 104 05/02/2024    CHOL 204 (H) 05/02/2024    LDLCALC 120 (H) 05/02/2024    HDL 63.4 05/02/2024     Lab Results   Component Value Date    HGBA1C 6.3 (H) 05/02/2024         Current Outpatient Medications   Medication Instructions    albuterol (Ventolin HFA) 90 mcg/actuation inhaler 2 puffs, inhalation, Every 4 hours PRN    atorvastatin (LIPITOR) 80 mg, oral, Daily    busPIRone (BUSPAR) 5 mg, oral, 2 times daily    carvedilol (COREG) 3.125 mg, oral, 2 times daily    cholecalciferol (Vitamin D-3) 125 MCG (5000 UT) capsule Take by mouth q 24 HR.    doxepin (SINEQUAN) 10 mg, oral, Nightly    ferrous sulfate 325 (65 Fe) MG EC tablet 1 tablet, oral, Daily with breakfast, Do not crush, chew, or split.    furosemide (LASIX) 20 mg, oral, Daily    gabapentin (Neurontin) 300 mg capsule TAKE 1 CAPSULE(300 MG) BY MOUTH bid    hydrALAZINE (APRESOLINE) 10 mg, oral, 3 times daily    isosorbide dinitrate (Isordil) 10 mg tablet TAKE 1 TABLET(10 MG) BY MOUTH THREE TIMES DAILY    Jardiance 10 mg, oral, Daily    sacubitriL-valsartan (Entresto) 24-26 mg tablet 1 tablet, oral, 2 times daily    valproic acid (DEPAKENE) 750 mg, oral, Nightly    venlafaxine (Effexor) 75 mg tablet TAKE 2 TABLETS(150 MG) BY MOUTH EVERY DAY AT BEDTIME            Assessment/Plan   Problem List Items  Addressed This Visit    None    Seizures   No activity since being home   Continue with VPA  CHF  Most recent EF was 22% while admitted   Current GDMT --> carvedilol and entresto   Was approved for PAP and just received the entresto and jardiance this morning   Will start jardiance today then to optimize GDMT   Lasix daily   Continue to log BP's and weights daily   BP today 114/71,   She is also going to be starting cardiac rehab today too     She is also interested in GLP1 for weight loss. Without having diagnosis of diabetes (she is pre-diabetic) may not be covered with her insurance, but will look into test claims for any of the options for her and can discuss at next visit.      PAP:  -she has been approved for both the entresto and jardiance!  -both were shipped yesterday, 7/23 and she did confirm she received both this morning     Follow Up: 1 week     Continue all meds under the continuation of care with the referring provider and clinical pharmacy team.    Michael Milligan, Elly     Verbal consent to manage patient's drug therapy was obtained from the patient. They were informed they may decline to participate or withdraw from participation in pharmacy services at any time.

## 2024-07-24 NOTE — PROGRESS NOTES
Cardiac Rehabilitation Initial Treatment Plan    Name: Sue Vail  Medical Record Number: [id number]  YOB: 1945  Age: 79 y.o.    Today’s Date: 7/24/2024  Primary Care Physician: Joanna Garza MD PhD  Referring Physician: Katya Guzman MD  Program Location: [facility]  Primary Diagnosis:   1. HFrEF (heart failure with reduced ejection fraction) (Multi)  Referral to Cardiac Rehab         Onset/Date of Diagnosis: 6/26/2024    Initial Assessment, not yet started program.    AACVPR Risk Stratification:   Moderate    Falls Risk: High  Psychosocial Assessment       Sent PH-Q 9 to MD if score > 20: No; score < 20. Initial PHQ2 score +3. PHQ9 indicated, score +15, indicates moderately severe depression.     Pt reported/currently experiencing stress: Yes; Stress; Severity: moderate and Depression; Severity: moderate  Patient uses stress management skills: Yes   History of: depression  Currently seeing a mental health provider: No  Social Support: Yes, Whom:spouse  Quality of Life Survey:  Sent PH-Q 9 to MD if score > 20: No; score < 20. Initial PHQ2 score +3. PHQ9 indicated, score +15, indicates moderately severe depression.   Learning Assessment:  Learning assessment/barriers: None  Preferred learning method: Visual and Reading handout  Barriers: None  Comments:    Stages of Change:Contemplation    Psychosocial Plan    Goal Status: Initial Assessment; goals not yet started    Psychosocial Goals: Demonstrating proper techniques for stress management, Maintain or lower PH-Q 9 score by discharge, and Identify strategies for managing depression    Psychosocial Interventions/Education: To be done in Cardiac Rehab.  Pt. questioned re: new stress, anxiety, or depressive symptoms.  Pt. Instructed on 3 Minute Breathing Space mindfulness exercise and other stress management strategies like deep breathing, exercise, listening to music.  PHQ9 reevaluated and reviewed with pt.    Initial Assessment: Pt.  "Reports history of depression, worse R/T present medical issues but feels it is improving on current medications. PHQ9 indicates moderately severe depression, will repeat at next 30 day evaluation. Pt. Reports stress R/T present medical issues but she is using spiritual and mindfulness strategies to manage.       Nutrition Assessment:    Hyperlipidemia: Yes     Lipids:   Lab Results   Component Value Date    CHOL 204 (H) 05/02/2024    HDL 63.4 05/02/2024    LDLF 112 (H) 07/27/2023    TRIG 104 05/02/2024       Current Dietary Guidelines: Low fat, Low sodium, Fluid restrictions  Barriers to dietary change: no    Diet Habit Survey: Block Dietary Fat Screener  Pre: Initial survey given. Pending completion and return from patient.  Post: To be done at discharge.    Diabetes Assessment    Lab Results   Component Value Date    HGBA1C 6.3 (H) 05/02/2024       History of Diabetes: No    Weight Management    Height: 157.5 cm (5' 2\")  Weight: 96.3 kg (212 lb 6.4 oz)  BMI (Calculated): 38.84      Nutrition Plan    Goal Status: Initial Assessment; goals not yet started    Nutrition Goals: Lipid Goal: HDL>45, LDL <70, Total <180, Trigs <150 and Learn how to read and interpret nutrition labels prior to discharge  Learn more about heart healthy eating and maintain heart healthy, low NA diet.     Nutrition Interventions/Education:   To be done in Cardiac Rehab.  Reviewed recent lipids, HGBA1C, weight, BMI.  Pt. Encouraged to follow heart healthy diet and strategies discussed.  Pt. Encouraged to meet with  dietician.  Heart Failure booklet given.  Block dietary fat screener given.    Initial Assessment: Pt. Reports she is making heart healthy low NA (1500 MG)diet choices and is reading labels. She has an appointment to meet with  dietician.     Exercise Assessment    No  Mode: NA  Frequency: NA  Duration: NA    Exercise Prescription     Exercise Prescription based on: Duke Activity Status Index (DASI) Score 4.0 METS    DASI " Score: 4.0    MET Score:  2.0   Frequency:  3 days/week   Mode: Recumbent Cycle, Arm Ergometer, and Sci Fit Stepper   Duration: 40 total aerobic minutes   Intensity: RPE 11-13  Target HR:     MET Level: 3.0-6.0  Patient wears supplemental O2: No     Modality Workload METs Duration (minutes)   1 Pre-Exercise   5:00   2 Recumbent Bike 1   5:00   3 Arm Ergometer/UEE 0 MANCUSO   5:00   4 Sci Fit Steeper 1    5:00   5 Sci Fit Steeper 1    5:00   6 Post-Exercise   5:00     Resistance Training: Yes   Home Exercise Prescription given: To be given prior to discharge from program.    Exercise Plan    Goal Status: Initial Assessment; goals not yet started    Exercise Goals:     Develop home exercise program 150 total minutes moderate intensity cardiac exercise weekly by end of cardiac rehab program.  30 minutes cardio exercise most days by end of program. Start moving more around house. No home walking until few sessions of rehab and comfortable to start.  Cardiac Rehab goal total 40 minutes exercise/session with increased duration by 1-2 minutes per modality for total 40 minutes/session.  Cardiac Rehab goal increased exercise intensity on TM/NS/RB/UEE based on pts. HR/BP/RPE/pain response to exercise.       Exercise Interventions/Education:   To be done in Cardiac Rehab.  Reviewed cardiac rehab exercise protocol including METS and RPE.  Exercise prescription based on 6MWT.  Encouraged home exercise 30 minutes/day most days not at CR. Start moving more around house.No home walking until few sessions of rehab and comfortable to start.      Initial Assessment: Pt. Reports sedentary. Encouraged  start moving more around house.No home walking until few sessions of rehab and comfortable to start.      Other Core Components/Risk Factor Assessment:    Medication adherence  Current Medications:   Medication Documentation Review Audit       Reviewed by Laura Polo RN (Registered Nurse) on 07/24/24 at 1324      Medication Order  Taking? Sig Documenting Provider Last Dose Status   albuterol (Ventolin HFA) 90 mcg/actuation inhaler [id number] No Inhale 2 puffs every 4 hours if needed for wheezing or shortness of breath.   Patient not taking: Reported on 7/24/2024    Joanna Garza MD PhD Not Taking Active   atorvastatin (Lipitor) 80 mg tablet [id number] Yes Take 1 tablet (80 mg) by mouth once daily. Ezequiel Castillo MD Taking Active   busPIRone (Buspar) 5 mg tablet [id number] Yes Take 1 tablet (5 mg) by mouth 2 times a day. Joanna Garza MD PhD Taking Active     Discontinued 07/22/24 1316   carvedilol (Coreg) 3.125 mg tablet [id number] Yes Take 1 tablet (3.125 mg) by mouth 2 times a day. Joanna Garza MD PhD Taking Active   cholecalciferol (Vitamin D-3) 125 MCG (5000 UT) capsule [id number] Yes Take by mouth q 24 HR. Historical Provider, MD Taking Active   doxepin (SINEquan) 10 mg capsule [id number] Yes Take 1 capsule (10 mg) by mouth once daily at bedtime. Joanna Garza MD PhD Taking Active     Discontinued 07/19/24 1005   empagliflozin (Jardiance) 10 mg [id number] Yes Take 1 tablet (10 mg) by mouth once daily. Dario Baig MD Taking Active   ferrous sulfate 325 (65 Fe) MG EC tablet [id number] Yes Take 1 tablet by mouth once daily with breakfast. Do not crush, chew, or split. Joanna Garza MD PhD Taking Active     Discontinued 07/22/24 1316   furosemide (Lasix) 20 mg tablet [id number] Yes Take 1 tablet (20 mg) by mouth once daily. Joanna Garza MD PhD Taking Active   gabapentin (Neurontin) 300 mg capsule [id number] Yes TAKE 1 CAPSULE(300 MG) BY MOUTH bid   Patient taking differently: Take 1 capsule (300 mg) by mouth once daily.    Joanna Garza MD PhD Taking Active   hydrALAZINE (Apresoline) 10 mg tablet [id number] Yes Take 1 tablet (10 mg) by mouth 3 times a day. Ezequiel Castillo MD Taking Active   isosorbide dinitrate (Isordil) 10 mg tablet [id number] Yes TAKE 1 TABLET(10 MG) BY MOUTH THREE TIMES DAILY Ezequiel Castillo MD Taking Active    sacubitriL-valsartan (Entresto) 24-26 mg tablet [id number] Yes Take 1 tablet by mouth 2 times a day. Dario Baig MD Taking Active   valproic acid (Depakene) 250 mg capsule [id number] Yes Take 3 capsules (750 mg) by mouth once daily at bedtime. Nilda ZAHEER Herring, APRN-CNP Taking Active   venlafaxine (Effexor) 75 mg tablet [id number] Yes TAKE 2 TABLETS(150 MG) BY MOUTH EVERY DAY AT BEDTIME   Patient taking differently: Take 1 tablet (75 mg) by mouth once daily at bedtime.    Joanna Garza MD PhD Taking Active                                 Medication compliance: Yes   Uses pill box/organizer: Yes    Carries medication list: Yes     Blood Pressure Management  History of Hypertension: No   Medication Changes: No   Resting BP:  Visit Vitals  BP 96/54 (BP Location: Left arm, Patient Position: Sitting)   Pulse 82        Heart Failure Management  Hx of Heart Failure: Yes;   Type (selection): HFrEF  Most recent EF:   22%    Onset of heart failure diagnosis: 6/26/2024  Last heart failure hospitalization: 6/26/2024  Number of HF admissions per year: 1    Symptoms: Fatigue with exertion and Shortness of breath  Is there a family Hx of HF: No   Does patient obtain daily weight Yes       Heart Failure Reassessment: Initial Treatment Plan. Will reassess in 30 days.    Heart Failure Goals: Able to verbalize signs and symptoms of fluid retention and when to contact MD, Adhere to proper fluid restrictions, Adapt a low sodium diet and verbalize guidelines, and Obtain daily weight and verbalize proper method of obtaining weights    Smoking/Tobacco Assessment  Social History     Tobacco Use   Smoking Status Never   Smokeless Tobacco Never       Other Core Component Plan    Goal Status: Initial Assessment; goals not yet started    Other Core Component Goals: Medication compliance Knowledge on medications and carries an updated medication list.    Other Core Component Interventions/Education:   Reviewed current medications,  indications, dosages.  Encouraged pt. To carry updated medication list.  Review effects of exercise on BP.  BP at cardiac rehab pre and post exercise.      Initial Assessment: Pt. Reports medication compliance, knowledge on medications and is now carrying an updated medication list. Checks BP am daily prior to medication.     Individual Patient Goals:    Improve strength and endurance to cook, walk dogs, and walk without fatigue and SOB.   Learn correct moderate intensity cardiac exercise and start home exercise program to maintain by end of rehab program.   Learn more about heart healthy diet and make heart healthy diet choice     Goal Status: Initial Assessment; goals not yet started    Staff Comments:  Reviewed cardiac rehab exercise protocol including METS and RPE.  Reviewed educational topics to be covered at Cardiac Rehab.       Rehab Staff Signature: Laura Polo RN

## 2024-07-26 ENCOUNTER — APPOINTMENT (OUTPATIENT)
Dept: NUTRITION | Facility: CLINIC | Age: 79
End: 2024-07-26
Payer: MEDICARE

## 2024-07-26 ENCOUNTER — PATIENT OUTREACH (OUTPATIENT)
Dept: HOME HEALTH SERVICES | Age: 79
End: 2024-07-26

## 2024-07-26 VITALS
SYSTOLIC BLOOD PRESSURE: 126 MMHG | BODY MASS INDEX: 37.55 KG/M2 | DIASTOLIC BLOOD PRESSURE: 78 MMHG | WEIGHT: 205.3 LBS | HEART RATE: 76 BPM

## 2024-07-26 DIAGNOSIS — I50.22 CHRONIC SYSTOLIC HEART FAILURE (MULTI): ICD-10-CM

## 2024-07-26 DIAGNOSIS — I50.9 CONGESTIVE HEART FAILURE, UNSPECIFIED HF CHRONICITY, UNSPECIFIED HEART FAILURE TYPE (MULTI): ICD-10-CM

## 2024-07-26 DIAGNOSIS — D50.9 IRON DEFICIENCY ANEMIA, UNSPECIFIED IRON DEFICIENCY ANEMIA TYPE: ICD-10-CM

## 2024-07-26 DIAGNOSIS — E66.01 MORBID OBESITY (MULTI): ICD-10-CM

## 2024-07-26 DIAGNOSIS — E11.9 DIET-CONTROLLED TYPE 2 DIABETES MELLITUS (MULTI): Primary | ICD-10-CM

## 2024-07-26 DIAGNOSIS — Z71.3 DIETARY COUNSELING AND SURVEILLANCE: ICD-10-CM

## 2024-07-26 PROCEDURE — 97802 MEDICAL NUTRITION INDIV IN: CPT

## 2024-07-26 NOTE — PROGRESS NOTES
7/23/24 Health coaching session #3 complete. Ongoing session, checked in, grounding technique complete, and discussed next action steps and scheduled next session for 7/30/24 at 1230.

## 2024-07-26 NOTE — PROGRESS NOTES
Daily Call Note: Call complete. Patient is doing well. She has her first actual cardiac rehab appointment on Monday and a nutrition appointment this afternoon. She is feeling better everyday. Next [facility] 7/31/24 at 1000, verbalized understanding. No other questions at this time.     /78   Pulse 76   Wt 93.1 kg (205 lb 4.8 oz)   LMP  (LMP Unknown)   BMI 37.55 kg/m²       Pt Education: per POC  Barriers: none  Topics for Daily Review: BP, weight  Pt demonstrates clear understanding: Yes    Daily Weight:  There were no vitals filed for this visit.   Last 3 Weights:  Wt Readings from Last 7 Encounters:   07/24/24 96.3 kg (212 lb 6.4 oz)   07/24/24 94.4 kg (208 lb 1.6 oz)   07/22/24 94.8 kg (209 lb)   07/19/24 94.4 kg (208 lb 1.6 oz)   07/16/24 94.7 kg (208 lb 12.8 oz)   07/15/24 95.1 kg (209 lb 11.2 oz)   07/08/24 92.9 kg (204 lb 11.2 oz)       Masimo Device: No   Masimo Clinical Impression: n/a    Virtual Visits--Scheduled (Most Recent Date at Top)  Follow up Appointments  Recent Visits  Date Type Provider Dept   07/02/24 Office Visit Joanna Garza MD PhD [facility]   Showing recent visits within past 30 days and meeting all other requirements  Future Appointments  Date Type Provider Dept   08/06/24 Appointment Joanna Garza MD PhD [facility]   Showing future appointments within next 90 days and meeting all other requirements       Frequency of RN Calls & Virtual Visits per Team Agreement: Healthy at Home Frequency: M/W/F    Medication issues Addressed (what was done): none    Follow up appointments scheduled by [facility] Staff: none  Referrals made by [facility] staff: none

## 2024-07-26 NOTE — PROGRESS NOTES
"NUTRITION ASSESSMENT NOTE    Reason for Nutrition Visit:  Pt is a 79 y.o. female being seen in person for an initial appointment at [facility] referred for morbid obesity.        Anthropometrics:  Wt: 212#, 96.3kg  Ht: 5'2\"  BMI: 38.85 kg/m2      Past Medical Hx:  Patient Active Problem List   Diagnosis    Anxiety    Ataxia    Benign essential tremor    Diet-controlled type 2 diabetes mellitus (Multi)    Esotropia    Generalized anxiety disorder with panic attacks    PTSD (post-traumatic stress disorder)    History of myocardial infarction    Follicular non-Hodgkin's lymphoma (Multi)    Primary osteoarthritis of right hip    Restless leg syndrome    Seizure disorder (Multi)    Sensorineural hearing loss, bilateral    Tinnitus, subjective, bilateral    Unsteady gait    Blurry vision, bilateral    Insomnia    Morbid obesity (Multi)    Abnormal brain MRI    Hypotropia of right eye    Iron deficiency anemia    Lesion of pancreas (HHS-HCC)    Nevus of neck    Abnormal weight loss    Divergence insufficiency    Diplopia    Combined form of age-related cataract, right eye    Myopia of both eyes    Combined form of age-related cataract, left eye    Presbyopia    Early dry stage nonexudative age-related macular degeneration of both eyes    Bilateral hearing loss    Bilateral hip pain    Current severe episode of major depressive disorder without psychotic features without prior episode (Multi)    Bronchitis    SOB (shortness of breath)    History of malignant melanoma of skin    Hx of gastric bypass    Lymphoma (Multi)    History of lymphoma    Other specified anemias    Congestive heart failure (Multi)    Chronic systolic heart failure (Multi)    Non-ischemic cardiomyopathy (Multi)          Lab Results   Component Value Date    HGBA1C 6.3 (H) 05/02/2024    CHOL 204 (H) 05/02/2024    LDLF 112 (H) 07/27/2023    TRIG 104 05/02/2024          Food and Nutrition Hx:  Pt, who is accompanied by  Anup, states they seek "  from dietitian for help managing heart health. Pt cites being in bed x 3 years d/t low energy, cardiovascular related issues and tx for lymphoma. Pt spouse on plant-based diet ([facility] program) pattern x 3 years with great success. Pt lost 100# twice in her life, once when Weight Watchers recommended the 5 fish, 3 beef, and 1 liver meal per week along with 5 fruits/day. Pt tends to eat the same foods over and over again, lacking variety in the food pattern.     Fluid Restriction: Yes, 2L/day. Pt follows strictly.     Physical Activity: Cardio rehab.     Primary GOAL of pt: Improve cardiovascular health within the vegetarian realm.     RDN provided full array of medical nutrition therapy (MNT) recommendations with specific  governance over weight management, diabetes, heart failure, gastric bypass, and balanced eating.     DIETARY RECALL: *Pt consumes an average of 3 meals/day*  Wake-up: ~8am/8:30am  Meal 1: Everyday, ~8am/8:30am, Biscotti x 1 w/ coffee (milk)  *NAP* until ~12pm  Meal 2: Most days, ~2pm/2:30pm, Tuna fish w/ celery/onions/apple w/ whole grain bread  Meal 3: Everyday, ~6pm, Grilled cheese w/ tomato soup (canned)  Snacks: Cherries, apples, steel cut oatmeal w/ walnuts, teff, millet, wheat berries, seeds, [place] [employer] cookies, Bryon[employer] Mexican wedding cookies w/ pecans  Bedtime: ~1am/2am  Beverages: Coffee (2% milk), fruit smoothie w/ protein powder, diet Pepsi x 12-36oz daily, water      NUTRITIONAL ARTIFACTS:  Loves whole grains and seeds    Allergies: None  Intolerance: None  Appetite: Normal  Intake: >75%  GI Symptoms : diarrhea Frequency: occasional  Swallowing Difficulty: Food sticking occurs daily -- RDN recommended SLP     Supplements: B-Complex, Vitamin B12, Iron, and Magnesium daily    Energy Levels: Stable -- Improving, were beyond low previously    Food Preparation: Patient  Cooking Skills/Barriers: Disability/Limited Mobility       Nutrition Focused Physical  Exam:    Performed/Deferred: Deferred as pt visually appears well-nourished with no signs of malnutrition    Estimated Energy Needs:    *Pt BMI @ 38.85 kg/m2*  WEIGHT LOSS: 25-30 kcal/kg IBW = 4883-2743 kcal/day  PROTEIN Needs: 0.8-1 g/kg = 75-95 g/day    Nutrition Diagnosis:    Diagnosis Statement 1:  Diagnosis Status: New  Diagnosis : Inadequate fiber intake  related to food and nutrition related knowledge deficit concerning desirable quantities of fiber as evidenced by estimated intake of fiber that is insufficient when compared to recommended amounts (38 g/day for men and 25 g/day for women)    Diagnosis Statement 2:  Diagnosis Status: New  Diagnosis : Food and nutrition related knowledge deficit related to lack of or limited prior nutrition-related education as evidenced by verbalizes inaccurate or incomplete knowledge    Diagnosis Statement 3:   Diagnosis Status: New  Diagnosis : Inadequate protein intake  related to food and nutrition related knowledge deficit concerning appropriate amount and type of dietary fat and/or protein as evidenced by  dietary recall reflecting daily intake of protein at levels <75% daily recommended amounts    Nutrition Interventions:  Anti-Inflammatory Diet, Consistent Carbohydrate Diet, DASH Diet, Decreased Sodium Diet, Fluid Restricted Diet, Increased Fiber Diet, Increased Soluble Fiber, Increased Omega-3 Diet, Increased Protein Diet, Increased Electrolyte Intake, Low Saturated Fat Diet, Mediterranean Diet, and Plant Based Diet  Nutrition Counseling: Motivational Interviewing, Goal Setting, and Health Belief Model  Coordination of Care: None      Educational Handouts: ADA My Plate, Iron MNT, Heart Health MNT, [place]-3 MNT, WG A-Z, Fruit A-Z, Veggies A-Z, Protein Content of Foods List    Readiness to Change : Good  Level of Understanding : Good  Anticipated Compliant : Good

## 2024-07-29 ENCOUNTER — CLINICAL SUPPORT (OUTPATIENT)
Dept: CARDIAC REHAB | Facility: HOSPITAL | Age: 79
End: 2024-07-29
Payer: MEDICARE

## 2024-07-29 DIAGNOSIS — I50.20 HFREF (HEART FAILURE WITH REDUCED EJECTION FRACTION) (MULTI): ICD-10-CM

## 2024-07-29 PROCEDURE — 93798 PHYS/QHP OP CAR RHAB W/ECG: CPT | Performed by: INTERNAL MEDICINE

## 2024-07-31 ENCOUNTER — PATIENT OUTREACH (OUTPATIENT)
Dept: HOME HEALTH SERVICES | Age: 79
End: 2024-07-31

## 2024-07-31 ENCOUNTER — APPOINTMENT (OUTPATIENT)
Dept: CARE COORDINATION | Age: 79
End: 2024-07-31
Payer: MEDICARE

## 2024-07-31 ENCOUNTER — APPOINTMENT (OUTPATIENT)
Dept: CARDIAC REHAB | Facility: HOSPITAL | Age: 79
End: 2024-07-31
Payer: MEDICARE

## 2024-07-31 ENCOUNTER — APPOINTMENT (OUTPATIENT)
Dept: PHARMACY | Facility: HOSPITAL | Age: 79
End: 2024-07-31
Payer: MEDICARE

## 2024-07-31 ENCOUNTER — TELEPHONE (OUTPATIENT)
Dept: PRIMARY CARE | Facility: CLINIC | Age: 79
End: 2024-07-31

## 2024-07-31 VITALS
BODY MASS INDEX: 37.26 KG/M2 | SYSTOLIC BLOOD PRESSURE: 139 MMHG | DIASTOLIC BLOOD PRESSURE: 82 MMHG | WEIGHT: 203.7 LBS | HEART RATE: 80 BPM

## 2024-07-31 DIAGNOSIS — I50.9 CONGESTIVE HEART FAILURE, UNSPECIFIED HF CHRONICITY, UNSPECIFIED HEART FAILURE TYPE (MULTI): ICD-10-CM

## 2024-07-31 DIAGNOSIS — E11.9 DIET-CONTROLLED TYPE 2 DIABETES MELLITUS (MULTI): Primary | ICD-10-CM

## 2024-07-31 DIAGNOSIS — I50.21 ACUTE SYSTOLIC HEART FAILURE (MULTI): ICD-10-CM

## 2024-07-31 DIAGNOSIS — G40.909 SEIZURE DISORDER (MULTI): ICD-10-CM

## 2024-07-31 DIAGNOSIS — F32.2 CURRENT SEVERE EPISODE OF MAJOR DEPRESSIVE DISORDER WITHOUT PSYCHOTIC FEATURES WITHOUT PRIOR EPISODE (MULTI): ICD-10-CM

## 2024-07-31 RX ORDER — HYDRALAZINE HYDROCHLORIDE 10 MG/1
10 TABLET, FILM COATED ORAL 3 TIMES DAILY
Qty: 90 TABLET | Refills: 0 | Status: SHIPPED | OUTPATIENT
Start: 2024-07-31

## 2024-07-31 RX ORDER — BUSPIRONE HYDROCHLORIDE 5 MG/1
5 TABLET ORAL 2 TIMES DAILY
Qty: 200 TABLET | Refills: 0 | Status: SHIPPED | OUTPATIENT
Start: 2024-07-31

## 2024-07-31 RX ORDER — VALPROIC ACID 250 MG/1
750 CAPSULE, LIQUID FILLED ORAL NIGHTLY
Qty: 270 CAPSULE | Refills: 3 | Status: SHIPPED | OUTPATIENT
Start: 2024-07-31 | End: 2025-07-31

## 2024-07-31 RX ORDER — ATORVASTATIN CALCIUM 80 MG/1
80 TABLET, FILM COATED ORAL DAILY
Qty: 100 TABLET | Refills: 3 | Status: SHIPPED | OUTPATIENT
Start: 2024-07-31

## 2024-07-31 NOTE — TELEPHONE ENCOUNTER
atorvastatin (Lipitor) 80 mg tablet//Take 1 tablet (80 mg) by mouth once daily.  busPIRone (Buspar) 5 mg tablet //Take 1 tablet (5 mg) by mouth 2 times a day    hydrALAZINE (Apresoline) 10 mg tablet//Take 1 tablet (10 mg) by mouth 3 times a day  valproic acid (Depakene) 250 mg capsule// Take 3 capsules (750 mg) by mouth once daily at bedtime.  [facility] #[id number]

## 2024-07-31 NOTE — PROGRESS NOTES
Pharmacy Post-Discharge Visit - Follow Up     Sue Vail is a 79 y.o. female was referred to Clinical Pharmacy Team to complete a post-discharge medication optimization and monitoring visit.  The patient was referred for their CHF management while also enrolled in our [facility].     Referring Provider: Bryon Baig*  PCP: Joanna Garza MD PhD - last visit: 7/2/24, next visit: 8/6      Subjective   Allergies   Allergen Reactions    Ace Inhibitors Unknown     Hypotension, Hypotension    Adhesive Tape-Silicones Unknown     Adhesive Tape TAPE and bandages    Beta-Blockers (Beta-Adrenergic Blocking Agts) Unknown     Hypotension, Hypotension    Fluoxetine Unknown     cns, cns    Nsaids (Non-Steroidal Anti-Inflammatory Drug) Unknown    Olanzapine Unknown    Other Omega-3s Unknown     all ssri    [facility] #[id number] [place] [id number] [place] [address] [address]  [address] [address]  Phone: [phone] Fax: [phone]      Medication System Management:  Affordability/Accessibility: approved for PAP  Adherence/Organization: no issues       Social History     Social History Narrative    Not on file          HPI  CHF ASSESSMENT  Staging:  Most recent ejection fraction: 22%  NYHA Stage: II  ACC/AHA Stage: C     Symptom Assessment:  Weight changes/edema?: Yes - down 5 lbs from last week visit   Dyspnea?: None  Dizziness/syncope/palpitations?: No     Current Regimen:  ARNI/ACEi/ARB: Yes - entresto   Beta Blocker: Yes - carvedilol   MRA: No  SGLT2i: Yes - Jardiance      Other therapy:  Lasix   Hydralazine   Isosorbide dinitrate      Secondary Prevention:  The ASCVD Risk score (Carmine MARCOS, et al., 2019) failed to calculate for the following reasons:    The patient has a prior MI or stroke diagnosis     Aspirin 81mg? no  Statin?: Yes - atorvastatin   HTN?: Yes - has been well controlled     Review of  Systems        Objective     LMP  (LMP Unknown)    BP Readings from Last 4 Encounters:   07/26/24 126/78   07/24/24 96/54   07/24/24 114/71   07/22/24 145/88      There were no vitals filed for this visit.     LAB  Lab Results   Component Value Date    BILITOT 0.4 06/25/2024    CALCIUM 8.1 (L) 06/30/2024    CO2 25 06/30/2024     06/30/2024    CREATININE 0.67 06/30/2024    GLUCOSE 111 (H) 06/30/2024    ALKPHOS 76 06/25/2024    K 3.7 06/30/2024    PROT 6.2 (L) 06/25/2024     06/30/2024    AST 15 06/25/2024    ALT 13 06/25/2024    BUN 16 06/30/2024    ANIONGAP 9 (L) 06/30/2024    MG 2.10 06/30/2024    PHOS 5.6 (H) 06/28/2024     05/05/2023    ALBUMIN 3.4 06/28/2024    GFRF 89 07/27/2023     Lab Results   Component Value Date    TRIG 104 05/02/2024    CHOL 204 (H) 05/02/2024    LDLCALC 120 (H) 05/02/2024    HDL 63.4 05/02/2024     Lab Results   Component Value Date    HGBA1C 6.3 (H) 05/02/2024         Current Outpatient Medications   Medication Instructions    albuterol (Ventolin HFA) 90 mcg/actuation inhaler 2 puffs, inhalation, Every 4 hours PRN    atorvastatin (LIPITOR) 80 mg, oral, Daily    busPIRone (BUSPAR) 5 mg, oral, 2 times daily    carvedilol (COREG) 3.125 mg, oral, 2 times daily    cholecalciferol (Vitamin D-3) 125 MCG (5000 UT) capsule Take by mouth q 24 HR.    doxepin (SINEQUAN) 10 mg, oral, Nightly    ferrous sulfate 325 (65 Fe) MG EC tablet 1 tablet, oral, Daily with breakfast, Do not crush, chew, or split.    furosemide (LASIX) 20 mg, oral, Daily    gabapentin (Neurontin) 300 mg capsule TAKE 1 CAPSULE(300 MG) BY MOUTH bid    hydrALAZINE (APRESOLINE) 10 mg, oral, 3 times daily    isosorbide dinitrate (Isordil) 10 mg tablet TAKE 1 TABLET(10 MG) BY MOUTH THREE TIMES DAILY    Jardiance 10 mg, oral, Daily    sacubitriL-valsartan (Entresto) 24-26 mg tablet 1 tablet, oral, 2 times daily    valproic acid (DEPAKENE) 750 mg, oral, Nightly    venlafaxine (Effexor) 75 mg tablet TAKE 2 TABLETS(150  MG) BY MOUTH EVERY DAY AT BEDTIME          Assessment/Plan   Problem List Items Addressed This Visit    None    Seizures   No activity since being home   Continue with VPA  CHF  Most recent EF was 22% while admitted   Current GDMT --> carvedilol, jardiance and entresto   Lasix daily   Continue to log BP's and weights daily   BP today 139/82, HR 80  Really happy with how well cardiac rehab went for her too and is looking forward to continue to go      PAP:  -she has been approved for both the entresto and jardiance!  -both were shipped on 7/23 for 90 days each     Follow Up: 1 week     Continue all meds under the continuation of care with the referring provider and clinical pharmacy team.    Michael Milligan, Elly     Verbal consent to manage patient's drug therapy was obtained from the patient. They were informed they may decline to participate or withdraw from participation in pharmacy services at any time.

## 2024-08-01 ENCOUNTER — PATIENT OUTREACH (OUTPATIENT)
Dept: HOME HEALTH SERVICES | Age: 79
End: 2024-08-01
Payer: MEDICARE

## 2024-08-02 ENCOUNTER — CLINICAL SUPPORT (OUTPATIENT)
Dept: CARDIAC REHAB | Facility: HOSPITAL | Age: 79
End: 2024-08-02
Payer: MEDICARE

## 2024-08-02 DIAGNOSIS — I50.20 HFREF (HEART FAILURE WITH REDUCED EJECTION FRACTION) (MULTI): ICD-10-CM

## 2024-08-02 PROCEDURE — 93798 PHYS/QHP OP CAR RHAB W/ECG: CPT | Performed by: INTERNAL MEDICINE

## 2024-08-05 ENCOUNTER — APPOINTMENT (OUTPATIENT)
Dept: BEHAVIORAL HEALTH | Facility: CLINIC | Age: 79
End: 2024-08-05
Payer: MEDICARE

## 2024-08-05 ENCOUNTER — PATIENT OUTREACH (OUTPATIENT)
Dept: HOME HEALTH SERVICES | Age: 79
End: 2024-08-05

## 2024-08-05 ENCOUNTER — TELEPHONE (OUTPATIENT)
Dept: PRIMARY CARE | Facility: CLINIC | Age: 79
End: 2024-08-05

## 2024-08-05 ENCOUNTER — CLINICAL SUPPORT (OUTPATIENT)
Dept: CARDIAC REHAB | Facility: HOSPITAL | Age: 79
End: 2024-08-05
Payer: MEDICARE

## 2024-08-05 ENCOUNTER — APPOINTMENT (OUTPATIENT)
Dept: CARDIAC REHAB | Facility: HOSPITAL | Age: 79
End: 2024-08-05
Payer: MEDICARE

## 2024-08-05 DIAGNOSIS — I50.20 HFREF (HEART FAILURE WITH REDUCED EJECTION FRACTION) (MULTI): ICD-10-CM

## 2024-08-05 PROCEDURE — 93798 PHYS/QHP OP CAR RHAB W/ECG: CPT | Performed by: INTERNAL MEDICINE

## 2024-08-05 RX ORDER — ISOSORBIDE DINITRATE 10 MG/1
10 TABLET ORAL
Qty: 270 TABLET | Refills: 3 | Status: SHIPPED | OUTPATIENT
Start: 2024-08-05

## 2024-08-05 NOTE — TELEPHONE ENCOUNTER
isosorbide dinitrate (Isordil) 10 mg tablet// TAKE 1 TABLET(10 MG) BY MOUTH THREE TIMES DAILY  [facility] #[id number] [facility]

## 2024-08-05 NOTE — PROGRESS NOTES
Daily Call Note: Call complete. Patient is doing well. She moved around some plants and a plant stand yesterday and was very pleased to have the motivation and the ability to get it done. She stated she would have been happy even if she didn't get it done, she was happy to have the motivation to attempt the task. She slept very well last night. She is continuing with cardiac rehab today at 1030. She reports her BP today on this call 79/48  HR 90. She denies dizziness or light headedness. She reports she already took her AM BP meds about 0900. She states she is unable to recheck it right now because she is in the bathroom and will be going out the door to get to cardiac rehab. She states they will check it before and after at cardiac rehab. Dr. Guzman and Michael Milligan, PharmD updated on patient status and BP. Dr. Guzman would like follow up after cardiac rehab. Next [facility] 8/7/24 at 0900, discussed probable graduation at this time, verbalized understanding. No other questions at this time.     1914- Outreach to patient to recheck BP, no answer, left voicemail.     Pt Education: per POC  Barriers: none  Topics for Daily Review: BP, weight  Pt demonstrates clear understanding: Yes    Daily Weight:  There were no vitals filed for this visit.   Last 3 Weights:  Wt Readings from Last 7 Encounters:   07/31/24 92.4 kg (203 lb 11.2 oz)   07/26/24 93.1 kg (205 lb 4.8 oz)   07/24/24 96.3 kg (212 lb 6.4 oz)   07/24/24 94.4 kg (208 lb 1.6 oz)   07/22/24 94.8 kg (209 lb)   07/19/24 94.4 kg (208 lb 1.6 oz)   07/16/24 94.7 kg (208 lb 12.8 oz)       Masimo Device: No   Masimo Clinical Impression: n/a    Virtual Visits--Scheduled (Most Recent Date at Top)  Follow up Appointments  Recent Visits  No visits were found meeting these conditions.  Showing recent visits within past 30 days and meeting all other requirements  Future Appointments  Date Type Provider Dept   08/06/24 Appointment Joanna Garza MD PhD [facility]   Showing future  appointments within next 90 days and meeting all other requirements       Frequency of RN Calls & Virtual Visits per Team Agreement: Healthy at Home Frequency: M/W/F    Medication issues Addressed (what was done): none    Follow up appointments scheduled by [facility] Staff: none  Referrals made by [facility] staff: none

## 2024-08-06 ENCOUNTER — APPOINTMENT (OUTPATIENT)
Dept: PRIMARY CARE | Facility: CLINIC | Age: 79
End: 2024-08-06
Payer: MEDICARE

## 2024-08-07 ENCOUNTER — APPOINTMENT (OUTPATIENT)
Dept: CARE COORDINATION | Age: 79
End: 2024-08-07
Payer: MEDICARE

## 2024-08-07 ENCOUNTER — APPOINTMENT (OUTPATIENT)
Dept: CARDIAC REHAB | Facility: HOSPITAL | Age: 79
End: 2024-08-07
Payer: MEDICARE

## 2024-08-07 ENCOUNTER — APPOINTMENT (OUTPATIENT)
Dept: HEMATOLOGY/ONCOLOGY | Facility: CLINIC | Age: 79
End: 2024-08-07
Payer: MEDICARE

## 2024-08-07 ENCOUNTER — PATIENT OUTREACH (OUTPATIENT)
Dept: HOME HEALTH SERVICES | Age: 79
End: 2024-08-07

## 2024-08-07 ENCOUNTER — APPOINTMENT (OUTPATIENT)
Dept: PHARMACY | Facility: HOSPITAL | Age: 79
End: 2024-08-07
Payer: MEDICARE

## 2024-08-07 DIAGNOSIS — I50.9 CONGESTIVE HEART FAILURE, UNSPECIFIED HF CHRONICITY, UNSPECIFIED HEART FAILURE TYPE (MULTI): ICD-10-CM

## 2024-08-07 DIAGNOSIS — E11.9 DIET-CONTROLLED TYPE 2 DIABETES MELLITUS (MULTI): Primary | ICD-10-CM

## 2024-08-07 NOTE — PROGRESS NOTES
Pharmacy Post-Discharge Visit - Follow Up     Sue Vail is a 79 y.o. female was referred to Clinical Pharmacy Team to complete a post-discharge medication optimization and monitoring visit.  The patient was referred for their CHF management while also enrolled in [facility].     Referring Provider: Dwayne Daniels DO  PCP: Joanna Garza MD PhD - last visit: 7/2/24, next visit: not scheduled       Subjective   Allergies   Allergen Reactions    Ace Inhibitors Unknown     Hypotension, Hypotension    Adhesive Tape-Silicones Unknown     Adhesive Tape TAPE and bandages    Beta-Blockers (Beta-Adrenergic Blocking Agts) Unknown     Hypotension, Hypotension    Fluoxetine Unknown     cns, cns    Nsaids (Non-Steroidal Anti-Inflammatory Drug) Unknown    Olanzapine Unknown    Other Omega-3s Unknown     all ssri    [facility]       [facility] #[id number] [place] [id number] [facility] [address]  [address] [facility] [address]  [facility] [address]  Phone: [phone] Fax: [phone]      Medication System Management:  Affordability/Accessibility: approved for PAP  Adherence/Organization: no issues      Social History     Social History Narrative    Not on file          HPI  CHF ASSESSMENT  Staging:  Most recent ejection fraction: 22%  NYHA Stage: II  ACC/AHA Stage: C     Symptom Assessment:  Weight changes/edema?: No   Dyspnea?: None  Dizziness/syncope/palpitations?: No     Current Regimen:  ARNI/ACEi/ARB: Yes - entresto   Beta Blocker: Yes - carvedilol   MRA: No  SGLT2i: Yes - Jardiance      Other therapy:  Lasix   Hydralazine   Isosorbide dinitrate      Secondary Prevention:  The ASCVD Risk score (Carmine MARCOS, et al., 2019) failed to calculate for the following reasons:    The patient has a prior MI or stroke diagnosis     Aspirin 81mg? no  Statin?: Yes - atorvastatin   HTN?: Yes - has been well controlled     Review of Systems        Objective     LMP  (LMP Unknown)    BP Readings from Last 4  Encounters:   07/31/24 139/82   07/26/24 126/78   07/24/24 96/54   07/24/24 114/71      There were no vitals filed for this visit.     LAB  Lab Results   Component Value Date    BILITOT 0.4 06/25/2024    CALCIUM 8.1 (L) 06/30/2024    CO2 25 06/30/2024     06/30/2024    CREATININE 0.67 06/30/2024    GLUCOSE 111 (H) 06/30/2024    ALKPHOS 76 06/25/2024    K 3.7 06/30/2024    PROT 6.2 (L) 06/25/2024     06/30/2024    AST 15 06/25/2024    ALT 13 06/25/2024    BUN 16 06/30/2024    ANIONGAP 9 (L) 06/30/2024    MG 2.10 06/30/2024    PHOS 5.6 (H) 06/28/2024     05/05/2023    ALBUMIN 3.4 06/28/2024    GFRF 89 07/27/2023     Lab Results   Component Value Date    TRIG 104 05/02/2024    CHOL 204 (H) 05/02/2024    LDLCALC 120 (H) 05/02/2024    HDL 63.4 05/02/2024     Lab Results   Component Value Date    HGBA1C 6.3 (H) 05/02/2024         Current Outpatient Medications   Medication Instructions    albuterol (Ventolin HFA) 90 mcg/actuation inhaler 2 puffs, inhalation, Every 4 hours PRN    atorvastatin (LIPITOR) 80 mg, oral, Daily    busPIRone (BUSPAR) 5 mg, oral, 2 times daily    carvedilol (COREG) 3.125 mg, oral, 2 times daily    cholecalciferol (Vitamin D-3) 125 MCG (5000 UT) capsule Take by mouth q 24 HR.    doxepin (SINEQUAN) 10 mg, oral, Nightly    ferrous sulfate 325 (65 Fe) MG EC tablet 1 tablet, oral, Daily with breakfast, Do not crush, chew, or split.    furosemide (LASIX) 20 mg, oral, Daily    gabapentin (Neurontin) 300 mg capsule TAKE 1 CAPSULE(300 MG) BY MOUTH bid    hydrALAZINE (APRESOLINE) 10 mg, oral, 3 times daily    isosorbide dinitrate (ISORDIL) 10 mg, oral, 3 times daily (0900,1400,1900)    Jardiance 10 mg, oral, Daily    sacubitriL-valsartan (Entresto) 24-26 mg tablet 1 tablet, oral, 2 times daily    valproic acid (DEPAKENE) 750 mg, oral, Nightly    venlafaxine (Effexor) 75 mg tablet TAKE 2 TABLETS(150 MG) BY MOUTH EVERY DAY AT BEDTIME            Assessment/Plan   Problem List Items Addressed  This Visit    None    Seizures   No activity since being home   Continue with VPA  Also taking gabapentin daily   CHF  Most recent EF was 22% while admitted   Current GDMT --> carvedilol, jardiance and entresto   Lasix daily   Continue to log BP's and weights daily   Really happy with how well cardiac rehab went for her too and is looking forward to continue to go      PAP:  -she has been approved for both the entresto and jardiance!  -both were shipped on 7/23 for 90 days each   -also interested in switching all meds to  pharmacy, but just received 90 day supplies from her pharmacy so will not be due to fill just quite yet    Follow Up: as needed     Continue all meds under the continuation of care with the referring provider and clinical pharmacy team.    Michael Milligan, Elly     Verbal consent to manage patient's drug therapy was obtained from the patient. They were informed they may decline to participate or withdraw from participation in pharmacy services at any time.

## 2024-08-07 NOTE — PROGRESS NOTES
"Weekly HHVC with Michael Ortega and myself. Pt states she feels \"great.\" Pt is following with cardiac rehab and taking all her meds  ordered. Pt has a follow up cardiology visit with Dr Hewitt 9/5. Pt states she had some issues with her local pharmacy and is thinking about switching. Pt now has all her medications at this time. Michael would be happy to change over all her meds to  Pharmacy, pt will email Michael her medications she needs refilled- Will be sent to  [facility] and can be mailed out to her or she could . Pt states when she does take her pills, they tend to lodge in her throat? Pt has noticed that over time she has had a little trouble swallowing lately, feels like something tiny many be stuck in throat? Pt instructed to not take all pills at same time, maybe try one at a time. Also follow up with PCP, they will be able to look in her throat and may get referral for specialist if need be.       Patient's Address:   [address] [place] [address]  **  If this is not the address patient will receive services - alert team and address in EMR**       Patient Contacts:  Extended Emergency Contact Information  Primary Emergency Contact: Anup Adam  Address: [address] [place] [address].           [place] [address] [place]  Home Phone: [phone]  Work Phone: [phone]  Mobile Phone: [phone]  Relation: Spouse                                Patient's Preferred Phone: [phone]  Patient's E-mail: [email or web address]                                      "

## 2024-08-08 ENCOUNTER — APPOINTMENT (OUTPATIENT)
Dept: HEMATOLOGY/ONCOLOGY | Facility: CLINIC | Age: 79
End: 2024-08-08
Payer: MEDICARE

## 2024-08-09 ENCOUNTER — CLINICAL SUPPORT (OUTPATIENT)
Dept: CARDIAC REHAB | Facility: HOSPITAL | Age: 79
End: 2024-08-09
Payer: MEDICARE

## 2024-08-09 DIAGNOSIS — I50.20 HFREF (HEART FAILURE WITH REDUCED EJECTION FRACTION) (MULTI): ICD-10-CM

## 2024-08-09 PROCEDURE — 93798 PHYS/QHP OP CAR RHAB W/ECG: CPT | Performed by: INTERNAL MEDICINE

## 2024-08-12 ENCOUNTER — CLINICAL SUPPORT (OUTPATIENT)
Dept: CARDIAC REHAB | Facility: HOSPITAL | Age: 79
End: 2024-08-12
Payer: MEDICARE

## 2024-08-12 ENCOUNTER — APPOINTMENT (OUTPATIENT)
Dept: BEHAVIORAL HEALTH | Facility: CLINIC | Age: 79
End: 2024-08-12
Payer: MEDICARE

## 2024-08-12 DIAGNOSIS — I50.20 HFREF (HEART FAILURE WITH REDUCED EJECTION FRACTION) (MULTI): ICD-10-CM

## 2024-08-12 PROCEDURE — 93798 PHYS/QHP OP CAR RHAB W/ECG: CPT | Performed by: INTERNAL MEDICINE

## 2024-08-14 ENCOUNTER — CLINICAL SUPPORT (OUTPATIENT)
Dept: CARDIAC REHAB | Facility: HOSPITAL | Age: 79
End: 2024-08-14
Payer: MEDICARE

## 2024-08-14 DIAGNOSIS — I50.20 HFREF (HEART FAILURE WITH REDUCED EJECTION FRACTION) (MULTI): ICD-10-CM

## 2024-08-14 PROCEDURE — 93798 PHYS/QHP OP CAR RHAB W/ECG: CPT | Performed by: INTERNAL MEDICINE

## 2024-08-15 ENCOUNTER — PATIENT OUTREACH (OUTPATIENT)
Dept: CARE COORDINATION | Facility: CLINIC | Age: 79
End: 2024-08-15
Payer: MEDICARE

## 2024-08-15 NOTE — PROGRESS NOTES
Outreach call to patient to follow up after hospital discharge and completed healthy at home program.  Patient has no needs at this time currently enrolled in the cardiac rehab program.  No further outreach is needed patient was given CM contact information.   Will dis enroll patient from transitions of care.     XIOMARA YinN, RN   [phone]   ACO Department

## 2024-08-16 ENCOUNTER — CLINICAL SUPPORT (OUTPATIENT)
Dept: CARDIAC REHAB | Facility: HOSPITAL | Age: 79
End: 2024-08-16
Payer: MEDICARE

## 2024-08-16 DIAGNOSIS — I50.20 HFREF (HEART FAILURE WITH REDUCED EJECTION FRACTION) (MULTI): ICD-10-CM

## 2024-08-16 PROCEDURE — 93798 PHYS/QHP OP CAR RHAB W/ECG: CPT | Performed by: INTERNAL MEDICINE

## 2024-08-19 ENCOUNTER — DOCUMENTATION (OUTPATIENT)
Dept: CARDIAC REHAB | Facility: HOSPITAL | Age: 79
End: 2024-08-19
Payer: MEDICARE

## 2024-08-19 ENCOUNTER — CLINICAL SUPPORT (OUTPATIENT)
Dept: CARDIAC REHAB | Facility: HOSPITAL | Age: 79
End: 2024-08-19
Payer: MEDICARE

## 2024-08-19 VITALS
WEIGHT: 211.4 LBS | BODY MASS INDEX: 38.9 KG/M2 | DIASTOLIC BLOOD PRESSURE: 64 MMHG | SYSTOLIC BLOOD PRESSURE: 108 MMHG | HEIGHT: 62 IN

## 2024-08-19 DIAGNOSIS — I50.20 HFREF (HEART FAILURE WITH REDUCED EJECTION FRACTION) (MULTI): ICD-10-CM

## 2024-08-19 PROCEDURE — 93798 PHYS/QHP OP CAR RHAB W/ECG: CPT | Performed by: INTERNAL MEDICINE

## 2024-08-19 NOTE — PROGRESS NOTES
Cardiac Rehabilitation 30 Day Reassessment    Name: Sue Vail  Medical Record Number: [id number]  YOB: 1945  Age: 79 y.o.    Today’s Date: 8/19/2024  Primary Care Physician: Joanna Garza MD PhD  Referring Physician: Katya Guzman MD  Program Location: [facility]  Primary Diagnosis:   HFrEF (heart failure with reduced ejection fraction)      Onset/Date of Diagnosis: 6/26/2024    Session #: 9    AACVPR Risk Stratification:   Moderate    Falls Risk: High  Psychosocial Assessment       Initial assessment- Sent PH-Q 9 to MD if score > 20: No; score < 20. Initial PHQ2 score +3. PHQ9 indicated, score +15, indicates moderately severe depression.     Pt reported/currently experiencing stress: Yes; Stress; Severity: moderate and Depression; Severity: moderate  Patient uses stress management skills: Yes   History of: depression  Currently seeing a mental health provider: No  Social Support: Yes, Whom:spouse  Quality of Life Survey: Initial assessment- Sent PH-Q 9 to MD if score > 20: No; score < 20. Initial PHQ2 score +3. PHQ9 indicated, score +15, indicates moderately severe depression.   Learning Assessment:  Learning assessment/barriers: None  Preferred learning method: Visual and Reading handout  Barriers: None  Comments:    Stages of Change:Action    Psychosocial Plan    Goal Status: In progress    Psychosocial Goals: Demonstrating proper techniques for stress management, Maintain or lower PH-Q 9 score by discharge, and Identify strategies for managing depression    Psychosocial Interventions/Education: To be done in Cardiac Rehab.  Pt. questioned re: new stress, anxiety, or depressive symptoms.  Pt. Instructed on 3 Minute Breathing Space mindfulness exercise and other stress management strategies like deep breathing, exercise, listening to music.  PHQ9 reevaluated and reviewed with pt.  8/19/2024 30 day eval- Reviewed stress management strategies like deep breathing, exercise, and listening  "to music.     Initial Assessment: Pt. Reports history of depression, worse R/T present medical issues but feels it is improving on current medications. PHQ9 indicates moderately severe depression, will repeat at next 30 day evaluation. Pt. Reports stress R/T present medical issues but she is using spiritual and mindfulness strategies to manage.   8/19/2024 30 day eval- Pt. Reports that she is managing stress and using positive stress management strategies, she got a new dog recently. She reports occasional feeling of anxiety but managing without issue. She also reports that she feels her depression is improving, continues on medication. PHQ9 given to take home and return to reassess depression.       Nutrition Assessment:    Hyperlipidemia: Yes     Lipids:   Lab Results   Component Value Date    CHOL 204 (H) 05/02/2024    HDL 63.4 05/02/2024    LDLF 112 (H) 07/27/2023    TRIG 104 05/02/2024       Current Dietary Guidelines: Low fat, Low sodium, Fluid restrictions  Barriers to dietary change: no    Diet Habit Survey: Block Dietary Fat Screener  Pre:  Returned  Block Dietary Fat Screener, indicates <30% of calories are from fat. Reviewed with pt. And AHA H/O \"Facts on Fat\" given.   Post: To be done at discharge.    Diabetes Assessment    Lab Results   Component Value Date    HGBA1C 6.3 (H) 05/02/2024       History of Diabetes: No    Weight Management  7/24/2024 Initial assessment:  Ht: 5'2\"  Wt: 212.4#  BMI: 38.84  8/19/2024 30 day eval-   Height: 157.5 cm (5' 2\")  Weight: 95.9 kg (211 lb 6.4 oz)  BMI (Calculated): 38.66      Nutrition Plan    Goal Status: In progress    Nutrition Goals: Lipid Goal: HDL>45, LDL <70, Total <180, Trigs <150 and Learn how to read and interpret nutrition labels prior to discharge  Learn more about heart healthy eating and maintain heart healthy, low NA diet.     Nutrition Interventions/Education:   To be done in Cardiac Rehab.  Reviewed recent lipids, HGBA1C, weight, BMI.  Pt. Encouraged " to follow heart healthy diet and strategies discussed.  Pt. Encouraged to meet with  dietician.  Heart Failure booklet given.  Block dietary fat screener given.  8/19/2024 30 day eval- Metabolic Syndrome/DM education provided through educational video and H/O.     Initial Assessment: Pt. Reports she is making heart healthy low NA (1500 MG)diet choices and is reading labels. She has an appointment to meet with  dietician.   8/19/2024 30 day eval- No new lipids or HGBA1C. Pt. Reports following a heart healthy diet and label reading. She has not met with  dietician at this time.     Exercise Assessment    Yes  Mode: walking   Frequency: 2-3 x week  Duration: 5-10 minutes     Exercise Prescription     Exercise Prescription based on: Duke Activity Status Index (DASI) Score 4.0 METS    DASI Score: 4.0    MET Score:  2.0   Frequency:  3 days/week   Mode: Recumbent Cycle, Arm Ergometer, and Sci Fit Stepper   Duration: 40 total aerobic minutes   Intensity: RPE 11-13  Target HR:     MET Level: 3.0-6.0  Patient wears supplemental O2: No     Modality Workload METs Duration (minutes)   1 Pre-Exercise   5:00   2 Recumbent Bike 1  2.6 10:00   3 Arm Ergometer/UEE 5 springer  10:00   4 Sci Fit Steeper 1  2.4 10:00   5 Sci Fit Steeper 1  2.4 10:00   6 Post-Exercise   5:00     Resistance Training: Yes   Home Exercise Prescription given: To be given prior to discharge from program.    Exercise Plan    Goal Status: In progress    Exercise Goals:     Develop home exercise program 150 total minutes moderate intensity cardiac exercise weekly by end of cardiac rehab program.  30 minutes cardio exercise most days by end of program. Start moving more around house. No home walking until few sessions of rehab and comfortable to start.  Cardiac Rehab goal total 40 minutes exercise/session with increased duration by 1-2 minutes per modality for total 40 minutes/session.  Cardiac Rehab goal increased exercise intensity on TM/NS/RB/UEE  based on pts. HR/BP/RPE/pain response to exercise.       Exercise Interventions/Education:   To be done in Cardiac Rehab.  Reviewed cardiac rehab exercise protocol including METS and RPE.  Exercise prescription based on 6MWT.  Encouraged home exercise 30 minutes/day most days not at CR. Start moving more around house.No home walking until few sessions of rehab and comfortable to start.      Initial Assessment: Pt. Reports sedentary. Encouraged  start moving more around house.No home walking until few sessions of rehab and comfortable to start.  8/19/2024 30 day eval-Pt. Is attending Cardiac Rehab 3x week and is now exercising 40 minutes/session at METs 2.4-2.6. In addition she has starting taking short walks at home 5-10 minutes 2-3x week on days not at rehab.        Other Core Components/Risk Factor Assessment:    Medication adherence  Current Medications:   Medication Documentation Review Audit       Reviewed by Yun BlankD (Pharmacist) on 08/07/24 at 0906      Medication Order Taking? Sig Documenting Provider Last Dose Status   albuterol (Ventolin HFA) 90 mcg/actuation inhaler [id number] No Inhale 2 puffs every 4 hours if needed for wheezing or shortness of breath.   Patient not taking: Reported on 7/24/2024    Joanna Garza MD PhD Not Taking Active   atorvastatin (Lipitor) 80 mg tablet [id number]  Take 1 tablet (80 mg) by mouth once daily. Joanna Garza MD PhD  Active   busPIRone (Buspar) 5 mg tablet [id number]  Take 1 tablet (5 mg) by mouth 2 times a day. Joanna Garza MD PhD  Active   carvedilol (Coreg) 3.125 mg tablet [id number] No Take 1 tablet (3.125 mg) by mouth 2 times a day. Joanna Garza MD PhD Taking Active   cholecalciferol (Vitamin D-3) 125 MCG (5000 UT) capsule [id number] No Take by mouth q 24 HR. Historical Provider, MD Taking Active   doxepin (SINEquan) 10 mg capsule [id number] No Take 1 capsule (10 mg) by mouth once daily at bedtime. Joanna Garza MD PhD Taking Active   empagliflozin  (Jardiance) 10 mg [id number] No Take 1 tablet (10 mg) by mouth once daily. Dario Baig MD Taking Active   ferrous sulfate 325 (65 Fe) MG EC tablet [id number] No Take 1 tablet by mouth once daily with breakfast. Do not crush, chew, or split. Joanna Garza MD PhD Taking Active   furosemide (Lasix) 20 mg tablet [id number] No Take 1 tablet (20 mg) by mouth once daily. Joanna Garza MD PhD Taking Active   gabapentin (Neurontin) 300 mg capsule [id number] No TAKE 1 CAPSULE(300 MG) BY MOUTH bid   Patient taking differently: Take 1 capsule (300 mg) by mouth once daily.    Joanna Garza MD PhD Taking Active   hydrALAZINE (Apresoline) 10 mg tablet [id number]  Take 1 tablet (10 mg) by mouth 3 times a day. Joanna Garza MD PhD  Active   isosorbide dinitrate (Isordil) 10 mg tablet [id number]  Take 1 tablet (10 mg) by mouth 3 times a day. Joanna Garza MD PhD  Active   sacubitriL-valsartan (Entresto) 24-26 mg tablet [id number] No Take 1 tablet by mouth 2 times a day. Dario Baig MD Taking Active   valproic acid (Depakene) 250 mg capsule [id number]  Take 3 capsules (750 mg) by mouth once daily at bedtime. Joanna Garza MD PhD  Active   venlafaxine (Effexor) 75 mg tablet [id number] No TAKE 2 TABLETS(150 MG) BY MOUTH EVERY DAY AT BEDTIME   Patient taking differently: Take 1 tablet (75 mg) by mouth once daily at bedtime.    Joanna Garza MD PhD Taking Active                                 Medication compliance: Yes   Uses pill box/organizer: Yes    Carries medication list: Yes     Blood Pressure Management  History of Hypertension: No   Medication Changes: No   Resting BP:   8/19/2024 30 day eval- Resting BP Visit Vitals  /64 (Patient Position: Sitting)        Heart Failure Management  Hx of Heart Failure: Yes;   Type (selection): HFrEF  Most recent EF:   22%    Onset of heart failure diagnosis: 6/26/2024  Last heart failure hospitalization: 6/26/2024  Number of HF admissions per year: 1    Symptoms:  Fatigue with exertion and Shortness of breath  Is there a family Hx of HF: No   Does patient obtain daily weight Yes       Heart Failure Reassessment: Reassessed every 30 days while in program.   8/19/2024 30 day eval-  Unplanned MD and/or ED Heart Failure visit: No  Hospitalization since starting program/last assessment: No  MD contacted regarding Heart Failure symptoms: No  Pt. Reports following 2L fluid restriction, 2GM NA diet, weighs daily.     Heart Failure Goals: Able to verbalize signs and symptoms of fluid retention and when to contact MD, Adhere to proper fluid restrictions, Adapt a low sodium diet and verbalize guidelines, and Obtain daily weight and verbalize proper method of obtaining weights      Smoking/Tobacco Assessment  Social History     Tobacco Use   Smoking Status Never   Smokeless Tobacco Never       Other Core Component Plan    Goal Status: In progress    Other Core Component Goals: Medication compliance Knowledge on medications and carries an updated medication list.    Other Core Component Interventions/Education:   Reviewed current medications, indications, dosages.  Encouraged pt. To carry updated medication list.  Review effects of exercise on BP.  BP at cardiac rehab pre and post exercise.  8/19/2024 30 day eval- Heart Parts, Cardiac Risk Factors, and Heart Failure education provided through educational videos, H/Os, and 1:1 discussion.       Initial Assessment: Pt. Reports medication compliance, knowledge on medications and is now carrying an updated medication list. Checks BP am daily prior to medication.   8/19/2024 30 day eval- Pt. Reports compliant with medication, knowledgeable on medication. She has not been carrying a medication list but has one and will place in her purse.     Individual Patient Goals:    Improve strength and endurance to cook, walk dogs, and walk without fatigue and SOB.   Learn correct moderate intensity cardiac exercise and start home exercise program to  maintain by end of rehab program.   Learn more about heart healthy diet and make heart healthy diet choice     Goal Status: In progress    Staff Comments:  Reviewed cardiac rehab exercise protocol including METS and RPE.  Reviewed educational topics to be covered at Cardiac Rehab.   8/19/2024 30 day clark Rogers reports improved strength and endurance to exercise at rehab,   and has started some walking at home with less fatigue and SOB. She continues to work on making heart healthy diet choices. And label reading. No ER visits, hospitalizations, or falls since starting in the program.       Rehab Staff Signature: Laura Polo RN

## 2024-08-21 ENCOUNTER — APPOINTMENT (OUTPATIENT)
Dept: CARDIAC REHAB | Facility: HOSPITAL | Age: 79
End: 2024-08-21
Payer: MEDICARE

## 2024-08-21 ENCOUNTER — CLINICAL SUPPORT (OUTPATIENT)
Dept: CARDIAC REHAB | Facility: HOSPITAL | Age: 79
End: 2024-08-21
Payer: MEDICARE

## 2024-08-21 ENCOUNTER — APPOINTMENT (OUTPATIENT)
Dept: NEUROLOGY | Facility: CLINIC | Age: 79
End: 2024-08-21
Payer: MEDICARE

## 2024-08-21 DIAGNOSIS — I50.20 HFREF (HEART FAILURE WITH REDUCED EJECTION FRACTION) (MULTI): ICD-10-CM

## 2024-08-21 PROCEDURE — 93798 PHYS/QHP OP CAR RHAB W/ECG: CPT | Performed by: INTERNAL MEDICINE

## 2024-08-21 ASSESSMENT — PATIENT HEALTH QUESTIONNAIRE - PHQ9
6. FEELING BAD ABOUT YOURSELF - OR THAT YOU ARE A FAILURE OR HAVE LET YOURSELF OR YOUR FAMILY DOWN: NEARLY EVERY DAY
9. THOUGHTS THAT YOU WOULD BE BETTER OFF DEAD, OR OF HURTING YOURSELF: NOT AT ALL
SUM OF ALL RESPONSES TO PHQ QUESTIONS 1-9: 12
SUM OF ALL RESPONSES TO PHQ9 QUESTIONS 1 & 2: 2
8. MOVING OR SPEAKING SO SLOWLY THAT OTHER PEOPLE COULD HAVE NOTICED. OR THE OPPOSITE, BEING SO FIGETY OR RESTLESS THAT YOU HAVE BEEN MOVING AROUND A LOT MORE THAN USUAL: NOT AT ALL
10. IF YOU CHECKED OFF ANY PROBLEMS, HOW DIFFICULT HAVE THESE PROBLEMS MADE IT FOR YOU TO DO YOUR WORK, TAKE CARE OF THINGS AT HOME, OR GET ALONG WITH OTHER PEOPLE: VERY DIFFICULT
4. FEELING TIRED OR HAVING LITTLE ENERGY: NEARLY EVERY DAY
1. LITTLE INTEREST OR PLEASURE IN DOING THINGS: SEVERAL DAYS
5. POOR APPETITE OR OVEREATING: NEARLY EVERY DAY
7. TROUBLE CONCENTRATING ON THINGS, SUCH AS READING THE NEWSPAPER OR WATCHING TELEVISION: SEVERAL DAYS
2. FEELING DOWN, DEPRESSED OR HOPELESS: SEVERAL DAYS
3. TROUBLE FALLING OR STAYING ASLEEP: NOT AT ALL

## 2024-08-23 ENCOUNTER — CLINICAL SUPPORT (OUTPATIENT)
Dept: CARDIAC REHAB | Facility: HOSPITAL | Age: 79
End: 2024-08-23
Payer: MEDICARE

## 2024-08-23 DIAGNOSIS — I50.20 HFREF (HEART FAILURE WITH REDUCED EJECTION FRACTION) (MULTI): ICD-10-CM

## 2024-08-23 PROCEDURE — 93798 PHYS/QHP OP CAR RHAB W/ECG: CPT | Performed by: INTERNAL MEDICINE

## 2024-08-26 ENCOUNTER — APPOINTMENT (OUTPATIENT)
Dept: CARDIAC REHAB | Facility: HOSPITAL | Age: 79
End: 2024-08-26
Payer: MEDICARE

## 2024-08-28 ENCOUNTER — CLINICAL SUPPORT (OUTPATIENT)
Dept: CARDIAC REHAB | Facility: HOSPITAL | Age: 79
End: 2024-08-28
Payer: MEDICARE

## 2024-08-28 DIAGNOSIS — I50.21 ACUTE SYSTOLIC HEART FAILURE (MULTI): ICD-10-CM

## 2024-08-28 DIAGNOSIS — I50.20 HFREF (HEART FAILURE WITH REDUCED EJECTION FRACTION) (MULTI): ICD-10-CM

## 2024-08-28 PROCEDURE — 93798 PHYS/QHP OP CAR RHAB W/ECG: CPT | Performed by: INTERNAL MEDICINE

## 2024-08-28 RX ORDER — HYDRALAZINE HYDROCHLORIDE 10 MG/1
10 TABLET, FILM COATED ORAL 3 TIMES DAILY
Qty: 90 TABLET | Refills: 0 | Status: SHIPPED | OUTPATIENT
Start: 2024-08-28 | End: 2024-09-05 | Stop reason: ALTCHOICE

## 2024-08-30 ENCOUNTER — APPOINTMENT (OUTPATIENT)
Dept: CARDIAC REHAB | Facility: HOSPITAL | Age: 79
End: 2024-08-30
Payer: MEDICARE

## 2024-09-04 ENCOUNTER — APPOINTMENT (OUTPATIENT)
Dept: CARDIAC REHAB | Facility: HOSPITAL | Age: 79
End: 2024-09-04
Payer: MEDICARE

## 2024-09-05 ENCOUNTER — TELEMEDICINE (OUTPATIENT)
Dept: PHARMACY | Facility: HOSPITAL | Age: 79
End: 2024-09-05
Payer: MEDICARE

## 2024-09-05 ENCOUNTER — OFFICE VISIT (OUTPATIENT)
Dept: CARDIOLOGY | Facility: CLINIC | Age: 79
End: 2024-09-05
Payer: MEDICARE

## 2024-09-05 VITALS
WEIGHT: 211 LBS | RESPIRATION RATE: 18 BRPM | OXYGEN SATURATION: 97 % | SYSTOLIC BLOOD PRESSURE: 128 MMHG | BODY MASS INDEX: 38.83 KG/M2 | HEIGHT: 62 IN | HEART RATE: 82 BPM | DIASTOLIC BLOOD PRESSURE: 74 MMHG

## 2024-09-05 DIAGNOSIS — I50.21 ACUTE SYSTOLIC HEART FAILURE (MULTI): Primary | ICD-10-CM

## 2024-09-05 DIAGNOSIS — I50.21 ACUTE SYSTOLIC HEART FAILURE (MULTI): ICD-10-CM

## 2024-09-05 DIAGNOSIS — R06.02 SOB (SHORTNESS OF BREATH): ICD-10-CM

## 2024-09-05 DIAGNOSIS — F41.9 ANXIETY: ICD-10-CM

## 2024-09-05 DIAGNOSIS — I50.9 CONGESTIVE HEART FAILURE, UNSPECIFIED HF CHRONICITY, UNSPECIFIED HEART FAILURE TYPE (MULTI): ICD-10-CM

## 2024-09-05 DIAGNOSIS — E11.9 DIET-CONTROLLED TYPE 2 DIABETES MELLITUS (MULTI): Primary | ICD-10-CM

## 2024-09-05 DIAGNOSIS — F32.2 CURRENT SEVERE EPISODE OF MAJOR DEPRESSIVE DISORDER WITHOUT PSYCHOTIC FEATURES WITHOUT PRIOR EPISODE (MULTI): ICD-10-CM

## 2024-09-05 DIAGNOSIS — D50.9 IRON DEFICIENCY ANEMIA, UNSPECIFIED IRON DEFICIENCY ANEMIA TYPE: ICD-10-CM

## 2024-09-05 DIAGNOSIS — G47.00 INSOMNIA, UNSPECIFIED TYPE: ICD-10-CM

## 2024-09-05 DIAGNOSIS — G25.81 RESTLESS LEG SYNDROME: ICD-10-CM

## 2024-09-05 DIAGNOSIS — G40.909 SEIZURE DISORDER (MULTI): ICD-10-CM

## 2024-09-05 PROCEDURE — 99205 OFFICE O/P NEW HI 60 MIN: CPT | Performed by: STUDENT IN AN ORGANIZED HEALTH CARE EDUCATION/TRAINING PROGRAM

## 2024-09-05 PROCEDURE — 1036F TOBACCO NON-USER: CPT | Performed by: STUDENT IN AN ORGANIZED HEALTH CARE EDUCATION/TRAINING PROGRAM

## 2024-09-05 PROCEDURE — 99215 OFFICE O/P EST HI 40 MIN: CPT | Performed by: STUDENT IN AN ORGANIZED HEALTH CARE EDUCATION/TRAINING PROGRAM

## 2024-09-05 PROCEDURE — 1159F MED LIST DOCD IN RCRD: CPT | Performed by: STUDENT IN AN ORGANIZED HEALTH CARE EDUCATION/TRAINING PROGRAM

## 2024-09-05 PROCEDURE — RXMED WILLOW AMBULATORY MEDICATION CHARGE

## 2024-09-05 PROCEDURE — 93005 ELECTROCARDIOGRAM TRACING: CPT | Performed by: STUDENT IN AN ORGANIZED HEALTH CARE EDUCATION/TRAINING PROGRAM

## 2024-09-05 RX ORDER — VENLAFAXINE 75 MG/1
75 TABLET ORAL NIGHTLY
Qty: 90 TABLET | Refills: 1 | Status: SHIPPED | OUTPATIENT
Start: 2024-09-05

## 2024-09-05 RX ORDER — CARVEDILOL 3.12 MG/1
3.12 TABLET ORAL 2 TIMES DAILY
Qty: 180 TABLET | Refills: 3 | Status: SHIPPED | OUTPATIENT
Start: 2024-09-05

## 2024-09-05 RX ORDER — SPIRONOLACTONE 25 MG/1
25 TABLET ORAL DAILY
Qty: 90 TABLET | Refills: 3 | Status: CANCELLED | OUTPATIENT
Start: 2024-09-05 | End: 2025-09-05

## 2024-09-05 RX ORDER — ATORVASTATIN CALCIUM 80 MG/1
80 TABLET, FILM COATED ORAL DAILY
Qty: 90 TABLET | Refills: 3 | Status: SHIPPED | OUTPATIENT
Start: 2024-09-05

## 2024-09-05 RX ORDER — FERROUS SULFATE 325(65) MG
325 TABLET, DELAYED RELEASE (ENTERIC COATED) ORAL
Qty: 90 TABLET | Refills: 1 | Status: SHIPPED | OUTPATIENT
Start: 2024-09-05

## 2024-09-05 RX ORDER — GABAPENTIN 300 MG/1
300 CAPSULE ORAL DAILY
Qty: 90 CAPSULE | Refills: 1 | Status: SHIPPED | OUTPATIENT
Start: 2024-09-05

## 2024-09-05 RX ORDER — CHOLECALCIFEROL (VITAMIN D3) 125 MCG
125 CAPSULE ORAL DAILY
Qty: 90 CAPSULE | Refills: 3 | Status: SHIPPED | OUTPATIENT
Start: 2024-09-05

## 2024-09-05 RX ORDER — FUROSEMIDE 20 MG/1
20 TABLET ORAL DAILY
Qty: 90 TABLET | Refills: 3 | Status: SHIPPED | OUTPATIENT
Start: 2024-09-05

## 2024-09-05 RX ORDER — VALPROIC ACID 250 MG/1
750 CAPSULE, LIQUID FILLED ORAL NIGHTLY
Qty: 270 CAPSULE | Refills: 3 | Status: SHIPPED | OUTPATIENT
Start: 2024-09-05 | End: 2025-09-05

## 2024-09-05 RX ORDER — BUSPIRONE HYDROCHLORIDE 5 MG/1
5 TABLET ORAL 2 TIMES DAILY
Qty: 180 TABLET | Refills: 0 | Status: SHIPPED | OUTPATIENT
Start: 2024-09-05

## 2024-09-05 RX ORDER — SPIRONOLACTONE 25 MG/1
25 TABLET ORAL DAILY
Qty: 90 TABLET | Refills: 3 | Status: SHIPPED | OUTPATIENT
Start: 2024-09-05 | End: 2025-09-05

## 2024-09-05 RX ORDER — SPIRONOLACTONE 25 MG/1
25 TABLET ORAL DAILY
Qty: 90 TABLET | Refills: 3 | OUTPATIENT
Start: 2024-09-05

## 2024-09-05 RX ORDER — DOXEPIN HYDROCHLORIDE 10 MG/1
10 CAPSULE ORAL NIGHTLY
Qty: 90 CAPSULE | Refills: 1 | Status: SHIPPED | OUTPATIENT
Start: 2024-09-05

## 2024-09-05 RX ORDER — SPIRONOLACTONE 25 MG/1
25 TABLET ORAL DAILY
Qty: 30 TABLET | Refills: 11 | Status: SHIPPED | OUTPATIENT
Start: 2024-09-05 | End: 2024-09-05 | Stop reason: SDUPTHER

## 2024-09-05 ASSESSMENT — ENCOUNTER SYMPTOMS: DEPRESSION: 0

## 2024-09-05 NOTE — PROGRESS NOTES
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  My final recommendations will be communicated back to the requesting clinician  by way of shared Medical record.     Review of the electronic medical record shows a past medical history significant for seizure disorder that started at age 2 years well maintained on antiepileptic therapy, history of lymphoma last dose of rituximab approximately 2019 (diagnosed on bone marrow biopsy) and this in remission (has follow-up with oncology), history of right shoulder melanoma status post resection.  She has been diagnosed with HFrEF detected on echocardiogram 6/2024 with LVEF 20-25%, regional wall motion abnormalities, and on RHC/LHC 7/2024 RA mean 4     PA 43/22/30   PCWP 18     [place] 4.2 / CI 2.2.  She has mild nonobstructive CAD.    She reports that she has struggled with profound fatigue for approximately 3 years that she associated with a return to the [place] from [place], but also coincided temporarily with her courses of rituximab.  She used to struggle with near collapse, dyspnea and a sensation that she could never draw full breath.  Since her diagnosis with heart failure at the initiation of heart failure pharmacotherapy she feels much improved.    She no longer has chest discomfort very rarely have a fleeting episode of dyspnea \"every now and then\" but she endorses feeling much improved generally.  She denies orthopnea, PND, leg swelling.  She does describe a sensation of leg \"buzzing\" when she stands.    She has started cardiac rehabilitation but finds this challenging because of right sided hip pain.  She will also try to work in rehab for her hip.  We did discuss that if right hip pain is the predominant issue and is limited in cardiac rehab, we may consider rehabbing her right hip first and then reintroducing " "cardiac rehab when she can.    She is strictly adherent with all heart failure medications    Surgical Hx:  - Batiatric surgery ( [facility])~ 2000  - right shoulder melanoma removal and right axillary lymph node removal    Past Obstetric Hx:  - G3  - No cardiac complications of pregnancy    Social Hx:  - Smoking- never   - ETOH- rare use  - Illicit drugs- never   - Lives with  and 8 pets.  She feels safe at home    Family Hx:  Specifically, there is no family history of  CAD, heart failure, ICD, PPM, LVAD, OHT, arrhythmias, CVA, or sudden cardiac death.    Daughter- fatal PE at age 33 years   Maternal gmother - \" enlarged heart\"    Medication reconciliation completed, see below.     Medication Documentation Review Audit       Reviewed by Roz Johnson RN (Registered Nurse) on 09/05/24 at 0908      Medication Order Taking? Sig Documenting Provider Last Dose Status   albuterol (Ventolin HFA) 90 mcg/actuation inhaler [id number] No Inhale 2 puffs every 4 hours if needed for wheezing or shortness of breath.   Patient not taking: Reported on 7/24/2024    Joanna Garza MD PhD Not Taking Flag for Review   atorvastatin (Lipitor) 80 mg tablet [id number] Yes Take 1 tablet (80 mg) by mouth once daily. Joanna Garza MD PhD Taking Active   busPIRone (Buspar) 5 mg tablet [id number] Yes Take 1 tablet (5 mg) by mouth 2 times a day. Joanna Garza MD PhD Taking Active   carvedilol (Coreg) 3.125 mg tablet [id number] Yes Take 1 tablet (3.125 mg) by mouth 2 times a day. Joanna Garza MD PhD Taking Active   cholecalciferol (Vitamin D-3) 125 MCG (5000 UT) capsule [id number] Yes Take by mouth q 24 HR. Historical Provider, MD Taking Active   doxepin (SINEquan) 10 mg capsule [id number] Yes Take 1 capsule (10 mg) by mouth once daily at bedtime. Joanna Garza MD PhD Taking Active   empagliflozin (Jardiance) 10 mg [id number] No Take 1 tablet (10 mg) by mouth once daily.   Patient not taking: Reported on 9/5/2024    Dario Baig MD " "Not Taking Active   ferrous sulfate 325 (65 Fe) MG EC tablet [id number] Yes Take 1 tablet by mouth once daily with breakfast. Do not crush, chew, or split. Joanna Garza MD PhD Taking Active   furosemide (Lasix) 20 mg tablet [id number] Yes Take 1 tablet (20 mg) by mouth once daily. Joanna Garza MD PhD Taking Active   gabapentin (Neurontin) 300 mg capsule [id number] Yes TAKE 1 CAPSULE(300 MG) BY MOUTH bid   Patient taking differently: Take 1 capsule (300 mg) by mouth once daily.    Joanna Garza MD PhD Taking Active   hydrALAZINE (Apresoline) 10 mg tablet [id number] Yes Take 1 tablet (10 mg) by mouth 3 times a day. Joanna Garza MD PhD Taking Active   isosorbide dinitrate (Isordil) 10 mg tablet [id number] Yes Take 1 tablet (10 mg) by mouth 3 times a day. Joanna Garza MD PhD Taking Active   sacubitriL-valsartan (Entresto) 24-26 mg tablet [id number] Yes Take 1 tablet by mouth 2 times a day. Dario Baig MD Taking Active   valproic acid (Depakene) 250 mg capsule [id number] Yes Take 3 capsules (750 mg) by mouth once daily at bedtime. Joanna Garza MD PhD Taking Active   venlafaxine (Effexor) 75 mg tablet [id number] Yes TAKE 2 TABLETS(150 MG) BY MOUTH EVERY DAY AT BEDTIME   Patient taking differently: Take 1 tablet (75 mg) by mouth once daily at bedtime.    Joanna Garza MD PhD Taking Active                   ROS     Investigations:    The electronic medical record has been reviewed by me for salient history. All cardiovascular imaging and testing available in the electronic medical record, and [facility] has been reviewed. The most recent ECG (9/5/2024) has been reviewed independently by me.      9/5/2024: LBBB, QRS  ~ 150 ms    Visit Vitals  /74 (BP Location: Left arm, Patient Position: Sitting, BP Cuff Size: Large adult)   Pulse 82   Resp 18   Ht 1.575 m (5' 2\")   Wt 95.7 kg (211 lb)   LMP  (LMP Unknown)   SpO2 97%   BMI 38.59 kg/m²   OB Status Postmenopausal   Smoking Status Never   BSA 2.05 m²       She " brought her blood pressure log for the last month or so, this has been scanned into [facility] 9/5/2024    On examination:    Very pleasant obese elderly  woman in no apparent CP or painful distress  Well groomed   Neck: No JVD or HJR  CVS: HS 1,2.   No added sounds  Resp: CTA bilaterally. Percussion note resonant  Abdomen: Obese, SNT, BS wnl  Extremities: No pedal oedema  Skin: warm and dry  CNS: AO x 4, mildly hearing impaired    Lab Results   Component Value Date    WBC 8.2 06/30/2024    HGB 10.8 (L) 06/30/2024    HCT 32.8 (L) 06/30/2024    MCV 87 06/30/2024     06/30/2024       Chemistry    Lab Results   Component Value Date/Time     06/30/2024 0514    K 3.7 06/30/2024 0514     06/30/2024 0514    CO2 25 06/30/2024 0514    BUN 16 06/30/2024 0514    CREATININE 0.67 06/30/2024 0514    Lab Results   Component Value Date/Time    CALCIUM 8.1 (L) 06/30/2024 0514    ALKPHOS 76 06/25/2024 1222    AST 15 06/25/2024 1222    ALT 13 06/25/2024 1222    BILITOT 0.4 06/25/2024 1222        Transthoracic echo (TTE) complete    Result Date: 6/26/2024              [place] [address]      Phone [phone] Fax [phone] TRANSTHORACIC ECHOCARDIOGRAM REPORT Patient Name:      NATALIE Gonzales Physician:    [id number] Bryn Polo DO Study Date:        6/26/2024             Ordering Provider:    [id number] GABBIE ALBA MRN/PID:           [id number]              Fellow: Accession#:        [id number]          Nurse:                Li Sagastume Date of Birth/Age: 1945 / 78 years  Sonographer:          Viky Christopher RDCS Gender:            F                     Additional Staff: Height:            157.48 cm             Admit Date:           6/25/2024 Weight:            95.26 kg              Admission Status:     Inpatient -                                                                 Routine BSA / BMI:         1.95 m2 / 38.41 kg/m2 Department Location:  [facility] Echo                                                                Lab Blood Pressure: 153 /93 mmHg Study Type:    TRANSTHORACIC ECHO (TTE) COMPLETE Diagnosis/ICD: Other chest pain-R07.89 Indication:    Chest Pain CPT Codes:     Echo Complete w Full Doppler-[id number]  Study Detail: The following Echo studies were performed: 2D, M-Mode, Doppler and               color flow. Optison used as a contrast agent for endocardial               border definition. Total contrast used for this procedure was 3 mL               via IV push.  PHYSICIAN INTERPRETATION: Left Ventricle: The left ventricular systolic function is severely decreased, with a Sorenson's biplane calculated ejection fraction of 22%. There are multiple wall motion abnormalities. The left ventricular cavity size is normal. The left ventricular septal wall thickness is normal. There is normal left ventricular posterior wall thickness. Spectral Doppler shows an abnormal pattern of left ventricular diastolic filling. LV Wall Scoring: The RCA distribution, entire septum, and entire apex are akinetic. The basal and mid anterior wall, basal and mid anterolateral wall, and basal and mid inferolateral wall are hypokinetic. Left Atrium: The left atrium is upper limits of normal in size. Right Ventricle: The right ventricle is normal in size. There is normal right ventricular global systolic function. Right Atrium: The right atrium is normal in size. Aortic Valve: The aortic valve is trileaflet. The aortic valve dimensionless index is 0.69. There is trace to mild aortic valve regurgitation. The peak instantaneous gradient of the aortic valve is 6.0 mmHg. The mean gradient of the aortic valve is 3.0 mmHg. Mitral Valve: The mitral valve is mildly thickened. There is moderate mitral valve regurgitation. Tricuspid Valve: The tricuspid valve  is structurally normal. There is mild tricuspid regurgitation. The Doppler estimated RVSP is moderately elevated at 49.0 mmHg. Pulmonic Valve: The pulmonic valve is structurally normal. There is no indication of pulmonic valve regurgitation. Pericardium: There is no pericardial effusion noted. Aorta: The aortic root is normal.  CONCLUSIONS:  1. Multiple segmental abnormalities exist. See findings.  2. The left ventricular systolic function is severely decreased, with a Sorenson's biplane calculated ejection fraction of 22%.  3. There are multiple wall motion abnormalities.  4. Spectral Doppler shows an abnormal pattern of left ventricular diastolic filling.  5. There is normal right ventricular global systolic function.  6. Moderate mitral valve regurgitation.  7. Moderately elevated right ventricular systolic pressure. QUANTITATIVE DATA SUMMARY: 2D MEASUREMENTS:                          Normal Ranges: Ao Root d:     3.20 cm   (2.0-3.7cm) LAs:           3.60 cm   (2.7-4.0cm) IVSd:          1.20 cm   (0.6-1.1cm) LVPWd:         0.90 cm   (0.6-1.1cm) LVIDd:         4.70 cm   (3.9-5.9cm) LVIDs:         4.40 cm LV Mass Index: 90.1 g/m2 LV % FS        6.4 % LA VOLUME:                               Normal Ranges: LA Vol A4C:        52.0 ml    (22+/-6mL/m2) LA Vol A2C:        68.9 ml LA Vol BP:         61.9 ml LA Vol Index A4C:  26.7ml/m2 LA Vol Index A2C:  35.3 ml/m2 LA Vol Index BP:   31.7 ml/m2 LA Area A4C:       19.5 cm2 LA Area A2C:       21.7 cm2 LA Major Axis A4C: 6.2 cm LA Major Axis A2C: 5.8 cm LA Volume Index:   25.3 ml/m2 RA VOLUME BY A/L METHOD:                       Normal Ranges: RA Area A4C: 12.5 cm2 AORTA MEASUREMENTS:                      Normal Ranges: Ao Sinus, d: 3.20 cm (2.1-3.5cm) Asc Ao, d:   3.11 cm (2.1-3.4cm) LV SYSTOLIC FUNCTION BY 2D PLANIMETRY (MOD):                      Normal Ranges: EF-A4C View:    26 % (>=55%) EF-A2C View:    22 % EF-Biplane:     22 % LV EF Reported: 22 % LV DIASTOLIC  "FUNCTION:                         Normal Ranges: MV Peak E:    1.54 m/s  (0.7-1.2 m/s) MV e'         0.103 m/s (>8.0) MV lateral e' 0.13 m/s MV medial e'  0.07 m/s E/e' Ratio:   14.97     (<8.0) MITRAL VALVE:                 Normal Ranges: MV DT: 138 msec (150-240msec) MITRAL INSUFFICIENCY:                           Normal Ranges: PISA Radius:  0.5 cm MR VTI:       159.00 cm MR Vmax:      484.00 cm/s MR Alias Ángel: 38.5 cm/s MR Volume:    19.87 ml MR Flow Rt:   60.48 ml/s MR EROA:      0.12 cm2 AORTIC VALVE:                                   Normal Ranges: AoV Vmax:                1.22 m/s (<=1.7m/s) AoV Peak P.0 mmHg (<20mmHg) AoV Mean PG:             3.0 mmHg (1.7-11.5mmHg) LVOT Max Ángel:            1.01 m/s (<=1.1m/s) AoV VTI:                 22.60 cm (18-25cm) LVOT VTI:                15.50 cm LVOT Diameter:           2.00 cm  (1.8-2.4cm) AoV Area, VTI:           2.15 cm2 (2.5-5.5cm2) AoV Area,Vmax:           2.60 cm2 (2.5-4.5cm2) AoV Dimensionless Index: 0.69  RIGHT VENTRICLE: RV Basal 3.00 cm RV Mid   2.50 cm RV Major 7.6 cm TAPSE:   23.3 mm RV s'    0.18 m/s TRICUSPID VALVE/RVSP:                             Normal Ranges: Peak TR Velocity: 3.39 m/s RV Syst Pressure: 49.0 mmHg (< 30mmHg) IVC Diam:         1.60 cm  [id number] Bryn Polo DO Electronically signed on 2024 at 10:25:55 AM  Wall Scoring  ** Final **        IMPRESSION:  79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  She has a past medical history significant for seizure disorder that started at age 2 years well maintained on antiepileptic therapy, history of lymphoma last dose of rituximab approximately 2019 (diagnosed on bone marrow biopsy) and this in remission (has follow-up with oncology), history of right shoulder melanoma status post resection.  She has been diagnosed with HFrEF detected on echocardiogram 2024 with LVEF 20-25%, regional wall motion " abnormalities, and on RHC/LHC 7/2024 RA mean 4     PA 43/22/30   PCWP 18     [place] 4.2 / CI 2.2.  She has mild nonobstructive CAD.    NYHA Functional Class: 2-3 (limited by right hip pain)  ACC/AHA Stage C heart failure  Volume status: Euvolemic  Perfusion status: Warm to touch  Aetiology: Nonischemic    PLAN:    #HFrEF  - ECG reviewed today, evidence of left bundle branch block  -Medication optimization as below  - TTE,  CPET once heart failure pharmacotherapy is at goal  - Labs before next visit: CBC, comprehensive panel, TSH with auto reflex, iron, TIBC, ferritin,Vitamin B12,  folate, HUNTER with auto reflex, BNP, lipids  -Once medications are optimised will consider need for device therapy, QRS is 150 ms on most recent ECG (9/5/2024)  -Heart failure lifestyle modifications discussed and Qs answered.  She was provided with a living with heart failure booklet 9/5/2024    -Medication optimisation:  BB:  RAASi: Increased sacubitril/valsartan to 49/51 mg twice daily  AA: Start spironolactone 25 mg once daily, with close renal function monitoring  SGLT2i       we discussed potential side effects   Hydralazine-held today to allow for ARNI up titration  Isosorbide dinitrate-held today to allow for early up titration    COUNSELING:   We discussed the following non-pharmacological measures during this visit:  ·Smoking and alcohol abstinence/cessation, if applicable.  ·Dietary and medication compliance (in particular, salt restriction)  ·Monitoring daily weights and blood pressures  ·Exercise regimen (walking)    Heart Failure Education Booklet given: 9/5/2024    This note was transcribed using the Dragon Dictation system. There may be grammatical, punctuation, or verbiage errors that can occur with voice recognition programs.    Antonieta Hewitt MD PhD

## 2024-09-05 NOTE — PROGRESS NOTES
Pharmacy Post-Discharge Visit - Follow Up     Sue Vail is a 79 y.o. female was referred to Clinical Pharmacy Team to complete a post-discharge medication optimization and monitoring visit.  The patient was referred for their CHF management.     Referring Provider: Michel Ratliff MD  PCP: Joanna Garza MD PhD - last visit: 7/2/24, next visit: not scheduled       Subjective   Allergies   Allergen Reactions    Ace Inhibitors Unknown     Hypotension, Hypotension    Adhesive Tape-Silicones Unknown     Adhesive Tape TAPE and bandages    Beta-Blockers (Beta-Adrenergic Blocking Agts) Unknown     Hypotension, Hypotension    Fluoxetine Unknown     cns, cns    Nsaids (Non-Steroidal Anti-Inflammatory Drug) Unknown    Olanzapine Unknown    Other Omega-3s Unknown     all ssri    Penicillins Hives    Poison Ivy Extract Hives     Per patient has to be hospitalized       [facility]  [id number] [place] [address], [address]  [place] [id number]  Phone: [phone] Fax: [phone]      Medication System Management:  Affordability/Accessibility: approved for PAP  Adherence/Organization: no issues       Social History     Social History Narrative    Not on file          HPI  CHF ASSESSMENT  Staging:  Most recent ejection fraction: 22%  NYHA Stage: II  ACC/AHA Stage: C     Symptom Assessment:  Weight changes/edema?: No   Dyspnea?: None  Dizziness/syncope/palpitations?: No     Current Regimen:  ARNI/ACEi/ARB: Yes - entresto   Beta Blocker: Yes - carvedilol   MRA: No  SGLT2i: Yes - Jardiance      Other therapy:  Lasix   Hydralazine   Isosorbide dinitrate      Secondary Prevention:  The ASCVD Risk score (Carmine MARCOS, et al., 2019) failed to calculate for the following reasons:    The patient has a prior MI or stroke diagnosis     Aspirin 81mg? no  Statin?: Yes - atorvastatin   HTN?: Yes - has been well controlled     Review of Systems        Objective     LMP  (LMP Unknown)    BP Readings from Last 4 Encounters:   09/05/24  128/74   08/19/24 108/64   07/31/24 139/82   07/26/24 126/78      There were no vitals filed for this visit.     LAB  Lab Results   Component Value Date    BILITOT 0.4 06/25/2024    CALCIUM 8.1 (L) 06/30/2024    CO2 25 06/30/2024     06/30/2024    CREATININE 0.67 06/30/2024    GLUCOSE 111 (H) 06/30/2024    ALKPHOS 76 06/25/2024    K 3.7 06/30/2024    PROT 6.2 (L) 06/25/2024     06/30/2024    AST 15 06/25/2024    ALT 13 06/25/2024    BUN 16 06/30/2024    ANIONGAP 9 (L) 06/30/2024    MG 2.10 06/30/2024    PHOS 5.6 (H) 06/28/2024     05/05/2023    ALBUMIN 3.4 06/28/2024    GFRF 89 07/27/2023     Lab Results   Component Value Date    TRIG 104 05/02/2024    CHOL 204 (H) 05/02/2024    LDLCALC 120 (H) 05/02/2024    HDL 63.4 05/02/2024     Lab Results   Component Value Date    HGBA1C 6.3 (H) 05/02/2024         Current Outpatient Medications   Medication Instructions    albuterol (Ventolin HFA) 90 mcg/actuation inhaler 2 puffs, inhalation, Every 4 hours PRN    atorvastatin (LIPITOR) 80 mg, oral, Daily    busPIRone (BUSPAR) 5 mg, oral, 2 times daily    carvedilol (COREG) 3.125 mg, oral, 2 times daily    cholecalciferol (Vitamin D-3) 125 MCG (5000 UT) capsule Take by mouth q 24 HR.    doxepin (SINEQUAN) 10 mg, oral, Nightly    ferrous sulfate 325 (65 Fe) MG EC tablet 1 tablet, oral, Daily with breakfast, Do not crush, chew, or split.    furosemide (LASIX) 20 mg, oral, Daily    gabapentin (Neurontin) 300 mg capsule TAKE 1 CAPSULE(300 MG) BY MOUTH bid    Jardiance 10 mg, oral, Daily    sacubitriL-valsartan (Entresto) 49-51 mg tablet 1 tablet, oral, 2 times daily    spironolactone (ALDACTONE) 25 mg, oral, Daily    valproic acid (DEPAKENE) 750 mg, oral, Nightly    venlafaxine (Effexor) 75 mg tablet TAKE 2 TABLETS(150 MG) BY MOUTH EVERY DAY AT BEDTIME            Assessment/Plan   Problem List Items Addressed This Visit    None    Seizures   No activity since being home   Continue with VPA  Also taking gabapentin  daily   CHF  Most recent EF was 22% while admitted   Current GDMT --> carvedilol, jardiance and entresto   Saw cardio earlier today and increased entresto to 49-51mg bid   Also started spironolactone 25mg daily   Lasix daily   Continue to log BP's and weights daily   BP today 128/74, HR 82  Stopped isosorbide and hydralazine to up-titrate GDMT   Really happy with how well cardiac rehab went for her too and is looking forward to continue to go      PAP:  -she has been approved for both the entresto and jardiance!  -both were shipped on 7/23 for 90 days each   -also interested in switching all meds to  pharmacy, but just received 90 day supplies from her pharmacy so will not be due to fill just quite yet    Follow Up: as needed     Continue all meds under the continuation of care with the referring provider and clinical pharmacy team.    Michael Milligan, PharmD     Verbal consent to manage patient's drug therapy was obtained from the patient. They were informed they may decline to participate or withdraw from participation in pharmacy services at any time.

## 2024-09-05 NOTE — PATIENT INSTRUCTIONS
Thank you for coming in today. If you have any questions or concerns, you may call the Heart Failure Office at [phone] option [phone], or [phone].  You may also contact our heart failure nursing team via email on [email or web address].    For quicker results set-up your  [employer] account to receive results and other correspondence directly to your phone.    Please bring all your pills/medications to your Cardiology appointments.    **  - Please make the following medication changes:  1.  STOP isosorbide    2.  STOP hydralazine    3.  INCREASE Entresto to 49/51 mg twice daily    4.  START spironolactone 25 mg once daily    - Please have the following tests done:  1.Blood tests in 1 week (CBC, comprehensive panel, TSH with auto reflex, iron, TIBC, ferritin,Vitamin B12,  folate, HUNTER with auto reflex, BNP)    2.  Blood tests in 4 weeks (RFP)    - Please make an appointment to be seen in 5 to 8 weeks

## 2024-09-06 ENCOUNTER — PHARMACY VISIT (OUTPATIENT)
Dept: PHARMACY | Facility: CLINIC | Age: 79
End: 2024-09-06
Payer: COMMERCIAL

## 2024-09-06 ENCOUNTER — APPOINTMENT (OUTPATIENT)
Dept: CARDIAC REHAB | Facility: HOSPITAL | Age: 79
End: 2024-09-06
Payer: MEDICARE

## 2024-09-07 LAB
ATRIAL RATE: 82 BPM
P AXIS: 33 DEGREES
P OFFSET: 188 MS
P ONSET: 129 MS
PR INTERVAL: 162 MS
Q ONSET: 210 MS
QRS COUNT: 14 BEATS
QRS DURATION: 152 MS
QT INTERVAL: 422 MS
QTC CALCULATION(BAZETT): 493 MS
QTC FREDERICIA: 468 MS
R AXIS: -44 DEGREES
T AXIS: 109 DEGREES
T OFFSET: 421 MS
VENTRICULAR RATE: 82 BPM

## 2024-09-09 ENCOUNTER — APPOINTMENT (OUTPATIENT)
Dept: CARDIAC REHAB | Facility: HOSPITAL | Age: 79
End: 2024-09-09
Payer: MEDICARE

## 2024-09-10 ENCOUNTER — TELEPHONE (OUTPATIENT)
Dept: PRIMARY CARE | Facility: CLINIC | Age: 79
End: 2024-09-10
Payer: MEDICARE

## 2024-09-10 NOTE — TELEPHONE ENCOUNTER
Pt is requesting a new script of 100mg gabapentin capsule. Pt is taking 200mg with tylenol and says she is pain free with that.   [facility]

## 2024-09-11 ENCOUNTER — CLINICAL SUPPORT (OUTPATIENT)
Dept: CARDIAC REHAB | Facility: HOSPITAL | Age: 79
End: 2024-09-11
Payer: MEDICARE

## 2024-09-11 ENCOUNTER — OFFICE VISIT (OUTPATIENT)
Dept: PRIMARY CARE | Facility: CLINIC | Age: 79
End: 2024-09-11
Payer: MEDICARE

## 2024-09-11 ENCOUNTER — PHARMACY VISIT (OUTPATIENT)
Dept: PHARMACY | Facility: CLINIC | Age: 79
End: 2024-09-11
Payer: COMMERCIAL

## 2024-09-11 VITALS — DIASTOLIC BLOOD PRESSURE: 76 MMHG | SYSTOLIC BLOOD PRESSURE: 132 MMHG

## 2024-09-11 DIAGNOSIS — M25.551 BILATERAL HIP PAIN: Primary | ICD-10-CM

## 2024-09-11 DIAGNOSIS — I50.20 HFREF (HEART FAILURE WITH REDUCED EJECTION FRACTION) (MULTI): ICD-10-CM

## 2024-09-11 DIAGNOSIS — M25.552 BILATERAL HIP PAIN: Primary | ICD-10-CM

## 2024-09-11 PROCEDURE — G2211 COMPLEX E/M VISIT ADD ON: HCPCS | Performed by: FAMILY MEDICINE

## 2024-09-11 PROCEDURE — 99213 OFFICE O/P EST LOW 20 MIN: CPT | Performed by: FAMILY MEDICINE

## 2024-09-11 PROCEDURE — RXMED WILLOW AMBULATORY MEDICATION CHARGE

## 2024-09-11 PROCEDURE — G0008 ADMIN INFLUENZA VIRUS VAC: HCPCS | Performed by: FAMILY MEDICINE

## 2024-09-11 PROCEDURE — 90656 IIV3 VACC NO PRSV 0.5 ML IM: CPT | Performed by: FAMILY MEDICINE

## 2024-09-11 PROCEDURE — 93798 PHYS/QHP OP CAR RHAB W/ECG: CPT | Performed by: INTERNAL MEDICINE

## 2024-09-11 RX ORDER — GABAPENTIN 100 MG/1
200 CAPSULE ORAL 3 TIMES DAILY
Qty: 180 CAPSULE | Refills: 5 | Status: SHIPPED | OUTPATIENT
Start: 2024-09-11

## 2024-09-11 NOTE — ASSESSMENT & PLAN NOTE
Change regimen of neurontin. Cont tylenol recommend. Cont pt for hip pain  Orders:    gabapentin (Neurontin) 100 mg capsule; Take 2 capsules (200 mg) by mouth 3 times a day.

## 2024-09-11 NOTE — PROGRESS NOTES
Subjective   Patient ID: Sue Vail is a 79 y.o. female who presents for hip pain    HPI   B/l hip dull pain was not controlled with current dose of neurontin and tylenol. Pt prefers to try neurontin 200mg tid with tylenol.  Review of Systems    Objective   /76   LMP  (LMP Unknown)     Physical Exam  No distress, well groomed, No sclera icterus. normal respiration, lungs: CTA b/l, heart: RRR, mild b/l hip tenderness, decreased rom at b/l hips.  normal strength and sensation at low extremities, good balance, No depressed mood.    Assessment/Plan   Assessment & Plan  Bilateral hip pain  Change regimen of neurontin. Cont tylenol recommend. Cont pt for hip pain  Orders:    gabapentin (Neurontin) 100 mg capsule; Take 2 capsules (200 mg) by mouth 3 times a day.    Patient is not sick today. Patient is not anxious about getting a shot today. Patient has never had Guillain Barré syndrome. Patient has never felt dizzy or faint before, during, or after a shot. Patient has never had a serious reaction to influenza vaccine in the past.  Patient has never had an allergy to an ingredient of influenza vaccine.  Flu shot was given via IM today.

## 2024-09-12 ENCOUNTER — APPOINTMENT (OUTPATIENT)
Dept: CARDIOLOGY | Facility: CLINIC | Age: 79
End: 2024-09-12
Payer: MEDICARE

## 2024-09-13 ENCOUNTER — DOCUMENTATION (OUTPATIENT)
Dept: CARDIAC REHAB | Facility: HOSPITAL | Age: 79
End: 2024-09-13
Payer: MEDICARE

## 2024-09-13 ENCOUNTER — CLINICAL SUPPORT (OUTPATIENT)
Dept: CARDIAC REHAB | Facility: HOSPITAL | Age: 79
End: 2024-09-13
Payer: MEDICARE

## 2024-09-13 VITALS
DIASTOLIC BLOOD PRESSURE: 60 MMHG | WEIGHT: 210.6 LBS | BODY MASS INDEX: 38.76 KG/M2 | HEIGHT: 62 IN | SYSTOLIC BLOOD PRESSURE: 100 MMHG

## 2024-09-13 DIAGNOSIS — I50.20 HFREF (HEART FAILURE WITH REDUCED EJECTION FRACTION) (MULTI): ICD-10-CM

## 2024-09-13 PROCEDURE — 93798 PHYS/QHP OP CAR RHAB W/ECG: CPT | Performed by: INTERNAL MEDICINE

## 2024-09-13 ASSESSMENT — PATIENT HEALTH QUESTIONNAIRE - PHQ9
5. POOR APPETITE OR OVEREATING: MORE THAN HALF THE DAYS
SUM OF ALL RESPONSES TO PHQ QUESTIONS 1-9: 7
1. LITTLE INTEREST OR PLEASURE IN DOING THINGS: SEVERAL DAYS
3. TROUBLE FALLING OR STAYING ASLEEP: MORE THAN HALF THE DAYS
10. IF YOU CHECKED OFF ANY PROBLEMS, HOW DIFFICULT HAVE THESE PROBLEMS MADE IT FOR YOU TO DO YOUR WORK, TAKE CARE OF THINGS AT HOME, OR GET ALONG WITH OTHER PEOPLE: SOMEWHAT DIFFICULT
2. FEELING DOWN, DEPRESSED OR HOPELESS: NOT AT ALL
SUM OF ALL RESPONSES TO PHQ9 QUESTIONS 1 & 2: 1
9. THOUGHTS THAT YOU WOULD BE BETTER OFF DEAD, OR OF HURTING YOURSELF: NOT AT ALL
6. FEELING BAD ABOUT YOURSELF - OR THAT YOU ARE A FAILURE OR HAVE LET YOURSELF OR YOUR FAMILY DOWN: NOT AT ALL
4. FEELING TIRED OR HAVING LITTLE ENERGY: MORE THAN HALF THE DAYS
8. MOVING OR SPEAKING SO SLOWLY THAT OTHER PEOPLE COULD HAVE NOTICED. OR THE OPPOSITE, BEING SO FIGETY OR RESTLESS THAT YOU HAVE BEEN MOVING AROUND A LOT MORE THAN USUAL: NOT AT ALL
7. TROUBLE CONCENTRATING ON THINGS, SUCH AS READING THE NEWSPAPER OR WATCHING TELEVISION: NOT AT ALL

## 2024-09-13 NOTE — PROGRESS NOTES
Cardiac Rehabilitation 60 Day Reassessment    Name: Sue Vail  Medical Record Number: [id number]  YOB: 1945  Age: 79 y.o.    Today’s Date: 9/13/2024  Primary Care Physician: Joanna Garza MD PhD  Referring Physician: Katya Guzman MD  Program Location: [facility]  Primary Diagnosis:   HFrEF (heart failure with reduced ejection fraction)      Onset/Date of Diagnosis: 6/26/2024    Session #: 14    AACVPR Risk Stratification:   Moderate    Falls Risk: High  Psychosocial Assessment       Initial assessment- Sent PH-Q 9 to MD if score > 20: No; score < 20. Initial PHQ2 score +3. PHQ9 indicated, score +15, indicates moderately severe depression.   8/19/2024 30 day eval-PHQ9 result- +12, indicates moderate depression.  9/13/2024 60 day eval-PHQ9 result- +7, indicates mild depression.    Pt reported/currently experiencing stress: Yes; Stress; Severity: moderate and Depression; Severity: moderate  Patient uses stress management skills: Yes   History of: depression  Currently seeing a mental health provider: No  Social Support: Yes, Whom:spouse  Quality of Life Survey: Initial assessment- Sent PH-Q 9 to MD if score > 20: No; score < 20. Initial PHQ2 score +3. PHQ9 indicated, score +15, indicates moderately severe depression.   8/19/2024 30 day eval-PHQ9 result- +12, indicates moderate depression.  9/13/2024 60 day eval-PHQ9 result- +7, indicates mild depression.  Learning Assessment:  Learning assessment/barriers: None  Preferred learning method: Visual and Reading handout  Barriers: None  Comments:    Stages of Change:Action    Psychosocial Plan    Goal Status: In progress    Psychosocial Goals: Demonstrating proper techniques for stress management, Maintain or lower PH-Q 9 score by discharge, and Identify strategies for managing depression    Psychosocial Interventions/Education: To be done in Cardiac Rehab.  Pt. questioned re: new stress, anxiety, or depressive symptoms.  Pt. Instructed on 3  Minute Breathing Space mindfulness exercise and other stress management strategies like deep breathing, exercise, listening to music.  PHQ9 reevaluated and reviewed with pt.  8/19/2024 30 day eval- Reviewed stress management strategies like deep breathing, exercise, and listening to music.   9/13/2024 60 day eval- PHQ9 repeated. Pt. questioned re: new stress, anxiety, or depressive symptoms. Reviewed stress management strategies like deep breathing, exercise, and listening to music.    Initial Assessment: Pt. Reports history of depression, worse R/T present medical issues but feels it is improving on current medications. PHQ9 indicates moderately severe depression, will repeat at next 30 day evaluation. Pt. Reports stress R/T present medical issues but she is using spiritual and mindfulness strategies to manage.   8/19/2024 30 day eval- Pt. Reports that she is managing stress and using positive stress management strategies, she got a new dog recently. She reports occasional feeling of anxiety but managing without issue. She also reports that she feels her depression is improving, continues on medication. PHQ9 given to take home and return to reassess depression.   9/13/2024 60 day eval- Pt. reports occasional feeling of anxiety and feeling down, but managing without issue. She also reports that she feels her depression is improving, continues on medication. PHQ9 repeated at 30 and 60 day evals continue to improve with most recent PHQ9 score +7, indicates mild depression. She reports using deep breathing as a stress management strategy.       Nutrition Assessment:    Hyperlipidemia: Yes     Lipids:   Lab Results   Component Value Date    CHOL 204 (H) 05/02/2024    HDL 63.4 05/02/2024    LDLF 112 (H) 07/27/2023    TRIG 104 05/02/2024       Current Dietary Guidelines: Low fat, Low sodium, Fluid restrictions  Barriers to dietary change: no    Diet Habit Survey: Block Dietary Fat Screener  Pre:  Returned  Block Dietary  "Fat Screener, indicates <30% of calories are from fat. Reviewed with pt. And [facility] H/O \"Facts on Fat\" given.   Post: To be done at discharge.    Diabetes Assessment    Lab Results   Component Value Date    HGBA1C 6.3 (H) 05/02/2024       History of Diabetes: No    Weight Management  7/24/2024 Initial assessment:  Ht: 5'2\"  Wt: 212.4#  BMI: 38.84  9/13/2024 60 day eval-   Height: 157.5 cm (5' 2\")  Weight: 95.5 kg (210 lb 9.6 oz)  BMI (Calculated): 38.51      Nutrition Plan    Goal Status: In progress    Nutrition Goals: Lipid Goal: HDL>45, LDL <70, Total <180, Trigs <150 and Learn how to read and interpret nutrition labels prior to discharge  Learn more about heart healthy eating and maintain heart healthy, low NA diet.     Nutrition Interventions/Education:   To be done in Cardiac Rehab.  Reviewed recent lipids, HGBA1C, weight, BMI.  Pt. Encouraged to follow heart healthy diet and strategies discussed.  Pt. Encouraged to meet with  dietician.  Heart Failure booklet given.  Block dietary fat screener given.  8/19/2024 30 day eval- Metabolic Syndrome/DM education provided through educational video and H/O.   9/13/2024 60 day eval- Healthy Eating and Cholesterol education provided through educational videos, H/Os, and discussion.     Initial Assessment: Pt. Reports she is making heart healthy low NA (1500 MG)diet choices and is reading labels. She has an appointment to meet with  dietician.   8/19/2024 30 day eval- No new lipids or HGBA1C. Pt. Reports following a heart healthy diet and label reading. She has not met with  dietician at this time.   9/13/2024 60 day eval- No new lipids or HGBA1C. Pt. Reports following a heart healthy, low NA, mostly vegan diet. She reports that she had a recent appointment with an outpatient dietician. Weight is maintained.     Exercise Assessment    Some walking  Mode: walking   Frequency: 2-3 x week  Duration: 5 minutes    Exercise Prescription     Exercise Prescription based on: " Cole Activity Status Index (DASI) Score 4.0 METS    DASI Score: 4.0    MET Score:  2.0   Frequency:  3 days/week   Mode: Recumbent Cycle, Arm Ergometer, and Sci Fit Stepper   Duration: 40 total aerobic minutes   Intensity: RPE 11-13  Target HR:     MET Level: 3.0-6.0  Patient wears supplemental O2: No     Modality Workload METs Duration (minutes)   1 Pre-Exercise   5:00   2 Recumbent Bike 1  2.6 0:00   3 Arm Ergometer/UEE 5 springer  10:00   4 Sci Fit Steeper 1.4 2.5 5:00 ,rest, 5:00    5 Sci Fit Steeper 1.4 2.5 5:00 ,rest, 5:00    6 Post-Exercise   5:00     Resistance Training: Yes   Home Exercise Prescription given: To be given prior to discharge from program.    Exercise Plan    Goal Status: In progress    Exercise Goals:     Develop home exercise program 150 total minutes moderate intensity cardiac exercise weekly by end of cardiac rehab program.  30 minutes cardio exercise most days by end of program. Start moving more around house. No home walking until few sessions of rehab and comfortable to start.  Cardiac Rehab goal total 40 minutes exercise/session with increased duration by 1-2 minutes per modality for total 40 minutes/session.  Cardiac Rehab goal increased exercise intensity on TM/NS/RB/UEE based on pts. HR/BP/RPE/pain response to exercise.       Exercise Interventions/Education:   To be done in Cardiac Rehab.  Reviewed cardiac rehab exercise protocol including METS and RPE.  Exercise prescription based on 6MWT.  Encouraged home exercise 30 minutes/day most days not at CR. Start moving more around house.No home walking until few sessions of rehab and comfortable to start.      Initial Assessment: Pt. Reports sedentary. Encouraged  start moving more around house.No home walking until few sessions of rehab and comfortable to start.  8/19/2024 30 day eval-Pt. Is attending Cardiac Rehab 3x week and is now exercising 40 minutes/session at METs 2.4-2.6. In addition she has starting taking short walks at  home 5-10 minutes 2-3x week on days not at rehab.  9/13/2024 60 day clark Rogers is attending Cardiac Rehab 2-3 /week and has had some time off recently R/T left hip pain. She is doing an occasional 5 minute walk at home but difficult R/T hip pain. She has adjusted her Gabapentin schedule per her PCP advice and has an upcoming Physical Therapy eval. She wants to continue Cardiac Rehab so times of exercise has been decreased with periods of rest added. Not using RB at present. Will continue to assess and increase time to goal 40 minutes as tolerates.       Other Core Components/Risk Factor Assessment:    Medication adherence  Current Medications:   Medication Documentation Review Audit       Reviewed by Yun BlankD (Pharmacist) on 09/05/24 at 1425      Medication Order Taking? Sig Documenting Provider Last Dose Status   Patient not taking:  Discontinued 09/05/24 1414   atorvastatin (Lipitor) 80 mg tablet [id number] No Take 1 tablet (80 mg) by mouth once daily. Joanna Garza MD PhD Taking Active   busPIRone (Buspar) 5 mg tablet [id number] No Take 1 tablet (5 mg) by mouth 2 times a day. Joanna Garza MD PhD Taking Active   carvedilol (Coreg) 3.125 mg tablet [id number] No Take 1 tablet (3.125 mg) by mouth 2 times a day. Joanna Garza MD PhD Taking Active   cholecalciferol (Vitamin D-3) 125 MCG (5000 UT) capsule [id number] No Take by mouth q 24 HR. Historical Provider, MD Taking Active   doxepin (SINEquan) 10 mg capsule [id number] No Take 1 capsule (10 mg) by mouth once daily at bedtime. Joanna Garza MD PhD Taking Active   empagliflozin (Jardiance) 10 mg [id number] No Take 1 tablet (10 mg) by mouth once daily.   Patient not taking: Reported on 9/5/2024    Dario Baig MD Not Taking Active   ferrous sulfate 325 (65 Fe) MG EC tablet [id number] No Take 1 tablet by mouth once daily with breakfast. Do not crush, chew, or split. Joanna Garza MD PhD Taking Active   furosemide (Lasix) 20 mg tablet [id number] No  Take 1 tablet (20 mg) by mouth once daily. Joanna Garza MD PhD Taking Active   gabapentin (Neurontin) 300 mg capsule [id number] No TAKE 1 CAPSULE(300 MG) BY MOUTH bid   Patient taking differently: Take 1 capsule (300 mg) by mouth once daily.    Joanna Garza MD PhD Taking Active   Discontinued 09/05/24 0950   Discontinued 09/05/24 1219   Discontinued 09/05/24 0951   sacubitriL-valsartan (Entresto) 49-51 mg tablet [id number]  Take 1 tablet by mouth 2 times a day. Michel Ratliff MD  Active   spironolactone (Aldactone) 25 mg tablet [id number]  Take 1 tablet (25 mg) by mouth once daily. Antonieta Hewitt MD PhD  Active   valproic acid (Depakene) 250 mg capsule [id number] No Take 3 capsules (750 mg) by mouth once daily at bedtime. Joanna Garza MD PhD Taking Active   venlafaxine (Effexor) 75 mg tablet [id number] No TAKE 2 TABLETS(150 MG) BY MOUTH EVERY DAY AT BEDTIME   Patient taking differently: Take 1 tablet (75 mg) by mouth once daily at bedtime.    Joanna Garza MD PhD Taking Active                                 Medication compliance: Yes   Uses pill box/organizer: Yes    Carries medication list: Yes     Blood Pressure Management  History of Hypertension: No   Medication Changes: No   Resting BP:   9/13/2024 60 day eval- Resting BP Visit Vitals  /60 (Patient Position: Sitting)        Heart Failure Management  Hx of Heart Failure: Yes;   Type (selection): HFrEF  Most recent EF:   22%    Onset of heart failure diagnosis: 6/26/2024  Last heart failure hospitalization: 6/26/2024  Number of HF admissions per year: 1    Symptoms: Fatigue with exertion and Shortness of breath  Is there a family Hx of HF: No   Does patient obtain daily weight Yes       Heart Failure Reassessment: Reassessed every 30 days while in program.   8/19/2024 30 day eval-  Unplanned MD and/or ED Heart Failure visit: No  Hospitalization since starting program/last assessment: No  MD contacted regarding Heart Failure symptoms: No  Pt. Reports  following 2L fluid restriction, 2GM NA diet, weighs daily.   9/13/2024 60 day eval- Unplanned MD and/or ED Heart Failure visit: No  Hospitalization since starting program/last assessment: No  MD contacted regarding Heart Failure symptoms: No  Pt. Reports following 2L fluid restriction, 2GM NA diet, weighs daily.    Heart Failure Goals: Able to verbalize signs and symptoms of fluid retention and when to contact MD, Adhere to proper fluid restrictions, Adapt a low sodium diet and verbalize guidelines, and Obtain daily weight and verbalize proper method of obtaining weights        Smoking/Tobacco Assessment  Social History     Tobacco Use   Smoking Status Never   Smokeless Tobacco Never       Other Core Component Plan    Goal Status: In progress    Other Core Component Goals: Medication compliance Knowledge on medications and carries an updated medication list.    Other Core Component Interventions/Education:   Reviewed current medications, indications, dosages.  Encouraged pt. To carry updated medication list.  Review effects of exercise on BP.  BP at cardiac rehab pre and post exercise.  8/19/2024 30 day eval- Heart Parts, Cardiac Risk Factors, and Heart Failure education provided through educational videos, H/Os, and 1:1 discussion.   9/13/2024 60 day eval- Cardiac Rehab pretest has been returned, score 12/15 and reviewed.         Initial Assessment: Pt. Reports medication compliance, knowledge on medications and is now carrying an updated medication list. Checks BP am daily prior to medication.   8/19/2024 30 day eval- Pt. Reports compliant with medication, knowledgeable on medication. She has not been carrying a medication list but has one and will place in her purse.   9/13/2024 60 day eval- Pt. Reports compliant with medication, knowledgeable on medication.  No med changes except adjusting her Gabapentin schedule. She reports carrying an updated medication list. She reports checking her BP daily at home prior to  medication.     Individual Patient Goals:    Improve strength and endurance to cook, walk dogs, and walk without fatigue and SOB.   Learn correct moderate intensity cardiac exercise and start home exercise program to maintain by end of rehab program.   Learn more about heart healthy diet and make heart healthy diet choice     Goal Status: In progress    Staff Comments:  Reviewed cardiac rehab exercise protocol including METS and RPE.  Reviewed educational topics to be covered at Cardiac Rehab.   8/19/2024 30 day clark Rogers reports improved strength and endurance to exercise at rehab,   and has started some walking at home with less fatigue and SOB. She continues to work on making heart healthy diet choices. And label reading. No ER visits, hospitalizations, or falls since starting in the program.   9/13/2024 60 day clark Rogers reports having some improved strength and endurance but feels she is losing strength R/T hip issues that are affecting her ability to exercise. Will will continue to work on plan for her to maintain cardio exercise. She continues to work on making heart healthy diet choices and has met with a dietician recently. No ER visits, hospitalizations since starting in the program. She reports lost balance and fell in kitchen few weeks ago but denies injury.       Rehab Staff Signature: Laura Polo, RN

## 2024-09-16 ENCOUNTER — EVALUATION (OUTPATIENT)
Dept: PHYSICAL THERAPY | Facility: CLINIC | Age: 79
End: 2024-09-16
Payer: MEDICARE

## 2024-09-16 ENCOUNTER — APPOINTMENT (OUTPATIENT)
Dept: CARDIAC REHAB | Facility: HOSPITAL | Age: 79
End: 2024-09-16
Payer: MEDICARE

## 2024-09-16 DIAGNOSIS — M25.552 LEFT HIP PAIN: ICD-10-CM

## 2024-09-16 DIAGNOSIS — G89.29 CHRONIC MIDLINE LOW BACK PAIN WITH SCIATICA, SCIATICA LATERALITY UNSPECIFIED: Primary | ICD-10-CM

## 2024-09-16 DIAGNOSIS — M54.40 CHRONIC MIDLINE LOW BACK PAIN WITH SCIATICA, SCIATICA LATERALITY UNSPECIFIED: Primary | ICD-10-CM

## 2024-09-16 PROCEDURE — 97162 PT EVAL MOD COMPLEX 30 MIN: CPT | Mod: GP | Performed by: PHYSICAL THERAPIST

## 2024-09-16 ASSESSMENT — PAIN SCALES - GENERAL: PAINLEVEL_OUTOF10: 8

## 2024-09-16 ASSESSMENT — ENCOUNTER SYMPTOMS
LOSS OF SENSATION IN FEET: 0
DEPRESSION: 0
OCCASIONAL FEELINGS OF UNSTEADINESS: 1

## 2024-09-16 ASSESSMENT — PATIENT HEALTH QUESTIONNAIRE - PHQ9
1. LITTLE INTEREST OR PLEASURE IN DOING THINGS: NOT AT ALL
2. FEELING DOWN, DEPRESSED OR HOPELESS: NOT AT ALL
SUM OF ALL RESPONSES TO PHQ9 QUESTIONS 1 AND 2: 0

## 2024-09-16 ASSESSMENT — PAIN - FUNCTIONAL ASSESSMENT: PAIN_FUNCTIONAL_ASSESSMENT: 0-10

## 2024-09-16 NOTE — PROGRESS NOTES
Physical Therapy    Physical Therapy Lumbar Spine Evaluation    Patient Name: Sue Vail  MRN: [id number]  Today's Date: 2024  Time Calculation  Start Time: 330  Stop Time: 415  Time Calculation (min): 45 min  PT Evaluation Time Entry  PT Evaluation (Moderate) Time Entry: 45                Visit Number: 1  Auth Dates: Auth not required    Current Problem  Problem List Items Addressed This Visit             ICD-10-CM    Left hip pain M25.552    Relevant Orders    Follow Up In Physical Therapy    Chronic midline low back pain with sciatica - Primary M54.40, G89.29    Relevant Orders    Follow Up In Physical Therapy          General  Name and  confirmed with patient on this date.           Precautions  Precautions  STEADI Fall Risk Score (The score of 4 or more indicates an increased risk of falling): 8       Pain  Pain Assessment: 0-10  0-10 (Numeric) Pain Score: 8  Pain Location: Hip  Pain Orientation: Left    SUBJECTIVE:   Chief complaint:  Pt is an 78 yo female who presents with complaint of LBP and left hip pain. Certain movements cause a sharp pain but patient is unsure which movements. Pt states her back pain is tolerable and more predictable, but the hip pain is unpredictable and causes pain when walking. Pt has decreased the amount of walking she does due to her left hip/groin pain. Pain started about 6 months ago-[facility], but her activity level has decreased over the past 3 years due to heart failure. Does cardiac rehab 3X/week. Pt has fallen recently due to trip over something on her kitchen floor.    Pain Better: sitting  Pain Worse: moving in bed, standing, walking  Imaging: Mild spondylosis lumbar spine. Mild osteophytosis in hips  Prior level of function:  Current limitations: standing/walking  Home setup: one story with basement  Work: Retired  Patients goal: Pain relief  Prior tx: none    OBJECTIVE:    Posture:     Spine ROM: WNL Unless documented below:  ROM (In degrees)   Flexion 65   Extension 5  "   Right Left   Side Bend 5 5   Rotation         Lower Extremity Strength: (5/5 unless noted)   RIGHT LEFT   Hip Flexion 4/5 3/5 limited by pain   Hip Abduction 4+/5 4+/5   Knee Extension 4+/5 4+/5   Knee Flexion 4+/5 4+/5   Ankle DF 4+/5 4+/5   Ankle EV     Gr. Toe Extension       Hip AROM Right (Degrees) Left (Degrees)   flexion Unable to stand on left leg and lift right 71   abduction  30   extension  -20 from neutral         (Degrees)                Neurological symptoms Intermittent paresthesias bilat LE/s  Special tests:  Slump negative            Other Measures  Oswestry Disablity Index (ABBEY): 23-raw score     TREATMENT:  EXERCISES Date Date Date Date    REPS REPS REPS REPS          Nustep                     Shuttle  DLP       Shuttle SLP       Shuttle TR/HR              Qhip Flexion       Qhip Extension       Qhip Abduction       Qhip  TKE              Q Quad       Q Hamstring               Long axis distraction left hip 30\"H X 3             Mid rows       Pull downs              Stabilization holding weight                                                             ASSESSEMENT  Pt is a 79 y.o.  referred for physical therapy by Henok Melo*  for LBP and left hip pain. Pt presents with back and left hip pain, decreased lumbar and hip ROM, decreased LE strength and trunk strength. This patient would benefit from a therapy program to restore prior level of function, decrease pain, increase AROM, increase strength and improve posture.    The physical therapy prognosis is fair for the patient to achieve their goals.   The pt tolerated therapy treatment today with no adverse effects.  Barriers to therapy/learning include:  CHF    PLAN  The pt will be seen 2 days a week for 6-8 weeks.      The pt has been educated about the risks and benefits of physical therapy and gives consent for treatment.     The patient will benefit from physical therapy treatment to include:  trunk and hip ROM and " strengthening, endurance, modalities PRN    Goals:  Improve lumbar extension ROM by 5 degrees-4 weeks  Improve trunk strength so that patient can stand fully upright when [id number]-4-8 weeks  Pt will be able to perform all personal care activities without difficulty or pain-4-6 weeks  Pt will be able to ambulate outdoors without difficulty using assistive device as needed-4-6 weeks  Improve trunk and LE strength and endurance so that patient can stand > 20 minutes without increased pain-4-6 weeks  Improve modified ABBEY score < 15 to facilitate return to prior level of function-4-6

## 2024-09-18 ENCOUNTER — APPOINTMENT (OUTPATIENT)
Dept: CARDIAC REHAB | Facility: HOSPITAL | Age: 79
End: 2024-09-18
Payer: MEDICARE

## 2024-09-19 ENCOUNTER — TELEPHONE (OUTPATIENT)
Dept: HEMATOLOGY/ONCOLOGY | Facility: CLINIC | Age: 79
End: 2024-09-19
Payer: MEDICARE

## 2024-09-19 NOTE — TELEPHONE ENCOUNTER
Sue has an appointment Monday she feels her lymphoma might be back.    She is going to [facility] to have labs drawn for another doctor is Dr. Padron going to want lab work if so can we put the order in so she can have it done with the other order?

## 2024-09-20 ENCOUNTER — APPOINTMENT (OUTPATIENT)
Dept: CARDIAC REHAB | Facility: HOSPITAL | Age: 79
End: 2024-09-20
Payer: MEDICARE

## 2024-09-20 ENCOUNTER — LAB (OUTPATIENT)
Dept: LAB | Facility: LAB | Age: 79
End: 2024-09-20
Payer: MEDICARE

## 2024-09-20 DIAGNOSIS — I50.21 ACUTE SYSTOLIC HEART FAILURE: ICD-10-CM

## 2024-09-20 DIAGNOSIS — R06.02 SOB (SHORTNESS OF BREATH): ICD-10-CM

## 2024-09-20 LAB
ALBUMIN SERPL BCP-MCNC: 4.1 G/DL (ref 3.4–5)
ALP SERPL-CCNC: 89 U/L (ref 33–136)
ALT SERPL W P-5'-P-CCNC: 14 U/L (ref 7–45)
ANION GAP SERPL CALC-SCNC: 15 MMOL/L (ref 10–20)
AST SERPL W P-5'-P-CCNC: 18 U/L (ref 9–39)
BILIRUB SERPL-MCNC: 0.3 MG/DL (ref 0–1.2)
BNP SERPL-MCNC: 115 PG/ML (ref 0–99)
BUN SERPL-MCNC: 20 MG/DL (ref 6–23)
CALCIUM SERPL-MCNC: 9.3 MG/DL (ref 8.6–10.3)
CHLORIDE SERPL-SCNC: 103 MMOL/L (ref 98–107)
CO2 SERPL-SCNC: 25 MMOL/L (ref 21–32)
CREAT SERPL-MCNC: 1.09 MG/DL (ref 0.5–1.05)
EGFRCR SERPLBLD CKD-EPI 2021: 51 ML/MIN/1.73M*2
ERYTHROCYTE [DISTWIDTH] IN BLOOD BY AUTOMATED COUNT: 17 % (ref 11.5–14.5)
FERRITIN SERPL-MCNC: 26 NG/ML (ref 8–150)
FOLATE SERPL-MCNC: >22.3 NG/ML
GLUCOSE SERPL-MCNC: 172 MG/DL (ref 74–99)
HCT VFR BLD AUTO: 40.4 % (ref 36–46)
HGB BLD-MCNC: 13 G/DL (ref 12–16)
IRON SATN MFR SERPL: 10 % (ref 25–45)
IRON SERPL-MCNC: 45 UG/DL (ref 35–150)
MCH RBC QN AUTO: 29.9 PG (ref 26–34)
MCHC RBC AUTO-ENTMCNC: 32.2 G/DL (ref 32–36)
MCV RBC AUTO: 93 FL (ref 80–100)
NRBC BLD-RTO: 0 /100 WBCS (ref 0–0)
PLATELET # BLD AUTO: 334 X10*3/UL (ref 150–450)
POTASSIUM SERPL-SCNC: 5.6 MMOL/L (ref 3.5–5.3)
PROT SERPL-MCNC: 6.5 G/DL (ref 6.4–8.2)
RBC # BLD AUTO: 4.35 X10*6/UL (ref 4–5.2)
SODIUM SERPL-SCNC: 137 MMOL/L (ref 136–145)
TIBC SERPL-MCNC: 445 UG/DL (ref 240–445)
TSH SERPL-ACNC: 2.88 MIU/L (ref 0.44–3.98)
UIBC SERPL-MCNC: 400 UG/DL (ref 110–370)
VIT B12 SERPL-MCNC: 262 PG/ML (ref 211–911)
WBC # BLD AUTO: 7.7 X10*3/UL (ref 4.4–11.3)

## 2024-09-20 PROCEDURE — 84443 ASSAY THYROID STIM HORMONE: CPT

## 2024-09-20 PROCEDURE — 82728 ASSAY OF FERRITIN: CPT

## 2024-09-20 PROCEDURE — 83550 IRON BINDING TEST: CPT

## 2024-09-20 PROCEDURE — 36415 COLL VENOUS BLD VENIPUNCTURE: CPT

## 2024-09-20 PROCEDURE — 83540 ASSAY OF IRON: CPT

## 2024-09-20 PROCEDURE — 82746 ASSAY OF FOLIC ACID SERUM: CPT

## 2024-09-20 PROCEDURE — 86038 ANTINUCLEAR ANTIBODIES: CPT

## 2024-09-20 PROCEDURE — 85027 COMPLETE CBC AUTOMATED: CPT

## 2024-09-20 PROCEDURE — 83880 ASSAY OF NATRIURETIC PEPTIDE: CPT

## 2024-09-20 PROCEDURE — 82607 VITAMIN B-12: CPT

## 2024-09-20 PROCEDURE — 80053 COMPREHEN METABOLIC PANEL: CPT

## 2024-09-22 ASSESSMENT — NCCN CANCER DISTRESS MANAGEMENT
NCCN PHYSICAL CONCERNS: 3
NCCN EMOTIONAL CONCERNS: 1
NCCN PHYSICAL CONCERNS: 8
NCCN PHYSICAL CONCERNS: 6
NCCN EMOTIONAL CONCERNS: 8
NCCN PHYSICAL CONCERNS: 1
NCCN EMOTIONAL CONCERNS: 3

## 2024-09-23 ENCOUNTER — CLINICAL SUPPORT (OUTPATIENT)
Dept: CARDIAC REHAB | Facility: HOSPITAL | Age: 79
End: 2024-09-23
Payer: MEDICARE

## 2024-09-23 ENCOUNTER — TELEPHONE (OUTPATIENT)
Dept: HEMATOLOGY/ONCOLOGY | Facility: CLINIC | Age: 79
End: 2024-09-23

## 2024-09-23 ENCOUNTER — OFFICE VISIT (OUTPATIENT)
Dept: HEMATOLOGY/ONCOLOGY | Facility: CLINIC | Age: 79
End: 2024-09-23
Payer: MEDICARE

## 2024-09-23 VITALS
TEMPERATURE: 97.5 F | RESPIRATION RATE: 16 BRPM | WEIGHT: 213.63 LBS | OXYGEN SATURATION: 96 % | DIASTOLIC BLOOD PRESSURE: 76 MMHG | HEIGHT: 62 IN | SYSTOLIC BLOOD PRESSURE: 131 MMHG | HEART RATE: 74 BPM | BODY MASS INDEX: 39.31 KG/M2

## 2024-09-23 DIAGNOSIS — I50.20 HFREF (HEART FAILURE WITH REDUCED EJECTION FRACTION): ICD-10-CM

## 2024-09-23 DIAGNOSIS — C82.90 FOLLICULAR NON-HODGKIN'S LYMPHOMA: ICD-10-CM

## 2024-09-23 LAB
ALBUMIN SERPL BCP-MCNC: 3.9 G/DL (ref 3.4–5)
ALP SERPL-CCNC: 78 U/L (ref 33–136)
ALT SERPL W P-5'-P-CCNC: 10 U/L (ref 7–45)
ANA SER QL HEP2 SUBST: NEGATIVE
ANION GAP SERPL CALC-SCNC: 12 MMOL/L (ref 10–20)
AST SERPL W P-5'-P-CCNC: 17 U/L (ref 9–39)
BASOPHILS # BLD AUTO: 0.04 X10*3/UL (ref 0–0.1)
BASOPHILS NFR BLD AUTO: 0.6 %
BILIRUB SERPL-MCNC: 0.2 MG/DL (ref 0–1.2)
BUN SERPL-MCNC: 20 MG/DL (ref 6–23)
CALCIUM SERPL-MCNC: 8.4 MG/DL (ref 8.6–10.3)
CHLORIDE SERPL-SCNC: 105 MMOL/L (ref 98–107)
CO2 SERPL-SCNC: 24 MMOL/L (ref 21–32)
CREAT SERPL-MCNC: 1.09 MG/DL (ref 0.5–1.05)
EGFRCR SERPLBLD CKD-EPI 2021: 51 ML/MIN/1.73M*2
EOSINOPHIL # BLD AUTO: 0.31 X10*3/UL (ref 0–0.4)
EOSINOPHIL NFR BLD AUTO: 4.3 %
ERYTHROCYTE [DISTWIDTH] IN BLOOD BY AUTOMATED COUNT: 16.9 % (ref 11.5–14.5)
GLUCOSE SERPL-MCNC: 94 MG/DL (ref 74–99)
HCT VFR BLD AUTO: 39.2 % (ref 36–46)
HGB BLD-MCNC: 12.3 G/DL (ref 12–16)
IMM GRANULOCYTES # BLD AUTO: 0 X10*3/UL (ref 0–0.5)
IMM GRANULOCYTES NFR BLD AUTO: 0 % (ref 0–0.9)
LDH SERPL L TO P-CCNC: 149 U/L (ref 84–246)
LYMPHOCYTES # BLD AUTO: 2.9 X10*3/UL (ref 0.8–3)
LYMPHOCYTES NFR BLD AUTO: 40.7 %
MCH RBC QN AUTO: 29.4 PG (ref 26–34)
MCHC RBC AUTO-ENTMCNC: 31.4 G/DL (ref 32–36)
MCV RBC AUTO: 94 FL (ref 80–100)
MONOCYTES # BLD AUTO: 0.68 X10*3/UL (ref 0.05–0.8)
MONOCYTES NFR BLD AUTO: 9.5 %
NEUTROPHILS # BLD AUTO: 3.2 X10*3/UL (ref 1.6–5.5)
NEUTROPHILS NFR BLD AUTO: 44.9 %
PLATELET # BLD AUTO: 263 X10*3/UL (ref 150–450)
POTASSIUM SERPL-SCNC: 5 MMOL/L (ref 3.5–5.3)
PROT SERPL-MCNC: 6.1 G/DL (ref 6.4–8.2)
RBC # BLD AUTO: 4.19 X10*6/UL (ref 4–5.2)
SODIUM SERPL-SCNC: 136 MMOL/L (ref 136–145)
WBC # BLD AUTO: 7.1 X10*3/UL (ref 4.4–11.3)

## 2024-09-23 PROCEDURE — 84075 ASSAY ALKALINE PHOSPHATASE: CPT | Performed by: INTERNAL MEDICINE

## 2024-09-23 PROCEDURE — 83615 LACTATE (LD) (LDH) ENZYME: CPT | Performed by: INTERNAL MEDICINE

## 2024-09-23 PROCEDURE — 1160F RVW MEDS BY RX/DR IN RCRD: CPT | Performed by: INTERNAL MEDICINE

## 2024-09-23 PROCEDURE — 1159F MED LIST DOCD IN RCRD: CPT | Performed by: INTERNAL MEDICINE

## 2024-09-23 PROCEDURE — 85025 COMPLETE CBC W/AUTO DIFF WBC: CPT | Performed by: INTERNAL MEDICINE

## 2024-09-23 PROCEDURE — 93798 PHYS/QHP OP CAR RHAB W/ECG: CPT | Performed by: INTERNAL MEDICINE

## 2024-09-23 PROCEDURE — 99214 OFFICE O/P EST MOD 30 MIN: CPT | Performed by: INTERNAL MEDICINE

## 2024-09-23 PROCEDURE — 36415 COLL VENOUS BLD VENIPUNCTURE: CPT | Performed by: INTERNAL MEDICINE

## 2024-09-23 PROCEDURE — 1125F AMNT PAIN NOTED PAIN PRSNT: CPT | Performed by: INTERNAL MEDICINE

## 2024-09-23 ASSESSMENT — PAIN SCALES - GENERAL: PAINLEVEL: 7

## 2024-09-23 NOTE — PATIENT INSTRUCTIONS
Follow up visit with Dr. Padron for history of follicular lymphoma diagnosed in 2018.     PET scan to be completed now.     Follow up with Dr. Padron in 6 months.

## 2024-09-23 NOTE — TELEPHONE ENCOUNTER
Notified Sue that her potassium is normal at 5 today. Reviewed that creatinine (kidney function) is also slightly elevated. Pt will speak with pcp regarding results for further follow up. She denied additional questions at this time.

## 2024-09-23 NOTE — PROGRESS NOTES
Patient Visit Information:   Visit Type: Follow Up Visit      Cancer History:   Treatment Synopsis:    Follicular non-Hodgkin lymphoma, stage IVb diagnosed July 2018: Found have enlarged right neck nodes June 2018.   Needle biopsy nondiagnostic.  PET/CT: Nonspecific changes right neck, otherwise negative.    7/18/18: BM/Bx done by Dr. Freedman: Positive for follicular lymphoma involving 20% of marrow.   8/9/18: Right neck node resections: Reactive nodes.  Not sufficient for diagnosis of lymphoma.  Flow cytometry negative.  Since minimally symptomatic, patient was observed.     1/29/19: CT neck and chest: Nonspecific 0.7 cm right mid lung nodule.  Nonspecific right breast changes.  1.6 cm nodule at pancreatic head.  No other adenopathy.     4/12/19: Started rituximab infusions: Weekly x 4, then every 8 weeks times 12 doses.  10/18/19: To start 3/12 maintenance doses of rituximab  10/30/19: CT chest/abdomen: No evidence of progressive adenopathy/ malignancy.  Stable.  12/13/19: To start fourth of 12 cycles maintenance rituximab  6/5/20: PET CT: Marked decrease right neck adenopathy.  No other evidence of malignancy.  ([place] 2)  7/27/20: Maintenance rituximab, cycle #8/12.  9/21/20: #9/12 rituximab.  3/10/2021: #12/12 rituximab.  3/10/2020: #12/12 rituximab.  1/11/21: #11/12 rituximab.  3/10/2021: #12/12 rituximab.   7/2/2021: PET/CT: No evidence of malignancy.  Mild uptake in BM c/w.  CBC normal.  Remission.  9/30/2022: CT abdomen pelvis: Hernia.  Stable borderline adenopathy (max 1.9 cm).  Clinically in remission.        #2 history of melanoma right shoulder  History of Present Illness:      ID Statement:    NATALIE MASTERS is a 77 year old Female        Chief Complaint: Continuation of care, follicular  lymphoma   Interval History:    Ms. Masters returns today for follow-up for non-Hodgkin's lymphoma.  She feels relatively well.  She complains of some fatigue and decreased energy.  No B symptom, patient is complaining  severe bilateral hip pain more prominent on the left side which is interfering with activity.  Patient has a history of congestive cardiac failure under care of cardiology service.     Past medical history: Follicular non-Hodgkin lymphoma, stage IV, diagnosed July 2018.  April 2019 she was started on rituximab infusions given  weekly ×4 followed by 1 cycle every 8 weeks . PET/CT 7/2/2021 shows no definite evidence of malignancy.  In remission.     Multiple sclerosis diagnosed in her 30s, history of seizure disorder, last seizure occurred in 2014 when she decreased her anticonvulsant medications.  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  Right axillary node biopsies were  reportedly negative.  No adjuvant therapy given.  History of gastric bypass surgery, Cecilio-en-Y, 7 years ago.  Restless leg syndrome, arthritis.  She denies history of other malignancy, blood disorder, MI, CVA or VT E disease.  Bilateral eye surgery to  correct motor function.  Chronic fatigue syndrome. 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  She is seeing a neurologist.      Family medical history: Mother had bone cancer.  Paternal grandfather had colon cancer.  Multiple paternal relatives had prostate cancer.  Paternal grandfather had cancer of his knee.     Social history: Never smoker.  Drinks alcohol rarely.  No exposure to toxic chemicals.      Review of Systems:   Review of Systems:    Constitutional: No fever, chills, night sweats.  Fatigue.  Head and neck: No headaches or dizziness.  No pain, stiffness.   HEENT: No sore throat, sinusitis.  Hearing is adequate.  History of eye surgery.  Cardiac: No chest pain, palpitations, lightheadedness.   Respiratory: No increased dyspnea, cough, hemoptysis.  Mild TOBIN.  GI: Appetite is good, weight stable.  Has occasional soft bowel movements after eating, but no melena, hematochezia. No abdominal pain, nausea, vomiting.  Genitourinary: No frequency, urgency.  No  polyuria, dysuria, hematuria.  Musculoskeletal: Complains of right hip pain, stable.  No other worsening bone or joint pain.  Chronic arthralgias.  Endocrine: History hyperglycemia, followed by endocrinology.  No thyroid disease.  Skin: No rash, new skin lesions.  Complains of itching, prickly skin.  Neuromuscular: No lightheadedness, dizziness.  History of seizure, MS. reportedly has POTS and ME-CFS                  Allergies and Intolerances:       Allergies:         penicillin: Drug, Hives/Urticaria, Active         selective serotonin reuptake inhibitors: Drug Category, Itching,  Hives/Urticaria, Active         Tape - Adhesive, Bandaids, Paper: Environment, Itching, Hives/Urticaria,  Active         fluoxetine: Drug, Unknown, Active         Adhesive Tape TAPE: Drug, Unknown, Active         Adhesive Bandages MISC: Drug, Unknown, Active       Intolerances:         ropinirole: Drug, Gastritis, Diarrhea, Nausea/Vomiting, GI  Upset, Active     Outpatient Medication Profile:  * Patient Currently Takes Medications as of 05-May-2023 10:26 documented in Structured Notes         traMADol 50 mg oral tablet : Last Dose Taken:  , 1 tab(s) orally every 6 hours, As Needed for pain Dx: G89.18, Start Date: 09-Aug-2018         Vitamin B Complex 100: Last Dose Taken:           Centrum Silver oral tablet: Last Dose Taken:  , 1 tab(s) orally once a  day         calcium citrate: Last Dose Taken:           turmeric 500 mg oral capsule: Last Dose Taken:  , 1 cap(s) orally once  a day         magnesium citrate: Last Dose Taken:           Depakote 250 mg oral delayed release tablet: Last Dose Taken:  , 1 tab(s)  orally 3 times a day         Omega-3: Last Dose Taken:           Vitamin D3 1000 intl units oral tablet: Last Dose Taken:  , 1 tab(s) orally  once a day         Tylenol 500 mg oral tablet: Last Dose Taken:  , 2 tab(s) orally every 6  hours         venlafaxine 75 mg oral tablet: Last Dose Taken:  , 1 tab(s) orally 2 times  a day          valproic acid 250 mg oral capsule: Last Dose Taken:  , orally once a day  (at bedtime)         aspirin 81 mg oral delayed release capsule: Last Dose Taken:  , orally  once a day         pramipexole 0.125 mg oral tablet: Last Dose Taken:  , orally once a day  (at bedtime)         riTUXimab: Last Dose Taken:  , 375 mg/m2 intravenous over 90 minutes every  8 weeks         famotidine: Last Dose Taken:  , 20 milligram(s) intravenous prior to rituximab         Tylenol 325 mg oral tablet: Last Dose Taken:  , 650 milligram(s) orally   prior to rituximab         dexamethasone: Last Dose Taken:  , 16 milligram(s) intravenous  prior to  rituximab         Benadryl: Last Dose Taken:  , 25 milligram(s) injectable  prior to rituximab         chlorpheniramine 4 mg oral tablet: Last Dose Taken:  , 4 milligram(s) orally   prior to rituximab         Vitamin K1: Last Dose Taken:  , orally once a day         cinnamon extract: Last Dose Taken:  , 2 times a day         tiZANidine 2 mg oral tablet: Last Dose Taken:  , 1 tab(s) orally every  4 hours             Medical History:         H/O malignant melanoma of skin: ICD-10: Z85.820, Status:  Active         Epilepsy: ICD-10: G40.909, Status: Active         Depression, major: ICD-10: F32.9, Status: Active         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, Status: Active         Restless legs syndrome: ICD-10: G25.81, Status: Active         Multiple sclerosis: ICD-10: G35, Status: Active         Follicular lymphoma: ICD-10: C82.90, Status: Active       Surg History:         History of gastric bypass: ICD-10: Z98.84, Status: Active        Social History:   Social Substance History:  ·  Smoking Status never smoker   ·  Additional History     Mosque/Yarsanism  retired   (Anup)  (1)           Vitals and Measurements:   Vitals: Temp: 78  HR: 78  RR: 16  BP: 144/89  SPO2%:   98   Measurements: HT(cm): 157.4  WT(kg): 95.9  BSA: 2.04   BMI:  38.7      Physical Exam:      Constitutional: Well developed,  awake/alert/oriented  x3.      Eyes: PERRL, EOMI, clear sclera.   ENMT: Wearing a mask .   Head/Neck: No apparent injury.  No mass, adenopathy  or tenderness.  No JVD. Trachea midline.  Surgical scar right neck.  No palpable nodes in the neck or elsewhere.   Respiratory/Thorax: Thorax symmetric. Clear to auscultation.   No wheezes, rales, rhonchi.   Cardiovascular: Regular, rate and rhythm. No murmur.   No rubs or gallops.   Gastrointestinal: Nondistended.  Protuberant.  Normal  bowel sounds.  Soft, non-tender. No rebound or guarding. No masses palpable.  No palpable hepatomegaly, splenomegaly.   Musculoskeletal: ROM intact.  No joint swelling.  Adequate strength. No significant deformity.   Extremities: Unremarkable extremities.   Neurological: Alert and oriented x3. Senses and cranial  nerves grossly intact. No focal motor deficits noted. No focal sensory deficits.   Lymphatic: No palpably significant lymphadenopathy  in the neck, supraclavicular, axillary areas.   Psychological: Appropriate mood and behavior.   Skin: Warm and dry, no rashes, no suspicious lesions.   Chronic changes.         Lab Results:        08:47  (9/23/24) 3 d ago  (9/20/24) 2 mo ago  (6/30/24) 2 mo ago  (6/29/24) 2 mo ago  (6/28/24) 2 mo ago  (6/27/24) 2 mo ago  (6/26/24)    Glucose  74 - 99 mg/dL 94 172 High  111 High  106 High  103 High  127 High  179 High    Sodium  136 - 145 mmol/L 136 137 137 135 Low  138 139 140   Potassium  3.5 - 5.3 mmol/L 5.0 5.6 High  3.7 4.4 CM 4.2 3.6 3.8   Chloride  98 - 107 mmol/L 105 103 107 105 105 105 108 High    Bicarbonate  21 - 32 mmol/L 24 25 25 22 26 26 25   Anion Gap  10 - 20 mmol/L 12 15 9 Low  12 11 12 11   Urea Nitrogen  6 - 23 mg/dL 20 20 16 24 High  21 15 14   Creatinine  0.50 - 1.05 mg/dL 1.09 High  1.09 High  0.67 0.71 1.01 0.76 0.77   eGFR  >60 mL/min/1.73m*2 51 Low                      /23/24) 3 d ago  (9/20/24) 2 mo ago  (6/30/24) 2 mo ago  (6/28/24) 2 mo ago  (6/26/24) 3 mo ago  (6/25/24) 7  mo ago  (2/7/24)    WBC  4.4 - 11.3 x10*3/uL 7.1 7.7 8.2 7.9 6.4 6.9 7.3   RBC  4.00 - 5.20 x10*6/uL 4.19 4.35 3.78 Low  3.76 Low  3.83 Low  4.06 4.36   Hemoglobin  12.0 - 16.0 g/dL 12.3 13.0 10.8 Low  10.6 Low  10.9 Low  11.6 Low  12.6   Hematocrit  36.0 - 46.0 % 39.2 40.4 32.8 Low  33.1 Low  33.6 Low  35.5 Low  40.4   MCV  80 - 100 fL 94 93 87 88 88 87 93   MCH  26.0 - 34.0 pg 29.4 29.9 28.6 28.2 28.5 28.6 28.9   MCHC  32.0 - 36.0 g/dL 31.4 Low  32.2 32.9 32.0 32.4 32.7 31.2 Low    RDW  11.5 - 14.5 % 16.9 High  17.0 High  15.1 High  15.3 High  15.7 High  15.7 High  15.4 High    Platelets  150 - 450 x10*3/uL 263               ·  Results              Assessment and Plan:   Assessment:    1.  Follicular non-Hodgkin lymphoma, stage IVb (bone marrow), status post induction therapy with rituximab and 12 cycles of maintenance rituximab.  Clinically improved.   PET/CT shows no definite evidence of recurrence.  Minimal uptake in bone marrow may be due to hyperplasia, though residual malignancy cannot be excluded.   CBC and LDH are normal.     2.  History of fatigue, lightheadedness, weakness with prolonged standing, relieved with rest.  Being managed by neurologist.     3.  History of MS, seizure disorder, arthritis and other medical problems.     4.  History of pancreatic had nodule seen on CT, but not seen on PET/CT.  Likely benign.     5.  History of melanoma s/p resection of right show  6.  Borderline iron deficiency, but no evidence of bleeding.  Normal hemoglobin.  Taking iron tablet daily.     Plan: Findings and follow-up were discussed.  Clinically doing very well patient is asymptomatic I will continue to monitor clinically.  Patient has complaints of weakness left hip pain I will schedule for PET/CT, follow-up after 6 months    Greater than 30 minutes spent discussing her condition, natural history of the disease, treatment, laboratory and imaging  results, follow-up and documentation in EMR.

## 2024-09-23 NOTE — TELEPHONE ENCOUNTER
Sue called because she said when she was here today she was told her potassium was high. She is wondering if there is anything she should do about it.

## 2024-09-24 ENCOUNTER — APPOINTMENT (OUTPATIENT)
Dept: OPHTHALMOLOGY | Facility: CLINIC | Age: 79
End: 2024-09-24
Payer: MEDICARE

## 2024-09-25 ENCOUNTER — HOSPITAL ENCOUNTER (EMERGENCY)
Facility: HOSPITAL | Age: 79
Discharge: HOME | End: 2024-09-25
Payer: MEDICARE

## 2024-09-25 ENCOUNTER — CLINICAL SUPPORT (OUTPATIENT)
Dept: CARDIAC REHAB | Facility: HOSPITAL | Age: 79
End: 2024-09-25
Payer: MEDICARE

## 2024-09-25 ENCOUNTER — APPOINTMENT (OUTPATIENT)
Dept: CARDIOLOGY | Facility: HOSPITAL | Age: 79
End: 2024-09-25
Payer: MEDICARE

## 2024-09-25 ENCOUNTER — APPOINTMENT (OUTPATIENT)
Dept: RADIOLOGY | Facility: HOSPITAL | Age: 79
End: 2024-09-25
Payer: MEDICARE

## 2024-09-25 ENCOUNTER — TELEPHONE (OUTPATIENT)
Dept: HEMATOLOGY/ONCOLOGY | Facility: CLINIC | Age: 79
End: 2024-09-25
Payer: MEDICARE

## 2024-09-25 VITALS
DIASTOLIC BLOOD PRESSURE: 85 MMHG | OXYGEN SATURATION: 99 % | HEART RATE: 65 BPM | HEIGHT: 62 IN | SYSTOLIC BLOOD PRESSURE: 168 MMHG | WEIGHT: 204 LBS | RESPIRATION RATE: 16 BRPM | TEMPERATURE: 98.2 F | BODY MASS INDEX: 37.54 KG/M2

## 2024-09-25 DIAGNOSIS — I50.20 HFREF (HEART FAILURE WITH REDUCED EJECTION FRACTION): ICD-10-CM

## 2024-09-25 DIAGNOSIS — R00.2 PALPITATIONS: Primary | ICD-10-CM

## 2024-09-25 LAB
ALBUMIN SERPL BCP-MCNC: 4.1 G/DL (ref 3.4–5)
ALP SERPL-CCNC: 83 U/L (ref 33–136)
ALT SERPL W P-5'-P-CCNC: 12 U/L (ref 7–45)
ANION GAP SERPL CALC-SCNC: 10 MMOL/L (ref 10–20)
AST SERPL W P-5'-P-CCNC: 23 U/L (ref 9–39)
BASOPHILS # BLD AUTO: 0.06 X10*3/UL (ref 0–0.1)
BASOPHILS NFR BLD AUTO: 0.9 %
BILIRUB SERPL-MCNC: 0.4 MG/DL (ref 0–1.2)
BUN SERPL-MCNC: 20 MG/DL (ref 6–23)
CALCIUM SERPL-MCNC: 8.6 MG/DL (ref 8.6–10.3)
CARDIAC TROPONIN I PNL SERPL HS: 8 NG/L (ref 0–13)
CARDIAC TROPONIN I PNL SERPL HS: 9 NG/L (ref 0–13)
CHLORIDE SERPL-SCNC: 100 MMOL/L (ref 98–107)
CO2 SERPL-SCNC: 27 MMOL/L (ref 21–32)
CREAT SERPL-MCNC: 1.07 MG/DL (ref 0.5–1.05)
EGFRCR SERPLBLD CKD-EPI 2021: 53 ML/MIN/1.73M*2
EOSINOPHIL # BLD AUTO: 0.26 X10*3/UL (ref 0–0.4)
EOSINOPHIL NFR BLD AUTO: 3.9 %
ERYTHROCYTE [DISTWIDTH] IN BLOOD BY AUTOMATED COUNT: 16.7 % (ref 11.5–14.5)
GLUCOSE SERPL-MCNC: 105 MG/DL (ref 74–99)
HCT VFR BLD AUTO: 40.5 % (ref 36–46)
HGB BLD-MCNC: 13.2 G/DL (ref 12–16)
IMM GRANULOCYTES # BLD AUTO: 0.01 X10*3/UL (ref 0–0.5)
IMM GRANULOCYTES NFR BLD AUTO: 0.1 % (ref 0–0.9)
LYMPHOCYTES # BLD AUTO: 2.73 X10*3/UL (ref 0.8–3)
LYMPHOCYTES NFR BLD AUTO: 40.7 %
MAGNESIUM SERPL-MCNC: 2.28 MG/DL (ref 1.6–2.4)
MCH RBC QN AUTO: 29.3 PG (ref 26–34)
MCHC RBC AUTO-ENTMCNC: 32.6 G/DL (ref 32–36)
MCV RBC AUTO: 90 FL (ref 80–100)
MONOCYTES # BLD AUTO: 0.53 X10*3/UL (ref 0.05–0.8)
MONOCYTES NFR BLD AUTO: 7.9 %
NEUTROPHILS # BLD AUTO: 3.12 X10*3/UL (ref 1.6–5.5)
NEUTROPHILS NFR BLD AUTO: 46.5 %
NRBC BLD-RTO: 0 /100 WBCS (ref 0–0)
PLATELET # BLD AUTO: 312 X10*3/UL (ref 150–450)
POTASSIUM SERPL-SCNC: 4.4 MMOL/L (ref 3.5–5.3)
PROT SERPL-MCNC: 6.7 G/DL (ref 6.4–8.2)
RBC # BLD AUTO: 4.5 X10*6/UL (ref 4–5.2)
SODIUM SERPL-SCNC: 133 MMOL/L (ref 136–145)
TSH SERPL-ACNC: 1.39 MIU/L (ref 0.44–3.98)
WBC # BLD AUTO: 6.7 X10*3/UL (ref 4.4–11.3)

## 2024-09-25 PROCEDURE — 71046 X-RAY EXAM CHEST 2 VIEWS: CPT

## 2024-09-25 PROCEDURE — 71046 X-RAY EXAM CHEST 2 VIEWS: CPT | Mod: FOREIGN READ | Performed by: RADIOLOGY

## 2024-09-25 PROCEDURE — 93798 PHYS/QHP OP CAR RHAB W/ECG: CPT | Performed by: INTERNAL MEDICINE

## 2024-09-25 PROCEDURE — 93005 ELECTROCARDIOGRAM TRACING: CPT

## 2024-09-25 PROCEDURE — 83735 ASSAY OF MAGNESIUM: CPT | Performed by: PHYSICIAN ASSISTANT

## 2024-09-25 PROCEDURE — 84443 ASSAY THYROID STIM HORMONE: CPT | Performed by: PHYSICIAN ASSISTANT

## 2024-09-25 PROCEDURE — 36415 COLL VENOUS BLD VENIPUNCTURE: CPT | Performed by: PHYSICIAN ASSISTANT

## 2024-09-25 PROCEDURE — 84484 ASSAY OF TROPONIN QUANT: CPT | Performed by: PHYSICIAN ASSISTANT

## 2024-09-25 PROCEDURE — 84075 ASSAY ALKALINE PHOSPHATASE: CPT | Performed by: PHYSICIAN ASSISTANT

## 2024-09-25 PROCEDURE — 99283 EMERGENCY DEPT VISIT LOW MDM: CPT | Mod: 25

## 2024-09-25 PROCEDURE — 85025 COMPLETE CBC W/AUTO DIFF WBC: CPT | Performed by: PHYSICIAN ASSISTANT

## 2024-09-25 ASSESSMENT — COLUMBIA-SUICIDE SEVERITY RATING SCALE - C-SSRS
2. HAVE YOU ACTUALLY HAD ANY THOUGHTS OF KILLING YOURSELF?: NO
1. IN THE PAST MONTH, HAVE YOU WISHED YOU WERE DEAD OR WISHED YOU COULD GO TO SLEEP AND NOT WAKE UP?: NO
6. HAVE YOU EVER DONE ANYTHING, STARTED TO DO ANYTHING, OR PREPARED TO DO ANYTHING TO END YOUR LIFE?: NO

## 2024-09-25 ASSESSMENT — LIFESTYLE VARIABLES
EVER FELT BAD OR GUILTY ABOUT YOUR DRINKING: NO
HAVE YOU EVER FELT YOU SHOULD CUT DOWN ON YOUR DRINKING: NO
HAVE PEOPLE ANNOYED YOU BY CRITICIZING YOUR DRINKING: NO
TOTAL SCORE: 0
EVER HAD A DRINK FIRST THING IN THE MORNING TO STEADY YOUR NERVES TO GET RID OF A HANGOVER: NO

## 2024-09-25 ASSESSMENT — PAIN - FUNCTIONAL ASSESSMENT: PAIN_FUNCTIONAL_ASSESSMENT: 0-10

## 2024-09-25 ASSESSMENT — PAIN SCALES - GENERAL
PAINLEVEL_OUTOF10: 0 - NO PAIN
PAINLEVEL_OUTOF10: 0 - NO PAIN

## 2024-09-25 NOTE — ED PROVIDER NOTES
"Subjective:      Ira Balderrama is a 52 y.o. female who presents with Dizziness    Past Medical History   Diagnosis Date   • Unspecified disorder of thyroid      takes Synthroid   • Hypertension    • Other specified symptom associated with female genital organs    • Psychiatric problem      Depression ,treated with medication     Social History     Social History   • Marital Status: Single     Spouse Name: N/A   • Number of Children: N/A   • Years of Education: N/A     Occupational History   • Not on file.     Social History Main Topics   • Smoking status: Former Smoker -- 1.00 packs/day for 3 years     Quit date: 01/01/1971   • Smokeless tobacco: Not on file   • Alcohol Use: Yes      Comment: occassional   • Drug Use: No   • Sexual Activity: Not on file     Other Topics Concern   • Not on file     Social History Narrative     Family History   Problem Relation Age of Onset   • Cancer Father    • Hypertension Father      Allergies: Review of patient's allergies indicates no known allergies.          Dizziness  This is a new problem. The current episode started in the past 7 days. The problem occurs intermittently. The problem has been gradually worsening. Associated symptoms include nausea. Pertinent negatives include no fever, headaches or vomiting. Exacerbated by: positional changes  She has tried nothing for the symptoms. The treatment provided no relief.       Review of Systems   Constitutional: Negative for fever.   HENT: Negative.    Eyes: Negative for blurred vision and double vision.   Cardiovascular: Negative.    Gastrointestinal: Positive for nausea. Negative for vomiting.   Neurological: Positive for dizziness. Negative for headaches.        Patient describes dizziness as a sensation of spinning      All other systems reviewed and are negative        Objective:     /92 mmHg  Pulse 88  Temp(Src) 37.3 °C (99.1 °F)  Resp 16  Ht 1.613 m (5' 3.5\")  Wt 109.77 kg (242 lb)  BMI 42.19 kg/m2  " EMERGENCY MEDICINE EVALUATION NOTE    History of Present Illness     Chief Complaint:   Chief Complaint   Patient presents with   • Irregular Heart Beat     At cardiac rehab       HPI: Sue Vail is a 79 y.o. female presents with a chief complaint of irregular heartbeat.  Patient reports that she was at cardiac rehab today where she was being evaluated and watched on the monitor while she was working out where they noted that she had some irregular heartbeats on the monitor.  She states she is not sure if they were PVCs or if she was in A-fib but she is told that she had some extra beats that were not expected.  Patient reports that she has been under evaluation now for the last 2 months.  She states that in July started with some chest tightness and chest pain where she had a heart cath which did not show any acute coronary artery disease however it was found that she was in heart failure that time with an ejection fraction of roughly 22%.  She states that she goes to cardiac rehab at this time.  She reports that she has had maybe a little chest tightness over the last few days but did not experience any significant symptoms today while at cardiac rehab.  She does not describe any definite sensation of palpitations with the abnormal readings on the monitor.    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 She appears well-developed and well-nourished. No distress.   HENT:   Right Ear: External ear normal.   Left Ear: External ear normal.   Nose: Nose normal.   Mouth/Throat: Oropharynx is clear and moist. No oropharyngeal exudate.   Eyes: Conjunctivae and EOM are normal. Pupils are equal, round, and reactive to light. Right eye exhibits no discharge. Left eye exhibits no discharge.   No nystagmus   Neck: Normal range of motion. Neck supple.   Cardiovascular: Normal rate and regular rhythm.    Pulmonary/Chest: Effort normal and breath sounds normal.   Musculoskeletal: Normal range of motion.   Neurological: She is alert and oriented to person, place, and time. She is not disoriented. No cranial nerve deficit or sensory deficit. She exhibits normal muscle tone. She displays a negative Romberg sign. Coordination and gait normal. GCS eye subscore is 4. GCS verbal subscore is 5. GCS motor subscore is 6.   CN II-XII intact. Cerebellar fx intact.  5/5 and equal, push/pull 5/5 and equal. Strength is 5/5 and equal in the lower extremities. Facial features are symmetric with equal movement. Proprioception intact. Romberg is negative. No pronator drift. Gait is even and steady. Speech is clear and logical.   Skin: Skin is warm and dry. She is not diaphoretic.   Psychiatric: She has a normal mood and affect. Her behavior is normal.   Vitals reviewed.              Assessment/Plan:     There are no diagnoses linked to this encounter.       of malignant melanoma of skin 11/10/2020    History of malignant melanoma of skin   • Personal history of malignant melanoma of skin 11/10/2020    History of malignant melanoma of skin   • Personal history of non-Hodgkin lymphomas 12/23/2019    History of lymphoma   • Personal history of other diseases of the nervous system and sense organs     History of benign essential tremor   • Personal history of other diseases of the nervous system and sense organs 04/24/2018    History of tinnitus   • Personal history of other diseases of the nervous system and sense organs 12/17/2019    History of diplopia   • Personal history of other endocrine, nutritional and metabolic disease 07/02/2018    History of vitamin D deficiency   • Personal history of other mental and behavioral disorders 10/28/2019    History of depression   • Urge incontinence 04/13/2017    Urge incontinence of urine     Past Surgical History:   Procedure Laterality Date   • CARDIAC CATHETERIZATION N/A 6/27/2024    Procedure: Left And Right Heart Cath, With LV;  Surgeon: Kate Cazares MD;  Location: [facility] Cardiac Cath Lab;  Service: Cardiovascular;  Laterality: N/A;   • GASTRIC BYPASS  04/11/2017    Gastric Surgery For Morbid Obesity Bypass With Cecilio-en-Y   • OTHER SURGICAL HISTORY  04/11/2017    Axillary Lymphadenectomy   • OTHER SURGICAL HISTORY  12/23/2019    Eye surgery   • OTHER SURGICAL HISTORY  12/23/2019    Neck surgery     Social History     Tobacco Use   • Smoking status: Never   • Smokeless tobacco: Never   Vaping Use   • Vaping status: Never Used   Substance Use Topics   • Alcohol use: Not Currently   • Drug use: Never     Family History   Problem Relation Name Age of Onset   • Breast cancer Mother     • Dementia Father     • Alzheimer's disease Father     • Prostate cancer Father     • Tremor Father     • Pulmonary embolism Daughter     • Pulmonary embolism Daughter     • Other (cardiomegaly) Maternal Grandmother       Allergies   Allergen  Reactions   • Ace Inhibitors Unknown     Hypotension, Hypotension   • Adhesive Tape-Silicones Unknown     Adhesive Tape TAPE and bandages   • Beta-Blockers (Beta-Adrenergic Blocking Agts) Unknown     Hypotension, Hypotension   • Fluoxetine Unknown     cns, cns   • Nsaids (Non-Steroidal Anti-Inflammatory Drug) Unknown   • Olanzapine Unknown   • Other Omega-3s Unknown     all ssri   • Penicillins Hives   • Poison Ivy Extract Hives     Per patient has to be hospitalized     Current Outpatient Medications   Medication Instructions   • atorvastatin (LIPITOR) 80 mg, oral, Daily   • busPIRone (BUSPAR) 5 mg, oral, 2 times daily   • carvedilol (COREG) 3.125 mg, oral, 2 times daily   • cholecalciferol (VITAMIN D-3) 125 mcg, oral, Daily   • doxepin (SINEQUAN) 10 mg, oral, Nightly   • ferrous sulfate 325 (65 Fe) MG EC tablet 1 tablet, oral, Daily with breakfast, Do not crush, chew, or split.   • furosemide (LASIX) 20 mg, oral, Daily   • gabapentin (NEURONTIN) 200 mg, oral, 3 times daily   • Jardiance 10 mg, oral, Daily   • sacubitriL-valsartan (Entresto) 49-51 mg tablet 1 tablet, oral, 2 times daily   • spironolactone (ALDACTONE) 25 mg, oral, Daily   • valproic acid (DEPAKENE) 750 mg, oral, Nightly   • venlafaxine (EFFEXOR) 75 mg, oral, Nightly       Physical Exam     Appearance: Alert, oriented , cooperative,  in no acute distress.      Skin: Intact,  dry skin, no lesions, rash, petechiae or purpura.      Eyes: PERRLA, EOMs intact,  Conjunctiva pink      ENT: Hearing grossly intact. Pharynx clear     Neck: Supple. Trachea at midline.      Pulmonary: Clear bilaterally. No rales, rhonchi or wheezing. No accessory muscle use or stridor.     Cardiac: Normal rate and rhythm without murmur     Abdomen: Soft, nontender, active bowel sounds.     Musculoskeletal: Full range of motion.      Neurological:Cranial nerves II through XII are grossly intact, normal sensation, no weakness, no focal findings identified.     Results     Labs  Reviewed   CBC WITH AUTO DIFFERENTIAL - Abnormal       Result Value    WBC 6.7      nRBC 0.0      RBC 4.50      Hemoglobin 13.2      Hematocrit 40.5      MCV 90      MCH 29.3      MCHC 32.6      RDW 16.7 (*)     Platelets 312      Neutrophils % 46.5      Immature Granulocytes %, Automated 0.1      Lymphocytes % 40.7      Monocytes % 7.9      Eosinophils % 3.9      Basophils % 0.9      Neutrophils Absolute 3.12      Immature Granulocytes Absolute, Automated 0.01      Lymphocytes Absolute 2.73      Monocytes Absolute 0.53      Eosinophils Absolute 0.26      Basophils Absolute 0.06     COMPREHENSIVE METABOLIC PANEL - Abnormal    Glucose 105 (*)     Sodium 133 (*)     Potassium 4.4      Chloride 100      Bicarbonate 27      Anion Gap 10      Urea Nitrogen 20      Creatinine 1.07 (*)     eGFR 53 (*)     Calcium 8.6      Albumin 4.1      Alkaline Phosphatase 83      Total Protein 6.7      AST 23      Bilirubin, Total 0.4      ALT 12     MAGNESIUM - Normal    Magnesium 2.28     TSH WITH REFLEX TO FREE T4 IF ABNORMAL - Normal    Thyroid Stimulating Hormone 1.39      Narrative:     TSH testing is performed using different testing methodology at [facility] than at other [facility]. Direct result comparisons should only be made within the same method.     SERIAL TROPONIN-INITIAL - Normal    Troponin I, High Sensitivity 8      Narrative:     Less than 99th percentile of normal range cutoff-  Female and children under 18 years old <14 ng/L; Male <21 ng/L: Negative  Repeat testing should be performed if clinically indicated.     Female and children under 18 years old 14-50 ng/L; Male 21-50 ng/L:  Consistent with possible cardiac damage and possible increased clinical   risk. Serial measurements may help to assess extent of myocardial damage.     >50 ng/L: Consistent with cardiac damage, increased clinical risk and  myocardial infarction. Serial measurements may help assess extent of   myocardial damage.     "  NOTE: Children less than 1 year old may have higher baseline troponin   levels and results should be interpreted in conjunction with the overall   clinical context.     NOTE: Troponin I testing is performed using a different   testing methodology at [facility] than at other   [facility] hospitals. Direct result comparisons should only   be made within the same method.   TROPONIN SERIES- (INITIAL, 1 HR)    Narrative:     The following orders were created for panel order Troponin I Series, High Sensitivity (0, 1 HR).  Procedure                               Abnormality         Status                     ---------                               -----------         ------                     Troponin I, High Sensiti...[[id number]]  Normal              Final result               Troponin, High Sensitivi...[[id number]]                      In process                   Please view results for these tests on the individual orders.   SERIAL TROPONIN, 1 HOUR     XR chest 2 views   Final Result   No focal pulmonary pathology.   Signed by Yogesh Rahman M.D.            ED Course & Medical Decision Making   Medications - No data to display  Heart Rate:  [76]   Temperature:  [36.8 °C (98.2 °F)]   Respirations:  [18]   BP: (124)/(70)   Height:  [157.5 cm (5' 2\")]   Weight:  [92.5 kg (204 lb)]   Pulse Ox:  [97 %]    ED Course as of 09/25/24 1921   Wed Sep 25, 2024   1909 Plan of care was discussed with the patient.  At this time patient is requesting to go home.  Informed her that we do not have the results from her second troponin however the remainder of her blood work looks okay at this time.  Patient's thyroid studies were within normal limits magnesium was normal.  Patient's potassium was normal as well.  CBC did not show any acute abnormalities.  Chest x-ray was within normal limits and EKG did not show any acute ischemic changes from previous.  At this time patient is electing to go home prior to receiving the second " troponin result.  I informed her without the second troponin I cannot completely rule out cardiac event but patient still electing to go home at this time.  Patient states that she is hungry and would like to go home and eat.  Through joint decision-making patient will be discharged at this time with strict return precautions.  Patient requested to be contacted with second troponin result if possible. [CJ]   1920 Was able to update the patient that her second troponin was negative.  Patient still in agreement the plan of care of discharge. [CJ]      ED Course User Index  [CJ] Michael Esposito PA-C         Diagnoses as of 09/25/24 1921   Palpitations       Procedures   ECG 12 lead    Performed by: Michael Esposito PA-C  Authorized by: Michael Esposito PA-C    ECG interpreted by ED Physician in the absence of a cardiologist: yes    Rate:     ECG rate:  71  Rhythm:     Rhythm: sinus rhythm    QRS:     QRS conduction: LBBB    ST segments:     ST segments:  Non-specific  T waves:     T waves: non-specific    Comments:      No STEMI      Diagnosis     1. Palpitations        Disposition   Discharged    ED Prescriptions    None         Disclaimer: This note was dictated by speech recognition. Minor errors in transcription may be present. Please call if questions.       Michael Esposito PA-C  09/25/24 1921

## 2024-09-25 NOTE — TELEPHONE ENCOUNTER
Patient had a follow up appointment with Dr. Padron and is under is care for follicular non-hodgkin's lymphoma.  Patient scored a 4 on her distress screen.  Patient identified worry or anxiety, sadness or depression, feelings of worthlessness or being a burden, taking care of others and death, dying or afterlife.  SW asked patient how she was doing.  Patient discussed that she has a lot going on medically.  Patient stated that this is why she marked worry and sadness.  Patient stated that she also has heart failure.  Patient stated that she does get scared about dying.  SW discussed if she speaks to anyone about her spiritual concerns.  Patient stated that she was Gnosticism and discussed is more about philosophy.  Patient appreciated the phone call.  SW encouraged her to reach out if she would need anything.      David Ricardo MSW, LSW

## 2024-09-26 ENCOUNTER — APPOINTMENT (OUTPATIENT)
Dept: PHYSICAL MEDICINE AND REHAB | Facility: CLINIC | Age: 79
End: 2024-09-26
Payer: MEDICARE

## 2024-09-27 ENCOUNTER — APPOINTMENT (OUTPATIENT)
Dept: CARDIAC REHAB | Facility: HOSPITAL | Age: 79
End: 2024-09-27
Payer: MEDICARE

## 2024-09-30 ENCOUNTER — CLINICAL SUPPORT (OUTPATIENT)
Dept: CARDIAC REHAB | Facility: HOSPITAL | Age: 79
End: 2024-09-30
Payer: MEDICARE

## 2024-09-30 DIAGNOSIS — I50.20 HFREF (HEART FAILURE WITH REDUCED EJECTION FRACTION): ICD-10-CM

## 2024-09-30 PROCEDURE — 93798 PHYS/QHP OP CAR RHAB W/ECG: CPT | Performed by: INTERNAL MEDICINE

## 2024-10-01 ENCOUNTER — OFFICE VISIT (OUTPATIENT)
Dept: PRIMARY CARE | Facility: CLINIC | Age: 79
End: 2024-10-01
Payer: MEDICARE

## 2024-10-01 ENCOUNTER — APPOINTMENT (OUTPATIENT)
Dept: NUTRITION | Facility: CLINIC | Age: 79
End: 2024-10-01
Payer: MEDICARE

## 2024-10-01 VITALS — SYSTOLIC BLOOD PRESSURE: 92 MMHG | HEART RATE: 72 BPM | DIASTOLIC BLOOD PRESSURE: 62 MMHG

## 2024-10-01 DIAGNOSIS — I50.9 CONGESTIVE HEART FAILURE, UNSPECIFIED HF CHRONICITY, UNSPECIFIED HEART FAILURE TYPE: Primary | ICD-10-CM

## 2024-10-01 DIAGNOSIS — M25.552 CHRONIC LEFT HIP PAIN: ICD-10-CM

## 2024-10-01 DIAGNOSIS — G47.00 INSOMNIA, UNSPECIFIED TYPE: ICD-10-CM

## 2024-10-01 DIAGNOSIS — G89.29 CHRONIC LEFT HIP PAIN: ICD-10-CM

## 2024-10-01 PROCEDURE — 99214 OFFICE O/P EST MOD 30 MIN: CPT | Performed by: FAMILY MEDICINE

## 2024-10-01 PROCEDURE — G2211 COMPLEX E/M VISIT ADD ON: HCPCS | Performed by: FAMILY MEDICINE

## 2024-10-01 RX ORDER — GABAPENTIN 300 MG/1
300 CAPSULE ORAL 3 TIMES DAILY
Qty: 90 CAPSULE | Refills: 3 | Status: SHIPPED | OUTPATIENT
Start: 2024-10-01

## 2024-10-01 NOTE — PROGRESS NOTES
Subjective   Patient ID: Sue Vail is a 79 y.o. female who presents for fu    HPI   Left hip dull pain was not controlled with neurontin. Walking made the pain worse. Patient has been compliant with taking all  current medications. No sob, PND, orthopnea, CP, HA, pt had occ dizziness, heart palpitation due to low bp. No claudication or cold LE.  No   LE edema. No  falls. Good mood.  pt denies  trouble falling and staying asleep lately. Doxepin caused drowsiness    Review of Systems    Objective   BP 92/62   Pulse 72   LMP  (LMP Unknown)     Physical Exam  NAD, well groomed, No sclera icterus.   lungs: CTA b/l, heart: RRR, No jvd, no  LE edema, normal pedal pulses, abd: soft, no tenderness, BS+, tenderness at left hip. fair balance. CNII-XII were grossly intact, good judgment and memory. No depressed mood.    Assessment/Plan   Assessment & Plan  Chronic left hip pain  Not controlled. Increase dose of Neurontin  Orders:    gabapentin (Neurontin) 300 mg capsule; Take 1 capsule (300 mg) by mouth 3 times a day.    Congestive heart failure, unspecified HF chronicity, unspecified heart failure type  No sob. Pt had low bp with heart palpitation some days. Decrease lasix to 20 mg every day. Low salt diet       Insomnia, unspecified type  Doxepin caused se. Dc doxepin. Advised good sleep hygiene

## 2024-10-01 NOTE — ASSESSMENT & PLAN NOTE
Not controlled. Increase dose of Neurontin  Orders:    gabapentin (Neurontin) 300 mg capsule; Take 1 capsule (300 mg) by mouth 3 times a day.

## 2024-10-01 NOTE — ASSESSMENT & PLAN NOTE
No sob. Pt had low bp with heart palpitation some days. Decrease lasix to 20 mg every day. Low salt diet

## 2024-10-02 ENCOUNTER — DOCUMENTATION (OUTPATIENT)
Dept: PHYSICAL THERAPY | Facility: CLINIC | Age: 79
End: 2024-10-02

## 2024-10-02 ENCOUNTER — CLINICAL SUPPORT (OUTPATIENT)
Dept: CARDIAC REHAB | Facility: HOSPITAL | Age: 79
End: 2024-10-02
Payer: MEDICARE

## 2024-10-02 ENCOUNTER — DOCUMENTATION (OUTPATIENT)
Dept: EMERGENCY MEDICINE | Facility: HOSPITAL | Age: 79
End: 2024-10-02

## 2024-10-02 ENCOUNTER — PHARMACY VISIT (OUTPATIENT)
Dept: PHARMACY | Facility: CLINIC | Age: 79
End: 2024-10-02
Payer: COMMERCIAL

## 2024-10-02 ENCOUNTER — APPOINTMENT (OUTPATIENT)
Dept: PRIMARY CARE | Facility: CLINIC | Age: 79
End: 2024-10-02
Payer: MEDICARE

## 2024-10-02 DIAGNOSIS — I50.20 HFREF (HEART FAILURE WITH REDUCED EJECTION FRACTION): ICD-10-CM

## 2024-10-02 PROCEDURE — 93798 PHYS/QHP OP CAR RHAB W/ECG: CPT | Performed by: INTERNAL MEDICINE

## 2024-10-02 PROCEDURE — RXMED WILLOW AMBULATORY MEDICATION CHARGE

## 2024-10-02 NOTE — PROGRESS NOTES
Physical Therapy                 Therapy Communication Note    Patient Name: Sue Vail  MRN: [id number]  Department:   Room: Room/bed info not found  Today's Date: 10/2/2024     Discipline: Physical Therapy    Missed Visit Reason:      Missed Time: No Show    Comment:

## 2024-10-04 ENCOUNTER — CLINICAL SUPPORT (OUTPATIENT)
Dept: CARDIAC REHAB | Facility: HOSPITAL | Age: 79
End: 2024-10-04
Payer: MEDICARE

## 2024-10-04 DIAGNOSIS — I50.20 HFREF (HEART FAILURE WITH REDUCED EJECTION FRACTION): ICD-10-CM

## 2024-10-04 DIAGNOSIS — I50.21 ACUTE SYSTOLIC HEART FAILURE: ICD-10-CM

## 2024-10-04 PROCEDURE — RXMED WILLOW AMBULATORY MEDICATION CHARGE

## 2024-10-04 PROCEDURE — 93798 PHYS/QHP OP CAR RHAB W/ECG: CPT | Performed by: INTERNAL MEDICINE

## 2024-10-04 RX ORDER — ATORVASTATIN CALCIUM 80 MG/1
80 TABLET, FILM COATED ORAL DAILY
Qty: 90 TABLET | Refills: 3 | Status: SHIPPED | OUTPATIENT
Start: 2024-10-04

## 2024-10-07 ENCOUNTER — CLINICAL SUPPORT (OUTPATIENT)
Dept: CARDIAC REHAB | Facility: HOSPITAL | Age: 79
End: 2024-10-07
Payer: MEDICARE

## 2024-10-07 DIAGNOSIS — I50.20 HFREF (HEART FAILURE WITH REDUCED EJECTION FRACTION): ICD-10-CM

## 2024-10-07 PROCEDURE — 93798 PHYS/QHP OP CAR RHAB W/ECG: CPT | Performed by: INTERNAL MEDICINE

## 2024-10-08 ENCOUNTER — PHARMACY VISIT (OUTPATIENT)
Dept: PHARMACY | Facility: CLINIC | Age: 79
End: 2024-10-08
Payer: COMMERCIAL

## 2024-10-08 PROCEDURE — RXMED WILLOW AMBULATORY MEDICATION CHARGE

## 2024-10-09 ENCOUNTER — CLINICAL SUPPORT (OUTPATIENT)
Dept: CARDIAC REHAB | Facility: HOSPITAL | Age: 79
End: 2024-10-09
Payer: MEDICARE

## 2024-10-09 ENCOUNTER — TREATMENT (OUTPATIENT)
Dept: PHYSICAL THERAPY | Facility: CLINIC | Age: 79
End: 2024-10-09
Payer: MEDICARE

## 2024-10-09 DIAGNOSIS — I50.20 HFREF (HEART FAILURE WITH REDUCED EJECTION FRACTION): ICD-10-CM

## 2024-10-09 DIAGNOSIS — M25.552 LEFT HIP PAIN: ICD-10-CM

## 2024-10-09 DIAGNOSIS — G89.29 CHRONIC MIDLINE LOW BACK PAIN WITH SCIATICA, SCIATICA LATERALITY UNSPECIFIED: ICD-10-CM

## 2024-10-09 DIAGNOSIS — M54.40 CHRONIC MIDLINE LOW BACK PAIN WITH SCIATICA, SCIATICA LATERALITY UNSPECIFIED: ICD-10-CM

## 2024-10-09 PROCEDURE — 97110 THERAPEUTIC EXERCISES: CPT | Mod: GP,CQ

## 2024-10-09 PROCEDURE — 93798 PHYS/QHP OP CAR RHAB W/ECG: CPT | Performed by: INTERNAL MEDICINE

## 2024-10-09 ASSESSMENT — PAIN - FUNCTIONAL ASSESSMENT: PAIN_FUNCTIONAL_ASSESSMENT: 0-10

## 2024-10-09 NOTE — PROGRESS NOTES
"Physical Therapy    Physical Therapy Treatment    Patient Name: Sue Vail  MRN: [id number]  Today's Date: 10/9/2024    Time Entry:   Time Calculation  Start Time: 0200  Stop Time: 0240  Time Calculation (min): 40 min     PT Therapeutic Procedures Time Entry  Therapeutic Exercise Time Entry: 40                   Assessment:   Pt needed verbal cueing to engage her abdominals and correct her technique and form with T-band DLS exercises. Her low back \"feels better\" with abdominal bracing.  Pt reports min increase in left hip pain at the end of her treatment.    Pt walks with a short step length secondary to left groin pain.    Plan:   Assess tolerance of today's visit. Progress strengthening and flexibility program to return to OF.    Issue HEP next visit.    Current Problem  1. Chronic midline low back pain with sciatica, sciatica laterality unspecified  Follow Up In Physical Therapy      2. Left hip pain  Follow Up In Physical Therapy            Subjective    Pt reports that she has increased low back and hip pain with walking and standing for prolonged periods of time.      Precautions   Precautions  STEADI Fall Risk Score (The score of 4 or more indicates an increased risk of falling): 8        Pain  Pain Assessment  Pain Assessment: 0-10  0-10 (Numeric) Pain Score:  (3-4)  Pain Location: Hip  Pain Orientation: Left    Objective   Initiated flow sheet    Treatments:  EXERCISES Date 10/9/24 Date Date Date    REPS REPS REPS REPS          Nustep L4 10 min                    Shuttle  DLP 5B 30x       Shuttle SLP 3B 3x10 ea      Shuttle TR/HR              Qhip Flexion 20#  1x10ea      Qhip Extension       Qhip Abduction 10# 1x10 ea                    Q Quad       Q Hamstring  25#  3x10             Long axis distraction left hip              Mid rows Blue 20x       Pull downs Blue 20x             Stabilization holding weight                                                             OP EDUCATION:       Improve lumbar " extension ROM by 5 degrees-4 weeks  Improve trunk strength so that patient can stand fully upright when qdcmdmqwuf-3-2 weeks  Pt will be able to perform all personal care activities without difficulty or pain-4-6 weeks  Pt will be able to ambulate outdoors without difficulty using assistive device as needed-4-6 weeks  Improve trunk and LE strength and endurance so that patient can stand > 20 minutes without increased pain-4-6 weeks  Improve modified ABBEY score < 15 to facilitate return to prior level of function-4-6Goals:

## 2024-10-10 ENCOUNTER — APPOINTMENT (OUTPATIENT)
Dept: RADIOLOGY | Facility: HOSPITAL | Age: 79
End: 2024-10-10
Payer: MEDICARE

## 2024-10-11 ENCOUNTER — DOCUMENTATION (OUTPATIENT)
Dept: CARDIAC REHAB | Facility: HOSPITAL | Age: 79
End: 2024-10-11
Payer: MEDICARE

## 2024-10-11 ENCOUNTER — CLINICAL SUPPORT (OUTPATIENT)
Dept: CARDIAC REHAB | Facility: HOSPITAL | Age: 79
End: 2024-10-11
Payer: MEDICARE

## 2024-10-11 ENCOUNTER — DOCUMENTATION (OUTPATIENT)
Dept: PHYSICAL THERAPY | Facility: CLINIC | Age: 79
End: 2024-10-11
Payer: MEDICARE

## 2024-10-11 ENCOUNTER — TREATMENT (OUTPATIENT)
Dept: PHYSICAL THERAPY | Facility: CLINIC | Age: 79
End: 2024-10-11
Payer: MEDICARE

## 2024-10-11 VITALS
WEIGHT: 213.8 LBS | BODY MASS INDEX: 39.34 KG/M2 | DIASTOLIC BLOOD PRESSURE: 73 MMHG | SYSTOLIC BLOOD PRESSURE: 123 MMHG | HEIGHT: 62 IN

## 2024-10-11 DIAGNOSIS — M54.40 CHRONIC MIDLINE LOW BACK PAIN WITH SCIATICA, SCIATICA LATERALITY UNSPECIFIED: Primary | ICD-10-CM

## 2024-10-11 DIAGNOSIS — M25.552 LEFT HIP PAIN: ICD-10-CM

## 2024-10-11 DIAGNOSIS — I50.20 HFREF (HEART FAILURE WITH REDUCED EJECTION FRACTION): ICD-10-CM

## 2024-10-11 DIAGNOSIS — G89.29 CHRONIC MIDLINE LOW BACK PAIN WITH SCIATICA, SCIATICA LATERALITY UNSPECIFIED: Primary | ICD-10-CM

## 2024-10-11 PROCEDURE — 93798 PHYS/QHP OP CAR RHAB W/ECG: CPT | Performed by: INTERNAL MEDICINE

## 2024-10-11 PROCEDURE — 97110 THERAPEUTIC EXERCISES: CPT | Mod: GP,CQ

## 2024-10-11 ASSESSMENT — PAIN SCALES - GENERAL: PAINLEVEL_OUTOF10: 1

## 2024-10-11 ASSESSMENT — PAIN - FUNCTIONAL ASSESSMENT: PAIN_FUNCTIONAL_ASSESSMENT: 0-10

## 2024-10-11 NOTE — PROGRESS NOTES
"Physical Therapy    Physical Therapy Treatment    Patient Name: Sue Vail  MRN: [id number]  Today's Date: 10/11/2024    Time Entry:   Time Calculation  Start Time: 0200  Stop Time: 0248  Time Calculation (min): 48 min     PT Therapeutic Procedures Time Entry  Therapeutic Exercise Time Entry: 48                   Assessment:   Unable to do shuttle DLP/SLP today. Pt had bilateral low back pain when she laid down on the machine.   Pt needed verbal cueing to correct her posture with standing T-band and stabilization exercises.   Left groin pain with transitioning from sit to stand.     Plan:   Assess tolerance of today's visit. Progress strengthening and flexibility program to return to OF.      Current Problem  1. Chronic midline low back pain with sciatica, sciatica laterality unspecified  Follow Up In Physical Therapy      2. Left hip pain  Follow Up In Physical Therapy            Subjective    Pt reports that she felt good after her last PT visit.      Precautions   Precautions  STEADI Fall Risk Score (The score of 4 or more indicates an increased risk of falling): 8        Pain  Pain Assessment  Pain Assessment: 0-10  0-10 (Numeric) Pain Score: 1  Pain Location: Hip  Pain Orientation: Left    Objective   Added Q-hip extension and stabilization holding weight  Issued home exercise program    Treatments:  EXERCISES Date 10/9/24 Date 10/11/24 Date Date    REPS REPS REPS REPS          Nustep L4 10 min L4 10 min                   Shuttle  DLP 5B 30x  ---     Shuttle SLP 3B 3x10 ea ---     Shuttle TR/HR  Standing HR 20x            Qhip Flexion 20#  1x10ea 20# 2x10 ea     Qhip Extension  20# 2x10 ea     Qhip Abduction 10# 1x10 ea 10# 2x10 ea                   Q Quad       Q Hamstring  25#  3x10 25# 3x10            Long axis distraction left hip              Mid rows Blue 20x  Blue 20x     Pull downs Blue 20x Blue 20x            Stabilization holding weight  10x10\"H                                                       "     OP EDUCATION:  [email or web address]  Access Code: [email or web address]       Improve lumbar extension ROM by 5 degrees-4 weeks  Improve trunk strength so that patient can stand fully upright when efvftnnjuk-2-3 weeks  Pt will be able to perform all personal care activities without difficulty or pain-4-6 weeks  Pt will be able to ambulate outdoors without difficulty using assistive device as needed-4-6 weeks  Improve trunk and LE strength and endurance so that patient can stand > 20 minutes without increased pain-4-6 weeks  Improve modified ABBEY score < 15 to facilitate return to prior level of function-4-6Goals:

## 2024-10-11 NOTE — PROGRESS NOTES
Cardiac Rehabilitation 90 Day Reassessment    Name: Sue Vail  Medical Record Number: [id number]  YOB: 1945  Age: 79 y.o.    Today’s Date: 10/11/2024  Primary Care Physician: Joanna Garza MD PhD  Referring Physician: Katya Guzman MD  Program Location: [facility]  Primary Diagnosis:   HFrEF (heart failure with reduced ejection fraction)      Onset/Date of Diagnosis: 6/26/2024    Session #: 22    AACVPR Risk Stratification:   Moderate    Falls Risk: High  Psychosocial Assessment       Initial assessment- Sent PH-Q 9 to MD if score > 20: No; score < 20. Initial PHQ2 score +3. PHQ9 indicated, score +15, indicates moderately severe depression.   8/19/2024 30 day eval-PHQ9 result- +12, indicates moderate depression.  9/13/2024 60 day eval-PHQ9 result- +7, indicates mild depression.  10/11/2024 90 day eval- PHQ9 not repeated. Last PHQ9 indicates mild depression. Sue has a history of depression and pt. is on medication for depression.     Pt reported/currently experiencing stress: Yes; Stress; Severity: moderate and Depression; Severity: moderate  Patient uses stress management skills: Yes   History of: depression  Currently seeing a mental health provider: No  Social Support: Yes, Whom:spouse  Quality of Life Survey: Initial assessment- Sent PH-Q 9 to MD if score > 20: No; score < 20. Initial PHQ2 score +3. PHQ9 indicated, score +15, indicates moderately severe depression.   8/19/2024 30 day eval-PHQ9 result- +12, indicates moderate depression.  9/13/2024 60 day eval-PHQ9 result- +7, indicates mild depression.  10/11/2024 90 day eval- PHQ9 not repeated. Last PHQ9 indicates mild depression. Sue has a history of depression and pt. is on medication for depression.    Learning Assessment:  Learning assessment/barriers: None  Preferred learning method: Visual and Reading handout  Barriers: None  Comments:    Stages of Change:Action    Psychosocial Plan    Goal Status: In  progress    Psychosocial Goals: Demonstrating proper techniques for stress management, Maintain or lower PH-Q 9 score by discharge, and Identify strategies for managing depression    Psychosocial Interventions/Education: To be done in Cardiac Rehab.  Pt. questioned re: new stress, anxiety, or depressive symptoms.  Pt. Instructed on 3 Minute Breathing Space mindfulness exercise and other stress management strategies like deep breathing, exercise, listening to music.  PHQ9 reevaluated and reviewed with pt.  8/19/2024 30 day eval- Reviewed stress management strategies like deep breathing, exercise, and listening to music.   9/13/2024 60 day eval- PHQ9 repeated. Pt. questioned re: new stress, anxiety, or depressive symptoms. Reviewed stress management strategies like deep breathing, exercise, and listening to music.    Initial Assessment: Pt. Reports history of depression, worse R/T present medical issues but feels it is improving on current medications. PHQ9 indicates moderately severe depression, will repeat at next 30 day evaluation. Pt. Reports stress R/T present medical issues but she is using spiritual and mindfulness strategies to manage.   8/19/2024 30 day eval- Pt. Reports that she is managing stress and using positive stress management strategies, she got a new dog recently. She reports occasional feeling of anxiety but managing without issue. She also reports that she feels her depression is improving, continues on medication. PHQ9 given to take home and return to reassess depression.   9/13/2024 60 day eval- Pt. reports occasional feeling of anxiety and feeling down, but managing without issue. She also reports that she feels her depression is improving, continues on medication. PHQ9 repeated at 30 and 60 day evals continue to improve with most recent PHQ9 score +7, indicates mild depression. She reports using deep breathing as a stress management strategy.   10/11/2024 90 day eval- Pt. reports occasional  "feeling of anxiety and feeling down, but managing without issue. She also reports that she feels her depression is improving, continues on medication. PHQ9 not repeated. Last PHQ9 indicates mild depression. Sue reports using deep breathing and meditation as stress management strategies. Stress, Anxiety, and Depression education provided through educational video, H/Os, and discussion.       Nutrition Assessment:    Hyperlipidemia: Yes     Lipids:   Lab Results   Component Value Date    CHOL 204 (H) 05/02/2024    HDL 63.4 05/02/2024    LDLF 112 (H) 07/27/2023    TRIG 104 05/02/2024       Current Dietary Guidelines: Low fat, Low sodium, Fluid restrictions  Barriers to dietary change: no    Diet Habit Survey: Block Dietary Fat Screener  Pre:  Returned  Block Dietary Fat Screener, indicates <30% of calories are from fat. Reviewed with pt. And [facility] H/O \"Facts on Fat\" given.   Post: To be done at discharge.    Diabetes Assessment    Lab Results   Component Value Date    HGBA1C 6.3 (H) 05/02/2024       History of Diabetes: No    Weight Management  10/11/2024 90 day eval-   Height: 157.5 cm (5' 2\")  Weight: 97 kg (213 lb 12.8 oz)  BMI (Calculated): 39.09      Nutrition Plan    Goal Status: In progress    Nutrition Goals: Lipid Goal: HDL>45, LDL <70, Total <180, Trigs <150 and Learn how to read and interpret nutrition labels prior to discharge  Learn more about heart healthy eating and maintain heart healthy, low NA diet.     Nutrition Interventions/Education:   To be done in Cardiac Rehab.  Reviewed recent lipids, HGBA1C, weight, BMI.  Pt. Encouraged to follow heart healthy diet and strategies discussed.  Pt. Encouraged to meet with  dietician.  Heart Failure booklet given.  Block dietary fat screener given.  8/19/2024 30 day eval- Metabolic Syndrome/DM education provided through educational video and H/O.   9/13/2024 60 day eval- Healthy Eating and Cholesterol education provided through educational videos, H/Os, and " discussion.     Initial Assessment: Pt. Reports she is making heart healthy low NA (1500 MG)diet choices and is reading labels. She has an appointment to meet with  dietician.   8/19/2024 30 day eval- No new lipids or HGBA1C. Pt. Reports following a heart healthy diet and label reading. She has not met with  dietician at this time.   9/13/2024 60 day eval- No new lipids or HGBA1C. Pt. Reports following a heart healthy, low NA, mostly vegan diet. She reports that she had a recent appointment with an outpatient dietician. Weight is maintained.   10/11/2024 90 day eval- No new lipids or HGBA1C. Pt. Reports following a heart healthy, low NA, mostly vegan diet. Weight is maintained.       Exercise Assessment    Some walking  Mode: walking   Frequency: 2-3 x week  Duration: 5 minutes    Exercise Prescription     Exercise Prescription based on: Duke Activity Status Index (DASI) Score 4.0 METS    DASI Score: 4.0    MET Score:  2.0   Frequency:  3 days/week   Mode: Recumbent Cycle, Arm Ergometer, and Sci Fit Stepper   Duration: 40 total aerobic minutes   Intensity: RPE 11-13  Target HR:     MET Level: 3.0-6.0  Patient wears supplemental O2: No     Modality Workload METs Duration (minutes)   1 Pre-Exercise   5:00   2 Arm Ergometer/UEE 5 springer  10:00   3 Arm Ergometer/UEE 5 springer  10:00   4 Sci Fit Steeper 1.5 2.5 10:00   5 Sci Fit Steeper 1.5 2.5 10:00   6 Post-Exercise   5:00     Resistance Training: Yes   Home Exercise Prescription given: To be given prior to discharge from program.    Exercise Plan    Goal Status: In progress    Exercise Goals:     Develop home exercise program 150 total minutes moderate intensity cardiac exercise weekly by end of cardiac rehab program.  30 minutes cardio exercise most days by end of program. Start moving more around house. No home walking until few sessions of rehab and comfortable to start.  Cardiac Rehab goal total 40 minutes exercise/session with increased duration by 1-2  minutes per modality for total 40 minutes/session.  Cardiac Rehab goal increased exercise intensity on TM/NS/RB/UEE based on pts. HR/BP/RPE/pain response to exercise.       Exercise Interventions/Education:   To be done in Cardiac Rehab.  Reviewed cardiac rehab exercise protocol including METS and RPE.  Exercise prescription based on 6MWT.  Encouraged home exercise 30 minutes/day most days not at CR. Start moving more around house.No home walking until few sessions of rehab and comfortable to start.      Initial Assessment: Pt. Reports sedentary. Encouraged  start moving more around house.No home walking until few sessions of rehab and comfortable to start.  8/19/2024 30 day eval-Pt. Is attending Cardiac Rehab 3x week and is now exercising 40 minutes/session at METs 2.4-2.6. In addition she has starting taking short walks at home 5-10 minutes 2-3x week on days not at rehab.  9/13/2024 60 day clark Rogers is attending Cardiac Rehab 2-3 /week and has had some time off recently R/T left hip pain. She is doing an occasional 5 minute walk at home but difficult R/T hip pain. She has adjusted her Gabapentin schedule per her PCP advice and has an upcoming Physical Therapy eval. She wants to continue Cardiac Rehab so times of exercise has been decreased with periods of rest added. Not using RB at present. Will continue to assess and increase time to goal 40 minutes as tolerates.   10/11/2024 90 day clark Rogers has resumed consistent attendance at Cardiac Rehab 3x week. She reports that her hip pain has improved and she has started physical therapy for treatment. Today she is back to exercising 40 minutes/session on two machines she is able to use-SFS and UEE. She reports short 3-5 minute walks occasionally at home. Plan to increase resistance/intensity on SFS and UEE and increase MET levels by 5-10% over next 2 weeks.       Other Core Components/Risk Factor Assessment:    Medication adherence  Current Medications:   Medication  Documentation Review Audit       Reviewed by Leanne Padron MD (Physician) on 09/23/24 at 1045      Medication Order Taking? Sig Documenting Provider Last Dose Status   atorvastatin (Lipitor) 80 mg tablet [id number] Yes Take 1 tablet (80 mg) by mouth once daily. Michel Ratliff MD Taking Active   busPIRone (Buspar) 5 mg tablet [id number] Yes Take 1 tablet (5 mg) by mouth 2 times a day. Michel Ratliff MD Taking Active   carvedilol (Coreg) 3.125 mg tablet [id number] Yes Take 1 tablet (3.125 mg) by mouth 2 times a day. Michel Ratliff MD Taking Active   cholecalciferol (Vitamin D-3) 125 MCG (5000 UT) capsule [id number] Yes Take 1 capsule (125 mcg) by mouth once daily. Michel Ratliff MD Taking Active   doxepin (SINEquan) 10 mg capsule [id number] Yes Take 1 capsule (10 mg) by mouth once daily at bedtime. Michel Ratliff MD Taking Active   empagliflozin (Jardiance) 10 mg [id number] Yes Take 1 tablet (10 mg) by mouth once daily. Dario Baig MD Taking Active   ferrous sulfate 325 (65 Fe) MG EC tablet [id number] Yes Take 1 tablet by mouth once daily with breakfast. Do not crush, chew, or split. Michel Ratliff MD Taking Active   furosemide (Lasix) 20 mg tablet [id number] Yes Take 1 tablet (20 mg) by mouth once daily. Michel Ratliff MD Taking Active   gabapentin (Neurontin) 100 mg capsule [id number] Yes Take 2 capsules (200 mg) by mouth 3 times a day. Joanna Garza MD PhD Taking Active   sacubitriL-valsartan (Entresto) 49-51 mg tablet [id number] Yes Take 1 tablet by mouth 2 times a day. Michel Ratliff MD Taking Active   spironolactone (Aldactone) 25 mg tablet [id number] Yes Take 1 tablet (25 mg) by mouth once daily. Michel Ratliff MD Taking Active   valproic acid (Depakene) 250 mg capsule [id number] Yes Take 3 capsules (750 mg) by mouth once daily at bedtime. Michel Ratliff MD Taking Active   venlafaxine (Effexor) 75 mg tablet [id number] Yes Take 1 tablet (75 mg) by mouth once daily at bedtime. Michel Ratliff MD  Taking Active                                 Medication compliance: Yes   Uses pill box/organizer: Yes    Carries medication list: Yes     Blood Pressure Management  History of Hypertension: No   Medication Changes: No   Resting BP:   10/11/2024 90 day eval- Resting BP Visit Vitals  /73 (Patient Position: Sitting)        Heart Failure Management  Hx of Heart Failure: Yes;   Type (selection): HFrEF  Most recent EF:   22%    Onset of heart failure diagnosis: 6/26/2024  Last heart failure hospitalization: 6/26/2024  Number of HF admissions per year: 1    Symptoms: Fatigue with exertion and Shortness of breath  Is there a family Hx of HF: No   Does patient obtain daily weight Yes       Heart Failure Reassessment: Reassessed every 30 days while in program.   8/19/2024 30 day eval-  Unplanned MD and/or ED Heart Failure visit: No  Hospitalization since starting program/last assessment: No  MD contacted regarding Heart Failure symptoms: No  Pt. Reports following 2L fluid restriction, 2GM NA diet, weighs daily.   9/13/2024 60 day eval- Unplanned MD and/or ED Heart Failure visit: No  Hospitalization since starting program/last assessment: No  MD contacted regarding Heart Failure symptoms: No  Pt. Reports following 2L fluid restriction, 2GM NA diet, weighs daily.  10/11/2024 90 day eval- Unplanned MD and/or ED Heart Failure visit: No. Sue did have one cardiac related ER visit on 9/25/2024 for increased PVC activity while at Cardiac Rehab. Dr. Hewitt was notified.   Hospitalization since starting program/last assessment: No  MD contacted regarding Heart Failure symptoms: No  Pt. Reports following 2L fluid restriction, 2GM NA diet, weighs daily.    Heart Failure Goals: Able to verbalize signs and symptoms of fluid retention and when to contact MD, Adhere to proper fluid restrictions, Adapt a low sodium diet and verbalize guidelines, and Obtain daily weight and verbalize proper method of obtaining  weights        Smoking/Tobacco Assessment  Social History     Tobacco Use   Smoking Status Never   Smokeless Tobacco Never       Other Core Component Plan    Goal Status: In progress    Other Core Component Goals: Medication compliance Knowledge on medications and carries an updated medication list.    Other Core Component Interventions/Education:   Reviewed current medications, indications, dosages.  Encouraged pt. To carry updated medication list.  Review effects of exercise on BP.  BP at cardiac rehab pre and post exercise.  8/19/2024 30 day eval- Heart Parts, Cardiac Risk Factors, and Heart Failure education provided through educational videos, H/Os, and 1:1 discussion.   9/13/2024 60 day eval- Cardiac Rehab pretest has been returned, score 12/15 and reviewed.       Initial Assessment: Pt. Reports medication compliance, knowledge on medications and is now carrying an updated medication list. Checks BP am daily prior to medication.   8/19/2024 30 day eval- Pt. Reports compliant with medication, knowledgeable on medication. She has not been carrying a medication list but has one and will place in her purse.   9/13/2024 60 day eval- Pt. Reports compliant with medication, knowledgeable on medication.  No med changes except adjusting her Gabapentin schedule. She reports carrying an updated medication list. She reports checking her BP daily at home prior to medication.   10/11/2024 90 day eval- Pt. Is compliant with her medication, knowledgeable on her medication, and carries an updated medication list.   BP is stable at Cardiac Rehab.     Individual Patient Goals:    Improve strength and endurance to cook, walk dogs, and walk without fatigue and SOB.   Learn correct moderate intensity cardiac exercise and start home exercise program to maintain by end of rehab program.   Learn more about heart healthy diet and make heart healthy diet choice     Goal Status: In progress    Staff Comments:  Reviewed cardiac rehab  exercise protocol including METS and RPE.  Reviewed educational topics to be covered at Cardiac Rehab.   8/19/2024 30 day clark Rogers reports improved strength and endurance to exercise at rehab,   and has started some walking at home with less fatigue and SOB. She continues to work on making heart healthy diet choices. And label reading. No ER visits, hospitalizations, or falls since starting in the program.   9/13/2024 60 day clark Rogers reports having some improved strength and endurance but feels she is losing strength R/T hip issues that are affecting her ability to exercise. Will will continue to work on plan for her to maintain cardio exercise. She continues to work on making heart healthy diet choices and has met with a dietician recently. No ER visits, hospitalizations since starting in the program. She reports lost balance and fell in kitchen few weeks ago but denies injury.   10/11/2024 90 day clark Rogers reports improved strength and endurance now that her hip is feeling better and she is able to move with less discomfort. She is exercising 40 minutes/session at Cardiac Rehab without issues and reports some short 3-5 minute walks at home. She is currently also receiving PT for hip issues. Seu reports making heart healthy, mostly vegan diet choices. One Cardiac related ER visit R/T to increased PVC activity at a Cardiac Rehab session. Continues to have occasional PVC both with rest and exercise. No hospitalizations or falls since last 30 day evaluation.       Rehab Staff Signature: Laura Polo RN

## 2024-10-11 NOTE — PROGRESS NOTES
Physical Therapy                 Therapy Communication Note    Patient Name: Sue Vail  MRN: [id number]  Department: [facility]  Room: Room/bed info not found  Today's Date: 10/11/2024     Discipline: Physical Therapy    Missed Visit Reason:      Missed Time: No Show    Comment:

## 2024-10-13 PROCEDURE — RXMED WILLOW AMBULATORY MEDICATION CHARGE

## 2024-10-14 ENCOUNTER — CLINICAL SUPPORT (OUTPATIENT)
Dept: CARDIAC REHAB | Facility: HOSPITAL | Age: 79
End: 2024-10-14
Payer: MEDICARE

## 2024-10-14 DIAGNOSIS — I50.20 HFREF (HEART FAILURE WITH REDUCED EJECTION FRACTION): ICD-10-CM

## 2024-10-14 PROCEDURE — RXMED WILLOW AMBULATORY MEDICATION CHARGE

## 2024-10-14 PROCEDURE — 93798 PHYS/QHP OP CAR RHAB W/ECG: CPT | Performed by: INTERNAL MEDICINE

## 2024-10-15 ENCOUNTER — PHARMACY VISIT (OUTPATIENT)
Dept: PHARMACY | Facility: CLINIC | Age: 79
End: 2024-10-15
Payer: COMMERCIAL

## 2024-10-16 ENCOUNTER — TREATMENT (OUTPATIENT)
Dept: PHYSICAL THERAPY | Facility: CLINIC | Age: 79
End: 2024-10-16
Payer: MEDICARE

## 2024-10-16 ENCOUNTER — CLINICAL SUPPORT (OUTPATIENT)
Dept: CARDIAC REHAB | Facility: HOSPITAL | Age: 79
End: 2024-10-16
Payer: MEDICARE

## 2024-10-16 DIAGNOSIS — M54.40 CHRONIC MIDLINE LOW BACK PAIN WITH SCIATICA, SCIATICA LATERALITY UNSPECIFIED: ICD-10-CM

## 2024-10-16 DIAGNOSIS — G89.29 CHRONIC MIDLINE LOW BACK PAIN WITH SCIATICA, SCIATICA LATERALITY UNSPECIFIED: ICD-10-CM

## 2024-10-16 DIAGNOSIS — I50.20 HFREF (HEART FAILURE WITH REDUCED EJECTION FRACTION): ICD-10-CM

## 2024-10-16 DIAGNOSIS — M25.552 LEFT HIP PAIN: ICD-10-CM

## 2024-10-16 PROCEDURE — 93798 PHYS/QHP OP CAR RHAB W/ECG: CPT | Performed by: INTERNAL MEDICINE

## 2024-10-16 PROCEDURE — 97110 THERAPEUTIC EXERCISES: CPT | Mod: GP,CQ

## 2024-10-16 ASSESSMENT — PAIN - FUNCTIONAL ASSESSMENT: PAIN_FUNCTIONAL_ASSESSMENT: 0-10

## 2024-10-16 ASSESSMENT — PAIN SCALES - GENERAL: PAINLEVEL_OUTOF10: 1

## 2024-10-16 NOTE — PROGRESS NOTES
Physical Therapy    Physical Therapy Treatment    Patient Name: Sue Vail  MRN: [id number]  Today's Date: 10/16/2024    Time Entry:   Time Calculation  Start Time: 0200  Stop Time: 0245  Time Calculation (min): 45 min     PT Therapeutic Procedures Time Entry  Therapeutic Exercise Time Entry: 45                   Assessment:   Pt is doing well with exercise progression. She was fatigued at the end of her session but did not have an increase in back or left hip pain.  Reviewed technique with T-band DLS home exercises.    Plan:   Assess tolerance of today's visit. Progress strengthening and flexibility program to return to [facility].      Current Problem  1. Chronic midline low back pain with sciatica, sciatica laterality unspecified  Follow Up In Physical Therapy      2. Left hip pain  Follow Up In Physical Therapy            Subjective    Pt reports that she has increased left hip pain 4-8/10 pain with walking and weight bearing activities. She has no pain with sitting or laying down.  Pt did not sleep well last night.      Precautions   Precautions  STEADI Fall Risk Score (The score of 4 or more indicates an increased risk of falling): 8        Pain  Pain Assessment  Pain Assessment: 0-10  0-10 (Numeric) Pain Score: 1  Pain Location: Hip  Pain Orientation: Left    Objective     Added forward step ups    Treatments:  EXERCISES Date 10/9/24 Date 10/11/24 Date 10/16/24 Date    REPS REPS REPS REPS          Nustep L4 10 min L4 10 min L4 10 min                  Shuttle  DLP 5B 30x  ---     Shuttle SLP 3B 3x10 ea ---     Shuttle TR/HR  Standing HR 20x Standing HR 30X           Qhip Flexion 20#  1x10ea 20# 2x10 ea 20# 2x10 ea    Qhip Extension  20# 2x10 ea 20# 2x10 ea    Qhip Abduction 10# 1x10 ea 10# 2x10 ea 10# 2x10 ea                  Q Quad       Q Hamstring  25#  3x10 25# 3x10 30# 3x10           Long axis distraction left hip   next           Mid rows Blue 20x  Blue 20x Blue 20x    Pull downs Blue 20x Blue 20x Blue 20x        "    Stabilization holding weight  10x10\"H  3# 10x10\"H 3#           Forward step ups   4\" 15X R/L                                            OP EDUCATION:  [email or web address]  Access Code: [email or web address]       Improve lumbar extension ROM by 5 degrees-4 weeks  Improve trunk strength so that patient can stand fully upright when mubiywybqz-8-6 weeks  Pt will be able to perform all personal care activities without difficulty or pain-4-6 weeks  Pt will be able to ambulate outdoors without difficulty using assistive device as needed-4-6 weeks  Improve trunk and LE strength and endurance so that patient can stand > 20 minutes without increased pain-4-6 weeks  Improve modified ABBEY score < 15 to facilitate return to prior level of function-4-6Goals:    "

## 2024-10-17 ENCOUNTER — HOSPITAL ENCOUNTER (OUTPATIENT)
Dept: RADIOLOGY | Facility: HOSPITAL | Age: 79
Discharge: HOME | End: 2024-10-17
Payer: MEDICARE

## 2024-10-17 DIAGNOSIS — C82.90 FOLLICULAR NON-HODGKIN'S LYMPHOMA: ICD-10-CM

## 2024-10-17 PROCEDURE — A9552 F18 FDG: HCPCS | Performed by: INTERNAL MEDICINE

## 2024-10-17 PROCEDURE — 3430000001 HC RX 343 DIAGNOSTIC RADIOPHARMACEUTICALS: Performed by: INTERNAL MEDICINE

## 2024-10-17 PROCEDURE — 78815 PET IMAGE W/CT SKULL-THIGH: CPT | Mod: PS

## 2024-10-17 PROCEDURE — 78815 PET IMAGE W/CT SKULL-THIGH: CPT | Mod: PET TUMOR SUBSQ TX STRATEGY | Performed by: NUCLEAR MEDICINE

## 2024-10-17 RX ORDER — FLUDEOXYGLUCOSE F 18 200 MCI/ML
11.8 INJECTION, SOLUTION INTRAVENOUS
Status: COMPLETED | OUTPATIENT
Start: 2024-10-17 | End: 2024-10-17

## 2024-10-18 ENCOUNTER — TREATMENT (OUTPATIENT)
Dept: PHYSICAL THERAPY | Facility: CLINIC | Age: 79
End: 2024-10-18
Payer: MEDICARE

## 2024-10-18 ENCOUNTER — APPOINTMENT (OUTPATIENT)
Dept: OPHTHALMOLOGY | Age: 79
End: 2024-10-18
Payer: MEDICARE

## 2024-10-18 ENCOUNTER — CLINICAL SUPPORT (OUTPATIENT)
Dept: CARDIAC REHAB | Facility: HOSPITAL | Age: 79
End: 2024-10-18
Payer: MEDICARE

## 2024-10-18 DIAGNOSIS — I50.20 HFREF (HEART FAILURE WITH REDUCED EJECTION FRACTION): ICD-10-CM

## 2024-10-18 DIAGNOSIS — M25.552 LEFT HIP PAIN: ICD-10-CM

## 2024-10-18 DIAGNOSIS — H53.2 MONOCULAR DIPLOPIA: Primary | ICD-10-CM

## 2024-10-18 DIAGNOSIS — M54.40 CHRONIC MIDLINE LOW BACK PAIN WITH SCIATICA, SCIATICA LATERALITY UNSPECIFIED: ICD-10-CM

## 2024-10-18 DIAGNOSIS — H52.4 MYOPIA WITH PRESBYOPIA OF BOTH EYES: ICD-10-CM

## 2024-10-18 DIAGNOSIS — G89.29 CHRONIC MIDLINE LOW BACK PAIN WITH SCIATICA, SCIATICA LATERALITY UNSPECIFIED: ICD-10-CM

## 2024-10-18 DIAGNOSIS — H52.13 MYOPIA WITH PRESBYOPIA OF BOTH EYES: ICD-10-CM

## 2024-10-18 PROCEDURE — 92015 DETERMINE REFRACTIVE STATE: CPT | Performed by: OPTOMETRIST

## 2024-10-18 PROCEDURE — 97110 THERAPEUTIC EXERCISES: CPT | Mod: GP | Performed by: PHYSICAL THERAPIST

## 2024-10-18 PROCEDURE — 92012 INTRM OPH EXAM EST PATIENT: CPT | Performed by: OPTOMETRIST

## 2024-10-18 PROCEDURE — 93798 PHYS/QHP OP CAR RHAB W/ECG: CPT | Performed by: INTERNAL MEDICINE

## 2024-10-18 ASSESSMENT — REFRACTION_WEARINGRX
OD_ADD: +2.50
OS_VBASE: DOWN
OS_SPHERE: -2.50
OS_AXIS: 120
OS_SPHERE: +4.50
OD_CYLINDER: SPHERE
OS_ADD: +2.50
OS_VPRISM: 2.0
OD_SPHERE: +4.50
OS_CYLINDER: -0.50
SPECS_TYPE: READING
OD_SPHERE: -1.25

## 2024-10-18 ASSESSMENT — ENCOUNTER SYMPTOMS
ALLERGIC/IMMUNOLOGIC NEGATIVE: 0
CARDIOVASCULAR NEGATIVE: 0
PSYCHIATRIC NEGATIVE: 0
MUSCULOSKELETAL NEGATIVE: 0
CONSTITUTIONAL NEGATIVE: 0
ENDOCRINE NEGATIVE: 0
RESPIRATORY NEGATIVE: 0
HEMATOLOGIC/LYMPHATIC NEGATIVE: 0
GASTROINTESTINAL NEGATIVE: 0
EYES NEGATIVE: 1
NEUROLOGICAL NEGATIVE: 0

## 2024-10-18 ASSESSMENT — VISUAL ACUITY
OS_CC: 20/30
OS_CC+: -2
OD_CC: 20/50
CORRECTION_TYPE: GLASSES
METHOD: SNELLEN - LINEAR

## 2024-10-18 ASSESSMENT — REFRACTION_MANIFEST
OS_ADD: +2.75
OS_SPHERE: -1.75
OD_SPHERE: -1.25
OS_CYLINDER: SPHERE
OD_CYLINDER: SPHERE
OD_ADD: +2.75

## 2024-10-18 ASSESSMENT — CUP TO DISC RATIO
OS_RATIO: 0.3
OD_RATIO: 0.3

## 2024-10-18 ASSESSMENT — EXTERNAL EXAM - LEFT EYE: OS_EXAM: NORMAL

## 2024-10-18 ASSESSMENT — EXTERNAL EXAM - RIGHT EYE: OD_EXAM: NORMAL

## 2024-10-18 ASSESSMENT — PAIN - FUNCTIONAL ASSESSMENT: PAIN_FUNCTIONAL_ASSESSMENT: 0-10

## 2024-10-18 ASSESSMENT — SLIT LAMP EXAM - LIDS
COMMENTS: GOOD POSITION
COMMENTS: GOOD POSITION

## 2024-10-18 ASSESSMENT — PAIN SCALES - GENERAL: PAINLEVEL_OUTOF10: 5 - MODERATE PAIN

## 2024-10-18 NOTE — PROGRESS NOTES
"Physical Therapy    Physical Therapy Treatment/Discharge    Patient Name: Sue Vail  MRN: [id number]  Today's Date: 10/18/2024    Visit:     Time Entry:   Time Calculation  Start Time: 0200  Stop Time: 0240  Time Calculation (min): 40 min     PT Therapeutic Procedures Time Entry  Therapeutic Exercise Time Entry: 40                   Assessment:    Gave patient a thorough HEP with pictures, written instructions and bands. Pt demonstrated good understanding. Will discharge patient per her request to independent HEP.    Plan:   Discharge to independent HEP.      Current Problem  1. Chronic midline low back pain with sciatica, sciatica laterality unspecified  Follow Up In Physical Therapy      2. Left hip pain  Follow Up In Physical Therapy              Subjective    Pt requests that today be her last PT visit. Pt states that she has cardiac rehab 3 days/week and it is too much to also have PT appts. Pt requests a HEP.    Precautions   Precautions  STEADI Fall Risk Score (The score of 4 or more indicates an increased risk of falling): 8        Pain  Pain Assessment  Pain Assessment: 0-10  0-10 (Numeric) Pain Score: 5 - Moderate pain  Pain Location: Hip  Pain Orientation: Left    Objective       Treatments:  EXERCISES Date 10/9/24 Date 10/11/24 Date 10/16/24 Date 10/18/21    REPS REPS REPS REPS          Nustep L4 10 min L4 10 min L4 10 min                  Shuttle  DLP 5B 30x  ---     Shuttle SLP 3B 3x10 ea ---     Shuttle TR/HR  Standing HR 20x Standing HR 30X           Qhip Flexion 20#  1x10ea 20# 2x10 ea 20# 2x10 ea At counter 20X   Qhip Extension  20# 2x10 ea 20# 2x10 ea At counter 20X   Qhip Abduction 10# 1x10 ea 10# 2x10 ea 10# 2x10 ea At counter 20X                 Q Quad       Q Hamstring  25#  3x10 25# 3x10 30# 3x10           Long axis distraction left hip   next           Mid rows Blue 20x  Blue 20x Blue 20x Blue 20X   Pull downs Blue 20x Blue 20x Blue 20x Blue 20X          Stabilization holding weight  10x10\"H  " "3# 10x10\"H 3#           Forward step ups   4\" 15X R/L           Hip abduction with band seated    Blue 2 X 10   Hip adduction with ball    5\"H X 20                      OP EDUCATION:   Access Code: [email or web address]  URL: [email or web address]  Date: 10/18/2024  Prepared by: Carlie Recio    Exercises  - Seated Hip Abduction with Resistance  - 1 x daily - 7 x weekly - 2-3 sets - 10 reps  - Seated Hip Adduction Isometrics with Ball  - 1 x daily - 7 x weekly - 2-3 sets - 10 reps  - Standing Hip Abduction with Counter Support  - 1 x daily - 7 x weekly - 1-3 sets - 10 reps  - Standing March with Counter Support  - 1 x daily - 7 x weekly - 1-3 sets - 10 reps  - Standing Hip Extension with Counter Support  - 1 x daily - 7 x weekly - 1-3 sets - 10 reps  - Standing Shoulder Row with Anchored Resistance  - 1 x daily - 7 x weekly - 2-3 sets - 10 reps  - Shoulder Extension with Resistance  - 1 x daily - 7 x weekly - 2-3 sets - 10 reps    Improve lumbar extension ROM by 5 degrees-4 weeks  Improve trunk strength so that patient can stand fully upright when xojjeisvzp-7-0 weeks  Pt will be able to perform all personal care activities without difficulty or pain-4-6 weeks  Pt will be able to ambulate outdoors without difficulty using assistive device as needed-4-6 weeks  Improve trunk and LE strength and endurance so that patient can stand > 20 minutes without increased pain-4-6 weeks  Improve modified ABBEY score < 15 to facilitate return to prior level of function-4-6Goals:    "

## 2024-10-18 NOTE — PROGRESS NOTES
Myopia  Presbyopia  Amblyopia OD    2020 visit with  Satya:  EP presents for Fresnel prism application, amount an orientation specified in Dr. Carr`s last note. Demo`d and applied 15BO OD and 2BD OS, good binocular VA, pt warned of distortion due to fresnel, attempted OD prism only but patient insisted vertical prism is needed also. patient sat in office for about 10min wearing prism prior to leaving to make sure diplopia was eliminated and vision not a bother, patient accepted prism in specs and was sent home to follow-up with Dr. Carr as directed. Patient was previously in an executive BF specially manufactured for difference in prism for distance and near, would like to explore this option again. Pt aware of cost (expensive)     Dr Jade visit 3/2024  Diplopia  Esotropia  Divergence insufficiency  Hypotropia right eye  -S/p [facility] 12/5/19 (Bruno) - does not feel that surgery improved her diplopia.   -Previously saw Dr. Hernadez for prisms 3/9/20.   -Patient with diplopia mostly controlled with prisms correction. Will consider referral back to strabismus surgeon as needed.   -Per patient, likely history of amblyopia OD - since childhood OD has never seen as well as OS.   -Current glasses with prisms are 2-3 years old - 15pd base-out prism (BRY) OD and 2BD base-down prism (BD) OS. Over refraction - ET neutralized with 8pd base-out prism (BRY) over OD and 1pd base-out prism (BRY) over OS. Did best with symptomatic improvement in diplopia with 5-6 additional pd base-out prism (BRY) over OD.   -Patient has executive bifocal with current glasses, does not like this. In the future, she would like DVO with prisms. Uses OTC reading glasses for reading - doesn't have diplopia when reading.     [facility]  Patient has intermittent monocular diplopia OD when viewing far targets. 25 prism diopter base out aligns the eyes but does not result in better vision. When patient switches to viewing with her right  eye she reports monocular diplopia and occasionally reports that the image looks slanted (eccentric fixation and two foveal loci?).  Unfortunately patient does alternate between the eyes when viewing far target and will persists with viewing with the right eye despite poor vision. Therefore prism glasses will not help and occlusion of the right eye is the best option to not have the right eye take over while driving. I dispnesed a distance Rx with option to frost the right lens. Patient may decide to wear a patch over the right eye when driving and will restrict driving as much as possible.  Patient paradoxically reads better with her right eye and uses +4.50 OTC readers without prism for extended reading using the right eye. We attempted to reduce the reading Rx and this did not help her comfort and vision. Patient did not align the eyes at near and had constant suppression of the left eye when reading.     New Rx for glasses given per patient request. Patient's signature obtained to acknowledge and confirm that a paper copy of glasses Rx was given to patient in compliance with [facility] Eyeglass Rule. Electronic copy of Rx will also be available via [employer].      Patient can return for additional testing with me PRN.

## 2024-10-21 ENCOUNTER — CLINICAL SUPPORT (OUTPATIENT)
Dept: CARDIAC REHAB | Facility: HOSPITAL | Age: 79
End: 2024-10-21
Payer: MEDICARE

## 2024-10-21 DIAGNOSIS — I50.20 HFREF (HEART FAILURE WITH REDUCED EJECTION FRACTION): ICD-10-CM

## 2024-10-21 PROCEDURE — 93798 PHYS/QHP OP CAR RHAB W/ECG: CPT | Performed by: INTERNAL MEDICINE

## 2024-10-23 ENCOUNTER — CLINICAL SUPPORT (OUTPATIENT)
Dept: CARDIAC REHAB | Facility: HOSPITAL | Age: 79
End: 2024-10-23
Payer: MEDICARE

## 2024-10-23 ENCOUNTER — APPOINTMENT (OUTPATIENT)
Dept: PHYSICAL THERAPY | Facility: CLINIC | Age: 79
End: 2024-10-23
Payer: MEDICARE

## 2024-10-23 DIAGNOSIS — I50.20 HFREF (HEART FAILURE WITH REDUCED EJECTION FRACTION): ICD-10-CM

## 2024-10-23 PROCEDURE — 93798 PHYS/QHP OP CAR RHAB W/ECG: CPT | Performed by: INTERNAL MEDICINE

## 2024-10-25 ENCOUNTER — APPOINTMENT (OUTPATIENT)
Dept: CARDIAC REHAB | Facility: HOSPITAL | Age: 79
End: 2024-10-25
Payer: MEDICARE

## 2024-10-25 ENCOUNTER — APPOINTMENT (OUTPATIENT)
Dept: PHYSICAL THERAPY | Facility: CLINIC | Age: 79
End: 2024-10-25
Payer: MEDICARE

## 2024-10-28 ENCOUNTER — APPOINTMENT (OUTPATIENT)
Dept: CARDIAC REHAB | Facility: HOSPITAL | Age: 79
End: 2024-10-28
Payer: MEDICARE

## 2024-10-28 ENCOUNTER — APPOINTMENT (OUTPATIENT)
Dept: OPHTHALMOLOGY | Facility: CLINIC | Age: 79
End: 2024-10-28
Payer: MEDICARE

## 2024-10-28 DIAGNOSIS — H25.812 COMBINED FORM OF AGE-RELATED CATARACT, LEFT EYE: ICD-10-CM

## 2024-10-28 DIAGNOSIS — H35.3131 EARLY DRY STAGE NONEXUDATIVE AGE-RELATED MACULAR DEGENERATION OF BOTH EYES: Primary | ICD-10-CM

## 2024-10-28 DIAGNOSIS — E11.9 DIET-CONTROLLED TYPE 2 DIABETES MELLITUS: ICD-10-CM

## 2024-10-28 DIAGNOSIS — Z85.72 HISTORY OF LYMPHOMA: ICD-10-CM

## 2024-10-28 DIAGNOSIS — H44.23 UNCOMPLICATED DEGENERATIVE MYOPIA OF BOTH EYES: ICD-10-CM

## 2024-10-28 DIAGNOSIS — C82.90 FOLLICULAR NON-HODGKIN'S LYMPHOMA: ICD-10-CM

## 2024-10-28 DIAGNOSIS — H53.8 BLURRY VISION, BILATERAL: ICD-10-CM

## 2024-10-28 PROCEDURE — 99213 OFFICE O/P EST LOW 20 MIN: CPT | Performed by: OPHTHALMOLOGY

## 2024-10-28 PROCEDURE — 92134 CPTRZ OPH DX IMG PST SGM RTA: CPT | Performed by: OPHTHALMOLOGY

## 2024-10-28 ASSESSMENT — CONF VISUAL FIELD
OS_NORMAL: 1
OD_SUPERIOR_TEMPORAL_RESTRICTION: 0
OS_INFERIOR_TEMPORAL_RESTRICTION: 0
OD_SUPERIOR_NASAL_RESTRICTION: 0
OD_INFERIOR_NASAL_RESTRICTION: 0
OS_SUPERIOR_TEMPORAL_RESTRICTION: 0
OD_NORMAL: 1
OS_INFERIOR_NASAL_RESTRICTION: 0
OS_SUPERIOR_NASAL_RESTRICTION: 0
OD_INFERIOR_TEMPORAL_RESTRICTION: 0

## 2024-10-28 ASSESSMENT — TONOMETRY
IOP_METHOD: GOLDMANN APPLANATION
OS_IOP_MMHG: 14
OD_IOP_MMHG: 14

## 2024-10-28 ASSESSMENT — CUP TO DISC RATIO
OS_RATIO: 0.3
OD_RATIO: 0.3

## 2024-10-28 ASSESSMENT — SLIT LAMP EXAM - LIDS
COMMENTS: GOOD POSITION
COMMENTS: GOOD POSITION

## 2024-10-28 ASSESSMENT — VISUAL ACUITY
METHOD: SNELLEN - LINEAR
OD_SC: 20/50
OS_SC: 20/50+1

## 2024-10-28 ASSESSMENT — ENCOUNTER SYMPTOMS: EYES NEGATIVE: 1

## 2024-10-28 ASSESSMENT — EXTERNAL EXAM - LEFT EYE: OS_EXAM: NORMAL

## 2024-10-28 ASSESSMENT — EXTERNAL EXAM - RIGHT EYE: OD_EXAM: NORMAL

## 2024-10-30 ENCOUNTER — APPOINTMENT (OUTPATIENT)
Dept: PHYSICAL THERAPY | Facility: CLINIC | Age: 79
End: 2024-10-30
Payer: MEDICARE

## 2024-10-30 ENCOUNTER — CLINICAL SUPPORT (OUTPATIENT)
Dept: CARDIAC REHAB | Facility: HOSPITAL | Age: 79
End: 2024-10-30
Payer: MEDICARE

## 2024-10-30 DIAGNOSIS — I50.20 HFREF (HEART FAILURE WITH REDUCED EJECTION FRACTION): ICD-10-CM

## 2024-10-30 PROCEDURE — 93798 PHYS/QHP OP CAR RHAB W/ECG: CPT | Performed by: INTERNAL MEDICINE

## 2024-10-31 ENCOUNTER — TELEPHONE (OUTPATIENT)
Dept: PRIMARY CARE | Facility: CLINIC | Age: 79
End: 2024-10-31
Payer: MEDICARE

## 2024-10-31 ENCOUNTER — APPOINTMENT (OUTPATIENT)
Dept: CARDIOLOGY | Facility: CLINIC | Age: 79
End: 2024-10-31
Payer: MEDICARE

## 2024-10-31 ENCOUNTER — OFFICE VISIT (OUTPATIENT)
Dept: CARDIOLOGY | Facility: CLINIC | Age: 79
End: 2024-10-31
Payer: MEDICARE

## 2024-10-31 VITALS
SYSTOLIC BLOOD PRESSURE: 104 MMHG | BODY MASS INDEX: 39.75 KG/M2 | WEIGHT: 216 LBS | HEART RATE: 84 BPM | DIASTOLIC BLOOD PRESSURE: 58 MMHG | OXYGEN SATURATION: 97 % | HEIGHT: 62 IN

## 2024-10-31 DIAGNOSIS — I50.22 CHRONIC SYSTOLIC HEART FAILURE: Primary | ICD-10-CM

## 2024-10-31 PROCEDURE — 1159F MED LIST DOCD IN RCRD: CPT | Performed by: STUDENT IN AN ORGANIZED HEALTH CARE EDUCATION/TRAINING PROGRAM

## 2024-10-31 PROCEDURE — 99215 OFFICE O/P EST HI 40 MIN: CPT | Performed by: STUDENT IN AN ORGANIZED HEALTH CARE EDUCATION/TRAINING PROGRAM

## 2024-10-31 PROCEDURE — 1036F TOBACCO NON-USER: CPT | Performed by: STUDENT IN AN ORGANIZED HEALTH CARE EDUCATION/TRAINING PROGRAM

## 2024-10-31 ASSESSMENT — ENCOUNTER SYMPTOMS: OCCASIONAL FEELINGS OF UNSTEADINESS: 0

## 2024-11-01 ENCOUNTER — APPOINTMENT (OUTPATIENT)
Dept: CARDIAC REHAB | Facility: HOSPITAL | Age: 79
End: 2024-11-01
Payer: MEDICARE

## 2024-11-01 ENCOUNTER — PHARMACY VISIT (OUTPATIENT)
Dept: PHARMACY | Facility: CLINIC | Age: 79
End: 2024-11-01
Payer: COMMERCIAL

## 2024-11-01 DIAGNOSIS — I50.20 HFREF (HEART FAILURE WITH REDUCED EJECTION FRACTION): ICD-10-CM

## 2024-11-01 PROCEDURE — 93798 PHYS/QHP OP CAR RHAB W/ECG: CPT | Performed by: INTERNAL MEDICINE

## 2024-11-01 PROCEDURE — RXMED WILLOW AMBULATORY MEDICATION CHARGE

## 2024-11-03 DIAGNOSIS — F32.2 CURRENT SEVERE EPISODE OF MAJOR DEPRESSIVE DISORDER WITHOUT PSYCHOTIC FEATURES WITHOUT PRIOR EPISODE (MULTI): ICD-10-CM

## 2024-11-03 PROCEDURE — RXMED WILLOW AMBULATORY MEDICATION CHARGE

## 2024-11-04 ENCOUNTER — APPOINTMENT (OUTPATIENT)
Dept: CARDIAC REHAB | Facility: HOSPITAL | Age: 79
End: 2024-11-04
Payer: MEDICARE

## 2024-11-04 ENCOUNTER — CLINICAL SUPPORT (OUTPATIENT)
Dept: CARDIAC REHAB | Facility: HOSPITAL | Age: 79
End: 2024-11-04
Payer: MEDICARE

## 2024-11-04 DIAGNOSIS — I50.20 HFREF (HEART FAILURE WITH REDUCED EJECTION FRACTION): ICD-10-CM

## 2024-11-04 PROCEDURE — 93798 PHYS/QHP OP CAR RHAB W/ECG: CPT | Performed by: INTERNAL MEDICINE

## 2024-11-04 RX ORDER — BUSPIRONE HYDROCHLORIDE 5 MG/1
5 TABLET ORAL 2 TIMES DAILY
Qty: 180 TABLET | Refills: 0 | Status: SHIPPED | OUTPATIENT
Start: 2024-11-04

## 2024-11-05 ENCOUNTER — APPOINTMENT (OUTPATIENT)
Dept: PRIMARY CARE | Facility: CLINIC | Age: 79
End: 2024-11-05
Payer: MEDICARE

## 2024-11-05 ENCOUNTER — PHARMACY VISIT (OUTPATIENT)
Dept: PHARMACY | Facility: CLINIC | Age: 79
End: 2024-11-05
Payer: COMMERCIAL

## 2024-11-05 VITALS — SYSTOLIC BLOOD PRESSURE: 128 MMHG | DIASTOLIC BLOOD PRESSURE: 76 MMHG

## 2024-11-05 DIAGNOSIS — M25.552 CHRONIC LEFT HIP PAIN: ICD-10-CM

## 2024-11-05 DIAGNOSIS — G89.29 CHRONIC LEFT HIP PAIN: ICD-10-CM

## 2024-11-05 PROCEDURE — 99213 OFFICE O/P EST LOW 20 MIN: CPT | Performed by: FAMILY MEDICINE

## 2024-11-05 PROCEDURE — G2211 COMPLEX E/M VISIT ADD ON: HCPCS | Performed by: FAMILY MEDICINE

## 2024-11-05 RX ORDER — GABAPENTIN 300 MG/1
300 CAPSULE ORAL 4 TIMES DAILY
Qty: 120 CAPSULE | Refills: 5 | Status: SHIPPED | OUTPATIENT
Start: 2024-11-05

## 2024-11-05 NOTE — PROGRESS NOTES
Subjective   Patient ID: Sue Vail is a 79 y.o. female who presents for worse hip pain    HPI   Left hip pain was not controlled with Neurontin 300mg tid. Neurontin 300mg qid controlled her pain well and pt was more ambulatory with better pain control. No SE from Neurontin.  Review of Systems    Objective   /76   LMP  (LMP Unknown)     Physical Exam  No distress, well groomed, No sclera icterus. normal respiration, lungs: CTA b/l, heart: RRR, no LE  edema, mild left hip tenderness, good balance    Assessment/Plan   Assessment & Plan  Chronic left hip pain  Not controlled. Increase dose of Neurontin as dir. Caution of se of confusion, imbalance or falls. Call office if pain does not improve in a few days    Orders:    gabapentin (Neurontin) 300 mg capsule; Take 1 capsule (300 mg) by mouth 4 times a day.

## 2024-11-05 NOTE — ASSESSMENT & PLAN NOTE
Not controlled. Increase dose of Neurontin as dir. Caution of se of confusion, imbalance or falls. Call office if pain does not improve in a few days    Orders:    gabapentin (Neurontin) 300 mg capsule; Take 1 capsule (300 mg) by mouth 4 times a day.

## 2024-11-06 ENCOUNTER — DOCUMENTATION (OUTPATIENT)
Dept: CARDIAC REHAB | Facility: HOSPITAL | Age: 79
End: 2024-11-06

## 2024-11-06 ENCOUNTER — APPOINTMENT (OUTPATIENT)
Dept: CARDIAC REHAB | Facility: HOSPITAL | Age: 79
End: 2024-11-06
Payer: MEDICARE

## 2024-11-06 VITALS — BODY MASS INDEX: 38.98 KG/M2 | HEIGHT: 62 IN | WEIGHT: 211.8 LBS

## 2024-11-06 DIAGNOSIS — I50.20 HFREF (HEART FAILURE WITH REDUCED EJECTION FRACTION): ICD-10-CM

## 2024-11-06 PROCEDURE — 93798 PHYS/QHP OP CAR RHAB W/ECG: CPT | Performed by: INTERNAL MEDICINE

## 2024-11-06 NOTE — PROGRESS NOTES
Cardiac Rehabilitation 120 Day Reassessment    Name: Sue Vail  Medical Record Number: [id number]  YOB: 1945  Age: 79 y.o.    Today’s Date: 11/6/2024  Primary Care Physician: Joanna Garza MD PhD  Referring Physician: Katya Guzman MD  Program Location: [facility]  Primary Diagnosis:   HFrEF (heart failure with reduced ejection fraction)      Onset/Date of Diagnosis: 6/26/2024    Session #: 30    AACVPR Risk Stratification:   Moderate    Falls Risk: High  Psychosocial Assessment       Initial assessment- Sent PH-Q 9 to MD if score > 20: No; score < 20. Initial PHQ2 score +3. PHQ9 indicated, score +15, indicates moderately severe depression.   8/19/2024 30 day eval-PHQ9 result- +12, indicates moderate depression.  9/13/2024 60 day eval-PHQ9 result- +7, indicates mild depression.  10/11/2024 90 day eval- PHQ9 not repeated. Last PHQ9 indicates mild depression. Sue has a history of depression and pt. is on medication for depression.     Pt reported/currently experiencing stress: Yes; Stress; Severity: moderate and Depression; Severity: moderate  Patient uses stress management skills: Yes   History of: depression  Currently seeing a mental health provider: No  Social Support: Yes, Whom:spouse  Quality of Life Survey: Initial assessment- Sent PH-Q 9 to MD if score > 20: No; score < 20. Initial PHQ2 score +3. PHQ9 indicated, score +15, indicates moderately severe depression.   8/19/2024 30 day eval-PHQ9 result- +12, indicates moderate depression.  9/13/2024 60 day eval-PHQ9 result- +7, indicates mild depression.  10/11/2024 90 day eval- PHQ9 not repeated. Last PHQ9 indicates mild depression. Sue has a history of depression and pt. is on medication for depression.    Learning Assessment:  Learning assessment/barriers: None  Preferred learning method: Visual and Reading handout  Barriers: None  Comments:    Stages of Change:Action    Psychosocial Plan    Goal Status: In  progress    Psychosocial Goals: Demonstrating proper techniques for stress management, Maintain or lower PH-Q 9 score by discharge, and Identify strategies for managing depression    Psychosocial Interventions/Education: To be done in Cardiac Rehab.  Pt. questioned re: new stress, anxiety, or depressive symptoms.  Pt. Instructed on 3 Minute Breathing Space mindfulness exercise and other stress management strategies like deep breathing, exercise, listening to music.  PHQ9 reevaluated and reviewed with pt.  8/19/2024 30 day eval- Reviewed stress management strategies like deep breathing, exercise, and listening to music.   9/13/2024 60 day eval- PHQ9 repeated. Pt. questioned re: new stress, anxiety, or depressive symptoms. Reviewed stress management strategies like deep breathing, exercise, and listening to music.    Initial Assessment: Pt. Reports history of depression, worse R/T present medical issues but feels it is improving on current medications. PHQ9 indicates moderately severe depression, will repeat at next 30 day evaluation. Pt. Reports stress R/T present medical issues but she is using spiritual and mindfulness strategies to manage.   8/19/2024 30 day eval- Pt. Reports that she is managing stress and using positive stress management strategies, she got a new dog recently. She reports occasional feeling of anxiety but managing without issue. She also reports that she feels her depression is improving, continues on medication. PHQ9 given to take home and return to reassess depression.   9/13/2024 60 day eval- Pt. reports occasional feeling of anxiety and feeling down, but managing without issue. She also reports that she feels her depression is improving, continues on medication. PHQ9 repeated at 30 and 60 day evals continue to improve with most recent PHQ9 score +7, indicates mild depression. She reports using deep breathing as a stress management strategy.   10/11/2024 90 day eval- Pt. reports occasional  "feeling of anxiety and feeling down, but managing without issue. She also reports that she feels her depression is improving, continues on medication. PHQ9 not repeated. Last PHQ9 indicates mild depression. Sue reports using deep breathing and meditation as stress management strategies. Stress, Anxiety, and Depression education provided through educational video, H/Os, and discussion.   11/6/2024 120 day eval- Sue continues to report occasional feeling of anxiety and feeling down, but managing without issue, and support from spouse. She also reports that she feels her depression is improving, continues on medication. PHQ9 not repeated. Last PHQ9 indicates mild depression. Sue reports using deep breathing and meditation as stress management strategies.       Nutrition Assessment:    Hyperlipidemia: Yes     Lipids:   Lab Results   Component Value Date    CHOL 204 (H) 05/02/2024    HDL 63.4 05/02/2024    LDLF 112 (H) 07/27/2023    TRIG 104 05/02/2024       Current Dietary Guidelines: Low fat, Low sodium, Fluid restrictions  Barriers to dietary change: no    Diet Habit Survey: Block Dietary Fat Screener  Pre:  Returned  Block Dietary Fat Screener, indicates <30% of calories are from fat. Reviewed with pt. And AHA H/O \"Facts on Fat\" given.   Post:  Not repeated as initial fat screener indicates <30% of calories are from fat.     Diabetes Assessment    Lab Results   Component Value Date    HGBA1C 6.3 (H) 05/02/2024       History of Diabetes: No    Weight Management  11/6/2024 120 day eval-   Height: 157.5 cm (5' 2\")  Weight: 96.1 kg (211 lb 12.8 oz)  BMI (Calculated): 38.73      Nutrition Plan    Goal Status: In progress    Nutrition Goals: Lipid Goal: HDL>45, LDL <70, Total <180, Trigs <150 and Learn how to read and interpret nutrition labels prior to discharge  Learn more about heart healthy eating and maintain heart healthy, low NA diet.     Nutrition Interventions/Education:   To be done in Cardiac Rehab.  Reviewed " recent lipids, HGBA1C, weight, BMI.  Pt. Encouraged to follow heart healthy diet and strategies discussed.  Pt. Encouraged to meet with  dietician.  Heart Failure booklet given.  Block dietary fat screener given.  8/19/2024 30 day eval- Metabolic Syndrome/DM education provided through educational video and H/O.   9/13/2024 60 day eval- Healthy Eating and Cholesterol education provided through educational videos, H/Os, and discussion.     Initial Assessment: Pt. Reports she is making heart healthy low NA (1500 MG)diet choices and is reading labels. She has an appointment to meet with  dietician.   8/19/2024 30 day eval- No new lipids or HGBA1C. Pt. Reports following a heart healthy diet and label reading. She has not met with  dietician at this time.   9/13/2024 60 day eval- No new lipids or HGBA1C. Pt. Reports following a heart healthy, low NA, mostly vegan diet. She reports that she had a recent appointment with an outpatient dietician. Weight is maintained.   10/11/2024 90 day eval- No new lipids or HGBA1C. Pt. Reports following a heart healthy, low NA, mostly vegan diet. Weight is maintained.   11/6/2024 120 day eval- No new lipids or HGBA1C. Pt. Reports following a heart healthy, low NA, mostly vegan diet. Weight is maintained.       Exercise Assessment    Some walking  Mode: walking   Frequency: 2-3 x week  Duration: 5-10 minutes    Exercise Prescription     Exercise Prescription based on: Initial Duke Activity Status Index (DASI) Score 4.0 METS    DASI Score: 10.7    MET Score:  4.0     Frequency:  3 days/week   Mode: Recumbent Cycle, Arm Ergometer, and Sci Fit Stepper   Duration: 40 total aerobic minutes   Intensity: RPE 11-13  Target HR:     MET Level: 3.0-6.0  Patient wears supplemental O2: No     Modality Workload METs Duration (minutes)   1 Pre-Exercise   5:00   2 Arm Ergometer/UEE 5 springer  10:00   3 Arm Ergometer/UEE 5 springer  10:00   4 Sci Fit Steeper 2.0 2.9 10:00   5 Sci Fit Steeper 2.0 2.9  10:00   6 Post-Exercise   5:00     Resistance Training: Yes  2# weight instruction with return demonstration 10/16/2024.   Home Exercise Prescription given: To be given prior to discharge from program.    Exercise Plan    Goal Status: In progress    Exercise Goals:     Develop home exercise program 150 total minutes moderate intensity cardiac exercise weekly by end of cardiac rehab program.  30 minutes cardio exercise most days by end of program. Start moving more around house. No home walking until few sessions of rehab and comfortable to start.  Cardiac Rehab goal total 40 minutes exercise/session with increased duration by 1-2 minutes per modality for total 40 minutes/session.  Cardiac Rehab goal increased exercise intensity on TM/NS/RB/UEE based on pts. HR/BP/RPE/pain response to exercise.       Exercise Interventions/Education:   To be done in Cardiac Rehab.  Reviewed cardiac rehab exercise protocol including METS and RPE.  Exercise prescription based on 6MWT.  Encouraged home exercise 30 minutes/day most days not at CR. Start moving more around house.No home walking until few sessions of rehab and comfortable to start.      Initial Assessment: Pt. Reports sedentary. Encouraged  start moving more around house.No home walking until few sessions of rehab and comfortable to start.  8/19/2024 30 day marcelina-Pt. Is attending Cardiac Rehab 3x week and is now exercising 40 minutes/session at METs 2.4-2.6. In addition she has starting taking short walks at home 5-10 minutes 2-3x week on days not at rehab.  9/13/2024 60 day clark Rogers is attending Cardiac Rehab 2-3 /week and has had some time off recently R/T left hip pain. She is doing an occasional 5 minute walk at home but difficult R/T hip pain. She has adjusted her Gabapentin schedule per her PCP advice and has an upcoming Physical Therapy eval. She wants to continue Cardiac Rehab so times of exercise has been decreased with periods of rest added. Not using RB at  present. Will continue to assess and increase time to goal 40 minutes as tolerates.   10/11/2024 90 day clark Rogers has resumed consistent attendance at Cardiac Rehab 3x week. She reports that her hip pain has improved and she has started physical therapy for treatment. Today she is back to exercising 40 minutes/session on two machines she is able to use-SFS and UEE. She reports short 3-5 minute walks occasionally at home. Plan to increase resistance/intensity on SFS and UEE and increase MET levels by 5-10% over next 2 weeks.   11/6/2024 120 day clark Rogers is attending Cardiac Rehab 2-3x week and most recently exercising 40 minutes/session at METs 2.9. She reports some short walks on her property at home but nothing consistent R/T ongoing hip pain. She was attending PT for hip pain treatment but stopped R/T cost. Exercise progression is difficult R/T hip pain. Plan to maintain 40 minutes of exercise per session and increase resistance on SFS and UEE, two machines she is able to use, if pain tolerance allows. Encouraged continuing small walks at home and to look into gym possibilities to maintain cardiac exercise. Exercise education provided through educational video , H/Os, and discussion. She reports using home weight exercises taught at Cardiac Rehab 2-3x week at home.       Other Core Components/Risk Factor Assessment:    Medication adherence  Current Medications:   Medication Documentation Review Audit       Reviewed by Rosetta Bennett RN (Registered Nurse) on 10/31/24 at 1402      Medication Order Taking? Sig Documenting Provider Last Dose Status   atorvastatin (Lipitor) 80 mg tablet [id number] Yes Take 1 tablet (80 mg) by mouth once daily. Antonieta Hewitt MD PhD  Active   busPIRone (Buspar) 5 mg tablet [id number] Yes Take 1 tablet (5 mg) by mouth 2 times a day. Michel Ratliff MD Taking Active   carvedilol (Coreg) 3.125 mg tablet [id number] Yes Take 1 tablet (3.125 mg) by mouth 2 times a day. Michel Ratliff MD  Taking Active   cholecalciferol (Vitamin D-3) 125 MCG (5000 UT) capsule [id number] Yes Take 1 capsule (125 mcg) by mouth once daily. Michel Ratliff MD Taking Active   empagliflozin (Jardiance) 10 mg [id number] Yes Take 1 tablet (10 mg) by mouth once daily. Antonieta Hewitt MD PhD  Active   ferrous sulfate 325 (65 Fe) MG EC tablet [id number] Yes Take 1 tablet by mouth once daily with breakfast. Do not crush, chew, or split. Michel Ratliff MD Taking Active   furosemide (Lasix) 20 mg tablet [id number] Yes Take 1 tablet (20 mg) by mouth once daily. Michel Ratliff MD Taking Active   gabapentin (Neurontin) 300 mg capsule [id number] Yes Take 1 capsule (300 mg) by mouth 3 times a day. Joanna Garza MD PhD  Active   sacubitriL-valsartan (Entresto) 49-51 mg tablet [id number] Yes Take 1 tablet by mouth 2 times a day. Michel Ratliff MD Taking Active   spironolactone (Aldactone) 25 mg tablet [id number] Yes Take 1 tablet (25 mg) by mouth once daily. Michel Ratliff MD Taking Active   valproic acid (Depakene) 250 mg capsule [id number] Yes Take 3 capsules (750 mg) by mouth once daily at bedtime. Michel Ratliff MD Taking Active   venlafaxine (Effexor) 75 mg tablet [id number] Yes Take 1 tablet (75 mg) by mouth once daily at bedtime. Michel Ratliff MD Taking Active                                 Medication compliance: Yes   Uses pill box/organizer: Yes    Carries medication list: Yes     Blood Pressure Management  History of Hypertension: No   Medication Changes: No   Resting BP: 11/6/2024 120 day eval- Resting BP at recent session prior to exercise- 107/63 Post exercise 106/70       Heart Failure Management  Hx of Heart Failure: Yes;   Type (selection): HFrEF  Most recent EF:   22%    Onset of heart failure diagnosis: 6/26/2024  Last heart failure hospitalization: 6/26/2024  Number of HF admissions per year: 1    Symptoms: Fatigue with exertion and Shortness of breath  Is there a family Hx of HF: No   Does patient obtain daily  weight Yes       Heart Failure Reassessment: Reassessed every 30 days while in program.   8/19/2024 30 day eval-  Unplanned MD and/or ED Heart Failure visit: No  Hospitalization since starting program/last assessment: No  MD contacted regarding Heart Failure symptoms: No  Pt. Reports following 2L fluid restriction, 2GM NA diet, weighs daily.   9/13/2024 60 day eval- Unplanned MD and/or ED Heart Failure visit: No  Hospitalization since starting program/last assessment: No  MD contacted regarding Heart Failure symptoms: No  Pt. Reports following 2L fluid restriction, 2GM NA diet, weighs daily.  10/11/2024 90 day eval- Unplanned MD and/or ED Heart Failure visit: No. Sue did have one cardiac related ER visit on 9/25/2024 for increased PVC activity while at Cardiac Rehab. Dr. Hewitt was notified.   Hospitalization since starting program/last assessment: No  MD contacted regarding Heart Failure symptoms: No  Pt. Reports following 2L fluid restriction, 2GM NA diet, weighs daily.  11/6/2024 120 day eval- Unplanned MD and/or ED Heart Failure visit: No.   Hospitalization since starting program/last assessment: No  MD contacted regarding Heart Failure symptoms: No      Heart Failure Goals: Able to verbalize signs and symptoms of fluid retention and when to contact MD, Adhere to proper fluid restrictions, Adapt a low sodium diet and verbalize guidelines, and Obtain daily weight and verbalize proper method of obtaining weights        Smoking/Tobacco Assessment  Social History     Tobacco Use   Smoking Status Never   Smokeless Tobacco Never       Other Core Component Plan    Goal Status: In progress    Other Core Component Goals: Medication compliance Knowledge on medications and carries an updated medication list.    Other Core Component Interventions/Education:   Reviewed current medications, indications, dosages.  Encouraged pt. To carry updated medication list.  Review effects of exercise on BP.  BP at cardiac rehab pre  and post exercise.  8/19/2024 30 day eval- Heart Parts, Cardiac Risk Factors, and Heart Failure education provided through educational videos, H/Os, and 1:1 discussion.   9/13/2024 60 day eval- Cardiac Rehab pretest has been returned, score 12/15 and reviewed.       Initial Assessment: Pt. Reports medication compliance, knowledge on medications and is now carrying an updated medication list. Checks BP am daily prior to medication.   8/19/2024 30 day eval- Pt. Reports compliant with medication, knowledgeable on medication. She has not been carrying a medication list but has one and will place in her purse.   9/13/2024 60 day eval- Pt. Reports compliant with medication, knowledgeable on medication.  No med changes except adjusting her Gabapentin schedule. She reports carrying an updated medication list. She reports checking her BP daily at home prior to medication.   10/11/2024 90 day eval- Pt. Is compliant with her medication, knowledgeable on her medication, and carries an updated medication list.   BP is stable at Cardiac Rehab.   11/6/2024 120 day eval- Pt. Is compliant with her medication, knowledgeable on her medication, and carries an updated medication list.   BP is stable at Cardiac Rehab. She reports Neurontin increased to 4 x day by PCP for ongoing hip pain.       Individual Patient Goals:    Improve strength and endurance to cook, walk dogs, and walk without fatigue and SOB.   Learn correct moderate intensity cardiac exercise and start home exercise program to maintain by end of rehab program.   Learn more about heart healthy diet and make heart healthy diet choice     Goal Status: In progress    Staff Comments:  Reviewed cardiac rehab exercise protocol including METS and RPE.  Reviewed educational topics to be covered at Cardiac Rehab.   8/19/2024 30 day evjailene Rogers reports improved strength and endurance to exercise at rehab,   and has started some walking at home with less fatigue and SOB. She continues  to work on making heart healthy diet choices. And label reading. No ER visits, hospitalizations, or falls since starting in the program.   9/13/2024 60 day clark Rogers reports having some improved strength and endurance but feels she is losing strength R/T hip issues that are affecting her ability to exercise. Will will continue to work on plan for her to maintain cardio exercise. She continues to work on making heart healthy diet choices and has met with a dietician recently. No ER visits, hospitalizations since starting in the program. She reports lost balance and fell in kitchen few weeks ago but denies injury.   10/11/2024 90 day clark Rogers reports improved strength and endurance now that her hip is feeling better and she is able to move with less discomfort. She is exercising 40 minutes/session at Cardiac Rehab without issues and reports some short 3-5 minute walks at home. She is currently also receiving PT for hip issues. Sue reports making heart healthy, mostly vegan diet choices. One Cardiac related ER visit R/T to increased PVC activity at a Cardiac Rehab session. Continues to have occasional PVC both with rest and exercise. No hospitalizations or falls since last 30 day evaluation.   11/6/2024 120 day clark Rogers reports ongoing improvement in strength and endurance and less SOB and fatigue but struggles to establish home exercise routine R/T ongoing hip pain. Strategies for home exercise have been suggested and encouraged to help maintain strength and endurance. Sue reports making heart healthy, mostly vegan diet choices. No ER visits, hospitalizations, or falls since last 30 day evaluation. Sue has 7 sessions of Cardiac Rehab remaining.         Rehab Staff Signature: Laura Polo RN

## 2024-11-08 ENCOUNTER — APPOINTMENT (OUTPATIENT)
Dept: CARDIAC REHAB | Facility: HOSPITAL | Age: 79
End: 2024-11-08
Payer: MEDICARE

## 2024-11-11 ENCOUNTER — APPOINTMENT (OUTPATIENT)
Dept: CARDIAC REHAB | Facility: HOSPITAL | Age: 79
End: 2024-11-11
Payer: MEDICARE

## 2024-11-13 ENCOUNTER — APPOINTMENT (OUTPATIENT)
Dept: CARDIAC REHAB | Facility: HOSPITAL | Age: 79
End: 2024-11-13
Payer: MEDICARE

## 2024-11-13 DIAGNOSIS — I50.20 HFREF (HEART FAILURE WITH REDUCED EJECTION FRACTION): ICD-10-CM

## 2024-11-13 PROCEDURE — 93798 PHYS/QHP OP CAR RHAB W/ECG: CPT | Performed by: INTERNAL MEDICINE

## 2024-11-15 ENCOUNTER — DOCUMENTATION (OUTPATIENT)
Dept: CARDIAC REHAB | Facility: HOSPITAL | Age: 79
End: 2024-11-15

## 2024-11-15 ENCOUNTER — APPOINTMENT (OUTPATIENT)
Dept: CARDIAC REHAB | Facility: HOSPITAL | Age: 79
End: 2024-11-15
Payer: MEDICARE

## 2024-11-15 VITALS
SYSTOLIC BLOOD PRESSURE: 108 MMHG | BODY MASS INDEX: 39.05 KG/M2 | HEIGHT: 62 IN | WEIGHT: 212.2 LBS | DIASTOLIC BLOOD PRESSURE: 63 MMHG

## 2024-11-15 DIAGNOSIS — I50.20 HFREF (HEART FAILURE WITH REDUCED EJECTION FRACTION): ICD-10-CM

## 2024-11-15 PROCEDURE — 93798 PHYS/QHP OP CAR RHAB W/ECG: CPT | Performed by: INTERNAL MEDICINE

## 2024-11-15 NOTE — PROGRESS NOTES
Cardiac Rehabilitation Discharge Summary    Name: Sue Vial  Medical Record Number: [id number]  YOB: 1945  Age: 79 y.o.    Today’s Date: 11/15/2024  Primary Care Physician: Joanna Garza MD PhD  Referring Physician: Katya Guzman MD  Program Location: [facility]  Primary Diagnosis:   HFrEF (heart failure with reduced ejection fraction)      Onset/Date of Diagnosis: 6/26/2024    Session #: 33    AACVPR Risk Stratification:   Moderate    Falls Risk: High  Psychosocial Assessment       Initial assessment- Sent PH-Q 9 to MD if score > 20: No; score < 20. Initial PHQ2 score +3. PHQ9 indicated, score +15, indicates moderately severe depression.   8/19/2024 30 day eval-PHQ9 result- +12, indicates moderate depression.  9/13/2024 60 day eval-PHQ9 result- +7, indicates mild depression.  10/11/2024 90 day eval- PHQ9 not repeated. Last PHQ9 indicates mild depression. Sue has a history of depression and pt. is on medication for depression.     Pt reported/currently experiencing stress: Yes; Stress; Severity: moderate and Depression; Severity: moderate  Patient uses stress management skills: Yes   History of: depression  Currently seeing a mental health provider: No  Social Support: Yes, Whom:spouse  Quality of Life Survey: Initial assessment- Sent PH-Q 9 to MD if score > 20: No; score < 20. Initial PHQ2 score +3. PHQ9 indicated, score +15, indicates moderately severe depression.   8/19/2024 30 day eval-PHQ9 result- +12, indicates moderate depression.  9/13/2024 60 day eval-PHQ9 result- +7, indicates mild depression.  10/11/2024 90 day eval- PHQ9 not repeated. Last PHQ9 indicates mild depression. Sue has a history of depression and pt. is on medication for depression.    Learning Assessment:  Learning assessment/barriers: None  Preferred learning method: Visual and Reading handout  Barriers: None  Comments:    Stages of Change:Action    Psychosocial Plan    Goal Status: In progress    Psychosocial  Goals: Demonstrating proper techniques for stress management, Maintain or lower PH-Q 9 score by discharge, and Identify strategies for managing depression    Psychosocial Interventions/Education: To be done in Cardiac Rehab.  Pt. questioned re: new stress, anxiety, or depressive symptoms.  Pt. Instructed on 3 Minute Breathing Space mindfulness exercise and other stress management strategies like deep breathing, exercise, listening to music.  PHQ9 reevaluated and reviewed with pt.  8/19/2024 30 day eval- Reviewed stress management strategies like deep breathing, exercise, and listening to music.   9/13/2024 60 day eval- PHQ9 repeated. Pt. questioned re: new stress, anxiety, or depressive symptoms. Reviewed stress management strategies like deep breathing, exercise, and listening to music.    Initial Assessment: Pt. Reports history of depression, worse R/T present medical issues but feels it is improving on current medications. PHQ9 indicates moderately severe depression, will repeat at next 30 day evaluation. Pt. Reports stress R/T present medical issues but she is using spiritual and mindfulness strategies to manage.   8/19/2024 30 day eval- Pt. Reports that she is managing stress and using positive stress management strategies, she got a new dog recently. She reports occasional feeling of anxiety but managing without issue. She also reports that she feels her depression is improving, continues on medication. PHQ9 given to take home and return to reassess depression.   9/13/2024 60 day eval- Pt. reports occasional feeling of anxiety and feeling down, but managing without issue. She also reports that she feels her depression is improving, continues on medication. PHQ9 repeated at 30 and 60 day evals continue to improve with most recent PHQ9 score +7, indicates mild depression. She reports using deep breathing as a stress management strategy.   10/11/2024 90 day eval- Pt. reports occasional feeling of anxiety and  feeling down, but managing without issue. She also reports that she feels her depression is improving, continues on medication. PHQ9 not repeated. Last PHQ9 indicates mild depression. Sue reports using deep breathing and meditation as stress management strategies. Stress, Anxiety, and Depression education provided through educational video, H/Os, and discussion.   11/6/2024 120 day eval- Sue continues to report occasional feeling of anxiety and feeling down, but managing without issue, and support from spouse. She also reports that she feels her depression is improving, continues on medication. PHQ9 not repeated. Last PHQ9 indicates mild depression. Sue reports using deep breathing and meditation as stress management strategies.   11/15/2024 Discharge Summary- Sue continues to work on her psychosocial goals. She has 2 sessions of rehab remaining but decided today would be her last day. Sue continues to report occasional feeling of anxiety and feeling down but today verbalizes that she feels that she has failed at her cardiac exercise because of not being able to exercise without pain, and therefore she feels she has not progressed as she should have. PHQ9 has not been repeated since 60 day evaluation because at that time it indicated mild depression which she had reported was chronic and managed on her current depression medications. [facility] doctorate psychology student Lyn talked with Sue today and they discussed Sue's current feelings of failure. Lyn gave Sue some resources to contact regarding counseling and I encouraged her to discuss her feelings with her PCP and cardiologist.       Nutrition Assessment:    Hyperlipidemia: Yes     Lipids:   Lab Results   Component Value Date    CHOL 204 (H) 05/02/2024    HDL 63.4 05/02/2024    LDLF 112 (H) 07/27/2023    TRIG 104 05/02/2024       Current Dietary Guidelines: Low fat, Low sodium, Fluid restrictions  Barriers to dietary change: no    Diet  "Habit Survey: Block Dietary Fat Screener  Pre:  Returned  Block Dietary Fat Screener, indicates <30% of calories are from fat. Reviewed with pt. And [facility] H/O \"Facts on Fat\" given.   Post:  Not repeated as initial fat screener indicates <30% of calories are from fat.     Diabetes Assessment    Lab Results   Component Value Date    HGBA1C 6.3 (H) 05/02/2024       History of Diabetes: No    Weight Management  11/15/2024 Discharge Summary-   Height: 157.5 cm (5' 2\")  Weight: 96.3 kg (212 lb 3.2 oz)  BMI (Calculated): 38.8      Nutrition Plan    Goal Status: In progress    Nutrition Goals: Lipid Goal: HDL>45, LDL <70, Total <180, Trigs <150 and Learn how to read and interpret nutrition labels prior to discharge  Learn more about heart healthy eating and maintain heart healthy, low NA diet.     Nutrition Interventions/Education:   To be done in Cardiac Rehab.  Reviewed recent lipids, HGBA1C, weight, BMI.  Pt. Encouraged to follow heart healthy diet and strategies discussed.  Pt. Encouraged to meet with  dietician.  Heart Failure booklet given.  Block dietary fat screener given.  8/19/2024 30 day eval- Metabolic Syndrome/DM education provided through educational video and H/O.   9/13/2024 60 day eval- Healthy Eating and Cholesterol education provided through educational videos, H/Os, and discussion.     Initial Assessment: Pt. Reports she is making heart healthy low NA (1500 MG)diet choices and is reading labels. She has an appointment to meet with  dietician.   8/19/2024 30 day eval- No new lipids or HGBA1C. Pt. Reports following a heart healthy diet and label reading. She has not met with  dietician at this time.   9/13/2024 60 day eval- No new lipids or HGBA1C. Pt. Reports following a heart healthy, low NA, mostly vegan diet. She reports that she had a recent appointment with an outpatient dietician. Weight is maintained.   10/11/2024 90 day eval- No new lipids or HGBA1C. Pt. Reports following a heart healthy, low " NA, mostly vegan diet. Weight is maintained.   11/6/2024 120 day eval- No new lipids or HGBA1C. Pt. Reports following a heart healthy, low NA, mostly vegan diet. Weight is maintained.   11/15/2024 Discharge Summary- Sue continues to work on her nutrition goals of improving her total cholesterol and LDL. She has not had a repeat lipid profile while in the program. No new HGBA1C while in the program. Pt. Reports following a heart healthy, low NA, mostly vegan diet. Weight is maintained.         Exercise Assessment    Some walking  Mode: walking   Frequency: 2-3 x week  Duration: 5-10 minutes    Exercise Prescription     Exercise Prescription based on: Initial Duke Activity Status Index (DASI) Score 4.0 METS                                                    Post Duke Activity Status Index (DASI) Score    4.0 METS    Pre: DASI Score: 10.7      Post: 9.6         MET Score:  4.0        MET Score: 4.0     Frequency:  3 days/week   Mode: Recumbent Cycle, Arm Ergometer, and Sci Fit Stepper   Duration: 40 total aerobic minutes   Intensity: RPE 11-13  Target HR:     MET Level: 3.0-6.0  Patient wears supplemental O2: No     Modality Workload METs Duration (minutes)   1 Pre-Exercise   5:00   2 Arm Ergometer/UEE 5 springer  10:00   3 Sci Fit Steeper 2.0 2.9 10:00   4 Sci Fit Steeper 2.0 2.9 10:00   5 Sci Fit Steeper 2.0 2.9 10:00   6 Post-Exercise   5:00     Resistance Training: Yes  2# weight instruction with return demonstration 10/16/2024.   Home Exercise Prescription given: Yes, given at today's session.    Exercise Plan    Goal Status: In progress    Exercise Goals:     Develop home exercise program 150 total minutes moderate intensity cardiac exercise weekly by end of cardiac rehab program.  30 minutes cardio exercise most days by end of program. Start moving more around house. No home walking until few sessions of rehab and comfortable to start.  Cardiac Rehab goal total 40 minutes exercise/session with increased  duration by 1-2 minutes per modality for total 40 minutes/session.  Cardiac Rehab goal increased exercise intensity on TM/NS/RB/UEE based on pts. HR/BP/RPE/pain response to exercise.       Exercise Interventions/Education:   To be done in Cardiac Rehab.  Reviewed cardiac rehab exercise protocol including METS and RPE.  Exercise prescription based on 6MWT.  Encouraged home exercise 30 minutes/day most days not at CR. Start moving more around house.No home walking until few sessions of rehab and comfortable to start.      Initial Assessment: Pt. Reports sedentary. Encouraged  start moving more around house.No home walking until few sessions of rehab and comfortable to start.  8/19/2024 30 day marcelina-Pt. Is attending Cardiac Rehab 3x week and is now exercising 40 minutes/session at METs 2.4-2.6. In addition she has starting taking short walks at home 5-10 minutes 2-3x week on days not at rehab.  9/13/2024 60 day clark Rogers is attending Cardiac Rehab 2-3 /week and has had some time off recently R/T left hip pain. She is doing an occasional 5 minute walk at home but difficult R/T hip pain. She has adjusted her Gabapentin schedule per her PCP advice and has an upcoming Physical Therapy eval. She wants to continue Cardiac Rehab so times of exercise has been decreased with periods of rest added. Not using RB at present. Will continue to assess and increase time to goal 40 minutes as tolerates.   10/11/2024 90 day clark Rogers has resumed consistent attendance at Cardiac Rehab 3x week. She reports that her hip pain has improved and she has started physical therapy for treatment. Today she is back to exercising 40 minutes/session on two machines she is able to use-SFS and UEE. She reports short 3-5 minute walks occasionally at home. Plan to increase resistance/intensity on SFS and UEE and increase MET levels by 5-10% over next 2 weeks.   11/6/2024 120 day clark Rogers is attending Cardiac Rehab 2-3x week and most recently  exercising 40 minutes/session at METs 2.9. She reports some short walks on her property at home but nothing consistent R/T ongoing hip pain. She was attending PT for hip pain treatment but stopped R/T cost. Exercise progression is difficult R/T hip pain. Plan to maintain 40 minutes of exercise per session and increase resistance on SFS and UEE, two machines she is able to use, if pain tolerance allows. Encouraged continuing small walks at home and to look into gym possibilities to maintain cardiac exercise. Exercise education provided through educational video , H/Os, and discussion. She reports using home weight exercises taught at Cardiac Rehab 2-3x week at home.   11/15/2024 Discharge Summary- Sue continues to work on her exercise goals. She has been able to exercise most recently for 40 minutes/session most days she attends rehab  at METs 2.9, but attendance is not consistent R/T orthopedic pain in shoulders and hips, and progression has been difficult. Many different strategies to make exercise more comfortable have been attempted, and while she rarely denies discomfort at rehab, reports she has pain the next day. She is now concerned that pain may be R/T her statin and has a call into Dr. Hewitt. She reports some small walks throughout the day when she is able R/T pain but has not been able to establish a home exercise program. She attended physical therapy for a few sessions for hip pain but stopped R/T cost.        Other Core Components/Risk Factor Assessment:    Medication adherence  Current Medications:   Medication Documentation Review Audit       Reviewed by Rosetta Bennett RN (Registered Nurse) on 10/31/24 at 1402      Medication Order Taking? Sig Documenting Provider Last Dose Status   atorvastatin (Lipitor) 80 mg tablet [id number] Yes Take 1 tablet (80 mg) by mouth once daily. Antonieta Hewitt MD PhD  Active   busPIRone (Buspar) 5 mg tablet [id number] Yes Take 1 tablet (5 mg) by mouth 2 times a  day. Michel Ratliff MD Taking Active   carvedilol (Coreg) 3.125 mg tablet [id number] Yes Take 1 tablet (3.125 mg) by mouth 2 times a day. Michel Ratliff MD Taking Active   cholecalciferol (Vitamin D-3) 125 MCG (5000 UT) capsule [id number] Yes Take 1 capsule (125 mcg) by mouth once daily. Michel Ratliff MD Taking Active   empagliflozin (Jardiance) 10 mg [id number] Yes Take 1 tablet (10 mg) by mouth once daily. Antonieta Hewitt MD PhD  Active   ferrous sulfate 325 (65 Fe) MG EC tablet [id number] Yes Take 1 tablet by mouth once daily with breakfast. Do not crush, chew, or split. Michel Ratliff MD Taking Active   furosemide (Lasix) 20 mg tablet [id number] Yes Take 1 tablet (20 mg) by mouth once daily. Michel Ratliff MD Taking Active   gabapentin (Neurontin) 300 mg capsule [id number] Yes Take 1 capsule (300 mg) by mouth 3 times a day. Joanna Garza MD PhD  Active   sacubitriL-valsartan (Entresto) 49-51 mg tablet [id number] Yes Take 1 tablet by mouth 2 times a day. Michel Ratliff MD Taking Active   spironolactone (Aldactone) 25 mg tablet [id number] Yes Take 1 tablet (25 mg) by mouth once daily. Michel Ratliff MD Taking Active   valproic acid (Depakene) 250 mg capsule [id number] Yes Take 3 capsules (750 mg) by mouth once daily at bedtime. Michel Ratliff MD Taking Active   venlafaxine (Effexor) 75 mg tablet [id number] Yes Take 1 tablet (75 mg) by mouth once daily at bedtime. Michel Ratliff MD Taking Active                                 Medication compliance: Yes   Uses pill box/organizer: Yes    Carries medication list: Yes     Blood Pressure Management  History of Hypertension: No   Medication Changes: No   Resting BP: 11/6/2024 120 day eval- Resting BP at recent session prior to exercise- 107/63. Post exercise 106/70  11/15/2024 Discharge Summary- Resting BP prior to exercise 108/63. Post exercise 96/65      Heart Failure Management  Hx of Heart Failure: Yes;   Type (selection): HFrEF  Most recent EF:   22%    Onset  of heart failure diagnosis: 6/26/2024  Last heart failure hospitalization: 6/26/2024  Number of HF admissions per year: 1    Symptoms: Fatigue with exertion and Shortness of breath  Is there a family Hx of HF: No   Does patient obtain daily weight Yes       Heart Failure Reassessment: Reassessed every 30 days while in program.   8/19/2024 30 day eval-  Unplanned MD and/or ED Heart Failure visit: No  Hospitalization since starting program/last assessment: No  MD contacted regarding Heart Failure symptoms: No  Pt. Reports following 2L fluid restriction, 2GM NA diet, weighs daily.   9/13/2024 60 day eval- Unplanned MD and/or ED Heart Failure visit: No  Hospitalization since starting program/last assessment: No  MD contacted regarding Heart Failure symptoms: No  Pt. Reports following 2L fluid restriction, 2GM NA diet, weighs daily.  10/11/2024 90 day eval- Unplanned MD and/or ED Heart Failure visit: No. Sue did have one cardiac related ER visit on 9/25/2024 for increased PVC activity while at Cardiac Rehab. Dr. Hewitt was notified.   Hospitalization since starting program/last assessment: No  MD contacted regarding Heart Failure symptoms: No  Pt. Reports following 2L fluid restriction, 2GM NA diet, weighs daily.  11/6/2024 120 day eval- Unplanned MD and/or ED Heart Failure visit: No.   Hospitalization since starting program/last assessment: No  MD contacted regarding Heart Failure symptoms: No  11/15/2024 Discharge Summary- Unplanned MD and/or ED Heart Failure visit: No.   Hospitalization since starting program/last assessment: No  MD contacted regarding Heart Failure symptoms: No      Heart Failure Goals: Able to verbalize signs and symptoms of fluid retention and when to contact MD, Adhere to proper fluid restrictions, Adapt a low sodium diet and verbalize guidelines, and Obtain daily weight and verbalize proper method of obtaining weights  11/15/2024 Discharge Summary- Sue reports following adhering to her fluid  restriction, following a 1500 mg NA diet, and weighing daily.          Smoking/Tobacco Assessment  Social History     Tobacco Use   Smoking Status Never   Smokeless Tobacco Never       Other Core Component Plan    Goal Status: Met    Other Core Component Goals: Medication compliance Knowledge on medications and carries an updated medication list.    Other Core Component Interventions/Education:   Reviewed current medications, indications, dosages.  Encouraged pt. To carry updated medication list.  Review effects of exercise on BP.  BP at cardiac rehab pre and post exercise.  8/19/2024 30 day eval- Heart Parts, Cardiac Risk Factors, and Heart Failure education provided through educational videos, H/Os, and 1:1 discussion.   9/13/2024 60 day eval- Cardiac Rehab pretest has been returned, score 12/15 and reviewed.   11/15/2024 Discharge Summary-Cardiac Rehab posttest returned, score 13/15. Reviewed with pt.   Blood pressure education provided through educational video and H/O.       Initial Assessment: Pt. Reports medication compliance, knowledge on medications and is now carrying an updated medication list. Checks BP am daily prior to medication.   8/19/2024 30 day eval- Pt. Reports compliant with medication, knowledgeable on medication. She has not been carrying a medication list but has one and will place in her purse.   9/13/2024 60 day eval- Pt. Reports compliant with medication, knowledgeable on medication.  No med changes except adjusting her Gabapentin schedule. She reports carrying an updated medication list. She reports checking her BP daily at home prior to medication.   10/11/2024 90 day eval- Pt. Is compliant with her medication, knowledgeable on her medication, and carries an updated medication list.   BP is stable at Cardiac Rehab.   11/6/2024 120 day eval- Pt. Is compliant with her medication, knowledgeable on her medication, and carries an updated medication list.   BP is stable at Cardiac Rehab. She  reports Neurontin increased to 4 x day by PCP for ongoing hip pain.   11/15/2024 Discharge Summary-Sue has met her core component goals. She is compliant with her medication, knowledgeable on her medication, and carries an updated medication list.       Individual Patient Goals:    Improve strength and endurance to cook, walk dogs, and walk without fatigue and SOB.   Learn correct moderate intensity cardiac exercise and start home exercise program to maintain by end of rehab program.   Learn more about heart healthy diet and make heart healthy diet choice     Goal Status: In progress    Staff Comments:  Reviewed cardiac rehab exercise protocol including METS and RPE.  Reviewed educational topics to be covered at Cardiac Rehab.   8/19/2024 30 day clark Rogers reports improved strength and endurance to exercise at rehab,   and has started some walking at home with less fatigue and SOB. She continues to work on making heart healthy diet choices. And label reading. No ER visits, hospitalizations, or falls since starting in the program.   9/13/2024 60 day clark Rogers reports having some improved strength and endurance but feels she is losing strength R/T hip issues that are affecting her ability to exercise. Will will continue to work on plan for her to maintain cardio exercise. She continues to work on making heart healthy diet choices and has met with a dietician recently. No ER visits, hospitalizations since starting in the program. She reports lost balance and fell in kitchen few weeks ago but denies injury.   10/11/2024 90 day clark Rogers reports improved strength and endurance now that her hip is feeling better and she is able to move with less discomfort. She is exercising 40 minutes/session at Cardiac Rehab without issues and reports some short 3-5 minute walks at home. She is currently also receiving PT for hip issues. Sue reports making heart healthy, mostly vegan diet choices. One Cardiac related ER visit R/T to  increased PVC activity at a Cardiac Rehab session. Continues to have occasional PVC both with rest and exercise. No hospitalizations or falls since last 30 day evaluation.   11/6/2024 120 day eval- Sue reports ongoing improvement in strength and endurance and less SOB and fatigue but struggles to establish home exercise routine R/T ongoing hip pain. Strategies for home exercise have been suggested and encouraged to help maintain strength and endurance. Sue reports making heart healthy, mostly vegan diet choices. No ER visits, hospitalizations, or falls since last 30 day evaluation. Sue has 7 sessions of Cardiac Rehab remaining.   11/15/2024 Discharge Summary-Sue continues to work on her individual goals. She does report improved strength and endurance, to cook, and walk with less fatigue and SOB. She reports moving more than she has in past but struggling with pain associated with exercise. She is contacting Dr. Hewitt to discuss if could be caused by statin but also admits to chronic pain issues. Because of pain issues she has not established a home exercise program. Sue reports following a heart healthy, 1500mg NA, mostly vegan diet. No ER visits, hospitalizations, or falls since last 30 day evaluation.         Rehab Staff Signature: Laura Polo RN

## 2024-11-18 ENCOUNTER — APPOINTMENT (OUTPATIENT)
Dept: CARDIAC REHAB | Facility: HOSPITAL | Age: 79
End: 2024-11-18
Payer: MEDICARE

## 2024-11-20 ENCOUNTER — APPOINTMENT (OUTPATIENT)
Dept: CARDIAC REHAB | Facility: HOSPITAL | Age: 79
End: 2024-11-20
Payer: MEDICARE

## 2024-11-22 ENCOUNTER — APPOINTMENT (OUTPATIENT)
Dept: CARDIAC REHAB | Facility: HOSPITAL | Age: 79
End: 2024-11-22
Payer: MEDICARE

## 2024-11-25 ENCOUNTER — APPOINTMENT (OUTPATIENT)
Dept: CARDIAC REHAB | Facility: HOSPITAL | Age: 79
End: 2024-11-25
Payer: MEDICARE

## 2024-11-25 PROCEDURE — RXMED WILLOW AMBULATORY MEDICATION CHARGE

## 2024-11-26 ENCOUNTER — PHARMACY VISIT (OUTPATIENT)
Dept: PHARMACY | Facility: CLINIC | Age: 79
End: 2024-11-26
Payer: COMMERCIAL

## 2024-11-27 ENCOUNTER — APPOINTMENT (OUTPATIENT)
Dept: CARDIAC REHAB | Facility: HOSPITAL | Age: 79
End: 2024-11-27
Payer: MEDICARE

## 2024-11-27 PROCEDURE — RXMED WILLOW AMBULATORY MEDICATION CHARGE

## 2024-11-28 PROCEDURE — RXMED WILLOW AMBULATORY MEDICATION CHARGE

## 2024-11-29 ENCOUNTER — PHARMACY VISIT (OUTPATIENT)
Dept: PHARMACY | Facility: CLINIC | Age: 79
End: 2024-11-29
Payer: COMMERCIAL

## 2024-12-02 ENCOUNTER — APPOINTMENT (OUTPATIENT)
Dept: CARDIAC REHAB | Facility: HOSPITAL | Age: 79
End: 2024-12-02
Payer: MEDICARE

## 2024-12-02 ENCOUNTER — HOSPITAL ENCOUNTER (OUTPATIENT)
Dept: CARDIOLOGY | Facility: HOSPITAL | Age: 79
Discharge: HOME | End: 2024-12-02
Payer: MEDICARE

## 2024-12-02 DIAGNOSIS — I50.22 CHRONIC SYSTOLIC HEART FAILURE: ICD-10-CM

## 2024-12-02 PROCEDURE — 94621 CARDIOPULM EXERCISE TESTING: CPT

## 2024-12-02 PROCEDURE — 94621 CARDIOPULM EXERCISE TESTING: CPT | Performed by: INTERNAL MEDICINE

## 2024-12-03 ENCOUNTER — PHARMACY VISIT (OUTPATIENT)
Dept: PHARMACY | Facility: CLINIC | Age: 79
End: 2024-12-03
Payer: COMMERCIAL

## 2024-12-04 ENCOUNTER — APPOINTMENT (OUTPATIENT)
Dept: CARDIAC REHAB | Facility: HOSPITAL | Age: 79
End: 2024-12-04
Payer: MEDICARE

## 2024-12-06 ENCOUNTER — APPOINTMENT (OUTPATIENT)
Dept: CARDIAC REHAB | Facility: HOSPITAL | Age: 79
End: 2024-12-06
Payer: MEDICARE

## 2024-12-10 DIAGNOSIS — G47.00 INSOMNIA, UNSPECIFIED TYPE: ICD-10-CM

## 2024-12-10 RX ORDER — DOXEPIN HYDROCHLORIDE 10 MG/1
10 CAPSULE ORAL NIGHTLY
Qty: 90 CAPSULE | Refills: 1 | Status: CANCELLED | OUTPATIENT
Start: 2024-12-10 | End: 2025-01-05

## 2024-12-13 ENCOUNTER — LAB (OUTPATIENT)
Dept: LAB | Facility: LAB | Age: 79
End: 2024-12-13
Payer: MEDICARE

## 2024-12-13 DIAGNOSIS — I50.21 ACUTE SYSTOLIC HEART FAILURE: ICD-10-CM

## 2024-12-13 DIAGNOSIS — I50.22 CHRONIC SYSTOLIC HEART FAILURE: ICD-10-CM

## 2024-12-13 LAB
ALBUMIN SERPL BCP-MCNC: 3.7 G/DL (ref 3.4–5)
ALP SERPL-CCNC: 74 U/L (ref 33–136)
ALT SERPL W P-5'-P-CCNC: 12 U/L (ref 7–45)
ANION GAP SERPL CALC-SCNC: 12 MMOL/L (ref 10–20)
AST SERPL W P-5'-P-CCNC: 16 U/L (ref 9–39)
BILIRUB SERPL-MCNC: 0.3 MG/DL (ref 0–1.2)
BNP SERPL-MCNC: 88 PG/ML (ref 0–99)
BUN SERPL-MCNC: 23 MG/DL (ref 6–23)
CALCIUM SERPL-MCNC: 8.7 MG/DL (ref 8.6–10.3)
CHLORIDE SERPL-SCNC: 105 MMOL/L (ref 98–107)
CO2 SERPL-SCNC: 26 MMOL/L (ref 21–32)
CREAT SERPL-MCNC: 1.03 MG/DL (ref 0.5–1.05)
EGFRCR SERPLBLD CKD-EPI 2021: 55 ML/MIN/1.73M*2
ERYTHROCYTE [DISTWIDTH] IN BLOOD BY AUTOMATED COUNT: 15.7 % (ref 11.5–14.5)
FERRITIN SERPL-MCNC: 40 NG/ML (ref 8–150)
GLUCOSE SERPL-MCNC: 96 MG/DL (ref 74–99)
HCT VFR BLD AUTO: 39.4 % (ref 36–46)
HGB BLD-MCNC: 13.1 G/DL (ref 12–16)
IRON SATN MFR SERPL: 11 % (ref 25–45)
IRON SERPL-MCNC: 41 UG/DL (ref 35–150)
MCH RBC QN AUTO: 32.3 PG (ref 26–34)
MCHC RBC AUTO-ENTMCNC: 33.2 G/DL (ref 32–36)
MCV RBC AUTO: 97 FL (ref 80–100)
NRBC BLD-RTO: 0 /100 WBCS (ref 0–0)
PHOSPHATE SERPL-MCNC: 4.9 MG/DL (ref 2.5–4.9)
PLATELET # BLD AUTO: 333 X10*3/UL (ref 150–450)
POTASSIUM SERPL-SCNC: 4.8 MMOL/L (ref 3.5–5.3)
PROT SERPL-MCNC: 5.7 G/DL (ref 6.4–8.2)
RBC # BLD AUTO: 4.05 X10*6/UL (ref 4–5.2)
SODIUM SERPL-SCNC: 138 MMOL/L (ref 136–145)
TIBC SERPL-MCNC: 377 UG/DL (ref 240–445)
TSH SERPL-ACNC: 2.41 MIU/L (ref 0.44–3.98)
UIBC SERPL-MCNC: 336 UG/DL (ref 110–370)
WBC # BLD AUTO: 8.5 X10*3/UL (ref 4.4–11.3)

## 2024-12-13 PROCEDURE — 83550 IRON BINDING TEST: CPT

## 2024-12-13 PROCEDURE — 85027 COMPLETE CBC AUTOMATED: CPT

## 2024-12-13 PROCEDURE — 83540 ASSAY OF IRON: CPT

## 2024-12-13 PROCEDURE — 84100 ASSAY OF PHOSPHORUS: CPT

## 2024-12-13 PROCEDURE — 84443 ASSAY THYROID STIM HORMONE: CPT

## 2024-12-13 PROCEDURE — 36415 COLL VENOUS BLD VENIPUNCTURE: CPT

## 2024-12-13 PROCEDURE — 83880 ASSAY OF NATRIURETIC PEPTIDE: CPT

## 2024-12-13 PROCEDURE — 80053 COMPREHEN METABOLIC PANEL: CPT

## 2024-12-13 PROCEDURE — 82728 ASSAY OF FERRITIN: CPT

## 2024-12-16 PROCEDURE — RXMED WILLOW AMBULATORY MEDICATION CHARGE

## 2024-12-18 ENCOUNTER — PHARMACY VISIT (OUTPATIENT)
Dept: PHARMACY | Facility: CLINIC | Age: 79
End: 2024-12-18
Payer: COMMERCIAL

## 2024-12-18 PROCEDURE — RXMED WILLOW AMBULATORY MEDICATION CHARGE

## 2024-12-19 ENCOUNTER — PHARMACY VISIT (OUTPATIENT)
Dept: PHARMACY | Facility: CLINIC | Age: 79
End: 2024-12-19
Payer: COMMERCIAL

## 2024-12-30 PROCEDURE — RXMED WILLOW AMBULATORY MEDICATION CHARGE

## 2025-01-03 ENCOUNTER — PHARMACY VISIT (OUTPATIENT)
Dept: PHARMACY | Facility: CLINIC | Age: 80
End: 2025-01-03
Payer: COMMERCIAL

## 2025-01-03 ENCOUNTER — TELEMEDICINE (OUTPATIENT)
Dept: PRIMARY CARE | Facility: CLINIC | Age: 80
End: 2025-01-03
Payer: MEDICARE

## 2025-01-03 DIAGNOSIS — G47.00 INSOMNIA, UNSPECIFIED TYPE: Primary | ICD-10-CM

## 2025-01-03 DIAGNOSIS — G47.00 INSOMNIA, UNSPECIFIED TYPE: ICD-10-CM

## 2025-01-03 PROCEDURE — 99213 OFFICE O/P EST LOW 20 MIN: CPT | Performed by: FAMILY MEDICINE

## 2025-01-03 PROCEDURE — RXMED WILLOW AMBULATORY MEDICATION CHARGE

## 2025-01-03 PROCEDURE — G2211 COMPLEX E/M VISIT ADD ON: HCPCS | Performed by: FAMILY MEDICINE

## 2025-01-03 RX ORDER — TRAZODONE HYDROCHLORIDE 50 MG/1
50 TABLET ORAL NIGHTLY PRN
Qty: 30 TABLET | Refills: 0 | Status: SHIPPED | OUTPATIENT
Start: 2025-01-03 | End: 2026-01-03

## 2025-01-03 RX ORDER — DOXEPIN HYDROCHLORIDE 10 MG/1
10 CAPSULE ORAL NIGHTLY
Qty: 90 CAPSULE | Refills: 1 | Status: CANCELLED | OUTPATIENT
Start: 2025-01-03 | End: 2025-01-29

## 2025-01-03 RX ORDER — DOXEPIN HYDROCHLORIDE 10 MG/1
10 CAPSULE ORAL NIGHTLY
Qty: 90 CAPSULE | Refills: 1 | OUTPATIENT
Start: 2025-01-03 | End: 2025-01-29

## 2025-01-03 NOTE — ASSESSMENT & PLAN NOTE
Insomnia, worse lately. doxepin caused SE. Start trazodone. Recommend psychology counseling.  Recommend good sleep hygiene. Caution side effects of trazodone such as feeling sleepy or tired, headaches, nausea, constipation, dry mouth etc. Refer to see a sleep specialist evaluation. Will monitor in 1 mos.    Orders:    traZODone (Desyrel) 50 mg tablet; Take 1 tablet (50 mg) by mouth as needed at bedtime for sleep.    Referral to Adult Sleep Medicine; Future

## 2025-01-03 NOTE — PROGRESS NOTES
Subjective   Patient ID: Sue Vail is a 79 y.o. female who presents for insomnia    HPI    pt had worse  trouble falling and staying asleep lately. Pt was unable to sleep for 2 straight nights.  Pt felt nonrefreshed in am. Pt was agitated. Doxepin caused se  Review of Systems    Objective   LMP  (LMP Unknown)     Physical Exam    Assessment/Plan   Assessment & Plan  Insomnia, unspecified type  Insomnia, worse lately. doxepin caused SE. Start trazodone. Recommend psychology counseling.  Recommend good sleep hygiene. Caution side effects of trazodone such as feeling sleepy or tired, headaches, nausea, constipation, dry mouth etc. Refer to see a sleep specialist evaluation. Will monitor in 1 mos.    Orders:    traZODone (Desyrel) 50 mg tablet; Take 1 tablet (50 mg) by mouth as needed at bedtime for sleep.    Referral to Adult Sleep Medicine; Future

## 2025-01-06 ENCOUNTER — PHARMACY VISIT (OUTPATIENT)
Dept: PHARMACY | Facility: CLINIC | Age: 80
End: 2025-01-06
Payer: COMMERCIAL

## 2025-01-06 PROCEDURE — RXMED WILLOW AMBULATORY MEDICATION CHARGE

## 2025-01-10 ENCOUNTER — ANCILLARY PROCEDURE (OUTPATIENT)
Dept: CARDIOLOGY | Facility: CLINIC | Age: 80
End: 2025-01-10
Payer: MEDICARE

## 2025-01-10 ENCOUNTER — APPOINTMENT (OUTPATIENT)
Dept: PRIMARY CARE | Facility: CLINIC | Age: 80
End: 2025-01-10
Payer: MEDICARE

## 2025-01-10 DIAGNOSIS — I50.22 CHRONIC SYSTOLIC HEART FAILURE: ICD-10-CM

## 2025-01-10 LAB
AORTIC VALVE PEAK VELOCITY: 1.61 M/S
AV PEAK GRADIENT: 10 MMHG
AVA (PEAK VEL): 1.58 CM2
EJECTION FRACTION APICAL 4 CHAMBER: 43.5
EJECTION FRACTION: 40 %
LEFT ATRIUM VOLUME AREA LENGTH INDEX BSA: 38.7 ML/M2
LEFT VENTRICLE INTERNAL DIMENSION DIASTOLE: 4.75 CM (ref 3.5–6)
LEFT VENTRICULAR OUTFLOW TRACT DIAMETER: 1.95 CM
MITRAL VALVE E/A RATIO: 0.71
RIGHT VENTRICLE FREE WALL PEAK S': 11 CM/S
RIGHT VENTRICLE PEAK SYSTOLIC PRESSURE: 26 MMHG
TRICUSPID ANNULAR PLANE SYSTOLIC EXCURSION: 2.6 CM

## 2025-01-10 PROCEDURE — 2500000004 HC RX 250 GENERAL PHARMACY W/ HCPCS (ALT 636 FOR OP/ED): Performed by: STUDENT IN AN ORGANIZED HEALTH CARE EDUCATION/TRAINING PROGRAM

## 2025-01-10 PROCEDURE — C8929 TTE W OR WO FOL WCON,DOPPLER: HCPCS

## 2025-01-10 PROCEDURE — 93306 TTE W/DOPPLER COMPLETE: CPT | Performed by: STUDENT IN AN ORGANIZED HEALTH CARE EDUCATION/TRAINING PROGRAM

## 2025-01-10 RX ADMIN — PERFLUTREN 4 ML OF DILUTION: 6.52 INJECTION, SUSPENSION INTRAVENOUS at 14:51

## 2025-01-11 ENCOUNTER — PHARMACY VISIT (OUTPATIENT)
Dept: PHARMACY | Facility: CLINIC | Age: 80
End: 2025-01-11
Payer: COMMERCIAL

## 2025-01-13 ENCOUNTER — APPOINTMENT (OUTPATIENT)
Dept: PRIMARY CARE | Facility: CLINIC | Age: 80
End: 2025-01-13
Payer: MEDICARE

## 2025-01-13 VITALS
DIASTOLIC BLOOD PRESSURE: 67 MMHG | SYSTOLIC BLOOD PRESSURE: 120 MMHG | HEART RATE: 68 BPM | BODY MASS INDEX: 36.51 KG/M2 | WEIGHT: 198.4 LBS | HEIGHT: 62 IN | RESPIRATION RATE: 14 BRPM

## 2025-01-13 DIAGNOSIS — E66.01 MORBID OBESITY (MULTI): ICD-10-CM

## 2025-01-13 DIAGNOSIS — I50.9 CONGESTIVE HEART FAILURE, UNSPECIFIED HF CHRONICITY, UNSPECIFIED HEART FAILURE TYPE: ICD-10-CM

## 2025-01-13 DIAGNOSIS — G40.909 SEIZURE DISORDER (MULTI): ICD-10-CM

## 2025-01-13 DIAGNOSIS — Z00.00 GENERAL MEDICAL EXAM: ICD-10-CM

## 2025-01-13 DIAGNOSIS — N18.31 STAGE 3A CHRONIC KIDNEY DISEASE (MULTI): Primary | ICD-10-CM

## 2025-01-13 DIAGNOSIS — G47.00 INSOMNIA, UNSPECIFIED TYPE: ICD-10-CM

## 2025-01-13 DIAGNOSIS — F32.5 MAJOR DEPRESSIVE DISORDER WITH SINGLE EPISODE, IN FULL REMISSION (CMS-HCC): ICD-10-CM

## 2025-01-13 PROBLEM — C82.90 FOLLICULAR NON-HODGKIN'S LYMPHOMA: Status: RESOLVED | Noted: 2023-07-14 | Resolved: 2025-01-13

## 2025-01-13 PROBLEM — D64.89 OTHER SPECIFIED ANEMIAS: Status: RESOLVED | Noted: 2024-06-25 | Resolved: 2025-01-13

## 2025-01-13 PROBLEM — R63.4 ABNORMAL WEIGHT LOSS: Status: RESOLVED | Noted: 2023-10-20 | Resolved: 2025-01-13

## 2025-01-13 PROBLEM — H91.93 BILATERAL HEARING LOSS: Status: RESOLVED | Noted: 2022-08-04 | Resolved: 2025-01-13

## 2025-01-13 PROBLEM — C85.90 LYMPHOMA: Status: RESOLVED | Noted: 2023-05-05 | Resolved: 2025-01-13

## 2025-01-13 PROBLEM — R06.02 SOB (SHORTNESS OF BREATH): Status: RESOLVED | Noted: 2024-05-06 | Resolved: 2025-01-13

## 2025-01-13 PROBLEM — E11.9 DIET-CONTROLLED TYPE 2 DIABETES MELLITUS: Status: RESOLVED | Noted: 2023-07-14 | Resolved: 2025-01-13

## 2025-01-13 PROBLEM — J40 BRONCHITIS: Status: RESOLVED | Noted: 2024-04-24 | Resolved: 2025-01-13

## 2025-01-13 PROCEDURE — 1170F FXNL STATUS ASSESSED: CPT | Performed by: FAMILY MEDICINE

## 2025-01-13 PROCEDURE — G0439 PPPS, SUBSEQ VISIT: HCPCS | Performed by: FAMILY MEDICINE

## 2025-01-13 PROCEDURE — 1159F MED LIST DOCD IN RCRD: CPT | Performed by: FAMILY MEDICINE

## 2025-01-13 PROCEDURE — 1160F RVW MEDS BY RX/DR IN RCRD: CPT | Performed by: FAMILY MEDICINE

## 2025-01-13 PROCEDURE — 99214 OFFICE O/P EST MOD 30 MIN: CPT | Performed by: FAMILY MEDICINE

## 2025-01-13 PROCEDURE — 1036F TOBACCO NON-USER: CPT | Performed by: FAMILY MEDICINE

## 2025-01-13 RX ORDER — TRAZODONE HYDROCHLORIDE 50 MG/1
50 TABLET ORAL NIGHTLY PRN
Qty: 30 TABLET | Refills: 2 | Status: SHIPPED | OUTPATIENT
Start: 2025-01-13 | End: 2026-01-13

## 2025-01-13 ASSESSMENT — ACTIVITIES OF DAILY LIVING (ADL)
MANAGING_FINANCES: INDEPENDENT
DRESSING: INDEPENDENT
TAKING_MEDICATION: INDEPENDENT
GROCERY_SHOPPING: INDEPENDENT
BATHING: INDEPENDENT
DOING_HOUSEWORK: INDEPENDENT

## 2025-01-13 ASSESSMENT — PATIENT HEALTH QUESTIONNAIRE - PHQ9
SUM OF ALL RESPONSES TO PHQ9 QUESTIONS 1 AND 2: 0
1. LITTLE INTEREST OR PLEASURE IN DOING THINGS: NOT AT ALL
2. FEELING DOWN, DEPRESSED OR HOPELESS: NOT AT ALL

## 2025-01-13 NOTE — PROGRESS NOTES
"Subjective   Patient ID: Sue Vail is a 79 y.o. female who presents for Medicare Annual Wellness Visit Subsequent (fu).    HPI   Patient has been compliant with taking all  current medications. Pt sleeps better with trazodone. No sob, pnd, orthopnea, CP, HA, dizziness, heart palpitation. No claudication or cold LE.  No LE edema. No nocturia. No seizure,  imbalance or falls. Good mood while on effexor.     Review of Systems    Objective   /67   Pulse 68   Resp 14   Ht 1.575 m (5' 2\")   Wt 90 kg (198 lb 6.4 oz)   LMP  (LMP Unknown)   BMI 36.29 kg/m²     Physical Exam  NAD, well groomed, No sclera icterus. lungs: CTA b/l, heart: RRR, no JVD, No LE edema, abd: soft, no tenderness, BS+, normal strength and sensation  at bilateral lower extremities.  Good balance. CNII-XII were grossly intact, good judgment and memory. No depressed mood.    Assessment/Plan   Assessment & Plan  Congestive heart failure, unspecified HF chronicity, unspecified heart failure type  No sob. Cont current meds, low salt diet  Orders:    Lipid panel; Future    Seizure disorder (Multi)  No seizure or confusion. Fu with neurology       Morbid obesity (Multi)  Recommend decrease in calorie intake, regular aerobic exercise with low fat and low cholesterol diet. Will monitor weight, blood glucose and cholesterol regularly. Recommend nutritionist evaluation. Pt declined         Stage 3a chronic kidney disease (Multi)  no lower extremity edema. Avoid NSAIDs, will monitor creatinine level.        Insomnia, unspecified type  Insomnia, controlled with trazodone. Recommend psychology counseling.  Recommend good sleep hygiene. Caution side effects of trazodone such as feeling sleepy or tired, headaches, nausea, constipation, dry mouth etc. Pt declined sleep specialist evaluation. Fu in 3 mos  Orders:    traZODone (Desyrel) 50 mg tablet; Take 1 tablet (50 mg) by mouth as needed at bedtime for sleep.    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 No HI/SI. Cont. the same. f/u in 3 mos         General medical exam [Z00.00]  Medicare wellness visit: pt was capable of performing all ADLs and IADLs. Pt has good memory and cognitive function. pt is  morbidly obese. Recommend healthy diet and regular exercise. pt declined to see a nutritionist for eval. Advise eye exam by an OD yearly for glaucoma screen and dental exam every 6 months. check lipids.   will monitor blood pressure, cholesterol levels and weight regularly. Recommend shingle vaccines, RSV, and covid shot.   Recommend pt to clear throw rugs at home and keep good lighting at night to avoid falls. Keep hot water for shower below 120F to avoid burn injury.  recommend grab bar at bathtub.   recommend to update living will and DPOA  pt  had been taking all current  medications as prescribed.           Patient was identified as a fall risk. Risk prevention instructions provided.

## 2025-01-13 NOTE — ASSESSMENT & PLAN NOTE
Insomnia, controlled with trazodone. Recommend psychology counseling.  Recommend good sleep hygiene. Caution side effects of trazodone such as feeling sleepy or tired, headaches, nausea, constipation, dry mouth etc. Pt declined sleep specialist evaluation. Fu in 3 mos  Orders:    traZODone (Desyrel) 50 mg tablet; Take 1 tablet (50 mg) by mouth as needed at bedtime for sleep.

## 2025-01-13 NOTE — PATIENT INSTRUCTIONS

## 2025-01-13 NOTE — ASSESSMENT & PLAN NOTE
Recommend decrease in calorie intake, regular aerobic exercise with low fat and low cholesterol diet. Will monitor weight, blood glucose and cholesterol regularly. Recommend nutritionist evaluation. Pt declined

## 2025-01-14 ENCOUNTER — TELEPHONE (OUTPATIENT)
Dept: PRIMARY CARE | Facility: CLINIC | Age: 80
End: 2025-01-14
Payer: MEDICARE

## 2025-01-14 DIAGNOSIS — F41.9 ANXIETY: ICD-10-CM

## 2025-01-14 RX ORDER — VENLAFAXINE 75 MG/1
75 TABLET ORAL NIGHTLY
Qty: 90 TABLET | Refills: 1 | Status: SHIPPED | OUTPATIENT
Start: 2025-01-14

## 2025-01-14 NOTE — TELEPHONE ENCOUNTER
venlafaxine (Effexor) 75 mg tablet//Take 1 tablet (75 mg) by mouth once daily at bedtime.    [facility]

## 2025-01-20 ENCOUNTER — OFFICE VISIT (OUTPATIENT)
Dept: CARDIOLOGY | Facility: CLINIC | Age: 80
End: 2025-01-20
Payer: MEDICARE

## 2025-01-20 VITALS
HEIGHT: 62 IN | DIASTOLIC BLOOD PRESSURE: 70 MMHG | SYSTOLIC BLOOD PRESSURE: 138 MMHG | BODY MASS INDEX: 37.73 KG/M2 | RESPIRATION RATE: 18 BRPM | HEART RATE: 63 BPM | OXYGEN SATURATION: 98 % | WEIGHT: 205 LBS

## 2025-01-20 DIAGNOSIS — I50.22 CHRONIC SYSTOLIC HEART FAILURE: Primary | ICD-10-CM

## 2025-01-20 DIAGNOSIS — D50.8 OTHER IRON DEFICIENCY ANEMIA: ICD-10-CM

## 2025-01-20 DIAGNOSIS — I50.9 CHRONIC CONGESTIVE HEART FAILURE, UNSPECIFIED HEART FAILURE TYPE: ICD-10-CM

## 2025-01-20 PROCEDURE — 99215 OFFICE O/P EST HI 40 MIN: CPT | Performed by: STUDENT IN AN ORGANIZED HEALTH CARE EDUCATION/TRAINING PROGRAM

## 2025-01-20 PROCEDURE — 1159F MED LIST DOCD IN RCRD: CPT | Performed by: STUDENT IN AN ORGANIZED HEALTH CARE EDUCATION/TRAINING PROGRAM

## 2025-01-20 PROCEDURE — 1036F TOBACCO NON-USER: CPT | Performed by: STUDENT IN AN ORGANIZED HEALTH CARE EDUCATION/TRAINING PROGRAM

## 2025-01-20 RX ORDER — DIPHENHYDRAMINE HYDROCHLORIDE 50 MG/ML
50 INJECTION INTRAMUSCULAR; INTRAVENOUS AS NEEDED
OUTPATIENT
Start: 2025-01-20

## 2025-01-20 RX ORDER — EPINEPHRINE 0.3 MG/.3ML
0.3 INJECTION SUBCUTANEOUS EVERY 5 MIN PRN
OUTPATIENT
Start: 2025-01-20

## 2025-01-20 RX ORDER — FAMOTIDINE 10 MG/ML
20 INJECTION INTRAVENOUS ONCE AS NEEDED
OUTPATIENT
Start: 2025-01-20

## 2025-01-20 RX ORDER — ALBUTEROL SULFATE 0.83 MG/ML
3 SOLUTION RESPIRATORY (INHALATION) AS NEEDED
OUTPATIENT
Start: 2025-01-20

## 2025-01-20 RX ORDER — SACUBITRIL AND VALSARTAN 97; 103 MG/1; MG/1
1 TABLET, FILM COATED ORAL 2 TIMES DAILY
Qty: 60 TABLET | Refills: 11 | Status: SHIPPED | OUTPATIENT
Start: 2025-01-20 | End: 2025-01-21 | Stop reason: SDUPTHER

## 2025-01-20 ASSESSMENT — ENCOUNTER SYMPTOMS: DEPRESSION: 0

## 2025-01-20 NOTE — PATIENT INSTRUCTIONS
Thank you for coming in today. If you have any questions or concerns, you may call the Heart Failure Office at [phone] option [phone], or [phone].  You may also contact our heart failure nursing team via email on [email or web address].    For quicker results set-up your  [facility] account to receive results and other correspondence directly to your phone.    Please bring all your pills/medications to your Cardiology appointments.    **  -We will renew your heart failure medications today    - Please make the following medication changes:  1. I we will prescribe IV ferric carboxymaltose 750 mg once a week for 2 weeks. The team will call you with details on how to schedule    2. INCREASE Entresto 97/103 mg twice daily    - Please have the following tests done:  1.Blood tests just before your next visit (RFP, BNP, CBC, iron, TIBC, ferritin, TSH WITH AUTO REFLEX, folate, Vit B12)    2.  Echocardiogram in 4 months, we can schedule this for you today before you leave, or you may CALL  [phone]   OR    [phone] to schedule      - You will be referred to the following teams, CALL  [phone] to schedule your appointments with:  1. Cardiac rehabilitation    - Please make an appointment to be seen in 4 months

## 2025-01-20 NOTE — PROGRESS NOTES
CLINICAL CLEARANCE FOR OUTPATIENT INFUSION      Patient to be scheduled for acute Injectafer (ferric carboxymaltose) infusions.     For Diagnosis: Iron Deficiency Anemia in Heart Failure    Iron Deficiency in patients with Heart Failure is defined as a serum ferritin level < 100mcg/L OR a serum ferritin level of 100 to 300 mcg/L WITH a transferrin saturation <20% with OR WITHOUT anemia. Hgb <15     Labs…  Lab Results   Component Value Date    HGB 13.1 12/13/2024    FERRITIN 40 12/13/2024    IRON 41 12/13/2024    TIBC 377 12/13/2024    MCHC 33.2 12/13/2024    MCV 97 12/13/2024    MCH 32.3 12/13/2024         Lab Results   Component Value Date    IRONSAT 11 (L) 12/13/2024      Lab Results   Component Value Date     12/13/2024        Do labs suggest iron deficiency in heart failure? Yes   (If NO clarify with prescribing provider prior to proceeding)    Okay to schedule treatment as ordered per prescribing provider.

## 2025-01-20 NOTE — PROGRESS NOTES
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  She has a past medical history significant for seizure disorder that started at age 2 years well maintained on antiepileptic therapy, history of lymphoma last dose of rituximab approximately 2019 (diagnosed on bone marrow biopsy) and this in remission (has follow-up with oncology), history of right shoulder melanoma status post resection, iron deficiency,  She has been diagnosed with HFrEF detected on echocardiogram 6/2024 with LVEF 20-25%, regional wall motion abnormalities, and on RHC/LHC 7/2024 RA mean 4     PA 43/22/30   PCWP 18     [place] 4.2 / CI 2.2.  She has mild nonobstructive CAD.  Mrs. Vail is on maximally tolerated doses of heart failure pharmacotherapy.  She has had an improvement in LV systolic function on TTE 1/2025 with improvement in LVEF from 22% (6/2024) to 40-45% LVIDD 4.75 cm.  On CPET 12/2024, she exercised for 7: 15 minutes with RER 1.08, MVO2 max 7.4 (61%), and VE/VCO2 35    Today, she does not report cardiovascular symptoms and she is cardiovascular symptom-free except for the very rare isolated palpitation.  She is most disturbed by the fact that she feels very \"blah, my energy is not what I expected myself\".  She mentions that she has a very good \"mental life\" but struggles with anhedonia.  She no longer enjoys reading cookbooks and cooking.  She also describes unintentional weight loss.    She reports that she has struggled with profound fatigue for approximately 3 years that she associated with a return to the [place] from [place], but also coincided temporarily with her courses of rituximab.  She used to struggle with near collapse, dyspnea and a sensation that she could never draw full breath.  Since her diagnosis with heart failure at the initiation of heart failure pharmacotherapy she feels " "much improved.    She no longer has chest discomfort very rarely have a fleeting episode of dyspnea \"every now and then\" but she endorses feeling much improved generally.  She denies orthopnea, PND, leg swelling.  She does describe a sensation of leg \"buzzing\" when she stands.    She is strictly adherent with all heart failure medications    Surgical Hx:  - Batiatric surgery ( [facility])~ 2000  - right shoulder melanoma removal and right axillary lymph node removal    Past Obstetric Hx:  - G3  - No cardiac complications of pregnancy    Social Hx:  - Smoking- never   - ETOH- rare use  - Illicit drugs- never   - Lives with  and 8 pets.  She feels safe at home    Family Hx:  Specifically, there is no family history of  CAD, heart failure, ICD, PPM, LVAD, OHT, arrhythmias, CVA, or sudden cardiac death.    Daughter- fatal PE at age 33 years   Maternal gmother - \" enlarged heart\"    Medication reconciliation completed, see below.  Medication Documentation Review Audit       Reviewed by Roz Johnson RN (Registered Nurse) on 01/20/25 at 1302      Medication Order Taking? Sig Documenting Provider Last Dose Status   atorvastatin (Lipitor) 80 mg tablet [id number] Yes Take 1 tablet (80 mg) by mouth once daily. Antonieta Hewitt MD PhD  Active   busPIRone (Buspar) 5 mg tablet [id number] Yes Take 1 tablet (5 mg) by mouth 2 times a day. Michel Ratliff MD  Active   carvedilol (Coreg) 3.125 mg tablet [id number] Yes Take 1 tablet (3.125 mg) by mouth 2 times a day. Michel Ratliff MD Taking Active   cholecalciferol (Vitamin D-3) 125 mcg (5000 UT) capsule [id number] Yes Take 1 capsule (125 mcg) by mouth once daily. Michel Ratliff MD Taking Active   empagliflozin (Jardiance) 10 mg [id number] Yes Take 1 tablet (10 mg) by mouth once daily. Michel Ratliff MD  Active   ferrous sulfate 325 (65 Fe) MG EC tablet [id number] Yes Take 1 tablet by mouth once daily with breakfast. Do not crush, chew, or split. Michel Ratliff MD Taking Active "   furosemide (Lasix) 20 mg tablet [id number] Yes Take 1 tablet (20 mg) by mouth once daily. Michel Ratliff MD Taking Active   gabapentin (Neurontin) 300 mg capsule [id number] Yes Take 1 capsule (300 mg) by mouth 4 times a day.   Patient taking differently: Take 1 capsule (300 mg) by mouth 4 times a day. As needed    Joanna Garza MD PhD  Active   sacubitriL-valsartan (Entresto) 49-51 mg tablet [id number] Yes Take 1 tablet by mouth 2 times a day. Michel Ratliff MD Taking Active   spironolactone (Aldactone) 25 mg tablet [id number] Yes Take 1 tablet (25 mg) by mouth once daily. Michel Ratliff MD Taking Active   traZODone (Desyrel) 50 mg tablet [id number] Yes Take 1 tablet (50 mg) by mouth as needed at bedtime for sleep. Joanna Garza MD PhD  Active   valproic acid (Depakene) 250 mg capsule [id number] Yes Take 3 capsules (750 mg) by mouth once daily at bedtime. Michel Ratliff MD Taking Active   Discontinued 01/14/25 1239   venlafaxine (Effexor) 75 mg tablet [id number] Yes Take 1 tablet (75 mg) by mouth once daily at bedtime. Joanna Garza MD PhD  Active                        Allergies   Allergen Reactions    Ace Inhibitors Unknown     Hypotension, Hypotension    Adhesive Tape-Silicones Unknown     Adhesive Tape TAPE and bandages    Beta-Blockers (Beta-Adrenergic Blocking Agts) Unknown     Hypotension, Hypotension    Fluoxetine Unknown     cns, cns    Nsaids (Non-Steroidal Anti-Inflammatory Drug) Unknown    Olanzapine Unknown    Other Omega-3s Unknown     all ssri    Penicillins Hives    Poison Ivy Extract Hives     Per patient has to be hospitalized        ROS   Negative except for as detailed in HPI    Investigations:    The electronic medical record has been reviewed by me for salient history. All cardiovascular imaging and testing available in the electronic medical record, and [facility] has been reviewed.     Visit Vitals  /70 (BP Location: Right arm, Patient Position: Sitting, BP Cuff Size: Large adult)   Pulse 63  "  Resp 18   Ht 1.575 m (5' 2\")   Wt 93 kg (205 lb)   LMP  (LMP Unknown)   SpO2 98%   BMI 37.49 kg/m²   OB Status Postmenopausal   Smoking Status Never   BSA 2.02 m²      On examination:    Very pleasant obese elderly  woman in no apparent CP or painful distress.  Mucous membranes slightly pale  Well groomed   Neck: No JVD or HJR  CVS: HS 1,2.   No added sounds  Resp: CTA bilaterally. Percussion note resonant  Abdomen: Obese, SNT, BS wnl  Extremities: No pedal oedema  Skin: warm and dry  CNS: AO x 4, mildly hearing impaired    Medication Documentation Review Audit       Reviewed by Roz Johnson RN (Registered Nurse) on 01/20/25 at 1302      Medication Order Taking? Sig Documenting Provider Last Dose Status   atorvastatin (Lipitor) 80 mg tablet [id number] Yes Take 1 tablet (80 mg) by mouth once daily. Antonieta Hewitt MD PhD  Active   busPIRone (Buspar) 5 mg tablet [id number] Yes Take 1 tablet (5 mg) by mouth 2 times a day. Michel Ratliff MD  Active   carvedilol (Coreg) 3.125 mg tablet [id number] Yes Take 1 tablet (3.125 mg) by mouth 2 times a day. Michel Ratliff MD Taking Active   cholecalciferol (Vitamin D-3) 125 mcg (5000 UT) capsule [id number] Yes Take 1 capsule (125 mcg) by mouth once daily. Michel Ratliff MD Taking Active   empagliflozin (Jardiance) 10 mg [id number] Yes Take 1 tablet (10 mg) by mouth once daily. Michel Ratliff MD  Active   ferrous sulfate 325 (65 Fe) MG EC tablet [id number] Yes Take 1 tablet by mouth once daily with breakfast. Do not crush, chew, or split. Michel Ratliff MD Taking Active   furosemide (Lasix) 20 mg tablet [id number] Yes Take 1 tablet (20 mg) by mouth once daily. Michel Ratliff MD Taking Active   gabapentin (Neurontin) 300 mg capsule [id number] Yes Take 1 capsule (300 mg) by mouth 4 times a day.   Patient taking differently: Take 1 capsule (300 mg) by mouth 4 times a day. As needed    Joanna Garza MD PhD  Active   sacubitriL-valsartan (Entresto) 49-51 mg tablet " [id number] Yes Take 1 tablet by mouth 2 times a day. Michel Ratliff MD Taking Active   spironolactone (Aldactone) 25 mg tablet [id number] Yes Take 1 tablet (25 mg) by mouth once daily. Michel Ratliff MD Taking Active   traZODone (Desyrel) 50 mg tablet [id number] Yes Take 1 tablet (50 mg) by mouth as needed at bedtime for sleep. Joanna Garza MD PhD  Active   valproic acid (Depakene) 250 mg capsule [id number] Yes Take 3 capsules (750 mg) by mouth once daily at bedtime. Michel Ratliff MD Taking Active   Discontinued 01/14/25 1239   venlafaxine (Effexor) 75 mg tablet [id number] Yes Take 1 tablet (75 mg) by mouth once daily at bedtime. Joanna Garza MD PhD  Active                       Transthoracic echo (TTE) complete    Result Date: 1/10/2025   [facility], [address] [place] [address]              Tel [phone] and Fax [phone] TRANSTHORACIC ECHOCARDIOGRAM REPORT  Patient Name:       NATALIE MASTERS          Janet Physician:    [id number] Caleb Gaitan MD Study Date:         1/10/2025           Ordering Provider:    [id number] AUGIE HALL MRN/PID:            [id number]            Fellow: Accession#:         [id number]        Nurse:                Solange Cartagena RN Date of Birth/Age:  1945 / 79      Sonographer:          Jessica ortiz                                     RDCS, RVT Gender assigned at  F                   Additional Staff: Birth: Height:             157.48 cm           Admit Date: Weight:             96.16 kg            Admission Status:     Outpatient BSA / BMI:          1.96 m2 / 38.77     Encounter#:           [id number]                     kg/m2 Blood Pressure:     108/63 mmHg         Department Location:  [facility] Non                                                               Invasive Study Type:    TRANSTHORACIC ECHO  (TTE) COMPLETE Diagnosis/ICD: Chronic systolic (congestive) heart failure (CHF)-I50.22 Indication:    Chronic systolic heart failure. CPT Code:      Echo Complete w Full Doppler-[id number] Patient History: Pertinent History: Lymphoma. Study Detail: The following Echo studies were performed: 2D, M-Mode, Doppler and               color flow. Image quality for this study is less than ideal.               Definity used as a contrast agent for endocardial border               definition. Total contrast used for this procedure was 3 mL via IV               push.  PHYSICIAN INTERPRETATION: Left Ventricle: Left ventricular ejection fraction is moderately decreased, calculated by Sorenson's biplane at 40%. There is global hypokinesis of the left ventricle with minor regional variations. The left ventricular cavity size is normal. There is mildly increased septal and normal posterior left ventricular wall thickness. Spectral Doppler shows a Grade II (pseudonormal pattern) of left ventricular diastolic filling with an elevated left atrial pressure. Left Atrium: The left atrium is moderately dilated. Right Ventricle: The right ventricle is mildly enlarged. There is normal right ventricular global systolic function. Right Atrium: The right atrium is normal in size. Aortic Valve: The aortic valve is trileaflet. There is mild aortic valve cusp calcification. There is trace to mild aortic valve regurgitation. The peak instantaneous gradient of the aortic valve is 10 mmHg. Mitral Valve: The mitral valve is normal in structure. The peak instantaneous gradient of the mitral valve is 5 mmHg. There is no evidence of mitral valve regurgitation. Tricuspid Valve: The tricuspid valve is structurally normal. There is mild tricuspid regurgitation. Pulmonic Valve: The pulmonic valve is structurally normal. There is no indication of pulmonic valve regurgitation. Pericardium: There is no pericardial effusion noted. Aorta: The aortic root is normal.  Pulmonary Artery: The tricuspid regurgitant velocity is 2.40 m/s, and with an estimated right atrial pressure of 3 mmHg, the estimated pulmonary artery pressure is normal with the RVSP at 26.0 mmHg. Systemic Veins: The inferior vena cava appears normal in size, with IVC inspiratory collapse greater than 50%. In comparison to the previous echocardiogram(s): Compared with study dated 7/20/2018, there is a significant reduction in calculated LVEF from 55 to 60% down to 40% with increased right ventricular and left atrial dilation.  CONCLUSIONS:  1. Left ventricular ejection fraction is moderately decreased, calculated by Sorenson's biplane at 40%.  2. There is global hypokinesis of the left ventricle with minor regional variations.  3. Spectral Doppler shows a Grade II (pseudonormal pattern) of left ventricular diastolic filling with an elevated left atrial pressure.  4. There is normal right ventricular global systolic function.  5. Mildly enlarged right ventricle.  6. The left atrium is moderately dilated.  7. Compared with study dated 7/20/2018, there is a significant reduction in calculated LVEF from 55 to 60% down to 40% with increased right ventricular and left atrial dilation. QUANTITATIVE DATA SUMMARY:  2D MEASUREMENTS:          Normal Ranges: Ao Root d:       3.30 cm  (2.0-3.7cm) LAs:             2.85 cm  (2.7-4.0cm) IVSd:            1.08 cm  (0.6-1.1cm) LVPWd:           0.71 cm  (0.6-1.1cm) LVIDd:           4.75 cm  (3.9-5.9cm) LVIDs:           3.81 cm LV Mass Index:   74 g/m2 LVEDV Index:     62 ml/m2 LV % FS          19.8 %  LA VOLUME:                    Normal Ranges: LA Vol A4C:        66.9 ml    (22+/-6mL/m2) LA Vol A2C:        81.6 ml LA Vol BP:         75.9 ml LA Vol Index A4C:  34.2 ml/m2 LA Vol Index A2C:  41.6 ml/m2 LA Vol Index BP:   38.7 ml/m2 LA Area A4C:       21.0 cm2 LA Area A2C:       23.8 cm2 LA Major Axis A4C: 5.6 cm LA Major Axis A2C: 5.9 cm LA Vol A4C:        55.4 ml LA Vol A2C:         85.8 ml LA Vol Index BSA:  36.0 ml/m2  RA VOLUME BY A/L METHOD:          Normal Ranges: RA Area A4C:             14.2 cm2  LV SYSTOLIC FUNCTION BY 2D PLANIMETRY (MOD):                      Normal Ranges: EF-A4C View:    44 % (>=55%) EF-A2C View:    39 % EF-Biplane:     40 % LV EF Reported: 40 %  LV DIASTOLIC FUNCTION:             Normal Ranges: MV Peak E:             0.79 m/s    (0.7-1.2 m/s) MV Peak A:             1.11 m/s    (0.42-0.7 m/s) E/A Ratio:             0.71        (1.0-2.2) MV e'                  0.050 m/s   (>8.0) MV lateral e'          0.06 m/s MV medial e'           0.04 m/s MV A Dur:              117.03 msec E/e' Ratio:            15.85       (<8.0) PulmV Sys Ángel:         48.59 cm/s PulmV Almeida Ángel:        33.32 cm/s PulmV S/D Ángel:         1.46 PulmV A Revs Ángel:      22.21 cm/s PulmV A Revs Dur:      156.99 msec  MITRAL VALVE:          Normal Ranges: MV Vmax:      1.09 m/s (<=1.3m/s) MV peak P.7 mmHg (<5mmHg) MV mean P.0 mmHg (<2mmHg) MV VTI:       26.63 cm (10-13cm) MV DT:        239 msec (150-240msec)  AORTIC VALVE:            Normal Ranges: AoV Vmax:      1.61 m/s  (<=1.7m/s) AoV Peak PG:   10.4 mmHg (<20mmHg) LVOT Max Ángel:  0.85 m/s  (<=1.1m/s) LVOT VTI:      17.29 cm LVOT Diameter: 1.95 cm   (1.8-2.4cm) AoV Area,Vmax: 1.58 cm2  (2.5-4.5cm2)  RIGHT VENTRICLE: RV Basal 4.30 cm RV Mid   3.50 cm RV Major 7.0 cm TAPSE:   26.0 mm RV s'    0.11 m/s  TRICUSPID VALVE/RVSP:          Normal Ranges: Peak TR Velocity:     2.40 m/s RV Syst Pressure:     26 mmHg  (< 30mmHg) IVC Diam:             1.40 cm  PULMONIC VALVE:          Normal Ranges: PV Accel Time:  63 msec  (>120ms) PV Max Ángel:     1.1 m/s  (0.6-0.9m/s) PV Max P.8 mmHg  Pulmonary Veins: PulmV A Revs Dur: 156.99 msec PulmV A Revs Ángel: 22.21 cm/s PulmV Almeida Ángel:   33.32 cm/s PulmV S/D Ángel:    1.46 PulmV Sys Ángel:    48.59 cm/s  [id number] Caleb Gaitan MD Electronically signed on 1/10/2025 at 4:19:42 PM  ** Final **     Transthoracic  echo (TTE) complete    Result Date: 6/26/2024              [place] [id number]      Phone [phone] Fax [phone] TRANSTHORACIC ECHOCARDIOGRAM REPORT Patient Name:      NATALIE Gonzales Physician:    [id number] Bryn Polo DO Study Date:        6/26/2024             Ordering Provider:    [id number] GABBIE ALBA MRN/PID:           [id number]              Fellow: Accession#:        [id number]          Nurse:                Li Sagastume Date of Birth/Age: 1945 / 78 years  Sonographer:          Viky Christopher [facility] Gender:            F                     Additional Staff: Height:            157.48 cm             Admit Date:           6/25/2024 Weight:            95.26 kg              Admission Status:     Inpatient -                                                                Routine BSA / BMI:         1.95 m2 / 38.41 kg/m2 Department Location:  [facility]                                                                Lab Blood Pressure: 153 /93 mmHg Study Type:    TRANSTHORACIC ECHO (TTE) COMPLETE Diagnosis/ICD: Other chest pain-R07.89 Indication:    Chest Pain CPT Codes:     Echo Complete w Full Doppler-[id number]  Study Detail: The following Echo studies were performed: 2D, M-Mode, Doppler and               color flow. Optison used as a contrast agent for endocardial               border definition. Total contrast used for this procedure was 3 mL               via IV push.  PHYSICIAN INTERPRETATION: Left Ventricle: The left ventricular systolic function is severely decreased, with a Sorenson's biplane calculated ejection fraction of 22%. There are multiple wall motion abnormalities. The left ventricular cavity size is normal. The left ventricular septal wall thickness is normal. There is normal left ventricular posterior wall  thickness. Spectral Doppler shows an abnormal pattern of left ventricular diastolic filling. LV Wall Scoring: The RCA distribution, entire septum, and entire apex are akinetic. The basal and mid anterior wall, basal and mid anterolateral wall, and basal and mid inferolateral wall are hypokinetic. Left Atrium: The left atrium is upper limits of normal in size. Right Ventricle: The right ventricle is normal in size. There is normal right ventricular global systolic function. Right Atrium: The right atrium is normal in size. Aortic Valve: The aortic valve is trileaflet. The aortic valve dimensionless index is 0.69. There is trace to mild aortic valve regurgitation. The peak instantaneous gradient of the aortic valve is 6.0 mmHg. The mean gradient of the aortic valve is 3.0 mmHg. Mitral Valve: The mitral valve is mildly thickened. There is moderate mitral valve regurgitation. Tricuspid Valve: The tricuspid valve is structurally normal. There is mild tricuspid regurgitation. The Doppler estimated RVSP is moderately elevated at 49.0 mmHg. Pulmonic Valve: The pulmonic valve is structurally normal. There is no indication of pulmonic valve regurgitation. Pericardium: There is no pericardial effusion noted. Aorta: The aortic root is normal.  CONCLUSIONS:  1. Multiple segmental abnormalities exist. See findings.  2. The left ventricular systolic function is severely decreased, with a Sorenson's biplane calculated ejection fraction of 22%.  3. There are multiple wall motion abnormalities.  4. Spectral Doppler shows an abnormal pattern of left ventricular diastolic filling.  5. There is normal right ventricular global systolic function.  6. Moderate mitral valve regurgitation.  7. Moderately elevated right ventricular systolic pressure. QUANTITATIVE DATA SUMMARY: 2D MEASUREMENTS:                          Normal Ranges: Ao Root d:     3.20 cm   (2.0-3.7cm) LAs:           3.60 cm   (2.7-4.0cm) IVSd:          1.20 cm   (0.6-1.1cm)  LVPWd:         0.90 cm   (0.6-1.1cm) LVIDd:         4.70 cm   (3.9-5.9cm) LVIDs:         4.40 cm LV Mass Index: 90.1 g/m2 LV % FS        6.4 % LA VOLUME:                               Normal Ranges: LA Vol A4C:        52.0 ml    (22+/-6mL/m2) LA Vol A2C:        68.9 ml LA Vol BP:         61.9 ml LA Vol Index A4C:  26.7ml/m2 LA Vol Index A2C:  35.3 ml/m2 LA Vol Index BP:   31.7 ml/m2 LA Area A4C:       19.5 cm2 LA Area A2C:       21.7 cm2 LA Major Axis A4C: 6.2 cm LA Major Axis A2C: 5.8 cm LA Volume Index:   25.3 ml/m2 RA VOLUME BY A/L METHOD:                       Normal Ranges: RA Area A4C: 12.5 cm2 AORTA MEASUREMENTS:                      Normal Ranges: Ao Sinus, d: 3.20 cm (2.1-3.5cm) Asc Ao, d:   3.11 cm (2.1-3.4cm) LV SYSTOLIC FUNCTION BY 2D PLANIMETRY (MOD):                      Normal Ranges: EF-A4C View:    26 % (>=55%) EF-A2C View:    22 % EF-Biplane:     22 % LV EF Reported: 22 % LV DIASTOLIC FUNCTION:                         Normal Ranges: MV Peak E:    1.54 m/s  (0.7-1.2 m/s) MV e'         0.103 m/s (>8.0) MV lateral e' 0.13 m/s MV medial e'  0.07 m/s E/e' Ratio:   14.97     (<8.0) MITRAL VALVE:                 Normal Ranges: MV DT: 138 msec (150-240msec) MITRAL INSUFFICIENCY:                           Normal Ranges: PISA Radius:  0.5 cm MR VTI:       159.00 cm MR Vmax:      484.00 cm/s MR Alias Ángel: 38.5 cm/s MR Volume:    19.87 ml MR Flow Rt:   60.48 ml/s MR EROA:      0.12 cm2 AORTIC VALVE:                                   Normal Ranges: AoV Vmax:                1.22 m/s (<=1.7m/s) AoV Peak P.0 mmHg (<20mmHg) AoV Mean PG:             3.0 mmHg (1.7-11.5mmHg) LVOT Max Ángel:            1.01 m/s (<=1.1m/s) AoV VTI:                 22.60 cm (18-25cm) LVOT VTI:                15.50 cm LVOT Diameter:           2.00 cm  (1.8-2.4cm) AoV Area, VTI:           2.15 cm2 (2.5-5.5cm2) AoV Area,Vmax:           2.60 cm2 (2.5-4.5cm2) AoV Dimensionless Index: 0.69  RIGHT VENTRICLE: RV Basal 3.00 cm RV  "Mid   2.50 cm RV Major 7.6 cm TAPSE:   23.3 mm RV s'    0.18 m/s TRICUSPID VALVE/RVSP:                             Normal Ranges: Peak TR Velocity: 3.39 m/s RV Syst Pressure: 49.0 mmHg (< 30mmHg) IVC Diam:         1.60 cm  [id number] Bryn Polo DO Electronically signed on 6/26/2024 at 10:25:55 AM  Wall Scoring  ** Final **        IMPRESSION:    79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  She has a past medical history significant for seizure disorder that started at age 2 years well maintained on antiepileptic therapy, history of lymphoma last dose of rituximab approximately 2019 (diagnosed on bone marrow biopsy) and this in remission (has follow-up with oncology), history of right shoulder melanoma status post resection, iron deficiency,  She has been diagnosed with HFrEF detected on echocardiogram 6/2024 with LVEF 20-25%, regional wall motion abnormalities, and on RHC/LHC 7/2024 RA mean 4     PA 43/22/30   PCWP 18     [place] 4.2 / CI 2.2.  She has mild nonobstructive CAD.  Mrs. Vail is on maximally tolerated doses of heart failure pharmacotherapy.  She has had an improvement in LV systolic function on TTE 1/2025 with improvement in LVEF from 22% (6/2024) to 40-45% LVIDD 4.75 cm.  On CPET 12/2024, she exercised for 7: 15 minutes with RER 1.08, MVO2 max 7.4 (61%), and VE/VCO2 35  She is struggling with anhedonia    NYHA Functional Class: 2-3 (limited by right hip pain)  ACC/AHA Stage C heart failure  Volume status: Euvolemic  Perfusion status: Warm to touch  Aetiology: Nonischemic    PLAN:    #HFmrEF (improving)  - Known  left bundle branch block  -Medication optimization as below  -No need for device therapy, improved LVEF  -Will maximize ARNI dose that she is now with sufficient blood pressure  -Heart failure lifestyle modifications discussed and Qs answered.     -Medication optimisation:  BB: Continue carvedilol 3.125 mg twice daily  RAASi: " Increase sacubitril/valsartan to 97/103 mg twice daily  AA: Continue spironolactone 25 mg once daily, with close renal function monitoring  SGLT2i: Continue empagliflozin 10 mg once daily  Hydralazine-held to allow for ARNI up titration  Isosorbide dinitrate-held to allow for early up titration    COUNSELING:   We discussed the following non-pharmacological measures during this visit:  ·Smoking and alcohol abstinence/cessation, if applicable.  ·Dietary and medication compliance (in particular, salt restriction)  ·Monitoring daily weights and blood pressures  ·Exercise regimen (walking)    Heart Failure Education Booklet given: 9/5/2024    #Iron deficiency  Referred for IV ferric carboxymaltose    #Anhedonia  Asked to discuss this with her primary care team.     This note was transcribed using the Dragon Dictation system. There may be grammatical, punctuation, or verbiage errors that can occur with voice recognition programs.    Antonieta Hewitt MD PhD

## 2025-01-21 DIAGNOSIS — I50.22 CHRONIC SYSTOLIC HEART FAILURE: ICD-10-CM

## 2025-01-21 PROCEDURE — RXMED WILLOW AMBULATORY MEDICATION CHARGE

## 2025-01-21 RX ORDER — SACUBITRIL AND VALSARTAN 97; 103 MG/1; MG/1
1 TABLET, FILM COATED ORAL 2 TIMES DAILY
Qty: 180 TABLET | Refills: 3 | Status: SHIPPED | OUTPATIENT
Start: 2025-01-21 | End: 2026-01-21

## 2025-01-23 ENCOUNTER — TELEPHONE (OUTPATIENT)
Dept: PRIMARY CARE | Facility: CLINIC | Age: 80
End: 2025-01-23
Payer: MEDICARE

## 2025-01-24 ENCOUNTER — PHARMACY VISIT (OUTPATIENT)
Dept: PHARMACY | Facility: CLINIC | Age: 80
End: 2025-01-24
Payer: COMMERCIAL

## 2025-01-31 ENCOUNTER — APPOINTMENT (OUTPATIENT)
Dept: PRIMARY CARE | Facility: CLINIC | Age: 80
End: 2025-01-31
Payer: MEDICARE

## 2025-01-31 ENCOUNTER — OFFICE VISIT (OUTPATIENT)
Dept: PRIMARY CARE | Facility: CLINIC | Age: 80
End: 2025-01-31
Payer: MEDICARE

## 2025-01-31 VITALS — DIASTOLIC BLOOD PRESSURE: 68 MMHG | HEART RATE: 68 BPM | SYSTOLIC BLOOD PRESSURE: 132 MMHG

## 2025-01-31 DIAGNOSIS — Z12.31 ENCOUNTER FOR SCREENING MAMMOGRAM FOR MALIGNANT NEOPLASM OF BREAST: ICD-10-CM

## 2025-01-31 DIAGNOSIS — G47.00 INSOMNIA, UNSPECIFIED TYPE: ICD-10-CM

## 2025-01-31 DIAGNOSIS — R19.7 DIARRHEA, UNSPECIFIED TYPE: Primary | ICD-10-CM

## 2025-01-31 DIAGNOSIS — D22.4 NEVUS OF NECK: ICD-10-CM

## 2025-01-31 PROCEDURE — G2211 COMPLEX E/M VISIT ADD ON: HCPCS | Performed by: FAMILY MEDICINE

## 2025-01-31 PROCEDURE — RXMED WILLOW AMBULATORY MEDICATION CHARGE

## 2025-01-31 PROCEDURE — 99214 OFFICE O/P EST MOD 30 MIN: CPT | Performed by: FAMILY MEDICINE

## 2025-01-31 RX ORDER — TRAZODONE HYDROCHLORIDE 50 MG/1
50 TABLET ORAL NIGHTLY PRN
Qty: 30 TABLET | Refills: 3 | Status: SHIPPED | OUTPATIENT
Start: 2025-01-31 | End: 2026-01-31

## 2025-01-31 NOTE — PROGRESS NOTES
Subjective   Patient ID: Sue Vail is a 79 y.o. female who presents for left neck mole and intermittent diarrhea for 1 yo    HPI    pt denies  trouble falling and staying asleep while on trazodone. Pt denies constipation or confusion. No snores. Patient cont to have diarrhea 2-4 time s a day which has been ongoing for 1-2 years. Pt denies  nausea, abdominal cramping, vomiting. No pus or blood in the stools. No wt loss, fever or chills.  No HA, dizziness, imbalance, sob, cp, heart palpitation or cough. pt had good  appetite. Patient had normal urination.  pt has had a mole at left neck for at least 1 year. the mole got bigger and darker  recently.  no bleeding or itchiness. pt was concerned about skin cancer and requested evaluation.      Review of Systems    Objective   /68   Pulse 68   LMP  (LMP Unknown)     Physical Exam  No acute distress. Eyes: PERRLA, EMOI, moist mouth mucosa, No cervical or axillary lymph node tenderness or enlargement. Lungs: CTA b/l, Heart: RRR, capillary refill was less than 2 seconds. Abdomen: soft, no abd tenderness. No guarding or rebound, Bowel sound: +.  there was a 0.4x0.3 cm darkish brown mole at left neck, no tenderness or bleeding, Patient walks with a good balance. Good mood    Assessment/Plan   Assessment & Plan  Insomnia, unspecified type  Insomnia, controlled with trazodone. Recommend psychology counseling.  Recommend good sleep hygiene. Caution side effects of trazodone such as feeling sleepy or tired, headaches, nausea, constipation, dry mouth etc. Pt declined sleep specialist evaluation. Will monitor.    Orders:    traZODone (Desyrel) 50 mg tablet; Take 1 tablet (50 mg) by mouth as needed at bedtime for sleep.    Diarrhea, unspecified type  Persistent. Gi eval  Orders:    Referral to Gastroenterology; Future    Encounter for screening mammogram for malignant neoplasm of breast    Orders:    BI mammo bilateral screening tomosynthesis; Future    Nevus of  neck  Persistent. Pt was concerned. Derm eval  Orders:    Referral to Dermatology

## 2025-02-01 NOTE — ASSESSMENT & PLAN NOTE
Insomnia, controlled with trazodone. Recommend psychology counseling.  Recommend good sleep hygiene. Caution side effects of trazodone such as feeling sleepy or tired, headaches, nausea, constipation, dry mouth etc. Pt declined sleep specialist evaluation. Will monitor.    Orders:    traZODone (Desyrel) 50 mg tablet; Take 1 tablet (50 mg) by mouth as needed at bedtime for sleep.

## 2025-02-03 ENCOUNTER — TELEPHONE (OUTPATIENT)
Dept: PRIMARY CARE | Facility: CLINIC | Age: 80
End: 2025-02-03
Payer: MEDICARE

## 2025-02-03 NOTE — TELEPHONE ENCOUNTER
Pt forgot to mention at her last appt. That she is having extreme pain in hip , causes limping and almost falling.   She stated that the orthopaedic she was referred to said that she did not need a hip replacement at the time.   Pt would like to know if there other options so to reduce pain?

## 2025-02-04 ENCOUNTER — PHARMACY VISIT (OUTPATIENT)
Dept: PHARMACY | Facility: CLINIC | Age: 80
End: 2025-02-04
Payer: COMMERCIAL

## 2025-02-07 ENCOUNTER — APPOINTMENT (OUTPATIENT)
Dept: INFUSION THERAPY | Facility: CLINIC | Age: 80
End: 2025-02-07
Payer: MEDICARE

## 2025-02-07 VITALS
HEART RATE: 58 BPM | OXYGEN SATURATION: 96 % | DIASTOLIC BLOOD PRESSURE: 76 MMHG | WEIGHT: 208.44 LBS | SYSTOLIC BLOOD PRESSURE: 133 MMHG | TEMPERATURE: 97.3 F | BODY MASS INDEX: 38.13 KG/M2 | RESPIRATION RATE: 16 BRPM

## 2025-02-07 DIAGNOSIS — I50.9 CHRONIC CONGESTIVE HEART FAILURE, UNSPECIFIED HEART FAILURE TYPE: ICD-10-CM

## 2025-02-07 DIAGNOSIS — I50.22 CHRONIC SYSTOLIC HEART FAILURE: ICD-10-CM

## 2025-02-07 DIAGNOSIS — D50.8 OTHER IRON DEFICIENCY ANEMIA: ICD-10-CM

## 2025-02-07 RX ORDER — DIPHENHYDRAMINE HYDROCHLORIDE 50 MG/ML
50 INJECTION INTRAMUSCULAR; INTRAVENOUS AS NEEDED
OUTPATIENT
Start: 2025-02-14

## 2025-02-07 RX ORDER — FAMOTIDINE 10 MG/ML
20 INJECTION INTRAVENOUS ONCE AS NEEDED
OUTPATIENT
Start: 2025-02-14

## 2025-02-07 RX ORDER — ALBUTEROL SULFATE 0.83 MG/ML
3 SOLUTION RESPIRATORY (INHALATION) AS NEEDED
OUTPATIENT
Start: 2025-02-14

## 2025-02-07 RX ORDER — EPINEPHRINE 0.3 MG/.3ML
0.3 INJECTION SUBCUTANEOUS EVERY 5 MIN PRN
OUTPATIENT
Start: 2025-02-14

## 2025-02-07 ASSESSMENT — ENCOUNTER SYMPTOMS
EYE PROBLEMS: 1
VOMITING: 0
NAUSEA: 0
DIARRHEA: 0
ARTHRALGIAS: 1
DECREASED CONCENTRATION: 0
APPETITE CHANGE: 1
COUGH: 0
CONFUSION: 0
WOUND: 0
HEADACHES: 0
TROUBLE SWALLOWING: 0
ADENOPATHY: 0
CONSTIPATION: 0
PALPITATIONS: 0
FATIGUE: 1
MYALGIAS: 1
LEG SWELLING: 0
SEIZURES: 1
NUMBNESS: 1
DEPRESSION: 1
NERVOUS/ANXIOUS: 1
SORE THROAT: 0
DIZZINESS: 0
SHORTNESS OF BREATH: 0
FEVER: 0
DYSURIA: 0

## 2025-02-07 NOTE — PROGRESS NOTES
[facility]   Infusion Clinic Note   Date: 2025   Name: Sue Vail  : 1945   MRN: [id number]          Reason for Visit: OP Infusion (PT. HERE FOR FIRST INJECTAFER 750 MG IV INFUSION./INFUSION # 1/2)         Today: We administered ferric carboxymaltose (Injectafer) 750 mg in sodium chloride 0.9% 265 mL IV.       Visit Type: INFUSION       Ordered By: Antonieta Hewitt MD*        Accompanied by: Significant Other       Diagnosis: Chronic systolic heart failure    Chronic congestive heart failure, unspecified heart failure type    Other iron deficiency anemia        Allergies:   Allergies as of 2025 - Reviewed 2025   Allergen Reaction Noted    Ace inhibitors Unknown 2016    Adhesive tape-silicones Unknown 10/20/2023    Beta-blockers (beta-adrenergic blocking agts) Unknown 2016    Fluoxetine Unknown 2016    Nsaids (non-steroidal anti-inflammatory drug) Unknown 2016    Olanzapine Unknown 2016    Other omega-3s Unknown 2023    Penicillins Hives 2007    Poison ivy extract Hives 2024          Current Medications: has a current medication list which includes the following prescription(s): atorvastatin, buspirone, carvedilol, cholecalciferol, empagliflozin, ferrous sulfate, furosemide, gabapentin, sacubitril-valsartan, spironolactone, trazodone, valproic acid, and venlafaxine.       Vitals:   Vitals:    25 1300 25 1416 25 1440   BP: (!) 140/95 124/66 133/76   Pulse: 63 57 58   Resp: 18 18 16   Temp: 35.8 °C (96.4 °F) 36.3 °C (97.3 °F) 36.3 °C (97.3 °F)   SpO2: 96% 95% 96%   Weight: 94.6 kg (208 lb 7.1 oz)               Infusion Pre-procedure Checklist:   - Allergies & Medications reviewed: yes       - Previous reaction to current treatment: n/a, FIRST IRON INFUSION TODAY.      Assess patient for the concerns below. Document provider notification as appropriate.  - Active or recent infection with/without  "current antibiotic use: no  - Recent or planned invasive dental work: PT. HAS HAD RECENT FILLINGS, WILL NEED A BRIDGE.  - Recent or planned surgeries: no  - Recently received or plans to receive vaccinations: PT. HAD FLU AND COVID VACCINE APPROX 2 WEEKS AGO.  - Has treatment related toxicities: no, n/a  - Any chance you may be pregnant:  n/a      Pain: 0, NO PAIN WHEN SITTING. LEFT HIP PAIN WITH MOVEMENT, STANDING ETC.   - Is the pain different from normal: no   - Is your Doctor aware:  yes       Labs: N/A          Fall Risk Screening: Underwood Fall Risk  History of Falling, Immediate or Within 3 Months: No  Secondary Diagnosis: No  Ambulatory Aid: Walks without aid/bedrest/nurse assist  Intravenous Therapy/Heparin Lock: Yes  Gait/Transferring: Normal/bedrest/immobile  Mental Status: Oriented to own ability  Underwood Fall Risk Score: 20       Review Of Systems:  Review of Systems   Constitutional:  Positive for appetite change and fatigue. Negative for fever.        PT. STATES SHE JUST \"DOESN'T CARE TO EAT\" ANYMORE.  PT. SPENT MOST OF LAST 18 MONTHS IN BED PRIOR TO BEING DIAGNOSED WITH HEART FAILURE IN AUGUST,2024. PT. NOW HAS MORE ENERGY AND ENTHUSIASM.  HAD GONE TO CARDIAC REHAB, WANTS TO JOIN A GYM NEXT WEEK.     HENT:   Positive for hearing loss. Negative for mouth sores, sore throat and trouble swallowing.         PT. FEELS HEARING IS \"TERRIBLE\".  HX OF COLD SORES, DENIES CURRENTLY.   Eyes:  Positive for eye problems.        PT. HAS HX OF DOUBLE VISION FOR A LONG TIME, HX OF HEAD TRAUMA 5 YRS AGO.   Respiratory:  Negative for cough and shortness of breath.    Cardiovascular:  Negative for chest pain, leg swelling and palpitations.        PT. DIAGNOSED WITH HEART FAILURE IN AUGUST 2024.  FEELS MEDICATIONS HAVE OPTIMIZED HER ENERGY AND ABILITY.  HX OF LYMPHOMA 3 YEARS AGO. DENIES CURRENT ISSUES.  PT. STATES RARE EPISODES OF CHEST PAIN.  DOES ADMIT TO VERY COLD LEGS FROM KNEES DOWN.   Gastrointestinal:  Negative for " "constipation, diarrhea, nausea and vomiting.        DID HAVE EPISODE OF DIARRHEA WITH INCONTINENCE YESTERDAY.   Genitourinary:  Negative for dysuria.    Musculoskeletal:  Positive for arthralgias and myalgias.        PT. ADMITS TO SEVERE GENERALIZED MUSCLE WEAKNESS, LOWER EXTREMITIES WORSE. PT. FEELS R/T DECONDITIONING.  ADMITS TO CHRONIC LEFT HIP PAIN WHEN CHANGING POSITIONS, MOVING.  ADMITS TO STIFFNESS WITH INITIAL MOVEMENT.   Skin:  Negative for itching, rash and wound.        HX OF LEFT SHOULDER STAGE 4 METASTATIC MELANOMA MANY YEARS AGO.  DENIES CURRENT ISSUES.  SMALL BUMP ON LEFT FOREHEAD.   Neurological:  Positive for numbness and seizures. Negative for dizziness and headaches.        PT. FEELS SHE HAS INTERMITTENT NUMB, TINGLING OF BOTH ARMS \"ALL THE TIME\".  HX OF SEIZURES SINCE CHILDHOOD, WELL MANAGED.   Hematological:  Negative for adenopathy.   Psychiatric/Behavioral:  Positive for depression. Negative for confusion, decreased concentration and suicidal ideas. The patient is nervous/anxious.         PT. WITH HX OF MILD ANXIETY AND DEPRESSION, ON MEDS, FEELS MANAGED  DENIES SI/HI.  ADMITS TO POOR MEMORY.         ROS completed? Yes      Infusion Readiness:  - Assessment Concerns Related to Infusion: No  - Provider notified: n/a      Document Below Only If Indicated:   New Patient Education:    NEW PATIENT MEDICATION EDUCATION PT PROVIDED WITH WRITTEN ([employer] PT EDUCATION SHEET) AND VERBAL EDUCATION REGARDING MEDICATION GIVEN. VERIFIED MEDICATION NAME WITH PATIENT AND DISCUSSED REASON FOR USE. BRIEFLY DISCUSSED HOW MEDICATION WORKS AND EDUCATED ON GOAL OF TREATMENT, FREQUENCY OF TREATMENT, ADVERSE RXN'S AND COMMON SIDE EFFECTS TO MONITOR FOR. INSTRUCTED PT TO ASSURE THAT ALL PROVIDERS INCLUDING DENTISTS ARE AWARE OF MEDICATION RECEIVED. DISCUSSED FLOW OF VISIT AND ORIENTED TO INFUSION CENTER. PT VERBALIZES UNDERSTANDING. CALL LIGHT PROVIDED AND PT AWARE TO ALERT STAFF OF ANY CONCERNS DURING TREATMENT.   "      Treatment Conditions & Drug Specific Questions:    Ferric Carboxymaltose  (INJECTAFER)    (Unless otherwise specified on patient specific therapy plan):     REMINDERS:  Recommended Vitals/Observation:  May cause transient hypertension.   Vitals: Obtain vital signs before and after each infusion.  Observation: 30 minutes after the infusion is complete. Observation complete.      Lab Results   Component Value Date    IRON 41 12/13/2024    TIBC 377 12/13/2024    FERRITIN 40 12/13/2024      Lab Results   Component Value Date    IRONSAT 11 (L) 12/13/2024     Lab Results   Component Value Date    WBC 8.5 12/13/2024    HGB 13.1 12/13/2024    HCT 39.4 12/13/2024    MCV 97 12/13/2024     12/13/2024            Weight Based Drug Calculations:    WEIGHT BASED DRUGS: NOT APPLICABLE / FLAT DOSE          Initiated By: Mei Lua RN

## 2025-02-07 NOTE — PATIENT INSTRUCTIONS
Today :We administered ferric carboxymaltose (Injectafer) 750 mg in sodium chloride 0.9% 265 mL IV.     For:   1. Chronic systolic heart failure    2. Chronic congestive heart failure, unspecified heart failure type    3. Other iron deficiency anemia         Your next appointment is due in:  1 WEEK        Please read the  Medication Guide that was given to you and reviewed during todays visit.     (Tell all doctors including dentists that you are taking this medication)     Go to the emergency room or call 911 if:  -You have signs of allergic reaction:   -Rash, hives, itching.   -Swollen, blistered, peeling skin.   -Swelling of face, lips, mouth, tongue or throat.   -Tightness of chest, trouble breathing, swallowing or talking     Call your doctor:  - If IV / injection site gets red, warm, swollen, itchy or leaks fluid or pus.     (Leave dressing on your IV site for at least 2 hours and keep area clean and dry  - If you get sick or have symptoms of infection or are not feeling well for any reason.    (Wash your hands often, stay away from people who are sick)  - If you have side effects from your medication that do not go away or are bothersome.     (Refer to the teaching your nurse gave you for side effects to call your doctor about)    - Common side effects may include:  stuffy nose, headache, feeling tired, muscle aches, upset stomach  - Before receiving any vaccines     - [facility] at   If:  - You get sick, are on antibiotics, have had a recent vaccine, have surgery or dental work and your doctor wants your visit rescheduled.  - You need to cancel and reschedule your visit for any reason. Call at least 2 days before your visit if you need to cancel.   - Your insurance changes before your next visit.    (We will need to get approval from your new insurance. This can take up to two weeks.)     [facility] is opened Monday thru Friday. We are closed on weekends and  holidays.   Voice mail will take your call if the center is closed. If you leave a message please allow 24 hours for a call back during weekdays. If you leave a message on a weekend/holiday, we will call you back the next business day.    A pharmacist is available Monday - Friday from 8:30AM to 3:30PM to help answer any questions you may have about your prescriptions(s). Please call pharmacy at:    [facility]: [phone]  [facility]: [phone]  [facility]: [phone]

## 2025-02-12 ENCOUNTER — APPOINTMENT (OUTPATIENT)
Dept: PRIMARY CARE | Facility: CLINIC | Age: 80
End: 2025-02-12
Payer: MEDICARE

## 2025-02-12 VITALS — DIASTOLIC BLOOD PRESSURE: 77 MMHG | SYSTOLIC BLOOD PRESSURE: 126 MMHG

## 2025-02-12 DIAGNOSIS — R41.3 MEMORY LOSS, SHORT TERM: ICD-10-CM

## 2025-02-12 DIAGNOSIS — M25.552 LEFT HIP PAIN: Primary | ICD-10-CM

## 2025-02-12 PROCEDURE — 1160F RVW MEDS BY RX/DR IN RCRD: CPT | Performed by: FAMILY MEDICINE

## 2025-02-12 PROCEDURE — G2211 COMPLEX E/M VISIT ADD ON: HCPCS | Performed by: FAMILY MEDICINE

## 2025-02-12 PROCEDURE — 1159F MED LIST DOCD IN RCRD: CPT | Performed by: FAMILY MEDICINE

## 2025-02-12 PROCEDURE — 1036F TOBACCO NON-USER: CPT | Performed by: FAMILY MEDICINE

## 2025-02-12 PROCEDURE — 99214 OFFICE O/P EST MOD 30 MIN: CPT | Performed by: FAMILY MEDICINE

## 2025-02-12 NOTE — PROGRESS NOTES
Subjective   Patient ID: Sue Vail is a 79 y.o. female who presents for left hip pain    HPI   left hip pain for over 1 year persisted at 0/10/10. Walking made the pain worse. Short-term memory loss for 2 years  got worse lately. Depression was controlled. No mood swings or hi/si  Review of Systems    Objective   LMP  (LMP Unknown)     Physical Exam  No distress, well groomed, no left hip tenderness, decreased rom at left hip, normal strength and sensation at low extremities, fair  balance. 5 min recall was at 3/3. Good mood.   Assessment/Plan   Assessment & Plan  Left hip pain  Persistent. Ortho eval  Orders:    Referral to Orthopaedic Surgery; Future    Memory loss, short term  Worse lately. 5 min was at 3/3.   Orders:    Referral to Adult Neuropsychology; Future

## 2025-02-13 ENCOUNTER — DOCUMENTATION (OUTPATIENT)
Dept: CARE COORDINATION | Facility: CLINIC | Age: 80
End: 2025-02-13
Payer: MEDICARE

## 2025-02-13 NOTE — PROGRESS NOTES
Referral received from Cora at [facility] for care management services. Will outreach to patient to assess needs and provide support.          XIOMARA YinN, RN   [phone]   [facility] Department

## 2025-02-14 ENCOUNTER — APPOINTMENT (OUTPATIENT)
Dept: INFUSION THERAPY | Facility: CLINIC | Age: 80
End: 2025-02-14
Payer: MEDICARE

## 2025-02-14 VITALS
BODY MASS INDEX: 37.74 KG/M2 | SYSTOLIC BLOOD PRESSURE: 132 MMHG | HEART RATE: 59 BPM | RESPIRATION RATE: 16 BRPM | TEMPERATURE: 97.3 F | WEIGHT: 206.35 LBS | DIASTOLIC BLOOD PRESSURE: 64 MMHG | OXYGEN SATURATION: 98 %

## 2025-02-14 DIAGNOSIS — I50.22 CHRONIC SYSTOLIC HEART FAILURE: ICD-10-CM

## 2025-02-14 DIAGNOSIS — D50.8 OTHER IRON DEFICIENCY ANEMIA: ICD-10-CM

## 2025-02-14 DIAGNOSIS — I50.9 CHRONIC CONGESTIVE HEART FAILURE, UNSPECIFIED HEART FAILURE TYPE: ICD-10-CM

## 2025-02-14 RX ORDER — EPINEPHRINE 0.3 MG/.3ML
0.3 INJECTION SUBCUTANEOUS EVERY 5 MIN PRN
Status: CANCELLED | OUTPATIENT
Start: 2025-02-21

## 2025-02-14 RX ORDER — FAMOTIDINE 10 MG/ML
20 INJECTION INTRAVENOUS ONCE AS NEEDED
Status: CANCELLED | OUTPATIENT
Start: 2025-02-21

## 2025-02-14 RX ORDER — DIPHENHYDRAMINE HYDROCHLORIDE 50 MG/ML
50 INJECTION INTRAMUSCULAR; INTRAVENOUS AS NEEDED
Status: CANCELLED | OUTPATIENT
Start: 2025-02-21

## 2025-02-14 RX ORDER — ALBUTEROL SULFATE 0.83 MG/ML
3 SOLUTION RESPIRATORY (INHALATION) AS NEEDED
Status: CANCELLED | OUTPATIENT
Start: 2025-02-21

## 2025-02-14 ASSESSMENT — ENCOUNTER SYMPTOMS
CONFUSION: 0
DIZZINESS: 0
MYALGIAS: 1
DIARRHEA: 0
VOMITING: 0
SORE THROAT: 0
NAUSEA: 0
ARTHRALGIAS: 1
EYE PROBLEMS: 1
APPETITE CHANGE: 0
COUGH: 0
DECREASED CONCENTRATION: 0
WOUND: 0
NERVOUS/ANXIOUS: 1
PALPITATIONS: 0
NUMBNESS: 1
FATIGUE: 0
CONSTIPATION: 0
FEVER: 0
LEG SWELLING: 0
DEPRESSION: 0
HEADACHES: 0
SHORTNESS OF BREATH: 0
DYSURIA: 0
TROUBLE SWALLOWING: 0
ADENOPATHY: 0

## 2025-02-14 NOTE — PATIENT INSTRUCTIONS
Today :We administered ferric carboxymaltose (Injectafer) 750 mg in sodium chloride 0.9% 265 mL IV.     For:   1. Chronic systolic heart failure    2. Chronic congestive heart failure, unspecified heart failure type    3. Other iron deficiency anemia         Your next appointment is due in: N/A     Please read the  Medication Guide that was given to you and reviewed during todays visit.     (Tell all doctors including dentists that you are taking this medication)     Go to the emergency room or call 911 if:  -You have signs of allergic reaction:   -Rash, hives, itching.   -Swollen, blistered, peeling skin.   -Swelling of face, lips, mouth, tongue or throat.   -Tightness of chest, trouble breathing, swallowing or talking     Call your doctor:  - If IV / injection site gets red, warm, swollen, itchy or leaks fluid or pus.     (Leave dressing on your IV site for at least 2 hours and keep area clean and dry  - If you get sick or have symptoms of infection or are not feeling well for any reason.    (Wash your hands often, stay away from people who are sick)  - If you have side effects from your medication that do not go away or are bothersome.     (Refer to the teaching your nurse gave you for side effects to call your doctor about)    - Common side effects may include:  stuffy nose, headache, feeling tired, muscle aches, upset stomach  - Before receiving any vaccines     - Call [facility] at   If:  - You get sick, are on antibiotics, have had a recent vaccine, have surgery or dental work and your doctor wants your visit rescheduled.  - You need to cancel and reschedule your visit for any reason. Call at least 2 days before your visit if you need to cancel.   - Your insurance changes before your next visit.    (We will need to get approval from your new insurance. This can take up to two weeks.)     [facility] is opened Monday thru Friday. We are closed on weekends and  holidays.   Voice mail will take your call if the center is closed. If you leave a message please allow 24 hours for a call back during weekdays. If you leave a message on a weekend/holiday, we will call you back the next business day.    A pharmacist is available Monday - Friday from 8:30AM to 3:30PM to help answer any questions you may have about your prescriptions(s). Please call pharmacy at:    [facility]: [phone]  [facility]: [phone]  [facility]: [phone]

## 2025-02-14 NOTE — PROGRESS NOTES
[facility]   Infusion Clinic Note   Date: 2025   Name: Sue Vail  : 1945   MRN: [id number]         Reason for Visit: OP Infusion (PT. HERE FOR INJECTAFER 750 MG IV INFUSION./INFUSION # 2/2.)         Today: We administered ferric carboxymaltose (Injectafer) 750 mg in sodium chloride 0.9% 265 mL IV.       Ordered By: Antonieta Hewitt MD*       For a Diagnosis of: Chronic systolic heart failure    Chronic congestive heart failure, unspecified heart failure type    Other iron deficiency anemia       At today's visit patient accompanied by:       Vitals:   Vitals:    25 1312 25 1358 25 1429   BP: 130/80 137/67 132/64   Pulse: 84 63 59   Resp: 18 16 16   Temp: 36.1 °C (96.9 °F) 36.3 °C (97.3 °F) 36.3 °C (97.3 °F)   SpO2: 95% 96% 98%   Weight: 93.6 kg (206 lb 5.6 oz)               Infusion Pre-procedure Checklist:      - Previous reaction to current treatment: no,TOLERATED FIRST INFUSION WELL.      Assess patient for the concerns below. Document provider notification as appropriate.  - Active or recent infection with/without current antibiotic use: no  - Recent or planned invasive dental work: WILL NEED TO GET A BRIDGE.  - Recent or planned surgeries: no  - Recently received or plans to receive vaccinations: FLU AND COVID APPROX 3 WEEKS AGO  - Has treatment related toxicities: no  - Any chance may be pregnant:  n/a      Pain: 0   - Is the pain different from normal: n/a   - Is prescribing Doctor aware:  n/a      Labs: N/A      Fall Risk Screening: Underwood Fall Risk  History of Falling, Immediate or Within 3 Months: No  Secondary Diagnosis: No  Ambulatory Aid: Walks without aid/bedrest/nurse assist  Intravenous Therapy/Heparin Lock: Yes  Gait/Transferring: Normal/bedrest/immobile  Mental Status: Oriented to own ability  Underwood Fall Risk Score: 20       Review Of Systems:  Review of Systems   Constitutional:  Negative for appetite change, fatigue and  fever.   HENT:   Positive for hearing loss. Negative for mouth sores, sore throat and trouble swallowing.    Eyes:  Positive for eye problems.        HX OF DOUBLE VISION.   Respiratory:  Negative for cough and shortness of breath.    Cardiovascular:  Negative for chest pain, leg swelling and palpitations.        HX OF HEART FAILURE.  HERE FOR SECOND INJECTAFER 750 MG IV INFUSION.  PT. FEELS SHE DID BENEFIT FROM FIRST INFUSION AEB BEING ABLE TO DO MORE SMALL TASKS; IE WASH DISHES AND CLEAN STOVE.  PT. STATES SHE DID APPROX 25 EXTRA THINGS.   Gastrointestinal:  Negative for constipation, diarrhea, nausea and vomiting.   Genitourinary:  Negative for dysuria.    Musculoskeletal:  Positive for arthralgias and myalgias.        PT. WITH CHRONIC LEFT HIP PAIN WHEN STANDING OR AMBULATING.  GENERALIZED MUSCLE WEAKNESS AND DECONDITIONING.   Skin:  Negative for itching, rash and wound.   Neurological:  Positive for numbness. Negative for dizziness and headaches.        HX OF NUMB , TINGLING OF BOTH ARMS.   Hematological:  Negative for adenopathy.   Psychiatric/Behavioral:  Negative for confusion, decreased concentration and depression. The patient is nervous/anxious.          Infusion Readiness:  - Assessment Concerns Related to Infusion: No  - Provider notified: n/a      New Patient Education:    N/A (returning patient for continuation of therapy. Ongoing education provided as needed.)        Treatment Conditions & Drug Specific Questions:    Ferric Carboxymaltose  (INJECTAFER)    (Unless otherwise specified on patient specific therapy plan):     REMINDERS:  Recommended Vitals/Observation:  May cause transient hypertension.   Vitals: Obtain vital signs before and after each infusion.  Observation: 30 minutes after the infusion is complete. Observation complete.      Lab Results   Component Value Date    IRON 41 12/13/2024    TIBC 377 12/13/2024    FERRITIN 40 12/13/2024      Lab Results   Component Value Date    IRONSAT 11 (L)  12/13/2024     Lab Results   Component Value Date    WBC 8.5 12/13/2024    HGB 13.1 12/13/2024    HCT 39.4 12/13/2024    MCV 97 12/13/2024     12/13/2024            Weight Based Drug Calculations:    WEIGHT BASED DRUGS: NOT APPLICABLE / FLAT DOSE          Initiated By:AMANDA WANG RN

## 2025-02-19 ENCOUNTER — PATIENT OUTREACH (OUTPATIENT)
Dept: CARE COORDINATION | Facility: CLINIC | Age: 80
End: 2025-02-19
Payer: MEDICARE

## 2025-02-19 NOTE — PROGRESS NOTES
Referral received for care management services. Outreach completed to patient to assess needs and provide support.  Patient states she is currently in a stable condition, adhere's to medications and completes follow-up visits.  Patient has an admission/ed risk score of 32%.  Patient states she would like to follow with another PCP, CM gave patient the number to the   to assist.  Patient was given CM contact information and CM will follow at this time.      XIOMARA YinN, RN   [phone]   [facility] Department

## 2025-02-21 ENCOUNTER — APPOINTMENT (OUTPATIENT)
Dept: ORTHOPEDIC SURGERY | Facility: CLINIC | Age: 80
End: 2025-02-21
Payer: MEDICARE

## 2025-02-21 DIAGNOSIS — M25.551 BILATERAL HIP PAIN: ICD-10-CM

## 2025-02-21 DIAGNOSIS — M25.552 BILATERAL HIP PAIN: ICD-10-CM

## 2025-03-04 ENCOUNTER — HOSPITAL ENCOUNTER (OUTPATIENT)
Dept: RADIOLOGY | Facility: CLINIC | Age: 80
End: 2025-03-04
Payer: MEDICARE

## 2025-03-04 ENCOUNTER — APPOINTMENT (OUTPATIENT)
Dept: ORTHOPEDIC SURGERY | Facility: CLINIC | Age: 80
End: 2025-03-04
Payer: MEDICARE

## 2025-03-11 ENCOUNTER — HOSPITAL ENCOUNTER (OUTPATIENT)
Dept: RADIOLOGY | Facility: CLINIC | Age: 80
Discharge: HOME | End: 2025-03-11
Payer: MEDICARE

## 2025-03-11 ENCOUNTER — APPOINTMENT (OUTPATIENT)
Dept: ORTHOPEDIC SURGERY | Facility: CLINIC | Age: 80
End: 2025-03-11
Payer: MEDICARE

## 2025-03-11 ENCOUNTER — HOSPITAL ENCOUNTER (OUTPATIENT)
Dept: RADIOLOGY | Facility: EXTERNAL LOCATION | Age: 80
Discharge: HOME | End: 2025-03-11

## 2025-03-11 VITALS — WEIGHT: 191 LBS | HEIGHT: 62 IN | BODY MASS INDEX: 35.15 KG/M2

## 2025-03-11 DIAGNOSIS — M16.11 PRIMARY OSTEOARTHRITIS OF RIGHT HIP: ICD-10-CM

## 2025-03-11 DIAGNOSIS — M25.552 LEFT HIP PAIN: ICD-10-CM

## 2025-03-11 DIAGNOSIS — M25.552 BILATERAL HIP PAIN: ICD-10-CM

## 2025-03-11 DIAGNOSIS — M16.12 PRIMARY OSTEOARTHRITIS OF LEFT HIP: Primary | ICD-10-CM

## 2025-03-11 DIAGNOSIS — M25.551 BILATERAL HIP PAIN: ICD-10-CM

## 2025-03-11 PROCEDURE — 99204 OFFICE O/P NEW MOD 45 MIN: CPT | Performed by: EMERGENCY MEDICINE

## 2025-03-11 PROCEDURE — 20611 DRAIN/INJ JOINT/BURSA W/US: CPT | Performed by: EMERGENCY MEDICINE

## 2025-03-11 PROCEDURE — 1159F MED LIST DOCD IN RCRD: CPT | Performed by: EMERGENCY MEDICINE

## 2025-03-11 PROCEDURE — 1036F TOBACCO NON-USER: CPT | Performed by: EMERGENCY MEDICINE

## 2025-03-11 PROCEDURE — 73522 X-RAY EXAM HIPS BI 3-4 VIEWS: CPT

## 2025-03-11 RX ORDER — LIDOCAINE HYDROCHLORIDE 10 MG/ML
4 INJECTION, SOLUTION INFILTRATION; PERINEURAL
Status: COMPLETED | OUTPATIENT
Start: 2025-03-11 | End: 2025-03-11

## 2025-03-11 RX ORDER — METHYLPREDNISOLONE ACETATE 40 MG/ML
80 INJECTION, SUSPENSION INTRA-ARTICULAR; INTRALESIONAL; INTRAMUSCULAR; SOFT TISSUE
Status: COMPLETED | OUTPATIENT
Start: 2025-03-11 | End: 2025-03-11

## 2025-03-11 RX ADMIN — METHYLPREDNISOLONE ACETATE 80 MG: 40 INJECTION, SUSPENSION INTRA-ARTICULAR; INTRALESIONAL; INTRAMUSCULAR; SOFT TISSUE at 12:00

## 2025-03-11 RX ADMIN — LIDOCAINE HYDROCHLORIDE 4 ML: 10 INJECTION, SOLUTION INFILTRATION; PERINEURAL at 12:00

## 2025-03-11 ASSESSMENT — PAIN - FUNCTIONAL ASSESSMENT: PAIN_FUNCTIONAL_ASSESSMENT: NO/DENIES PAIN

## 2025-03-11 NOTE — PROGRESS NOTES
Subjective    Patient ID: Sue Vail is a 79 y.o. female.    Chief Complaint: Pain of the Left Hip (NPV: progressive B/L Hip problems for the past 6 months. Left is worse. Pain is in the groin and gong down the leg. Reports instability. No Hx of trauma, Sx, or injections to the area. Denies numbness/tingling. Reports low back pain.  No diabetes. XR done today. They have seen Dr. Storm 6/6/2024. Gabapentin and Tylenol PRN. No formal PT. /Referred by: Joanna Garza MD PhD) and Pain of the Right Hip     Last Surgery: No surgery found  Last Surgery Date: No surgery found    Sue Vail is a 79-year-old female with past history of heart failure and chronic kidney disease presenting as a referral from Dr.Yongjin Garza for about 1 year of bilateral hip pain left worse than right.  She reports the pain is coming from her groin and lateral hip radiating to the anterior thigh and knee.  She has baseline bedridden due to her comorbidities.  She is able to walk short distance using furniture as assistance.  She has difficulty standing for any period of time.  She notes her pain is worse with standing and getting up from car/chair.  Better with lying down and elevating her feet.  Denies numbness or tingling.  Denies any acute trauma or injury to her hip or back.  She uses gabapentin and Tylenol under direction of her primary care provider.      Objective   Right Hip Exam     Tenderness   The patient is experiencing tenderness in the lateral, anterior and greater trochanter.    Range of Motion   External rotation:  normal   Internal rotation:  normal     Muscle Strength   Abduction: 5/5   Adduction: 5/5   Flexion: 4/5     Tests   DESIREE: negative    Other   Erythema: absent  Scars: absent    Comments:  [ + ]FADIR, [ - ]DESIREE, [ + ]Kishor      Left Hip Exam     Tenderness   The patient is experiencing tenderness in the anterior, lateral and greater trochanter.    Range of Motion   External rotation:  10   Internal rotation:  normal     Muscle Strength   Abduction: 5/5   Adduction: 5/5   Flexion: 4/5     Tests   DESIREE: negative    Other   Erythema: absent  Scars: absent    Comments:  [ + ]FAD[facility], [ - ][facility], [ + ][facility]      Back Exam     Tenderness   The patient is experiencing tenderness in the lumbar.            Image Results:  3/11/2025 x-ray bilateral hips with pelvis were reviewed and revealed   R hip: no acute fracture or dislocation. minimal hip osteoarthritis with subcondral sclerosis. calcification over greater trochanter  L hip: no acute fracture or dislocation. mild hip osteoarthritis with joint space narrowing and subcondral sclerosis. calcification over greater trochanter    Point of Care Ultrasound  These images are not reportable by radiology and will not be interpreted   by  Radiologists.    Patient ID: Sue Vail is a 79 y.o. female.    L Inj/Asp: L hip joint on 3/11/2025 12:00 PM  Indications: pain  Details: 20 G (spinal) needle, ultrasound-guided anterior approach  Medications: 80 mg methylPREDNISolone acetate 40 mg/mL; 4 mL lidocaine 10 mg/mL (1 %)  Outcome: tolerated well, no immediate complications  Procedure, treatment alternatives, risks and benefits explained, specific risks discussed. Consent was given by the patient. Immediately prior to procedure a time out was called to verify the correct patient, procedure, equipment, support staff and site/side marked as required. Patient was prepped and draped in the usual sterile fashion.         Assessment/Plan   Encounter Diagnoses:  Primary osteoarthritis of left hip    Left hip pain    Primary osteoarthritis of right hip    Orders Placed This Encounter    L Inj/Asp: L hip joint    Point of Care Ultrasound     Her bilateral hip pain is most likely due to hip osteoarthritis with most symptomatic being left hip with groin pain and positive FADIR.  However, she does have back pain and greater trochanter pain. We discussed diagnosis and management.  We agreed to  perform diagnostic and hopefully therapeutic ultrasound-guided left hip intra-articular corticosteroid injection today.  Patient tolerated procedure well.  Aftercare instructions were reviewed.  She will follow-up in 4 weeks to assess the effectiveness of the left hip intra-articular corticosteroid injection.  If no improvement, her hip pain may be coming from her back.    Star Garza MD  Primary Care Sports Medicine Fellow  [facility]  [facility]    Referred by Dr. Garza.     I saw and evaluated the patient. I personally obtained the key and critical portions of the history and physical exam or was physically present for key and critical portions performed by the resident/fellow/GABBY. I reviewed the resident/fellow/GABBY's documentation and discussed the patient with the resident/fellow/GABBY. I agree with the resident/fellow/GABBY's medical decision making as documented in the note.    ** Please excuse any errors in grammar or translation related to this dictation. Voice recognition software was utilized to prepare this document. **       Finesse Espinal MD  [facility]

## 2025-03-24 ENCOUNTER — OFFICE VISIT (OUTPATIENT)
Dept: HEMATOLOGY/ONCOLOGY | Facility: CLINIC | Age: 80
End: 2025-03-24
Payer: MEDICARE

## 2025-03-24 VITALS
DIASTOLIC BLOOD PRESSURE: 70 MMHG | TEMPERATURE: 97.2 F | OXYGEN SATURATION: 97 % | HEART RATE: 74 BPM | SYSTOLIC BLOOD PRESSURE: 117 MMHG | BODY MASS INDEX: 36.09 KG/M2 | RESPIRATION RATE: 16 BRPM | WEIGHT: 197.31 LBS

## 2025-03-24 DIAGNOSIS — C82.90 FOLLICULAR NON-HODGKIN'S LYMPHOMA: ICD-10-CM

## 2025-03-24 LAB
ALBUMIN SERPL BCP-MCNC: 3.8 G/DL (ref 3.4–5)
ALP SERPL-CCNC: 97 U/L (ref 33–136)
ALT SERPL W P-5'-P-CCNC: 27 U/L (ref 7–45)
ANION GAP SERPL CALC-SCNC: 12 MMOL/L (ref 10–20)
AST SERPL W P-5'-P-CCNC: 22 U/L (ref 9–39)
BASOPHILS # BLD AUTO: 0.02 X10*3/UL (ref 0–0.1)
BASOPHILS NFR BLD AUTO: 0.3 %
BILIRUB SERPL-MCNC: 0.4 MG/DL (ref 0–1.2)
BUN SERPL-MCNC: 13 MG/DL (ref 6–23)
CALCIUM SERPL-MCNC: 9 MG/DL (ref 8.6–10.3)
CHLORIDE SERPL-SCNC: 105 MMOL/L (ref 98–107)
CO2 SERPL-SCNC: 23 MMOL/L (ref 21–32)
CREAT SERPL-MCNC: 0.7 MG/DL (ref 0.5–1.05)
EGFRCR SERPLBLD CKD-EPI 2021: 88 ML/MIN/1.73M*2
EOSINOPHIL # BLD AUTO: 0.11 X10*3/UL (ref 0–0.4)
EOSINOPHIL NFR BLD AUTO: 1.4 %
ERYTHROCYTE [DISTWIDTH] IN BLOOD BY AUTOMATED COUNT: 13.9 % (ref 11.5–14.5)
GLUCOSE SERPL-MCNC: 112 MG/DL (ref 74–99)
HCT VFR BLD AUTO: 45.7 % (ref 36–46)
HGB BLD-MCNC: 14.4 G/DL (ref 12–16)
IMM GRANULOCYTES # BLD AUTO: 0.02 X10*3/UL (ref 0–0.5)
IMM GRANULOCYTES NFR BLD AUTO: 0.3 % (ref 0–0.9)
LYMPHOCYTES # BLD AUTO: 3.1 X10*3/UL (ref 0.8–3)
LYMPHOCYTES NFR BLD AUTO: 39.3 %
MCH RBC QN AUTO: 31.4 PG (ref 26–34)
MCHC RBC AUTO-ENTMCNC: 31.5 G/DL (ref 32–36)
MCV RBC AUTO: 100 FL (ref 80–100)
MONOCYTES # BLD AUTO: 0.56 X10*3/UL (ref 0.05–0.8)
MONOCYTES NFR BLD AUTO: 7.1 %
NEUTROPHILS # BLD AUTO: 4.08 X10*3/UL (ref 1.6–5.5)
NEUTROPHILS NFR BLD AUTO: 51.6 %
PLATELET # BLD AUTO: 182 X10*3/UL (ref 150–450)
POTASSIUM SERPL-SCNC: 3.9 MMOL/L (ref 3.5–5.3)
PROT SERPL-MCNC: 6.4 G/DL (ref 6.4–8.2)
RBC # BLD AUTO: 4.59 X10*6/UL (ref 4–5.2)
SODIUM SERPL-SCNC: 136 MMOL/L (ref 136–145)
WBC # BLD AUTO: 7.9 X10*3/UL (ref 4.4–11.3)

## 2025-03-24 PROCEDURE — 99214 OFFICE O/P EST MOD 30 MIN: CPT | Performed by: INTERNAL MEDICINE

## 2025-03-24 PROCEDURE — 80053 COMPREHEN METABOLIC PANEL: CPT | Performed by: INTERNAL MEDICINE

## 2025-03-24 PROCEDURE — 1126F AMNT PAIN NOTED NONE PRSNT: CPT | Performed by: INTERNAL MEDICINE

## 2025-03-24 PROCEDURE — 1159F MED LIST DOCD IN RCRD: CPT | Performed by: INTERNAL MEDICINE

## 2025-03-24 PROCEDURE — 1160F RVW MEDS BY RX/DR IN RCRD: CPT | Performed by: INTERNAL MEDICINE

## 2025-03-24 PROCEDURE — 85025 COMPLETE CBC W/AUTO DIFF WBC: CPT | Performed by: INTERNAL MEDICINE

## 2025-03-24 PROCEDURE — 36415 COLL VENOUS BLD VENIPUNCTURE: CPT | Performed by: INTERNAL MEDICINE

## 2025-03-24 ASSESSMENT — PAIN SCALES - GENERAL: PAINLEVEL_OUTOF10: 0-NO PAIN

## 2025-03-24 NOTE — PATIENT INSTRUCTIONS
Follow up visit for history of follicular non hodgkin lymphoma diagnosed in 2018.     Return for follow up after PET scan in 6 months to review results with Dr. Padron.

## 2025-03-24 NOTE — PROGRESS NOTES
Patient Visit Information:   Visit Type: Follow Up Visit      Cancer History:   Treatment Synopsis:    Follicular non-Hodgkin lymphoma, stage IVb diagnosed July 2018: Found have enlarged right neck nodes June 2018.   Needle biopsy nondiagnostic.  PET/CT: Nonspecific changes right neck, otherwise negative.    7/18/18: BM/Bx done by Dr. Freedman: Positive for follicular lymphoma involving 20% of marrow.   8/9/18: Right neck node resections: Reactive nodes.  Not sufficient for diagnosis of lymphoma.  Flow cytometry negative.  Since minimally symptomatic, patient was observed.     1/29/19: CT neck and chest: Nonspecific 0.7 cm right mid lung nodule.  Nonspecific right breast changes.  1.6 cm nodule at pancreatic head.  No other adenopathy.     4/12/19: Started rituximab infusions: Weekly x 4, then every 8 weeks times 12 doses.  10/18/19: To start 3/12 maintenance doses of rituximab  10/30/19: CT chest/abdomen: No evidence of progressive adenopathy/ malignancy.  Stable.  12/13/19: To start fourth of 12 cycles maintenance rituximab  6/5/20: PET CT: Marked decrease right neck adenopathy.  No other evidence of malignancy.  (Deauville 2)  7/27/20: Maintenance rituximab, cycle #8/12.  9/21/20: #9/12 rituximab.  3/10/2021: #12/12 rituximab.  3/10/2020: #12/12 rituximab.  1/11/21: #11/12 rituximab.  3/10/2021: #12/12 rituximab.   7/2/2021: PET/CT: No evidence of malignancy.  Mild uptake in BM c/w.  CBC normal.  Remission.  9/30/2022: CT abdomen pelvis: Hernia.  Stable borderline adenopathy (max 1.9 cm).  Clinically in remission.   10/17/24 , PET scan, interval development of new subcentimeter mildly hypermetabolic cervical lymph node which could be related to inflammatory changes versus lymphomatous involvement     #2 history of melanoma right shoulder  History of Present Illness:      ID Statement:    NATALIE MASTERS is a 77 year old Female        Chief Complaint: Continuation of care, follicular  lymphoma   Interval History:     3/24/25  Ms. Vail returns today for follow-up for non-Hodgkin's lymphoma.  She feels relatively well.  She complains of some fatigue and decreased energy.  No B symptom, patient is complaining severe bilateral hip pain more prominent on the left side which is interfering with activity.  Patient has a history of congestive cardiac failure under care of cardiology service.  PET scan was repeated in October 2024 did show cervical lymphadenopathy less than 1 cm otherwise stable.  Complains of weakness fatigue last the patient has some night sweats, some shortness of breath not very active  Weight loss approximately 10 point    Patient not eating very well.    Past medical history: Follicular non-Hodgkin lymphoma, stage IV, diagnosed July 2018.  April 2019 she was started on rituximab infusions given  weekly ×4 followed by 1 cycle every 8 weeks . PET/CT 7/2/2021 shows no definite evidence of malignancy.  In remission.     Multiple sclerosis diagnosed in her 30s, history of seizure disorder, last seizure occurred in 2014 when she decreased her anticonvulsant medications.  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  Right axillary node biopsies were  reportedly negative.  No adjuvant therapy given.  History of gastric bypass surgery, Cecilio-en-Y, 7 years ago.  Restless leg syndrome, arthritis.  She denies history of other malignancy, blood disorder, MI, CVA or VT E disease.  Bilateral eye surgery to  correct motor function.  Chronic fatigue syndrome. 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  She is seeing a neurologist.      Family medical history: Mother had bone cancer.  Paternal grandfather had colon cancer.  Multiple paternal relatives had prostate cancer.  Paternal grandfather had cancer of his knee.     Social history: Never smoker.  Drinks alcohol rarely.  No exposure to toxic chemicals.      Review of Systems:   Review of Systems:    Constitutional: No fever, chills, night sweats.   Fatigue.  Head and neck: No headaches or dizziness.  No pain, stiffness.   HEENT: No sore throat, sinusitis.  Hearing is adequate.  History of eye surgery.  Cardiac: No chest pain, palpitations, lightheadedness.   Respiratory: No increased dyspnea, cough, hemoptysis.  Mild TOBIN.  GI: Appetite is good, weight stable.  Has occasional soft bowel movements after eating, but no melena, hematochezia. No abdominal pain, nausea, vomiting.  Genitourinary: No frequency, urgency.  No polyuria, dysuria, hematuria.  Musculoskeletal: Complains of right hip pain, stable.  No other worsening bone or joint pain.  Chronic arthralgias.  Endocrine: History hyperglycemia, followed by endocrinology.  No thyroid disease.  Skin: No rash, new skin lesions.  Complains of itching, prickly skin.  Neuromuscular: No lightheadedness, dizziness.  History of seizure, MS. reportedly has POTS and ME-CFS                  Allergies and Intolerances:       Allergies:         penicillin: Drug, Hives/Urticaria, Active         selective serotonin reuptake inhibitors: Drug Category, Itching,  Hives/Urticaria, Active         Tape - Adhesive, Bandaids, Paper: Environment, Itching, Hives/Urticaria,  Active         fluoxetine: Drug, Unknown, Active         Adhesive Tape TAPE: Drug, Unknown, Active         Adhesive Bandages MISC: Drug, Unknown, Active       Intolerances:         ropinirole: Drug, Gastritis, Diarrhea, Nausea/Vomiting, GI  Upset, Active     Outpatient Medication Profile:  * Patient Currently Takes Medications as of 05-May-2023 10:26 documented in Structured Notes         traMADol 50 mg oral tablet : Last Dose Taken:  , 1 tab(s) orally every 6 hours, As Needed for pain Dx: G89.18, Start Date: 09-Aug-2018         Vitamin B Complex 100: Last Dose Taken:           Centrum Silver oral tablet: Last Dose Taken:  , 1 tab(s) orally once a  day         calcium citrate: Last Dose Taken:           turmeric 500 mg oral capsule: Last Dose Taken:  , 1 cap(s)  orally once  a day         magnesium citrate: Last Dose Taken:           Depakote 250 mg oral delayed release tablet: Last Dose Taken:  , 1 tab(s)  orally 3 times a day         Omega-3: Last Dose Taken:           Vitamin D3 1000 intl units oral tablet: Last Dose Taken:  , 1 tab(s) orally  once a day         Tylenol 500 mg oral tablet: Last Dose Taken:  , 2 tab(s) orally every 6  hours         venlafaxine 75 mg oral tablet: Last Dose Taken:  , 1 tab(s) orally 2 times  a day         valproic acid 250 mg oral capsule: Last Dose Taken:  , orally once a day  (at bedtime)         aspirin 81 mg oral delayed release capsule: Last Dose Taken:  , orally  once a day         pramipexole 0.125 mg oral tablet: Last Dose Taken:  , orally once a day  (at bedtime)         riTUXimab: Last Dose Taken:  , 375 mg/m2 intravenous over 90 minutes every  8 weeks         famotidine: Last Dose Taken:  , 20 milligram(s) intravenous prior to rituximab         Tylenol 325 mg oral tablet: Last Dose Taken:  , 650 milligram(s) orally   prior to rituximab         dexamethasone: Last Dose Taken:  , 16 milligram(s) intravenous  prior to  rituximab         Benadryl: Last Dose Taken:  , 25 milligram(s) injectable  prior to rituximab         chlorpheniramine 4 mg oral tablet: Last Dose Taken:  , 4 milligram(s) orally   prior to rituximab         Vitamin K1: Last Dose Taken:  , orally once a day         cinnamon extract: Last Dose Taken:  , 2 times a day         tiZANidine 2 mg oral tablet: Last Dose Taken:  , 1 tab(s) orally every  4 hours             Medical History:         H/O malignant melanoma of skin: ICD-10: Z85.820, Status:  Active         Epilepsy: ICD-10: G40.909, Status: Active         Depression, major: ICD-10: F32.9, Status: Active         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, Status: Active         Restless legs syndrome: ICD-10: G25.81, Status: Active         Multiple sclerosis: ICD-10: G35, Status: Active         Follicular lymphoma: ICD-10: C82.90,  Status: Active       Surg History:         History of gastric bypass: ICD-10: Z98.84, Status: Active        Social History:   Social Substance History:  ·  Smoking Status never smoker   ·  Additional History     S[employer]ity  retired   (Anup)  (1)           Vitals and Measurements:   Vitals: Temp: 78  HR: 78  RR: 16  BP: 144/89  SPO2%:   98   Measurements: HT(cm): 157.4  WT(kg): 95.9  BSA: 2.04   BMI:  38.7      Physical Exam:      Constitutional: Well developed, awake/alert/oriented  x3.      Eyes: PERRL, EOMI, clear sclera.   ENMT: Wearing a mask .   Head/Neck: No apparent injury.  No mass, adenopathy  or tenderness.  No JVD. Trachea midline.  Surgical scar right neck.  No palpable nodes in the neck or elsewhere.   Respiratory/Thorax: Thorax symmetric. Clear to auscultation.   No wheezes, rales, rhonchi.   Cardiovascular: Regular, rate and rhythm. No murmur.   No rubs or gallops.   Gastrointestinal: Nondistended.  Protuberant.  Normal  bowel sounds.  Soft, non-tender. No rebound or guarding. No masses palpable.  No palpable hepatomegaly, splenomegaly.   Musculoskeletal: ROM intact.  No joint swelling.  Adequate strength. No significant deformity.   Extremities: Unremarkable extremities.   Neurological: Alert and oriented x3. Senses and cranial  nerves grossly intact. No focal motor deficits noted. No focal sensory deficits.   Lymphatic: No palpably significant lymphadenopathy  in the neck, supraclavicular, axillary areas.   Psychological: Appropriate mood and behavior.   Skin: Warm and dry, no rashes, no suspicious lesions.   Chronic changes.         Lab Results:                   /25) 3 mo ago  (12/13/24) 6 mo ago  (9/25/24) 6 mo ago  (9/23/24) 6 mo ago  (9/20/24) 8 mo ago  (6/30/24) 8 mo ago  (6/28/24)    WBC  4.4 - 11.3 x10*3/uL 7.9 8.5 6.7 7.1 7.7 8.2 7.9   RBC  4.00 - 5.20 x10*6/uL 4.59 4.05 4.50 4.19 4.35 3.78 Low  3.76 Low    Hemoglobin  12.0 - 16.0 g/dL 14.4 13.1 13.2 12.3 13.0 10.8 Low  10.6  Low    Hematocrit  36.0 - 46.0 % 45.7 39.4 40.5 39.2 40.4 32.8 Low  33.1 Low    MCV  80 - 100 fL 100 97 90 94 93 87 88   MCH  26.0 - 34.0 pg 31.4 32.3 29.3 29.4 29.9 28.6 28.2   MCHC  32.0 - 36.0 g/dL 31.5 Low  33.2 32.6 31.4 Low  32.2 32.9 32.0   RDW  11.5 - 14.5 % 13.9 15.7 High  16.7 High  16.9 High  17.0 High  15.1 High  15.3 High    Platelets  150 - 450 x10*3/uL 182 333 312 263 334 283 273   Neutrophils %  40.0 - 80.0 % 51.6  46.5 44.9      Immature Granulocytes %, Automated  0.0 - 0.9 % 0.3  0.1 CM 0.0 CM      Comment: Immature Granulocyte Count (IG) includes promyelocytes, myelocytes and metamyelocytes but does not include bands. Percent differential counts (%) should be interpreted in the context of the absolute cell counts (cells/UL).   Lymphocytes %  13.0 - 44.0 % 39.3                   ·  Results              Assessment and Plan:   Assessment:    1.  Follicular non-Hodgkin lymphoma, stage IVb (bone marrow), status post induction therapy with rituximab and 12 cycles of maintenance rituximab.  Clinically improved.   PET/CT shows no definite evidence of recurrence.  Minimal uptake in bone marrow may be due to hyperplasia, though residual malignancy cannot be excluded.   CBC and LDH are normal.     2.  History of fatigue, lightheadedness, weakness with prolonged standing, relieved with rest.  Being managed by neurologist.     3.  History of MS, seizure disorder, arthritis and other medical problems.     4.  History of pancreatic had nodule seen on CT, but not seen on PET/CT.  Likely benign.     5.  History of melanoma s/p resection of right show  6.  Borderline iron deficiency, but no evidence of bleeding.  Normal hemoglobin.  Taking iron tablet daily.     Plan: PET scan done and at which did show cervical lymphadenopathy today physical examination did not show any evidence of cervical lymphadenopathy and peripheral lymphadenopathy.  Fatigue could be due to underlying cardiac problem.  CBC within normal limit  I will continue monitor clinically repeat PET scan after 6-month.    Plan discussed with patient    Greater than 30 minutes spent discussing her condition, natural history of the disease, treatment, laboratory and imaging  results, follow-up and documentation in EMR.

## 2025-03-28 PROCEDURE — RXMED WILLOW AMBULATORY MEDICATION CHARGE

## 2025-03-29 PROCEDURE — RXMED WILLOW AMBULATORY MEDICATION CHARGE

## 2025-03-31 ENCOUNTER — DOCUMENTATION (OUTPATIENT)
Dept: CARE COORDINATION | Facility: CLINIC | Age: 80
End: 2025-03-31
Payer: MEDICARE

## 2025-03-31 ENCOUNTER — PHARMACY VISIT (OUTPATIENT)
Dept: PHARMACY | Facility: CLINIC | Age: 80
End: 2025-03-31
Payer: COMMERCIAL

## 2025-03-31 PROCEDURE — RXMED WILLOW AMBULATORY MEDICATION CHARGE

## 2025-04-01 ENCOUNTER — PHARMACY VISIT (OUTPATIENT)
Dept: PHARMACY | Facility: CLINIC | Age: 80
End: 2025-04-01
Payer: COMMERCIAL

## 2025-04-03 ENCOUNTER — PATIENT OUTREACH (OUTPATIENT)
Dept: CARE COORDINATION | Facility: CLINIC | Age: 80
End: 2025-04-03
Payer: MEDICARE

## 2025-04-03 NOTE — PROGRESS NOTES
Care Management follow up outreach successful.  This CM spoke with patient who states she was recently diagnosed with HF she is working with her Cardiologist who she likes and feels she has been so supportive and very informative in her care.  Patient has decided to continue to stay with current PCP with upcoming appointment on 4/11, she doesn't want to make any changes and she likes the fact all her providers know her and she is able to work with them for her health.  Patient had a follow-up with Ortho in regards to her Osteoarthritis which she received an corticosteroid injection which she states has improved her mobility by 100%, no pain at all and she is so happy.  Patient states she has no needs from CM at this time.  CM will do another follow up with patient.     XIOMARA YinN, RN   [phone]   [facility] Department

## 2025-04-09 PROCEDURE — RXMED WILLOW AMBULATORY MEDICATION CHARGE

## 2025-04-10 ENCOUNTER — PHARMACY VISIT (OUTPATIENT)
Dept: PHARMACY | Facility: CLINIC | Age: 80
End: 2025-04-10
Payer: MEDICARE

## 2025-04-10 ENCOUNTER — PHARMACY VISIT (OUTPATIENT)
Dept: PHARMACY | Facility: CLINIC | Age: 80
End: 2025-04-10
Payer: COMMERCIAL

## 2025-04-11 ENCOUNTER — APPOINTMENT (OUTPATIENT)
Dept: PRIMARY CARE | Facility: CLINIC | Age: 80
End: 2025-04-11
Payer: MEDICARE

## 2025-04-11 VITALS — SYSTOLIC BLOOD PRESSURE: 125 MMHG | HEART RATE: 68 BPM | DIASTOLIC BLOOD PRESSURE: 67 MMHG

## 2025-04-11 DIAGNOSIS — R63.0 POOR APPETITE: ICD-10-CM

## 2025-04-11 DIAGNOSIS — R43.0 LOSS OF SENSE OF SMELL: ICD-10-CM

## 2025-04-11 DIAGNOSIS — G47.00 INSOMNIA, UNSPECIFIED TYPE: ICD-10-CM

## 2025-04-11 DIAGNOSIS — R41.3 MEMORY LOSS, SHORT TERM: Primary | ICD-10-CM

## 2025-04-11 PROCEDURE — 99214 OFFICE O/P EST MOD 30 MIN: CPT | Performed by: FAMILY MEDICINE

## 2025-04-11 PROCEDURE — 1159F MED LIST DOCD IN RCRD: CPT | Performed by: FAMILY MEDICINE

## 2025-04-11 PROCEDURE — 1036F TOBACCO NON-USER: CPT | Performed by: FAMILY MEDICINE

## 2025-04-11 PROCEDURE — 1160F RVW MEDS BY RX/DR IN RCRD: CPT | Performed by: FAMILY MEDICINE

## 2025-04-11 PROCEDURE — G2211 COMPLEX E/M VISIT ADD ON: HCPCS | Performed by: FAMILY MEDICINE

## 2025-04-11 RX ORDER — TRAZODONE HYDROCHLORIDE 50 MG/1
50 TABLET ORAL NIGHTLY PRN
Qty: 30 TABLET | Refills: 3 | Status: SHIPPED | OUTPATIENT
Start: 2025-04-11 | End: 2026-04-11

## 2025-04-11 NOTE — PROGRESS NOTES
Subjective   Patient ID: Sue Vail is a 79 y.o. female who presents for memory loss, poor appetite,  loss of tastes and smell    HPI   Worse short-term memory loss. Pt noticed loss of appetite, tastes and smell lately. Pt cont to have loose stools. Patient started to have nausea, abdominal cramping, vomiting. No pus or blood in the stools.  No sneezing, itchy eyes,  HA, dizziness, sob, cp, heart palpitation. Patient was able to keep food and fluid down. Patient had stable  urination.   pt denies trouble falling and staying asleep while on trazodone.       Review of Systems    Objective   /67   Pulse 68   LMP  (LMP Unknown)     Physical Exam  No acute distress. Eyes: PERRLA, EMOI, moist mouth mucosa, no pale nasal mucosa or nasal discharge, Neck is supple. Lungs: CTA b/l, Heart: RRR, capillary refill was less than 2 seconds. Abdomen: soft, no  abd tenderness. No guarding or rebound, Bowel sound: +. Pt walks with a good balance. Good mood. 3 min recall was at 1/3 down from 3/3 from a few mos ago    Assessment/Plan   Assessment & Plan  Insomnia, unspecified type  Insomnia, controlled with trazodone. Recommend psychology counseling.  Recommend good sleep hygiene. Caution side effects of trazodone such as feeling sleepy or tired, headaches, nausea, constipation, dry mouth etc. Pt declined sleep specialist evaluation. Will monitor.    Orders:    traZODone (Desyrel) 50 mg tablet; Take 1 tablet (50 mg) by mouth as needed at bedtime for sleep.    Memory loss, short term  Worse. Neuro eval  Orders:    Referral to Neurology; Future    Loss of sense of smell  New onset. Ent eval  Orders:    Referral to ENT; Future    Poor appetite  Pt has had  persistent loose stools. Gi eval  Orders:    Referral to Gastroenterology; Future

## 2025-04-14 PROCEDURE — RXMED WILLOW AMBULATORY MEDICATION CHARGE

## 2025-04-15 ENCOUNTER — APPOINTMENT (OUTPATIENT)
Dept: ORTHOPEDIC SURGERY | Facility: CLINIC | Age: 80
End: 2025-04-15
Payer: MEDICARE

## 2025-04-15 ENCOUNTER — PHARMACY VISIT (OUTPATIENT)
Dept: PHARMACY | Facility: CLINIC | Age: 80
End: 2025-04-15
Payer: COMMERCIAL

## 2025-04-15 VITALS — WEIGHT: 197.5 LBS | HEIGHT: 62 IN | BODY MASS INDEX: 36.34 KG/M2

## 2025-04-15 DIAGNOSIS — M16.11 PRIMARY OSTEOARTHRITIS OF RIGHT HIP: ICD-10-CM

## 2025-04-15 DIAGNOSIS — M16.12 PRIMARY OSTEOARTHRITIS OF LEFT HIP: Primary | ICD-10-CM

## 2025-04-15 PROCEDURE — 1036F TOBACCO NON-USER: CPT | Performed by: EMERGENCY MEDICINE

## 2025-04-15 PROCEDURE — G2211 COMPLEX E/M VISIT ADD ON: HCPCS | Performed by: EMERGENCY MEDICINE

## 2025-04-15 PROCEDURE — 1159F MED LIST DOCD IN RCRD: CPT | Performed by: EMERGENCY MEDICINE

## 2025-04-15 PROCEDURE — 99213 OFFICE O/P EST LOW 20 MIN: CPT | Performed by: EMERGENCY MEDICINE

## 2025-04-15 ASSESSMENT — PAIN - FUNCTIONAL ASSESSMENT: PAIN_FUNCTIONAL_ASSESSMENT: NO/DENIES PAIN

## 2025-04-15 ASSESSMENT — PAIN SCALES - GENERAL: PAINLEVEL_OUTOF10: 0 - NO PAIN

## 2025-04-15 NOTE — PROGRESS NOTES
Subjective    Patient ID: Sue Vail is a 79 y.o. female.    Chief Complaint: Pain of the Left Hip (F/U Left hip injection date 3/11/2025. Patient is overall happy with the outcome of the injection but states she believes injection is wearing off, patient states she gets short pains in her left hip and becomes unstable. ) and Pain of the Right Hip     Last Surgery: No surgery found  Last Surgery Date: No surgery found    Sue Vail is a 79-year-old female with past history of heart failure and chronic kidney disease presenting as a referral from Dr.Yongjin Garza for about 1 year of bilateral hip pain left worse than right.  She reports the pain is coming from her groin and lateral hip radiating to the anterior thigh and knee.  She has baseline bedridden due to her comorbidities.  She is able to walk short distance using furniture as assistance.  She has difficulty standing for any period of time.  She notes her pain is worse with standing and getting up from car/chair.  Better with lying down and elevating her feet.  Denies numbness or tingling.  Denies any acute trauma or injury to her hip or back.  She uses gabapentin and Tylenol under direction of her primary care provider. Her bilateral hip pain is most likely due to hip osteoarthritis with most symptomatic being left hip with groin pain and positive FADIR.  However, she does have back pain and greater trochanter pain. We discussed diagnosis and management.  We agreed to perform diagnostic and hopefully therapeutic ultrasound-guided left hip intra-articular corticosteroid injection today.  Patient tolerated procedure well.  Aftercare instructions were reviewed.  She will follow-up in 4 weeks to assess the effectiveness of the left hip intra-articular corticosteroid injection.  If no improvement, her hip pain may be coming from her back.    Update on 4/15/2025.  Patient is coming back in for a follow-up visit for her left hip pain.  We did a left hip cortisone  injection at her last visit on 3/11/2025 and she states that it worked like magic.  Overall she feels 90% better but does say that it feels like it is starting to wear off a little bit.  She has very mild lower back pain but states that all of her symptoms are pretty well-controlled with her gabapentin and regular Tylenol.  No other complaints or today.      Objective   Right Hip Exam     Tenderness   The patient is experiencing no tenderness.     Range of Motion   External rotation:  normal   Internal rotation:  normal     Muscle Strength   Abduction: 5/5   Adduction: 5/5   Flexion: 4/5     Tests   DESIREE: negative    Other   Erythema: absent  Scars: absent    Comments:  [ + ]FADIR, [ - ]DESIREE, [ + ]Stinchfield      Left Hip Exam     Tenderness   The patient is experiencing tenderness in the lateral, anterior and greater trochanter.    Range of Motion   Flexion:  abnormal   External rotation:  10 abnormal   Internal rotation: abnormal     Muscle Strength   Abduction: 5/5   Adduction: 5/5   Flexion: 4/5     Tests   DESIREE: negative    Other   Erythema: absent  Scars: absent    Comments:  [ + ]FADIR, [ - ]DESIREE, [ + ]Stinchfield      Back Exam     Tenderness   The patient is experiencing tenderness in the lumbar.            Image Results:  No new imaging today    Patient ID: Sue Vail is a 79 y.o. female.    Procedures    Assessment/Plan   Encounter Diagnoses:  Primary osteoarthritis of left hip    Primary osteoarthritis of right hip    No orders of the defined types were placed in this encounter.    We discussed her treatment options and both agree that overall she is doing very well.  We are going to make a follow-up visit for her in mid June and we will assess whether or not we need to perform a repeat cortisone injection in her left hip at that time.  In the meantime she is going to continue her gabapentin and prescription dose Tylenol up to 1000 mg 3 times daily.  She will call me if her pain worsens prior to  that.    ** Please excuse any errors in grammar or translation related to this dictation. Voice recognition software was utilized to prepare this document. **       Finesse Espinal MD  [facility]

## 2025-04-25 ENCOUNTER — APPOINTMENT (OUTPATIENT)
Dept: PRIMARY CARE | Facility: CLINIC | Age: 80
End: 2025-04-25
Payer: MEDICARE

## 2025-04-25 DIAGNOSIS — F32.2 CURRENT SEVERE EPISODE OF MAJOR DEPRESSIVE DISORDER WITHOUT PSYCHOTIC FEATURES WITHOUT PRIOR EPISODE (MULTI): ICD-10-CM

## 2025-04-25 PROCEDURE — RXMED WILLOW AMBULATORY MEDICATION CHARGE

## 2025-04-25 RX ORDER — BUSPIRONE HYDROCHLORIDE 5 MG/1
5 TABLET ORAL 2 TIMES DAILY
Qty: 180 TABLET | Refills: 0 | Status: SHIPPED | OUTPATIENT
Start: 2025-04-25

## 2025-04-28 ENCOUNTER — PHARMACY VISIT (OUTPATIENT)
Dept: PHARMACY | Facility: CLINIC | Age: 80
End: 2025-04-28
Payer: COMMERCIAL

## 2025-04-28 ENCOUNTER — APPOINTMENT (OUTPATIENT)
Dept: OPHTHALMOLOGY | Age: 80
End: 2025-04-28
Payer: MEDICARE

## 2025-04-30 ENCOUNTER — APPOINTMENT (OUTPATIENT)
Dept: PRIMARY CARE | Facility: CLINIC | Age: 80
End: 2025-04-30
Payer: MEDICARE

## 2025-05-06 ENCOUNTER — PATIENT OUTREACH (OUTPATIENT)
Dept: CARE COORDINATION | Facility: CLINIC | Age: 80
End: 2025-05-06
Payer: MEDICARE

## 2025-05-06 NOTE — PROGRESS NOTES
This CM completed final outreach to patient who states she has no needs at this time.  Patient is well connected with providers and is able to care for self.  Patient was given to   number to follow up with neurology department about getting a sooner appointment and she was also told of a program through neurology for people who have early signs of dementia.   Patient states overall she is doing well and is no longer in need of CM services.   CM will dis enroll patient from CCM program.      XIOMARA YinN, RN   [phone]   [facility] Department

## 2025-05-20 ENCOUNTER — TELEPHONE (OUTPATIENT)
Dept: PRIMARY CARE | Facility: CLINIC | Age: 80
End: 2025-05-20
Payer: MEDICARE

## 2025-05-20 DIAGNOSIS — F41.9 ANXIETY: Primary | ICD-10-CM

## 2025-05-20 NOTE — TELEPHONE ENCOUNTER
Pt stated that she had a terrible night with restless leg syndrome and took an old medication clonazepam and stated she slept wonderfully.   She would like to know if rather than trazodone, she could  switch to clonazepam?

## 2025-05-21 DIAGNOSIS — F32.2 CURRENT SEVERE EPISODE OF MAJOR DEPRESSIVE DISORDER WITHOUT PSYCHOTIC FEATURES WITHOUT PRIOR EPISODE (MULTI): ICD-10-CM

## 2025-05-21 RX ORDER — BUSPIRONE HYDROCHLORIDE 5 MG/1
5 TABLET ORAL 2 TIMES DAILY
Qty: 180 TABLET | Refills: 0 | Status: SHIPPED | OUTPATIENT
Start: 2025-05-21

## 2025-05-24 PROCEDURE — RXMED WILLOW AMBULATORY MEDICATION CHARGE

## 2025-05-25 PROCEDURE — RXMED WILLOW AMBULATORY MEDICATION CHARGE

## 2025-05-28 ENCOUNTER — PHARMACY VISIT (OUTPATIENT)
Dept: PHARMACY | Facility: CLINIC | Age: 80
End: 2025-05-28
Payer: COMMERCIAL

## 2025-05-29 ENCOUNTER — APPOINTMENT (OUTPATIENT)
Dept: CARDIOLOGY | Facility: CLINIC | Age: 80
End: 2025-05-29
Payer: MEDICARE

## 2025-06-10 ENCOUNTER — APPOINTMENT (OUTPATIENT)
Dept: ORTHOPEDIC SURGERY | Facility: CLINIC | Age: 80
End: 2025-06-10
Payer: MEDICARE

## 2025-06-10 VITALS — WEIGHT: 198 LBS | BODY MASS INDEX: 36.44 KG/M2 | HEIGHT: 62 IN

## 2025-06-10 DIAGNOSIS — M16.12 PRIMARY OSTEOARTHRITIS OF LEFT HIP: Primary | ICD-10-CM

## 2025-06-10 PROCEDURE — 99214 OFFICE O/P EST MOD 30 MIN: CPT | Performed by: EMERGENCY MEDICINE

## 2025-06-10 NOTE — PROGRESS NOTES
Subjective    Patient ID: Sue Vail is a 79 y.o. female.    Chief Complaint: Pain of the Left Hip (Left hip joint injection 3/11/25, patient states last injection helped for about 2 months)     Last Surgery: No surgery found  Last Surgery Date: No surgery found    Sue Vail is a 79-year-old female with past history of heart failure and chronic kidney disease presenting as a referral from Dr.Yongjin Garza for about 1 year of bilateral hip pain left worse than right.  She reports the pain is coming from her groin and lateral hip radiating to the anterior thigh and knee.  She has baseline bedridden due to her comorbidities.  She is able to walk short distance using furniture as assistance.  She has difficulty standing for any period of time.  She notes her pain is worse with standing and getting up from car/chair.  Better with lying down and elevating her feet.  Denies numbness or tingling.  Denies any acute trauma or injury to her hip or back.  She uses gabapentin and Tylenol under direction of her primary care provider. Her bilateral hip pain is most likely due to hip osteoarthritis with most symptomatic being left hip with groin pain and positive FADIR.  However, she does have back pain and greater trochanter pain. We discussed diagnosis and management.  We agreed to perform diagnostic and hopefully therapeutic ultrasound-guided left hip intra-articular corticosteroid injection today.  Patient tolerated procedure well.  Aftercare instructions were reviewed.  She will follow-up in 4 weeks to assess the effectiveness of the left hip intra-articular corticosteroid injection.  If no improvement, her hip pain may be coming from her back.    Update on 4/15/2025.  Patient is coming back in for a follow-up visit for her left hip pain.  We did a left hip cortisone injection at her last visit on 3/11/2025 and she states that it worked like magic.  Overall she feels 90% better but does say that it feels like it is starting to  wear off a little bit.  She has very mild lower back pain but states that all of her symptoms are pretty well-controlled with her gabapentin and regular Tylenol.  No other complaints or today. We discussed her treatment options and both agree that overall she is doing very well.  We are going to make a follow-up visit for her in mid June and we will assess whether or not we need to perform a repeat cortisone injection in her left hip at that time.  In the meantime she is going to continue her gabapentin and prescription dose Tylenol up to 1000 mg 3 times daily.  She will call me if her pain worsens prior to that.    Update on 6/10/2025.  Sue is coming back in for follow-up visit for her left hip pain secondary to DJD.  We did a cortisone injection on 3/11/2025 and provided her with really good pain relief but only for 2 months.  For the past month she has been pretty miserable and is now interested in a left hip replacement.  No trauma.  She is here today with her  who is helping provide additional history.  She does not want to pursue gel or PRP injections.  The pain is worse with activity, slightly better with rest      Objective   Right Hip Exam     Tenderness   The patient is experiencing no tenderness.     Range of Motion   External rotation:  normal   Internal rotation:  normal     Muscle Strength   Abduction: 5/5   Adduction: 5/5   Flexion: 4/5     Tests   DESIREE: negative    Other   Erythema: absent  Scars: absent    Comments:  [ + ]FADIR, [ - ]DESIREE, [ + ]Stinchfield      Left Hip Exam     Tenderness   The patient is experiencing tenderness in the lateral, anterior and greater trochanter.    Range of Motion   Flexion:  abnormal   External rotation:  10 abnormal   Internal rotation: abnormal     Muscle Strength   Abduction: 5/5   Adduction: 5/5   Flexion: 4/5     Tests   DESIREE: negative    Other   Erythema: absent  Scars: absent    Comments:  [ + ]FADIR, [ - ]DESIREE, [ + ]Stinchfield      Back Exam      Tenderness   The patient is experiencing tenderness in the lumbar.            Image Results:  No new imaging today    Patient ID: Sue Vail is a 79 y.o. female.    Procedures    Assessment/Plan   Encounter Diagnoses:  Primary osteoarthritis of left hip    Orders Placed This Encounter    Referral to Orthopedics and Sports Medicine     We discussed her treatment options and ultimately decided that she could continue prescription dose Tylenol 1000 mg 3 times daily and instead of repeating an injection we are going to get her to follow-up with Dr. Burton as soon as possible to discuss potential hip replacement.  She is going to follow-up with me as needed moving forward.    ** Please excuse any errors in grammar or translation related to this dictation. Voice recognition software was utilized to prepare this document. **       Finesse Espinal MD  [facility]

## 2025-06-11 ENCOUNTER — ANCILLARY PROCEDURE (OUTPATIENT)
Facility: CLINIC | Age: 80
End: 2025-06-11
Payer: MEDICARE

## 2025-06-11 DIAGNOSIS — I50.22 CHRONIC SYSTOLIC HEART FAILURE: ICD-10-CM

## 2025-06-11 LAB
AORTIC VALVE PEAK VELOCITY: 1.25 M/S
AV PEAK GRADIENT: 6 MMHG
AVA (PEAK VEL): 2.22 CM2
EJECTION FRACTION APICAL 4 CHAMBER: 44.7
EJECTION FRACTION: 48 %
LEFT ATRIUM VOLUME AREA LENGTH INDEX BSA: 40.1 ML/M2
LEFT VENTRICLE INTERNAL DIMENSION DIASTOLE: 4.55 CM (ref 3.5–6)
LEFT VENTRICULAR OUTFLOW TRACT DIAMETER: 1.97 CM
MITRAL VALVE E/A RATIO: 0.57
RIGHT VENTRICLE FREE WALL PEAK S': 10 CM/S
RIGHT VENTRICLE PEAK SYSTOLIC PRESSURE: 22 MMHG
TRICUSPID ANNULAR PLANE SYSTOLIC EXCURSION: 2.2 CM

## 2025-06-11 PROCEDURE — 93306 TTE W/DOPPLER COMPLETE: CPT | Performed by: STUDENT IN AN ORGANIZED HEALTH CARE EDUCATION/TRAINING PROGRAM

## 2025-06-11 PROCEDURE — C8929 TTE W OR WO FOL WCON,DOPPLER: HCPCS

## 2025-06-11 NOTE — NURSING NOTE
24 g IV placed. Definity given per order. PIV flushed with 10ml NS. IV removed, dressing applied and cannula was intact. Patient tolerated well.

## 2025-06-13 ENCOUNTER — APPOINTMENT (OUTPATIENT)
Facility: CLINIC | Age: 80
End: 2025-06-13
Payer: MEDICARE

## 2025-06-13 ENCOUNTER — TELEPHONE (OUTPATIENT)
Dept: PRIMARY CARE | Facility: CLINIC | Age: 80
End: 2025-06-13

## 2025-06-13 DIAGNOSIS — I50.9 CONGESTIVE HEART FAILURE, UNSPECIFIED HF CHRONICITY, UNSPECIFIED HEART FAILURE TYPE: ICD-10-CM

## 2025-06-13 DIAGNOSIS — E11.9 DIET-CONTROLLED TYPE 2 DIABETES MELLITUS: Primary | ICD-10-CM

## 2025-06-13 NOTE — TELEPHONE ENCOUNTER
Pt stated that her my chart states she is due for Bone Density Test, shingles vaccine and urine protein.

## 2025-06-15 PROCEDURE — RXMED WILLOW AMBULATORY MEDICATION CHARGE

## 2025-06-16 PROCEDURE — RXMED WILLOW AMBULATORY MEDICATION CHARGE

## 2025-06-17 ENCOUNTER — APPOINTMENT (OUTPATIENT)
Dept: ORTHOPEDIC SURGERY | Facility: CLINIC | Age: 80
End: 2025-06-17
Payer: MEDICARE

## 2025-06-17 ENCOUNTER — OFFICE VISIT (OUTPATIENT)
Dept: ORTHOPEDIC SURGERY | Facility: CLINIC | Age: 80
End: 2025-06-17
Payer: MEDICARE

## 2025-06-17 ENCOUNTER — PREP FOR PROCEDURE (OUTPATIENT)
Dept: ORTHOPEDIC SURGERY | Facility: HOSPITAL | Age: 80
End: 2025-06-17

## 2025-06-17 VITALS — BODY MASS INDEX: 35.48 KG/M2 | HEIGHT: 62 IN | WEIGHT: 192.8 LBS

## 2025-06-17 DIAGNOSIS — M16.12 PRIMARY OSTEOARTHRITIS OF LEFT HIP: ICD-10-CM

## 2025-06-17 DIAGNOSIS — M16.12 PRIMARY OSTEOARTHRITIS OF LEFT HIP: Primary | ICD-10-CM

## 2025-06-17 PROCEDURE — 1125F AMNT PAIN NOTED PAIN PRSNT: CPT | Performed by: ORTHOPAEDIC SURGERY

## 2025-06-17 PROCEDURE — 1036F TOBACCO NON-USER: CPT | Performed by: ORTHOPAEDIC SURGERY

## 2025-06-17 PROCEDURE — 99215 OFFICE O/P EST HI 40 MIN: CPT | Performed by: ORTHOPAEDIC SURGERY

## 2025-06-17 ASSESSMENT — PAIN - FUNCTIONAL ASSESSMENT: PAIN_FUNCTIONAL_ASSESSMENT: 0-10

## 2025-06-17 ASSESSMENT — PAIN SCALES - GENERAL: PAINLEVEL_OUTOF10: 4

## 2025-06-17 NOTE — PROGRESS NOTES
PRIMARY CARE PHYSICIAN: Joanna Garza MD PhD  REFERRING PROVIDER: Finesse Espinal MD     CONSULT ORTHOPAEDIC: Hip Evaluation        ASSESSMENT & PLAN    IMPRESSION:  Primary osteoarthritis, left hip    PLAN:  Discussed the patient her findings above reviewed her current x-rays with her.  Her symptoms are getting severely exacerbated and no longer responding conservative treatment.  At this point given her quality life affected she wants to discuss surgical intervention. Sue Vail has radiographic and physical exam evidence of degenerative joint disease and wishes to pursue surgery. The patient appears to have sufficient symptoms to warrant surgical intervention and is an appropriate candidate for left Total Hip Arthroplasty as evidenced by six months of unsuccessful non-operative treatment as outlined in the HPI, progressive symptoms including pain impacting sleep or causing fatigue, pain impacting work, pain worsened with weight bearing, and pain limiting ability to stay fit and healthy.     We had a lengthy discussion regarding the risk and benefit of surgery, the alternatives, limitations, and personnel involved. The include but are not limited to infection, persistent pain, instability, nerve injury, blood clots, and medical complications. We also discussed the pre-operative course, surgery itself, need for potential extended postoperative hospitalization, and rehabilitation. Perioperative blood management and transfusion issues were discussed, and options clearly outlined. The patient consented to the use of allogenic blood if medically necessary.     The patient has elected to schedule surgery at this time or intents to call the office with a surgical date. Shared decision making occurred while obtaining informed consent. The patient with be scheduled for a pre-operative education class. The patient will be ordered appropriate preoperative labs to be completed for preadmission testing.     Robotic Assisted  Surgery: No    - Preoperative Consults: PAT Clearance, cardiac clearance  - Disposition: Extended recovery  - Anesthesia Plan: Spinal, local  - Implants: [employer], insignia  - Physical Therapy Plan: Home  - [id number]: Yes  - Surgical Approach: Direct superior  - DVT PPx: Aspirin    Risk Assessment for Post-Op Antibiotics   - BMI >35: Yes    I hereby indicate that these comorbidities may have a detrimental effect on this arthroplasty. -None present        SUBJECTIVE  CHIEF COMPLAINT:   Chief Complaint   Patient presents with    Left Hip - Pain        HPI: Sue Vail is a 79 y.o. patient. Sue Vail has had progressive problems with the left hip over the past 1.5 years. They do not report any history of trauma to the area(s). They do reports some numbness and/or tingling in their legs/feet when laying down or standing up. Their symptoms that are interfering with their daily life include lateral and anterior sided hip pain which goes down to the knee and instability. Worse with ambulation sometimes. XR done 3/11/2025. Of note, they do not have an updated list of their medications, but will bring an updated list during their next visit. Does report low back pain but feels it is unrelated to hip.     Has history of lymphoma but is currently stable and just being monitored. Has history of heart failure but is currently improving and with history of seizures last one was about 2-3 years ago.     FUNCTIONAL STATUS: limited:  unable to perform activities of daily living.  AMBULATORY STATUS: Cane  PREVIOUS TREATMENTS: Cortisone injection L hip joint 3/11/2025 done by Dr. Espinal with good improvement, for a few weeks and then wore off in two months  Analgesics Tylenol and Gabapentin  HISTORY OF SURGERY ON AFFECTED HIP(S): No   BACK PAIN REPORTED: Yes   PREVIOUS NOTES REVIEWED: Referral notes from Dr Espinal      REVIEW OF SYSTEMS  Constitutional: See HPI for pain assessment, No significant weight loss, recent  trauma  Cardiovascular: No chest pain, shortness of breath  Respiratory: No difficulty breathing, cough  Gastrointestinal: No nausea, vomiting, diarrhea, constipation  Musculoskeletal: Noted in HPI, positive for pain, restricted motion, stiffness  Integumentary: No rashes, easy bruising, redness   Neurological: no numbness or tingling in extremities, no gait disturbances   Psychiatric: No mood changes, memory changes, social issues  Heme/Lymph: no excessive swelling, easy bruising, excessive bleeding  ENT: No hearing changes  Eyes: No vision changes    Medical History[1]     Allergies[2]     Surgical History[3]     Family History[4]     Social History     Socioeconomic History    Marital status: Significant Other     Spouse name: Not on file    Number of children: Not on file    Years of education: Not on file    Highest education level: Not on file   Occupational History    Not on file   Tobacco Use    Smoking status: Never    Smokeless tobacco: Never   Vaping Use    Vaping status: Never Used   Substance and Sexual Activity    Alcohol use: Not Currently    Drug use: Never    Sexual activity: Not on file   Other Topics Concern    Not on file   Social History Narrative    Not on file     Social Drivers of Health     Financial Resource Strain: Low Risk  (6/25/2024)    Overall Financial Resource Strain (CARDIA)     Difficulty of Paying Living Expenses: Not very hard   Food Insecurity: No Food Insecurity (7/1/2024)    Hunger Vital Sign     Worried About Running Out of Food in the Last Year: Never true     Ran Out of Food in the Last Year: Never true   Transportation Needs: No Transportation Needs (6/25/2024)    PRAPARE - Transportation     Lack of Transportation (Medical): No     Lack of Transportation (Non-Medical): No   Physical Activity: Inactive (7/1/2024)    Exercise Vital Sign     Days of Exercise per Week: 0 days     Minutes of Exercise per Session: 0 min   Stress: No Stress Concern Present (7/1/2024)    [place]  "[place] of Occupational [employer] - Occupational Stress Questionnaire     Feeling of Stress : Only a little   Social Connections: Moderately Isolated (7/1/2024)    Social Connection and Isolation Panel [NHANES]     Frequency of Communication with Friends and Family: Once a week     Frequency of Social Gatherings with Friends and Family: Once a week     Attends Protestant Services: Never     Active Member of Clubs or Organizations: No     Attends Club or Organization Meetings: 1 to 4 times per year     Marital Status:    Intimate Partner Violence: Not At Risk (7/1/2024)    Humiliation, Afraid, Rape, and Kick questionnaire     Fear of Current or Ex-Partner: No     Emotionally Abused: No     Physically Abused: No     Sexually Abused: No   Housing Stability: Low Risk  (6/25/2024)    Housing Stability Vital Sign     Unable to Pay for Housing in the Last Year: No     Number of Places Lived in the Last Year: 1     Unstable Housing in the Last Year: No        CURRENT MEDICATIONS:   Current Medications[5]     OBJECTIVE    PHYSICAL EXAM      2/14/2025     2:29 PM 3/11/2025    11:45 AM 3/24/2025     3:00 PM 4/11/2025     1:25 PM 4/15/2025    11:24 AM 6/10/2025    11:13 AM 6/17/2025    10:12 AM   Vitals   Systolic 132  117 125      Diastolic 64  70 67      Heart Rate 59  74 68      Temp 36.3 °C (97.3 °F)  36.2 °C (97.2 °F)       Resp 16  16       Height  1.575 m (5' 2\")   1.575 m (5' 2\") 1.575 m (5' 2\") 1.576 m (5' 2.05\")   Weight (lb)  191 197.31  197.5 198 192.8   BMI  34.93 kg/m2 36.09 kg/m2  36.12 kg/m2 36.21 kg/m2 35.21 kg/m2   BSA (m2)  1.95 m2 1.98 m2  1.98 m2 1.98 m2 1.96 m2   Visit Report  Report Report Report Report Report Report      Body mass index is 35.21 kg/m².    GENERAL: A/Ox3, NAD. Appears healthy, well nourished  PSYCHIATRIC: Mood stable, appropriate memory recall  EYES: EOM intact, no scleral icterus  CARDIAC: regular rate  LUNGS: Breathing non-labored  SKIN: no erythema, rashes, or ecchymoses "     MUSCULOSKELETAL:  Laterality: left Hip Exam  - ROM, Extension: full, no flexion contracture  - Strength: Abduction 5/5 against resitance, Flexion 5/5  - Palpation: mild TTP along greater trochanter  - Log roll/IR exam: painful and limited motion. Pain with hip flexion past 90 degrees and internal rotation  - Straight leg raise: negative  - EHL/PF/DF motor intact  - Gait: antalgic to arthritic side, negative Trendelenburg gait   - Special Tests: none performed    NEUROVASCULAR:  - Neurovascular Status: sensation intact to light touch distally  - Capillary refill brisk at extremities, Bilateral dorsalis pedis pulse 2+           IMAGING:  Multiple views of the affected left hip(s) demonstrate: Severe thrice of left hip most notably the medial pole with complete joint space narrowing, subchondral sclerosis, marginal osteophytic change.   X-rays were personally reviewed and interpreted by me.  Radiology reports were reviewed by me as well, if readily available at the time.    ** Voice Recognition software was used to dictate this note. Please be aware that minor errors in the transcription may be present **    Cj Burton DO  Attending Surgeon  Joint Replacement and Adult Reconstructive Surgery  [place], [place]          [1]   Past Medical History:  Diagnosis Date    Amblyopia of eye, right     Bitten or stung by nonvenomous insect and other nonvenomous arthropods, initial encounter 07/03/2018    Tick bite    Cancer (Multi)     Cataract     CHF (congestive heart failure)     Diplopia     Divergence insufficiency     Drusen of macula, bilateral     Epilepsy, unspecified, not intractable, without status epilepticus     Epilepsy    Localized enlarged lymph nodes 08/31/2018    Cervical lymphadenopathy    Macular degeneration     Multiple sclerosis (Multi)     History of multiple sclerosis    Myopia of both eyes     Old myocardial infarction 12/23/2019    History of myocardial infarction     Personal history of malignant melanoma of skin 04/13/2017    History of malignant melanoma    Personal history of malignant melanoma of skin 11/10/2020    History of malignant melanoma of skin    Personal history of malignant melanoma of skin 11/10/2020    History of malignant melanoma of skin    Personal history of non-Hodgkin lymphomas 12/23/2019    History of lymphoma    Personal history of other diseases of the nervous system and sense organs     History of benign essential tremor    Personal history of other diseases of the nervous system and sense organs 04/24/2018    History of tinnitus    Personal history of other diseases of the nervous system and sense organs 12/17/2019    History of diplopia    Personal history of other endocrine, nutritional and metabolic disease 07/02/2018    History of vitamin D deficiency    Personal history of other mental and behavioral disorders 10/28/2019    History of depression    Urge incontinence 04/13/2017    Urge incontinence of urine   [2]   Allergies  Allergen Reactions    Ace Inhibitors Unknown     Hypotension, Hypotension    Adhesive Tape-Silicones Unknown     Adhesive Tape TAPE and bandages    Beta-Blockers (Beta-Adrenergic Blocking Agts) Unknown     Hypotension, Hypotension    Fluoxetine Unknown     cns, cns    Nsaids (Non-Steroidal Anti-Inflammatory Drug) Unknown    Olanzapine Unknown    Other Omega-3s Unknown     all ssri    Penicillins Hives    Poison Ivy Extract Hives     Per patient has to be hospitalized   [3]   Past Surgical History:  Procedure Laterality Date    CARDIAC CATHETERIZATION N/A 6/27/2024    Procedure: Left And Right Heart Cath, With LV;  Surgeon: Kate Cazares MD;  Location: [facility] Card[facility] Lab;  Service: Cardiovascular;  Laterality: N/A;    GASTRIC BYPASS  04/11/2017    Gastric Surgery For Morbid Obesity Bypass With Cecilio-en-Y    OTHER SURGICAL HISTORY  04/11/2017    Axillary Lymphadenectomy    OTHER SURGICAL HISTORY  12/23/2019    Eye surgery     OTHER SURGICAL HISTORY  12/23/2019    Neck surgery   [4]   Family History  Problem Relation Name Age of Onset    Breast cancer Mother      Dementia Father      Alzheimer's disease Father      Prostate cancer Father      Tremor Father      Pulmonary embolism Daughter      Pulmonary embolism Daughter      Other (cardiomegaly) Maternal Grandmother     [5]   Current Outpatient Medications   Medication Sig Dispense Refill    atorvastatin (Lipitor) 80 mg tablet Take 1 tablet (80 mg) by mouth once daily. 90 tablet 3    busPIRone (Buspar) 5 mg tablet Take 1 tablet (5 mg) by mouth 2 times a day. 180 tablet 0    carvedilol (Coreg) 3.125 mg tablet Take 1 tablet (3.125 mg) by mouth 2 times a day. 180 tablet 3    cholecalciferol (Vitamin D-3) 125 mcg (5000 UT) capsule Take 1 capsule (125 mcg) by mouth once daily. 90 capsule 3    empagliflozin (Jardiance) 10 mg tablet Take 1 tablet (10 mg) by mouth once daily. 90 tablet 3    ferrous sulfate 325 (65 Fe) MG EC tablet Take 1 tablet by mouth once daily with breakfast. Do not crush, chew, or split. 90 tablet 1    furosemide (Lasix) 20 mg tablet Take 1 tablet (20 mg) by mouth once daily. 90 tablet 3    gabapentin (Neurontin) 300 mg capsule Take 1 capsule (300 mg) by mouth 4 times a day. 120 capsule 5    sacubitriL-valsartan (Entresto)  mg tablet Take 1 tablet by mouth 2 times a day. 180 tablet 3    spironolactone (Aldactone) 25 mg tablet Take 1 tablet (25 mg) by mouth once daily. 90 tablet 3    traZODone (Desyrel) 50 mg tablet Take 1 tablet (50 mg) by mouth as needed at bedtime for sleep. 30 tablet 3    valproic acid (Depakene) 250 mg capsule Take 3 capsules (750 mg) by mouth once daily at bedtime. 270 capsule 3    venlafaxine (Effexor) 75 mg tablet Take 1 tablet (75 mg) by mouth once daily at bedtime. 90 tablet 1     No current facility-administered medications for this visit.

## 2025-06-18 ENCOUNTER — APPOINTMENT (OUTPATIENT)
Dept: PHARMACY | Facility: HOSPITAL | Age: 80
End: 2025-06-18
Payer: MEDICARE

## 2025-06-19 ENCOUNTER — PHARMACY VISIT (OUTPATIENT)
Dept: PHARMACY | Facility: CLINIC | Age: 80
End: 2025-06-19
Payer: COMMERCIAL

## 2025-06-20 ENCOUNTER — PHARMACY VISIT (OUTPATIENT)
Dept: PHARMACY | Facility: CLINIC | Age: 80
End: 2025-06-20
Payer: COMMERCIAL

## 2025-06-20 ENCOUNTER — OFFICE VISIT (OUTPATIENT)
Dept: PRIMARY CARE | Facility: CLINIC | Age: 80
End: 2025-06-20
Payer: MEDICARE

## 2025-06-20 VITALS
HEIGHT: 62 IN | HEART RATE: 72 BPM | BODY MASS INDEX: 35.33 KG/M2 | SYSTOLIC BLOOD PRESSURE: 126 MMHG | WEIGHT: 192 LBS | RESPIRATION RATE: 14 BRPM | DIASTOLIC BLOOD PRESSURE: 76 MMHG

## 2025-06-20 DIAGNOSIS — R41.3 MEMORY LOSS, SHORT TERM: ICD-10-CM

## 2025-06-20 DIAGNOSIS — Z78.0 POSTMENOPAUSAL: ICD-10-CM

## 2025-06-20 DIAGNOSIS — B37.31 VAGINAL CANDIDIASIS: Primary | ICD-10-CM

## 2025-06-20 DIAGNOSIS — Z00.00 ROUTINE MEDICAL EXAM: ICD-10-CM

## 2025-06-20 DIAGNOSIS — E66.01 MORBID OBESITY (MULTI): ICD-10-CM

## 2025-06-20 DIAGNOSIS — R73.03 PREDIABETES: ICD-10-CM

## 2025-06-20 PROBLEM — R19.7 DIARRHEA: Status: RESOLVED | Noted: 2025-01-31 | Resolved: 2025-06-20

## 2025-06-20 PROBLEM — F32.2 CURRENT SEVERE EPISODE OF MAJOR DEPRESSIVE DISORDER WITHOUT PSYCHOTIC FEATURES WITHOUT PRIOR EPISODE (MULTI): Status: RESOLVED | Noted: 2024-04-05 | Resolved: 2025-06-20

## 2025-06-20 PROBLEM — N18.31 STAGE 3A CHRONIC KIDNEY DISEASE (MULTI): Status: RESOLVED | Noted: 2025-01-13 | Resolved: 2025-06-20

## 2025-06-20 PROCEDURE — RXMED WILLOW AMBULATORY MEDICATION CHARGE

## 2025-06-20 RX ORDER — FLUCONAZOLE 150 MG/1
150 TABLET ORAL ONCE
Qty: 1 TABLET | Refills: 0 | Status: SHIPPED | OUTPATIENT
Start: 2025-06-20 | End: 2025-06-21

## 2025-06-20 ASSESSMENT — PATIENT HEALTH QUESTIONNAIRE - PHQ9
SUM OF ALL RESPONSES TO PHQ9 QUESTIONS 1 AND 2: 1
2. FEELING DOWN, DEPRESSED OR HOPELESS: SEVERAL DAYS
1. LITTLE INTEREST OR PLEASURE IN DOING THINGS: NOT AT ALL

## 2025-06-20 ASSESSMENT — ACTIVITIES OF DAILY LIVING (ADL)
DOING_HOUSEWORK: NEEDS ASSISTANCE
BATHING: INDEPENDENT
MANAGING_FINANCES: NEEDS ASSISTANCE
TAKING_MEDICATION: INDEPENDENT
DRESSING: INDEPENDENT
GROCERY_SHOPPING: NEEDS ASSISTANCE

## 2025-06-20 NOTE — PROGRESS NOTES
"Daily Call Note: Weekly [facility] complete with Dr. Baig and Michael Milligan, PharmD. Patient states she, \"feels so good, it's scary.\" She states she has more optimism and energy than she has had in 10 years. She even had incontinence that has seem to have resolved. She has started cardiac rehab and feels this is very helpful and really customized to her activity level. She denies CP and SOB. Dr. Baig discussed graduation from the program at this time and patient really thinks the support is really helping her and she would like to remain in program 1 more week. Next [facility] scheduled 8/7/24 at 0900, verbalized understanding. No other questions at this time.     Pt Education: per POC  Barriers: none  Topics for Daily Review: BP, weight  Pt demonstrates clear understanding: Yes    Daily Weight:  Vitals:    07/31/24 1011   Weight: 92.4 kg (203 lb 11.2 oz)      Last 3 Weights:  Wt Readings from Last 7 Encounters:   07/31/24 92.4 kg (203 lb 11.2 oz)   07/26/24 93.1 kg (205 lb 4.8 oz)   07/24/24 96.3 kg (212 lb 6.4 oz)   07/24/24 94.4 kg (208 lb 1.6 oz)   07/22/24 94.8 kg (209 lb)   07/19/24 94.4 kg (208 lb 1.6 oz)   07/16/24 94.7 kg (208 lb 12.8 oz)       Masimo Device: No   Masimo Clinical Impression: n/a    Virtual Visits--Scheduled (Most Recent Date at Top)  Follow up Appointments  Recent Visits  Date Type Provider Dept   07/02/24 Office Visit Joanna Garza MD PhD [facility]   Showing recent visits within past 30 days and meeting all other requirements  Future Appointments  Date Type Provider Dept   08/06/24 Appointment Joanna Garza MD PhD [facility]   Showing future appointments within next 90 days and meeting all other requirements       Frequency of RN Calls & Virtual Visits per Team Agreement: Healthy at Home Frequency: M/W/F    Medication issues Addressed (what was done): none    Follow up appointments scheduled by [facility] Staff: none  Referrals made by [facility] staff: none             " [Follow - Up] : a follow-up visit [Weight Management/Obesity] : weight management/obesity [Hypogonadism] : hypogonadism

## 2025-06-20 NOTE — ASSESSMENT & PLAN NOTE
Asymptomatic. Check labs.   Orders:    Lipid Panel; Future    Hemoglobin A1C; Future    Albumin-Creatinine Ratio, Urine Random; Future

## 2025-06-20 NOTE — ASSESSMENT & PLAN NOTE
Persistent for 3 days.  Otc antifungal cream did not help. Pt declined vaginal cx. Diflucan as dir,  Call office if symptoms do not resolve in 7 days    Orders:    fluconazole (Diflucan) 150 mg tablet; Take 1 tablet (150 mg) by mouth 1 time for 1 dose.

## 2025-06-20 NOTE — PROGRESS NOTES
"Subjective   Patient ID: Sue Vail is a 79 y.o. female who presents for Medicare Annual Wellness Visit Subsequent (fu).    HPI   pt noticed vaginal itchiness and whitish discharge 3 days ago, otc anti yeast cream did not help, no vaginal bleeding, dysuria, hematuria, flank pain, fever or chills. No nausea, vomiting. No cp, sob, HA. Normal appetite. Symptoms persisted today.  No polydipsia, polyuria or significant weight changes.  No  falls. stable mood. Memory loss has got worse lately.  No hallucination or delusion. No agitation        Review of Systems    Objective   /76   Pulse 72   Resp 14   Ht 1.575 m (5' 2\")   Wt 87.1 kg (192 lb)   LMP  (LMP Unknown)   BMI 35.12 kg/m²     Physical Exam  NAD, well groomed, No sclera icterus.  lungs: CTA b/l, heart: RRR, No LE edema,  abd: soft, no tenderness, BS+, cva percussion was negative, vaginal exam deferred,  fair  balance. CNII-XII were grossly intact, good judgment and stable memory. No depressed mood.    Assessment/Plan   Assessment & Plan  Morbid obesity (Multi)  Recommend decrease in calorie intake, regular aerobic exercise with low fat and low cholesterol diet. Will monitor weight, blood glucose and cholesterol regularly. Recommend nutritionist evaluation. Pt declined    Orders:    Lipid Panel; Future    Hemoglobin A1C; Future    Albumin-Creatinine Ratio, Urine Random; Future    Memory loss, short term  Worse, pt declined to try aricept. Fu with neurology  Orders:    Lipid Panel; Future    Hemoglobin A1C; Future    Albumin-Creatinine Ratio, Urine Random; Future    Vaginal candidiasis  Persistent for 3 days.  Otc antifungal cream did not help. Pt declined vaginal cx. Diflucan as dir,  Call office if symptoms do not resolve in 7 days    Orders:    fluconazole (Diflucan) 150 mg tablet; Take 1 tablet (150 mg) by mouth 1 time for 1 dose.    Postmenopausal    Orders:    XR DEXA bone density; Future    Prediabetes  Asymptomatic. Check labs.   Orders:    Lipid " Panel; Future    Hemoglobin A1C; Future    Albumin-Creatinine Ratio, Urine Random; Future    Routine medical exam [Z00.00]  Medicare wellness visit: pt was capable of performing all ADLs and  some IADLs. Pt has worse memory memory loss. pt is morbidly obese. Recommend healthy diet and regular exercise.  Advise eye exam by an OD yearly for glaucoma screen and dental exam every 6 months. check lipids.   will monitor blood pressure, cholesterol levels and weight regularly. Recommend sunscreen application if exposed to the sun when the UV index is over 2. Recommend vaccine shots as indicated in the patient's Care Gaps in Epic.   Recommend pt to clear clutters on floor at home to prevent falls. recommend to update living will and DPOA  pt  had been taking all current  medications as prescribed.

## 2025-06-20 NOTE — ASSESSMENT & PLAN NOTE
Recommend decrease in calorie intake, regular aerobic exercise with low fat and low cholesterol diet. Will monitor weight, blood glucose and cholesterol regularly. Recommend nutritionist evaluation. Pt declined    Orders:    Lipid Panel; Future    Hemoglobin A1C; Future    Albumin-Creatinine Ratio, Urine Random; Future

## 2025-06-20 NOTE — ASSESSMENT & PLAN NOTE
Worse, pt declined to try aricept. Fu with neurology  Orders:    Lipid Panel; Future    Hemoglobin A1C; Future    Albumin-Creatinine Ratio, Urine Random; Future

## 2025-06-23 ENCOUNTER — APPOINTMENT (OUTPATIENT)
Dept: PHARMACY | Facility: HOSPITAL | Age: 80
End: 2025-06-23
Payer: MEDICARE

## 2025-06-23 DIAGNOSIS — E11.9 DIET-CONTROLLED TYPE 2 DIABETES MELLITUS: ICD-10-CM

## 2025-06-23 DIAGNOSIS — I50.9 CONGESTIVE HEART FAILURE, UNSPECIFIED HF CHRONICITY, UNSPECIFIED HEART FAILURE TYPE: ICD-10-CM

## 2025-06-23 NOTE — PROGRESS NOTES
Clinical Pharmacy Visit    Sue Vail is a 79 y.o. female was referred to Clinical Pharmacy Team to complete a post-discharge medication optimization and monitoring visit. The patient was referred for their CHF and diabetes management. This visit was also to discuss getting re-enrolled for the Patient Assistance Program.       Referring Provider: Michel Ratliff MD  PCP: Joanna Garza MD PhD - last visit: 6/20, next visit: 7/11      Subjective   Allergies[1]     [facility]  [address] [facility] [address]  Phone: [phone] Fax: [phone]    Replaced by [facility]  [address], [address]  [facility] [address]  Phone: [phone] Fax: [phone]      Medication System Management:  Affordability/Accessibility: approved for PAP  Adherence/Organization: no issues       Social History     Social History Narrative    Not on file          HPI      Review of Systems        Objective     LMP  (LMP Unknown)    BP Readings from Last 4 Encounters:   06/20/25 126/76   04/11/25 125/67   03/24/25 117/70   02/14/25 132/64      There were no vitals filed for this visit.     LAB  Lab Results   Component Value Date    BILITOT 0.4 03/24/2025    CALCIUM 9.0 03/24/2025    CO2 23 03/24/2025     03/24/2025    CREATININE 0.70 03/24/2025    GLUCOSE 112 (H) 03/24/2025    ALKPHOS 97 03/24/2025    K 3.9 03/24/2025    PROT 6.4 03/24/2025     03/24/2025    AST 22 03/24/2025    ALT 27 03/24/2025    BUN 13 03/24/2025    ANIONGAP 12 03/24/2025    MG 2.28 09/25/2024    PHOS 4.9 12/13/2024     09/23/2024    ALBUMIN 3.8 03/24/2025    GFRF 89 07/27/2023     Lab Results   Component Value Date    TRIG 104 05/02/2024    CHOL 204 (H) 05/02/2024    LDLCALC 120 (H) 05/02/2024    HDL 63.4 05/02/2024     Lab Results   Component Value Date    HGBA1C 6.3 (H) 05/02/2024         Current Outpatient Medications   Medication Instructions    atorvastatin (LIPITOR) 80 mg, oral, Daily    busPIRone (BUSPAR) 5 mg,  oral, 2 times daily    carvedilol (COREG) 3.125 mg, oral, 2 times daily    cholecalciferol (VITAMIN D-3) 125 mcg, oral, Daily    furosemide (LASIX) 20 mg, oral, Daily    gabapentin (NEURONTIN) 300 mg, oral, 4 times daily    Jardiance 10 mg, oral, Daily    sacubitriL-valsartan (Entresto)  mg tablet 1 tablet, oral, 2 times daily    spironolactone (ALDACTONE) 25 mg, oral, Daily    traZODone (DESYREL) 50 mg, oral, Nightly PRN    valproic acid (DEPAKENE) 750 mg, oral, Nightly    venlafaxine (EFFEXOR) 75 mg, oral, Nightly            Assessment/Plan   Problem List Items Addressed This Visit       Congestive heart failure     Other Visit Diagnoses         Diet-controlled type 2 diabetes mellitus              CHF  Most recent EF improved to 40-45%  No swelling or SOB  Established with cardio and will be seeing again in July   Encouraged to continue to keep log of weights and BP's for all future visits   Continue current GDMT --> carvedilol 3.125mg BID, jardiance 10mg daily, spironolactone 25mg daily and entresto 97-103mg BID   Lasix daily   DM   Most recent A1c was 6.3% from 5/2/24  Her pcp placed an order already for new A1c to be drawn, just needs to be collected still   Not checking sugars currently   Jardiance daily     PAP:  -she is currently approved until 7/17  -confirmed still only receives SS for annual income   -will send me copy of most recent benefit letter  -once received, I will submit for possible enrollment     Follow Up: as needed     Continue all meds under the continuation of care with the referring provider and clinical pharmacy team.    Michael Milligan, PharmD     Verbal consent to manage patient's drug therapy was obtained from the patient. They were informed they may decline to participate or withdraw from participation in pharmacy services at any time.        [1]   Allergies  Allergen Reactions    Ace Inhibitors Unknown     Hypotension, Hypotension    Adhesive Tape-Silicones Unknown     Adhesive  Tape TAPE and bandages    Beta-Blockers (Beta-Adrenergic Blocking Agts) Unknown     Hypotension, Hypotension    Fluoxetine Unknown     cns, cns    Nsaids (Non-Steroidal Anti-Inflammatory Drug) Unknown    Olanzapine Unknown    Other Omega-3s Unknown     all ssri    Penicillins Hives    Poison Ivy Extract Hives     Per patient has to be hospitalized

## 2025-06-25 RX ORDER — CHLORHEXIDINE GLUCONATE ORAL RINSE 1.2 MG/ML
SOLUTION DENTAL
Qty: 30 ML | Refills: 0 | Status: SHIPPED | OUTPATIENT
Start: 2025-06-25

## 2025-06-27 ENCOUNTER — PRE-ADMISSION TESTING (OUTPATIENT)
Dept: PREADMISSION TESTING | Facility: HOSPITAL | Age: 80
End: 2025-06-27
Payer: MEDICARE

## 2025-06-27 ENCOUNTER — TELEPHONE (OUTPATIENT)
Dept: OBSTETRICS AND GYNECOLOGY | Facility: CLINIC | Age: 80
End: 2025-06-27

## 2025-06-27 ENCOUNTER — TELEPHONE (OUTPATIENT)
Dept: PRIMARY CARE | Facility: CLINIC | Age: 80
End: 2025-06-27

## 2025-06-27 ENCOUNTER — TELEPHONE (OUTPATIENT)
Dept: PRIMARY CARE | Facility: CLINIC | Age: 80
End: 2025-06-27
Payer: MEDICARE

## 2025-06-27 ENCOUNTER — HOSPITAL ENCOUNTER (OUTPATIENT)
Dept: RADIOLOGY | Facility: HOSPITAL | Age: 80
Discharge: HOME | End: 2025-06-27
Payer: MEDICARE

## 2025-06-27 ENCOUNTER — OFFICE VISIT (OUTPATIENT)
Dept: URGENT CARE | Age: 80
End: 2025-06-27
Payer: MEDICARE

## 2025-06-27 VITALS
RESPIRATION RATE: 16 BRPM | WEIGHT: 191 LBS | DIASTOLIC BLOOD PRESSURE: 94 MMHG | TEMPERATURE: 97.4 F | OXYGEN SATURATION: 98 % | HEART RATE: 65 BPM | SYSTOLIC BLOOD PRESSURE: 145 MMHG | BODY MASS INDEX: 35.15 KG/M2 | HEIGHT: 62 IN

## 2025-06-27 VITALS — DIASTOLIC BLOOD PRESSURE: 86 MMHG | HEART RATE: 90 BPM | OXYGEN SATURATION: 96 % | SYSTOLIC BLOOD PRESSURE: 120 MMHG

## 2025-06-27 DIAGNOSIS — B37.31 VAGINAL CANDIDIASIS: Primary | ICD-10-CM

## 2025-06-27 DIAGNOSIS — M16.12 PRIMARY OSTEOARTHRITIS OF LEFT HIP: ICD-10-CM

## 2025-06-27 DIAGNOSIS — N30.00 ACUTE CYSTITIS WITHOUT HEMATURIA: ICD-10-CM

## 2025-06-27 DIAGNOSIS — I10 PRIMARY HYPERTENSION: ICD-10-CM

## 2025-06-27 DIAGNOSIS — N89.8 VAGINAL ITCHING: Primary | ICD-10-CM

## 2025-06-27 DIAGNOSIS — Z01.818 PRE-OP EVALUATION: Primary | ICD-10-CM

## 2025-06-27 DIAGNOSIS — R10.2 VAGINAL PAIN: ICD-10-CM

## 2025-06-27 DIAGNOSIS — Z01.818 PRE-OP EVALUATION: ICD-10-CM

## 2025-06-27 DIAGNOSIS — E11.69 TYPE 2 DIABETES MELLITUS WITH OTHER SPECIFIED COMPLICATION, WITHOUT LONG-TERM CURRENT USE OF INSULIN: ICD-10-CM

## 2025-06-27 LAB
ABO GROUP (TYPE) IN BLOOD: NORMAL
ANION GAP SERPL CALC-SCNC: 10 MMOL/L (ref 10–20)
ANTIBODY SCREEN: NORMAL
BUN SERPL-MCNC: 11 MG/DL (ref 6–23)
CALCIUM SERPL-MCNC: 9.1 MG/DL (ref 8.6–10.3)
CHLORIDE SERPL-SCNC: 107 MMOL/L (ref 98–107)
CO2 SERPL-SCNC: 24 MMOL/L (ref 21–32)
CREAT SERPL-MCNC: 0.64 MG/DL (ref 0.5–1.05)
EGFRCR SERPLBLD CKD-EPI 2021: 89 ML/MIN/1.73M*2
ERYTHROCYTE [DISTWIDTH] IN BLOOD BY AUTOMATED COUNT: 14.3 % (ref 11.5–14.5)
EST. AVERAGE GLUCOSE BLD GHB EST-MCNC: 146 MG/DL
GLUCOSE SERPL-MCNC: 125 MG/DL (ref 74–99)
HBA1C MFR BLD: 6.7 % (ref ?–5.7)
HCT VFR BLD AUTO: 39.6 % (ref 36–46)
HGB BLD-MCNC: 13.4 G/DL (ref 12–16)
MCH RBC QN AUTO: 33.3 PG (ref 26–34)
MCHC RBC AUTO-ENTMCNC: 33.8 G/DL (ref 32–36)
MCV RBC AUTO: 99 FL (ref 80–100)
NRBC BLD-RTO: 0 /100 WBCS (ref 0–0)
PLATELET # BLD AUTO: 244 X10*3/UL (ref 150–450)
POC APPEARANCE, URINE: CLEAR
POC BILIRUBIN, URINE: NEGATIVE
POC BLOOD, URINE: ABNORMAL
POC COLOR, URINE: YELLOW
POC GLUCOSE, URINE: ABNORMAL MG/DL
POC KETONES, URINE: NEGATIVE MG/DL
POC LEUKOCYTES, URINE: ABNORMAL
POC NITRITE,URINE: POSITIVE
POC PH, URINE: 6 PH
POC PROTEIN, URINE: NEGATIVE MG/DL
POC SPECIFIC GRAVITY, URINE: 1.01
POC UROBILINOGEN, URINE: 0.2 EU/DL
POTASSIUM SERPL-SCNC: 4.6 MMOL/L (ref 3.5–5.3)
RBC # BLD AUTO: 4.02 X10*6/UL (ref 4–5.2)
RH FACTOR (ANTIGEN D): NORMAL
SODIUM SERPL-SCNC: 136 MMOL/L (ref 136–145)
WBC # BLD AUTO: 7.4 X10*3/UL (ref 4.4–11.3)

## 2025-06-27 PROCEDURE — 36415 COLL VENOUS BLD VENIPUNCTURE: CPT

## 2025-06-27 PROCEDURE — 71046 X-RAY EXAM CHEST 2 VIEWS: CPT

## 2025-06-27 PROCEDURE — 80051 ELECTROLYTE PANEL: CPT

## 2025-06-27 PROCEDURE — 99203 OFFICE O/P NEW LOW 30 MIN: CPT | Performed by: CLINICAL NURSE SPECIALIST

## 2025-06-27 PROCEDURE — 1036F TOBACCO NON-USER: CPT

## 2025-06-27 PROCEDURE — 87081 CULTURE SCREEN ONLY: CPT | Mod: PORLAB

## 2025-06-27 PROCEDURE — 83036 HEMOGLOBIN GLYCOSYLATED A1C: CPT | Mod: PORLAB

## 2025-06-27 PROCEDURE — 86901 BLOOD TYPING SEROLOGIC RH(D): CPT

## 2025-06-27 PROCEDURE — 99203 OFFICE O/P NEW LOW 30 MIN: CPT

## 2025-06-27 PROCEDURE — 1159F MED LIST DOCD IN RCRD: CPT

## 2025-06-27 PROCEDURE — 99070 SPECIAL SUPPLIES PHYS/QHP: CPT

## 2025-06-27 PROCEDURE — 85027 COMPLETE CBC AUTOMATED: CPT

## 2025-06-27 PROCEDURE — 81003 URINALYSIS AUTO W/O SCOPE: CPT

## 2025-06-27 RX ORDER — FLUCONAZOLE 150 MG/1
150 TABLET ORAL ONCE
Qty: 1 TABLET | Refills: 0 | Status: SHIPPED | OUTPATIENT
Start: 2025-06-27 | End: 2025-06-27

## 2025-06-27 ASSESSMENT — ENCOUNTER SYMPTOMS
DYSURIA: 0
FLANK PAIN: 0
FREQUENCY: 0
CHILLS: 0
SHORTNESS OF BREATH: 0
ABDOMINAL DISTENTION: 0
FEVER: 0
BACK PAIN: 0
NAUSEA: 0
VOMITING: 0
DIARRHEA: 0
HEMATURIA: 0

## 2025-06-27 ASSESSMENT — DUKE ACTIVITY SCORE INDEX (DASI)
CAN YOU WALK A BLOCK OR TWO ON LEVEL GROUND: NO
CAN YOU DO LIGHT WORK AROUND THE HOUSE LIKE DUSTING OR WASHING DISHES: YES
CAN YOU WALK INDOORS, SUCH AS AROUND YOUR HOUSE: YES
CAN YOU TAKE CARE OF YOURSELF (EAT, DRESS, BATHE, OR USE TOILET): YES
CAN YOU CLIMB A FLIGHT OF STAIRS OR WALK UP A HILL: NO
CAN YOU RUN A SHORT DISTANCE: NO
TOTAL_SCORE: 7.2
CAN YOU DO HEAVY WORK AROUND THE HOUSE LIKE SCRUBBING FLOORS OR LIFTING AND MOVING HEAVY FURNITURE: NO
CAN YOU PARTICIPATE IN STRENOUS SPORTS LIKE SWIMMING, SINGLES TENNIS, FOOTBALL, BASKETBALL, OR SKIING: NO
CAN YOU DO MODERATE WORK AROUND THE HOUSE LIKE VACUUMING, SWEEPING FLOORS OR CARRYING GROCERIES: NO
CAN YOU HAVE SEXUAL RELATIONS: NO
CAN YOU DO YARD WORK LIKE RAKING LEAVES, WEEDING OR PUSHING A MOWER: NO
DASI METS SCORE: 3.6
CAN YOU PARTICIPATE IN MODERATE RECREATIONAL ACTIVITIES LIKE GOLF, BOWLING, DANCING, DOUBLES TENNIS OR THROWING A BASEBALL OR FOOTBALL: NO

## 2025-06-27 ASSESSMENT — LIFESTYLE VARIABLES: SMOKING_STATUS: NONSMOKER

## 2025-06-27 NOTE — PATIENT INSTRUCTIONS
We will call for any additional treatment based on urine culture    Take additional diflucan pill as prescribed today    We are doing an extensive vaginal panel that is testing for bacterial causes of vaginitis along with yeast strains.  Results will be in your my chart as well for your doctors to view    If you develop fever, chills, back pain, nausea, vomiting go to emergency room    Otherwise follow up with primary care provider.

## 2025-06-27 NOTE — TELEPHONE ENCOUNTER
Pt stated that she still has yeast infection. She stated the medication helped but that yeast infection came back soon after taking.

## 2025-06-27 NOTE — CPM/PAT H&P
CPM/PAT Evaluation       Name: Sue Vail (Sue Vail)  /Age: 1945/79 y.o.     In-Person       Chief Complaint: left hip pain, OA. Scheduled for left total hip arthroplasty under general/spinal anesthesia per Dr. Burton on 25.       Past Medical History:   Diagnosis Date    Amblyopia of eye, right     Anemia .    Resukt of Lymphoma treatment w/Rituxan.    Anxiety     Arthritis 2013    Bipolar disorder 2017    Dx 2017    Bitten or stung by nonvenomous insect and other nonvenomous arthropods, initial encounter 2018    Tick bite    Cancer (Multi)     Cataract     Developing    Cervical disc disease     CHF (congestive heart failure)     Cognitive decline     Coronary artery disease     Weakness, extreme    Dementia     Depression     Chronic, serious    Diplopia     Divergence insufficiency     Dizziness     Drusen of macula, bilateral     Epilepsy, unspecified, not intractable, without status epilepticus     Epilepsy    Hearing aid worn     Helps a lot.    HL (hearing loss)     Progressing    Hypertension 9/15/1980    Not a day-to-day issue.    Localized enlarged lymph nodes 2018    Cervical lymphadenopathy    Macular degeneration     Multiple sclerosis (Multi)     History of multiple sclerosis    Myopia of both eyes     Neuromuscular disorder (Multi) 2013    Essential Tremor    Obesity 1950    Life-long    Old myocardial infarction 2019    History of myocardial infarction    Personal history of malignant melanoma of skin 2017    History of malignant melanoma    Personal history of malignant melanoma of skin 11/10/2020    History of malignant melanoma of skin    Personal history of malignant melanoma of skin 11/10/2020    History of malignant melanoma of skin    Personal history of non-Hodgkin lymphomas 2019    History of lymphoma    Personal history of other diseases of the nervous system and sense organs     History of benign essential tremor     Personal history of other diseases of the nervous system and sense organs 04/24/2018    History of tinnitus    Personal history of other diseases of the nervous system and sense organs 12/17/2019    History of diplopia    Personal history of other endocrine, nutritional and metabolic disease 07/02/2018    History of vitamin D deficiency    Personal history of other mental and behavioral disorders 10/28/2019    History of depression    PTSD (post-traumatic stress disorder) 1951     said i had it from childhood.    Urge incontinence 04/13/2017    Urge incontinence of urine    Urinary tract infection 6/25    Yeat infection    Vertigo     Vision loss In 2025    Double vision. Eccentric Fixation       Past Surgical History:   Procedure Laterality Date    CARDIAC CATHETERIZATION N/A 6/27/2024    Procedure: Left And Right Heart Cath, With LV;  Surgeon: Kate Cazares MD;  Location: [facility] Card[facility];  Service: Cardiovascular;  Laterality: N/A;    GASTRIC BYPASS  04/11/2017    Gastric Surgery For Morbid Obesity Bypass With Cecilio-en-Y    OTHER SURGICAL HISTORY  04/11/2017    Axillary Lymphadenectomy    OTHER SURGICAL HISTORY  12/23/2019    Eye surgery    OTHER SURGICAL HISTORY  12/23/2019    Neck surgery       Patient  has no history on file for sexual activity.    Family History[1]    Allergies[2]    Prior to Admission medications    Medication Sig Start Date End Date Taking? Authorizing Provider   atorvastatin (Lipitor) 80 mg tablet Take 1 tablet (80 mg) by mouth once daily. 10/4/24   Antonieta Hewitt MD PhD   busPIRone (Buspar) 5 mg tablet Take 1 tablet (5 mg) by mouth 2 times a day. 5/21/25   Joanna Garza MD PhD   carvedilol (Coreg) 3.125 mg tablet Take 1 tablet (3.125 mg) by mouth 2 times a day. 9/5/24   Michel Ratliff MD   chlorhexidine (Peridex) 0.12 % solution Swish and Spit 15 ml the night before and the morning of surgery. (2 Doses) 6/25/25   Laury Montague, APRN-CNS   cholecalciferol (Vitamin D-3) 125  mcg (5000 UT) capsule Take 1 capsule (125 mcg) by mouth once daily. 9/5/24   Michel Ratliff MD   empagliflozin (Jardiance) 10 mg tablet Take 1 tablet (10 mg) by mouth once daily. 12/16/24 12/16/25  Michel Ratliff MD   furosemide (Lasix) 20 mg tablet Take 1 tablet (20 mg) by mouth once daily. 9/5/24   Michel Ratliff MD   gabapentin (Neurontin) 300 mg capsule Take 1 capsule (300 mg) by mouth 4 times a day. 11/5/24   Joanna Garza MD PhD   sacubitriL-valsartan (Entresto)  mg tablet Take 1 tablet by mouth 2 times a day. 1/21/25 1/21/26  Michel Ratliff MD   spironolactone (Aldactone) 25 mg tablet Take 1 tablet (25 mg) by mouth once daily. 9/5/24 9/5/25  Michel Ratliff MD   traZODone (Desyrel) 50 mg tablet Take 1 tablet (50 mg) by mouth as needed at bedtime for sleep. 4/11/25 4/11/26  Joanna Garza MD PhD   valproic acid (Depakene) 250 mg capsule Take 3 capsules (750 mg) by mouth once daily at bedtime. 9/5/24 10/7/25  Michel Ratliff MD   venlafaxine (Effexor) 75 mg tablet Take 1 tablet (75 mg) by mouth once daily at bedtime. 1/14/25   Joanna Garza MD PhD   fluconazole (Diflucan) 150 mg tablet Take 1 tablet (150 mg) by mouth 1 time for 1 dose. 6/20/25 6/23/25  Joanna Garza MD PhD        Visit Vitals  LMP  (LMP Unknown)   OB Status Postmenopausal   Smoking Status Never       DASI Risk Score      Flowsheet Row Pre-Admission Testing from 6/27/2025 in  [facility]   Can you take care of yourself (eat, dress, bathe, or use toilet)?  2.75 filed at 06/27/2025 1304   Can you walk indoors, such as around your house? 1.75 filed at 06/27/2025 1304   Can you walk a block or two on level ground?  0 filed at 06/27/2025 1304   Can you climb a flight of stairs or walk up a hill? 0 filed at 06/27/2025 1304   Can you run a short distance? 0 filed at 06/27/2025 1304   Can you do light work around the house like dusting or washing dishes? 2.7 filed at 06/27/2025 1304   Can you do moderate work around the house like  vacuuming, sweeping floors or carrying groceries? 0 filed at 06/27/2025 1304   Can you do heavy work around the house like scrubbing floors or lifting and moving heavy furniture?  0 filed at 06/27/2025 1304   Can you do yard work like raking leaves, weeding or pushing a mower? 0 filed at 06/27/2025 1304   Can you have sexual relations? 0 filed at 06/27/2025 1304   Can you participate in moderate recreational activities like golf, bowling, dancing, doubles tennis or throwing a baseball or football? 0 filed at 06/27/2025 1304   Can you participate in strenous sports like swimming, singles tennis, football, basketball, or skiing? 0 filed at 06/27/2025 1304   DASI SCORE 7.2 filed at 06/27/2025 1304   METS Score (Will be calculated only when all the questions are answered) 3.6 filed at 06/27/2025 1304          Caprini DVT Assessment      Flowsheet Row Pre-Admission Testing from 6/27/2025 in  [facility] ED to Hosp-Admission (Discharged) from 6/25/2024 in [facility] with Ezequiel Castillo MD and Al Watts, DO   DVT Score (IF A SCORE IS NOT CALCULATING, MUST SELECT A BMI TO COMPLETE) 13 filed at 06/27/2025 1403 6 filed at 06/27/2024 1420   Surgical Factors Major surgery planned, including arthroscopic and laproscopic (1-2 hours), Elective major lower extremity arthroplasty filed at 06/27/2025 1403 --   BMI (BMI MUST BE CHOSEN) 31-40 (Obesity) filed at 06/27/2025 1403 31-40 (Obesity) filed at 06/27/2024 1420   RETIRED: History -- Congestive heart failure filed at 06/25/2024 1402   RETIRED: Age -- Over 75 years filed at 06/27/2024 1420          Modified Frailty Index    No data to display       [id number] Stroke Risk Points  Current as of just now        N/A 0 to 9 Points:      Last Change: N/A          The [id number] risk score (Main VIDALES, et al. 2009. © 2010 [employer]) quantifies the risk of stroke for a patient with atrial fibrillation. For patients  without atrial fibrillation or under the age of 18 this score appears as N/A. Higher score values generally indicate higher risk of stroke.        This score is not applicable to this patient. Components are not calculated.          Revised Cardiac Risk Index      Flowsheet Row Pre-Admission Testing from 6/27/2025 in  [facility]   High-Risk Surgery (Intraperitoneal, Intrathoracic,Suprainguinal vascular) 1 filed at 06/27/2025 1403   History of ischemic heart disease (History of MI, History of positive exercuse test, Current chest paint considered due to myocardial ischemia, Use of nitrate therapy, ECG with pathological Q Waves) 1 filed at 06/27/2025 1403   History of congestive heart failure (pulmonary edemia, bilateral rales or S3 gallop, Paroxysmal nocturnal dyspnea, CXR showing pulmonary vascular redistribution) 1 filed at 06/27/2025 1403   History of cerebrovascular disease (Prior TIA or stroke) 0 filed at 06/27/2025 1403   Pre-operative insulin treatment 0 filed at 06/27/2025 1403   Pre-operative creatinine>2 mg/dl 0 filed at 06/27/2025 1403   Revised Cardiac Risk Calculator 3 filed at 06/27/2025 1403          Apfel Simplified Score      Flowsheet Row Pre-Admission Testing from 6/27/2025 in  [facility]   Smoking status 1 filed at 06/27/2025 1403   History of motion sickness or PONV  0 filed at 06/27/2025 1403   Use of postoperative opioids 1 filed at 06/27/2025 1403   Gender - Female 1=Yes filed at 06/27/2025 1403   Apfel Simplified Score Calculator 3 filed at 06/27/2025 1403          Risk Analysis Index Results This Encounter    No data found in the last 10 encounters.       Stop Bang Score      Flowsheet Row Pre-Admission Testing from 6/27/2025 in  [facility] ED to Hosp-Admission (Discharged) from 6/25/2024 in [facility] with Ezequiel Castillo MD and Al Watts, DO   Do you snore loudly? 0 filed at 06/27/2025 1301 0 filed at 06/27/2024 1126    Do you often feel tired or fatigued after your sleep? 0 filed at 06/27/2025 1301 0 filed at 06/27/2024 1126   Has anyone ever observed you stop breathing in your sleep? 0 filed at 06/27/2025 1301 0 filed at 06/27/2024 1126   Do you have or are you being treated for high blood pressure? 0 filed at 06/27/2025 1301 0 filed at 06/27/2024 1126   Recent BMI (Calculated) 35.1 filed at 06/27/2025 1301 34.8 filed at 06/27/2024 1126   Is BMI greater than 35 kg/m2? 1=Yes filed at 06/27/2025 1301 0=No filed at 06/27/2024 1126   Age older than 50 years old? 1=Yes filed at 06/27/2025 1301 1=Yes filed at 06/27/2024 1126   Is your neck circumference greater than 17 inches (Male) or 16 inches (Female)? 0 filed at 06/27/2025 1301 0 filed at 06/27/2024 1126   Gender - Male 0=No filed at 06/27/2025 1301 0=No filed at 06/27/2024 1126   STOP-BANG Total Score 2 filed at 06/27/2025 1301 1 filed at 06/27/2024 1126          Prodigy: High Risk  Total Score: 23              Prodigy Age Score      Prodigy CHF score          ARISCAT Score for Postoperative Pulmonary Complications      Flowsheet Row Pre-Admission Testing from 6/27/2025 in  [facility]   Age Calculated Score 3 filed at 06/27/2025 1403   Preoperative SpO2 0 filed at 06/27/2025 1403   Respiratory infection in the last month Either upper or lower (i.e., URI, bronchitis, pneumonia), with fever and antibiotic treatment 0 filed at 06/27/2025 1403   Preoperative anemia (Hgb less than 10 g/dl) 0 filed at 06/27/2025 1403   Surgical incision  0 filed at 06/27/2025 1403   Duration of surgery  0 filed at 06/27/2025 1403   Emergency Procedure  0 filed at 06/27/2025 1403   ARISCAT Total Score  3 filed at 06/27/2025 1403          Judith Perioperative Risk for Myocardial Infarction or Cardiac Arrest (ASAD)      Flowsheet Row Pre-Admission Testing from 6/27/2025 in  [facility]   Calculated Age Score 1.6 filed at 06/27/2025 1404   Functional Status  0 filed at  06/27/2025 1404   ASA Class  -3.29 filed at 06/27/2025 1404   Creatinine -0.10 filed at 06/27/2025 1404   Type of Procedure  0.80 filed at 06/27/2025 1404   ASAD Total Score  -6.24 filed at 06/27/2025 1404   ASAD % 0.19 filed at 06/27/2025 1404          Results for orders placed or performed in visit on 06/27/25 (from the past 24 hours)   Basic Metabolic Panel   Result Value Ref Range    Glucose 125 (H) 74 - 99 mg/dL    Sodium 136 136 - 145 mmol/L    Potassium 4.6 3.5 - 5.3 mmol/L    Chloride 107 98 - 107 mmol/L    Bicarbonate 24 21 - 32 mmol/L    Anion Gap 10 10 - 20 mmol/L    Urea Nitrogen 11 6 - 23 mg/dL    Creatinine 0.64 0.50 - 1.05 mg/dL    eGFR 89 >60 mL/min/1.73m*2    Calcium 9.1 8.6 - 10.3 mg/dL   CBC   Result Value Ref Range    WBC 7.4 4.4 - 11.3 x10*3/uL    nRBC 0.0 0.0 - 0.0 /100 WBCs    RBC 4.02 4.00 - 5.20 x10*6/uL    Hemoglobin 13.4 12.0 - 16.0 g/dL    Hematocrit 39.6 36.0 - 46.0 %    MCV 99 80 - 100 fL    MCH 33.3 26.0 - 34.0 pg    MCHC 33.8 32.0 - 36.0 g/dL    RDW 14.3 11.5 - 14.5 %    Platelets 244 150 - 450 x10*3/uL   Type And Screen Is this order related to pregnancy or an upcoming surgery? Yes; Where will this surgery/delivery be performed? [facility]; What is the date of the surgery? 7/16/2025; Has this patient ever had a transfusion? Yes; Date o...   Result Value Ref Range    ABO TYPE A     Rh TYPE POS      *Note: Due to a large number of results and/or encounters for the requested time period, some results have not been displayed. A complete set of results can be found in Results Review.      1. Pre-op evaluation  Basic Metabolic Panel    CBC    ECG 12 Lead    Staphylococcus aureus/MRSA colonization, Culture    Hemoglobin A1C    XR chest 2 views    chlorhexidine (Peridex) 0.12 % solution    Basic Metabolic Panel    CBC    Staphylococcus aureus/MRSA colonization, Culture    Hemoglobin A1C    Type And Screen Is this order related to pregnancy or an upcoming surgery? Yes; Where will  this surgery/delivery be performed? [facility]; What is the date of the surgery? 7/16/2025; Has this patient ever had a transfusion? Yes; Date o...    Type And Screen Is this order related to pregnancy or an upcoming surgery? Yes; Where will this surgery/delivery be performed? [facility]; What is the date of the surgery? 7/16/2025; Has this patient ever had a transfusion? Yes; Date o...      2. Primary osteoarthritis of left hip  Basic Metabolic Panel    CBC    ECG 12 Lead    Staphylococcus aureus/MRSA colonization, Culture    Hemoglobin A1C    XR chest 2 views    chlorhexidine (Peridex) 0.12 % solution    Basic Metabolic Panel    CBC    Staphylococcus aureus/MRSA colonization, Culture    Hemoglobin A1C    Type And Screen Is this order related to pregnancy or an upcoming surgery? Yes; Where will this surgery/delivery be performed? [facility]; What is the date of the surgery? 7/16/2025; Has this patient ever had a transfusion? Yes; Date o...    Type And Screen Is this order related to pregnancy or an upcoming surgery? Yes; Where will this surgery/delivery be performed? [facility]; What is the date of the surgery? 7/16/2025; Has this patient ever had a transfusion? Yes; Date o...      3. Primary hypertension  Basic Metabolic Panel    CBC    ECG 12 Lead    XR chest 2 views    Basic Metabolic Panel    CBC      4. Type 2 diabetes mellitus with other specified complication, without long-term current use of insulin  Basic Metabolic Panel    CBC    ECG 12 Lead    Hemoglobin A1C    Basic Metabolic Panel    CBC    Hemoglobin A1C         Assessment and Plan:     Musculoskeletal:  left hip pain, OA. Scheduled for left total hip arthroplasty under general/spinal anesthesia per Dr. Burton on 7/16/25.   CBC, BMP ordered. Reviewed and these are WNL and acceptable for upcoming surgery.   MRSA, T&C ordered.   EKG ordered, shows SR, large LBBB, rate of 71 bpm.  H&P reviewed, dated 6/20/25  per Dr. Garza.   Patient has cardiology appointment on 7/1 and gerontology appointment on 7/15 prior to surgery.   Patient attended the total joint class.  Has Hibiclens.  Peridex prescription sent to  [facility].  Last dose of Jardiance 7/12/25.   Patient has current vaginal yeast infection. Finsihed Diflucan prescription from PCP. She will start Monistat today and was referred to GYN. Dr. Micheal tineo.   All surgery instructions reviewed with patient by RN. Verbalized understanding.   Encouraged early ambulation, DVT/ PE prevention, constipation prevention, and C&DB post operatively. Patient verbalized understanding.              [1]   Family History  Problem Relation Name Age of Onset    Breast cancer Mother      Dementia Father      Alzheimer's disease Father      Prostate cancer Father      Tremor Father      Pulmonary embolism Daughter      Pulmonary embolism Daughter      Other (cardiomegaly) Maternal Grandmother      Alcohol abuse Mother Lyn Yovanny Vail 10 - 19    Depression Mother Lyn Yovanny Vail     Cancer Mother Lyn Yovanny Vail     Osteoporosis Mother Lyn Yovanny Vail     Alcohol abuse Father Julian Vail, Sr. 10 - 19    COPD Father Julian Vail, Sr. 10 - 19    Depression Father Julian Vail, Sr. 10 - 19    Cancer Father Julian Vail, Sr.     Asthma Father Julian Vail, Sr.     Hearing loss Father Julian Vail, Sr. 60 - 69    Obesity Father Julian Vail, Sr.     Lung disease Father Julian Vail, Sr.     Heart disease Maternal Grandmother Jeni Yovanny 40 - 49    Abnormal EKG Maternal Grandmother Jeni Yovanny     Cancer Paternal Grandmother Brook Fabián Vail Young     Diabetes Mother's Sister Cora Yovanny     Depression Brother Julian Vail, Sr.     Depression Sister Amrita Yovanny Vail     Diabetes type I Mother's Sister Don't know     Asthma Brother Julian Vail Jr.     Cancer  Paternal Grandfather Thomas Vail     Diabetes type I Mother's Sister Don't know     Cancer Other Self 30 - 39    Vision loss Other Self 40 - 49   [2]   Allergies  Allergen Reactions    Ace Inhibitors Unknown     Hypotension, Hypotension    Adhesive Tape-Silicones Unknown     Adhesive Tape TAPE and bandages    Beta-Blockers (Beta-Adrenergic Blocking Agts) Unknown     Hypotension, Hypotension    Fluoxetine Unknown     cns, cns    Nsaids (Non-Steroidal Anti-Inflammatory Drug) Unknown    Olanzapine Unknown    Other Omega-3s Unknown     all ssri    Penicillins Hives    Poison Ivy Extract Hives     Per patient has to be hospitalized

## 2025-06-27 NOTE — TELEPHONE ENCOUNTER
Pt stated that she cannot see gyn for yeast infection right now due to a number of other appts she is taking care of.

## 2025-06-27 NOTE — TELEPHONE ENCOUNTER
Pt stated that she went to pre admission testing today and was told that if her yeast infection is not cleared for at least 7 days prior to surgery, it will be cancelled.   Pt stated she has tried to call 5 different GYN and the soonest she can get an appt is middle of July.   Pt is requesting you treat yeast infection so that her surgery is not cancelled.

## 2025-06-27 NOTE — TELEPHONE ENCOUNTER
Sue is a new patient who was referred to GYN by Dr. Garza for a yeast infection. Patient has full hip replacement surgery scheduled on 7/16/25, and they said that they will not be able to complete the surgery unless the infection is cleared up by the 7th. Patient would prefer to schedule in [place]. Please advise.

## 2025-06-27 NOTE — PREPROCEDURE INSTRUCTIONS
Medication List            Accurate as of June 27, 2025  1:05 PM. Always use your most recent med list.                atorvastatin 80 mg tablet  Commonly known as: Lipitor  Take 1 tablet (80 mg) by mouth once daily.     busPIRone 5 mg tablet  Commonly known as: Buspar  Take 1 tablet (5 mg) by mouth 2 times a day.     carvedilol 3.125 mg tablet  Commonly known as: Coreg  Take 1 tablet (3.125 mg) by mouth 2 times a day.     chlorhexidine 0.12 % solution  Commonly known as: Peridex  Swish and Spit 15 ml the night before and the morning of surgery. (2 Doses)     cholecalciferol 125 mcg (5,000 units) capsule  Commonly known as: Vitamin D-3  Take 1 capsule (125 mcg) by mouth once daily.     Entresto  mg tablet  Generic drug: sacubitriL-valsartan  Take 1 tablet by mouth 2 times a day.     furosemide 20 mg tablet  Commonly known as: Lasix  Take 1 tablet (20 mg) by mouth once daily.     gabapentin 300 mg capsule  Commonly known as: Neurontin  Take 1 capsule (300 mg) by mouth 4 times a day.     Jardiance 10 mg tablet  Generic drug: empagliflozin  Take 1 tablet (10 mg) by mouth once daily.     spironolactone 25 mg tablet  Commonly known as: Aldactone  Take 1 tablet (25 mg) by mouth once daily.     traZODone 50 mg tablet  Commonly known as: Desyrel  Take 1 tablet (50 mg) by mouth as needed at bedtime for sleep.     valproic acid 250 mg capsule  Commonly known as: Depakene  Take 3 capsules (750 mg) by mouth once daily at bedtime.     venlafaxine 75 mg tablet  Commonly known as: Effexor  Take 1 tablet (75 mg) by mouth once daily at bedtime.              Last dose of Jardiance is 7/12/25 until after surgery.    Take Carvedilol and Entresto with sip of water the morning of surgery.   Swish and Spit with Peridex mouth wash the night before and the morning of your surgery.     Call to schedule for the Total Joint class on July 7.    PRE-SURGERY INSTRUCTIONS:    Do not drink any liquid after midnight the night before your  surgery  Do not eat any food after midnight the night before your surgery/procedure.  Candy, gum, mints and smoking of cigarettes, marijuana or vaping is not permitted after midnight prior to your surgery   Do not drink Alcohol 24 hours prior to surgery      Increase fluid intake day before surgery    Additional Instructions:      Review your medication instructions, take indicated medications    Wear  comfortable loose fitting clothing  Do not use moisturizers, creams, lotions or perfume  All jewelry and valuables should be left at home. May bring glasses and partials.    Stop blood thinning medications as instructed by ordering physician or surgeon    Shower or bathe the night before or day of surgery.   Brush teeth and avoid perfumes, colognes, powders, makeup, aftershave and hair spray    Go to Registration, in the main lobby, upon arrival on the day of surgery and have 's license and medical insurance card available.    Call [phone] the day before your surgery/procedure to find out what time you are to arrive the next day.     Please have a responsible adult to drive you home and be available to help you as needed after surgery.   Cannot use public transportation or an insurance service for your ride home.           Deep Breathing Exercises after surgery:   Breathe deeply using your lungs as fully as possible to move   secretions and clear lungs more easily.  Do a cycle of five deep breaths every hour.      Start by placing your hands on your ribs and take a deep breath in through your nose, expanding your lower chest.  You should feel your ribs push against your hands.  Breathe out slowly through your mouth until all the air is gone.    For every other breath, hold your breath for three seconds.  This will help keep the lungs fully open.     Coughing : Coughing is necessary to clear secretions that may accumulate in your lungs.  This should be done after breathing exercises.    If lying down, bend  your knees and support you incision with a pillow or your hands to make it more comfortable.    If sitting, support your incision, lean forward and keep your feet on the floor.  After your five deep breaths, breathe in and cough out sharply.  Repeat this cycle twice or for as long as you have secretions to clear.  ( You will not put your incision at risk by coughing.)    Leg Exercises:  Leg exercises are important to maintain good blood circulation in your legs, maintain muscle strength and prevent joint stiffness.  Do each exercise for 5 repetitions every hour while awake for the first few days or until you are up and walking around.         A. Pump your feet up and down at the ankles        B. With your legs straight, make circles with your feet.        C. Bend and straighten your knees by sliding your heels up and down the bed.         D. With your legs straight tighten the muscle above your knee and push the back of your knee down             Into the bed.  Hold for five seconds and then relax.  Alternate legs.

## 2025-06-27 NOTE — PROGRESS NOTES
"Subjective   Patient ID: Sue Vail is a 79 y.o. female. They present today with a chief complaint of Vaginal Itching (X 3 days on Monistat and took Diflucan ).    History of Present Illness  Patient presents for ongoing vaginal itching for 3 days. She has taken 1 diflucan and monistat with some relief but has not completely resolved. Denies burning with urination, urgency, frequency. Denies fever, chills, sweats. Denies n/v/d. Denies chest pain, sob.   Denies back pain, flank pain, hematuria. Patient explains that she is having hip surgery in 2 weeks and needs her \"yeast infection to be cleared up before then\".           Past Medical History  Allergies as of 06/27/2025 - Reviewed 06/27/2025   Allergen Reaction Noted    Ace inhibitors Unknown 11/03/2016    Adhesive tape-silicones Unknown 10/20/2023    Beta-blockers (beta-adrenergic blocking agts) Unknown 11/03/2016    Fluoxetine Unknown 11/03/2016    Nsaids (non-steroidal anti-inflammatory drug) Unknown 11/03/2016    Olanzapine Unknown 11/03/2016    Other omega-3s Unknown 07/14/2023    Penicillins Hives 06/20/2007    Poison ivy extract Hives 09/05/2024       Prescriptions Prior to Admission[1]     Medical History[2]    Surgical History[3]     reports that she has never smoked. She has never used smokeless tobacco. She reports current alcohol use of about 1.0 standard drink of alcohol per week. She reports that she does not use drugs.    Review of Systems  Review of Systems   Constitutional:  Negative for chills and fever.   Respiratory:  Negative for shortness of breath.    Cardiovascular:  Negative for chest pain.   Gastrointestinal:  Negative for abdominal distention, diarrhea, nausea and vomiting.   Genitourinary:  Negative for dysuria, flank pain, frequency, hematuria, urgency, vaginal bleeding, vaginal discharge and vaginal pain.   Musculoskeletal:  Negative for back pain.                                  Objective    Vitals:    06/27/25 1715   BP: 120/86 "   Pulse: 90   SpO2: 96%     No LMP recorded (lmp unknown). Patient is postmenopausal.    Physical Exam  Constitutional:       General: She is not in acute distress.     Appearance: She is not toxic-appearing.   HENT:      Head: Normocephalic and atraumatic.   Eyes:      Extraocular Movements: Extraocular movements intact.      Pupils: Pupils are equal, round, and reactive to light.   Pulmonary:      Effort: Pulmonary effort is normal. No respiratory distress.      Breath sounds: Normal breath sounds. No wheezing.   Abdominal:      General: Abdomen is flat. There is no distension.      Palpations: Abdomen is soft.      Tenderness: There is no abdominal tenderness. There is no right CVA tenderness, left CVA tenderness, guarding or rebound.   Genitourinary:     Comments: No gu exam preformed. Self swab obtained.   Musculoskeletal:      Cervical back: Normal range of motion.   Neurological:      Mental Status: She is alert.         Procedures    Point of Care Test & Imaging Results from this visit  Results for orders placed or performed in visit on 06/27/25   POCT UA Automated manually resulted   Result Value Ref Range    POC Color, Urine Yellow Straw, Yellow, Light-Yellow    POC Appearance, Urine Clear Clear    POC Glucose, Urine 500 (3+) (A) NEGATIVE mg/dl    POC Bilirubin, Urine NEGATIVE NEGATIVE    POC Ketones, Urine NEGATIVE NEGATIVE mg/dl    POC Specific Gravity, Urine 1.010 1.005 - 1.035    POC Blood, Urine MODERATE (2+) (A) NEGATIVE    POC PH, Urine 6.0 No Reference Range Established PH    POC Protein, Urine NEGATIVE NEGATIVE mg/dl    POC Urobilinogen, Urine 0.2 0.2, 1.0 EU/DL    Poc Nitrite, Urine POSITIVE (A) NEGATIVE    POC Leukocytes, Urine SMALL (1+) (A) NEGATIVE      Imaging  No results found.    Cardiology, Vascular, and Other Imaging  No other imaging results found for the past 2 days      Diagnostic study results (if any) were reviewed by Amanda Abdi PA-C.    Assessment/Plan   Allergies,  medications, history, and pertinent labs/EKGs/Imaging reviewed by Amanda Abdi PA-C.     Medical Decision Making  MDM- Signs and symptoms consistent with suspected candidal vaginitis. History and examination do not support evidence of acute abdomen or pyelonephritis. UA suspected contaminated considering patient has no urinary symptoms, sending urine culture prior to any antibiotic treatment. Will treat with outpatient therapy at this time with additional diflucan and cultures pending. Encourage patient to return to clinic or present to ED if symptoms change or worsen. Otherwise follow up with PCP or Gynecology. Patient verbalized understanding and agrees with plan.       Orders and Diagnoses  Diagnoses and all orders for this visit:  Vaginal itching  -     POCT UA Automated manually resulted  -     [facility] SureSwab Advanced Vaginitis Plus, TMA  -     Urine Culture  -     fluconazole (Diflucan) 150 mg tablet; Take 1 tablet (150 mg) by mouth 1 time for 1 dose.  Vaginal pain  -     Urine Culture  -     fluconazole (Diflucan) 150 mg tablet; Take 1 tablet (150 mg) by mouth 1 time for 1 dose.      Medical Admin Record      Patient disposition: Home    Electronically signed by Amanda Abdi PA-C  6:10 PM           [1] (Not in a hospital admission)   [2]   Past Medical History:  Diagnosis Date    Amblyopia of eye, right     Anemia 2019.    Resukt of Lymphoma treatment w/Rituxan.    Anxiety     Arthritis 2013    Bipolar disorder 2017    Dx 2017    Bitten or stung by nonvenomous insect and other nonvenomous arthropods, initial encounter 07/03/2018    Tick bite    Cancer (Multi)     Cataract     Developing    Cervical disc disease     CHF (congestive heart failure)     Cognitive decline 2022    Coronary artery disease 7/24    Weakness, extreme    Dementia 2023    Depression 1951    Chronic, serious    Diplopia     Divergence insufficiency     Dizziness     Drusen of macula, bilateral     Epilepsy, unspecified, not  intractable, without status epilepticus     Epilepsy    Hearing aid worn     Helps a lot.    HL (hearing loss) 1/23    Progressing    Hypertension 9/15/1980    Not a day-to-day issue.    Localized enlarged lymph nodes 08/31/2018    Cervical lymphadenopathy    Macular degeneration     Multiple sclerosis (Multi)     History of multiple sclerosis    Myopia of both eyes     Neuromuscular disorder (Multi) 2013    Essential Tremor    Obesity 1950    Life-long    Old myocardial infarction 12/23/2019    History of myocardial infarction    Personal history of malignant melanoma of skin 04/13/2017    History of malignant melanoma    Personal history of malignant melanoma of skin 11/10/2020    History of malignant melanoma of skin    Personal history of malignant melanoma of skin 11/10/2020    History of malignant melanoma of skin    Personal history of non-Hodgkin lymphomas 12/23/2019    History of lymphoma    Personal history of other diseases of the nervous system and sense organs     History of benign essential tremor    Personal history of other diseases of the nervous system and sense organs 04/24/2018    History of tinnitus    Personal history of other diseases of the nervous system and sense organs 12/17/2019    History of diplopia    Personal history of other endocrine, nutritional and metabolic disease 07/02/2018    History of vitamin D deficiency    Personal history of other mental and behavioral disorders 10/28/2019    History of depression    PTSD (post-traumatic stress disorder) 1951     said i had it from childhood.    Urge incontinence 04/13/2017    Urge incontinence of urine    Urinary tract infection 6/25    Yeat infection    Vertigo     Vision loss In 2025    Double vision. Eccentric Fixation   [3]   Past Surgical History:  Procedure Laterality Date    CARDIAC CATHETERIZATION N/A 6/27/2024    Procedure: Left And Right Heart Cath, With LV;  Surgeon: Kate Cazares MD;  Location: [facility];   Service: Cardiovascular;  Laterality: N/A;    GASTRIC BYPASS  04/11/2017    Gastric Surgery For Morbid Obesity Bypass With Cecilio-en-Y    OTHER SURGICAL HISTORY  04/11/2017    Axillary Lymphadenectomy    OTHER SURGICAL HISTORY  12/23/2019    Eye surgery    OTHER SURGICAL HISTORY  12/23/2019    Neck surgery

## 2025-06-28 ENCOUNTER — PHARMACY VISIT (OUTPATIENT)
Dept: PHARMACY | Facility: CLINIC | Age: 80
End: 2025-06-28
Payer: COMMERCIAL

## 2025-06-28 PROCEDURE — RXMED WILLOW AMBULATORY MEDICATION CHARGE

## 2025-06-29 LAB
BACTERIA UR CULT: ABNORMAL
QUEST FLEXITEST1 RESULTS:: NORMAL
STAPHYLOCOCCUS SPEC CULT: NORMAL

## 2025-06-29 RX ORDER — NITROFURANTOIN 25; 75 MG/1; MG/1
100 CAPSULE ORAL 2 TIMES DAILY
Qty: 14 CAPSULE | Refills: 0 | Status: SHIPPED | OUTPATIENT
Start: 2025-06-29 | End: 2025-07-06

## 2025-06-30 LAB
BACTERIA UR CULT: ABNORMAL
QUEST FLEXITEST1 RESULTS:: NORMAL

## 2025-06-30 NOTE — PATIENT INSTRUCTIONS
Thank you for coming in today. If you have any questions or concerns, you may call the Heart Failure Office at [phone] option [phone], or [phone].  You may also contact our heart failure nursing team via email on [email or web address].    For quicker results set-up your  [employer] account to receive results and other correspondence directly to your phone.    Please bring all your pills/medications to your Cardiology appointments.    **  - Our  Pastor will call you today    - Once we see your stress test result, we will reach out to Dr Burton  before your hip surgery    - No new medication changes today    -We will renew your heart medications today    - Please have the following tests done:  1.Blood tests just before your next visit (RFP, BNP, CBC)    2. STAT Nuclear pharmacological stress test. CALL Tel  [phone]  to schedule this test    3. Echocardiogram in 1 year, we will schedule this test for you today    - Please make an appointment to be seen in 1 year

## 2025-07-01 ENCOUNTER — TELEPHONE (OUTPATIENT)
Dept: CARDIOLOGY | Facility: HOSPITAL | Age: 80
End: 2025-07-01

## 2025-07-01 ENCOUNTER — APPOINTMENT (OUTPATIENT)
Facility: CLINIC | Age: 80
End: 2025-07-01
Payer: MEDICARE

## 2025-07-01 VITALS
DIASTOLIC BLOOD PRESSURE: 70 MMHG | WEIGHT: 187 LBS | HEIGHT: 61 IN | BODY MASS INDEX: 35.3 KG/M2 | RESPIRATION RATE: 16 BRPM | HEART RATE: 95 BPM | SYSTOLIC BLOOD PRESSURE: 116 MMHG | OXYGEN SATURATION: 97 %

## 2025-07-01 DIAGNOSIS — E66.01 MORBID OBESITY (MULTI): Primary | ICD-10-CM

## 2025-07-01 DIAGNOSIS — I50.21 ACUTE SYSTOLIC HEART FAILURE: ICD-10-CM

## 2025-07-01 DIAGNOSIS — I50.22 CHRONIC SYSTOLIC HEART FAILURE: ICD-10-CM

## 2025-07-01 DIAGNOSIS — I50.9 CONGESTIVE HEART FAILURE, UNSPECIFIED HF CHRONICITY, UNSPECIFIED HEART FAILURE TYPE: ICD-10-CM

## 2025-07-01 PROCEDURE — RXMED WILLOW AMBULATORY MEDICATION CHARGE

## 2025-07-01 PROCEDURE — 99215 OFFICE O/P EST HI 40 MIN: CPT | Performed by: STUDENT IN AN ORGANIZED HEALTH CARE EDUCATION/TRAINING PROGRAM

## 2025-07-01 PROCEDURE — 1159F MED LIST DOCD IN RCRD: CPT | Performed by: STUDENT IN AN ORGANIZED HEALTH CARE EDUCATION/TRAINING PROGRAM

## 2025-07-01 PROCEDURE — 99212 OFFICE O/P EST SF 10 MIN: CPT

## 2025-07-01 PROCEDURE — 1126F AMNT PAIN NOTED NONE PRSNT: CPT | Performed by: STUDENT IN AN ORGANIZED HEALTH CARE EDUCATION/TRAINING PROGRAM

## 2025-07-01 PROCEDURE — 1036F TOBACCO NON-USER: CPT | Performed by: STUDENT IN AN ORGANIZED HEALTH CARE EDUCATION/TRAINING PROGRAM

## 2025-07-01 RX ORDER — ATORVASTATIN CALCIUM 80 MG/1
80 TABLET, FILM COATED ORAL DAILY
Qty: 90 TABLET | Refills: 3 | Status: SHIPPED | OUTPATIENT
Start: 2025-07-01

## 2025-07-01 RX ORDER — FUROSEMIDE 20 MG/1
20 TABLET ORAL DAILY
Qty: 90 TABLET | Refills: 3 | Status: SHIPPED | OUTPATIENT
Start: 2025-07-01

## 2025-07-01 RX ORDER — SPIRONOLACTONE 25 MG/1
25 TABLET ORAL DAILY
Qty: 90 TABLET | Refills: 3 | Status: SHIPPED | OUTPATIENT
Start: 2025-07-01 | End: 2026-07-01

## 2025-07-01 RX ORDER — CARVEDILOL 3.12 MG/1
3.12 TABLET ORAL 2 TIMES DAILY
Qty: 180 TABLET | Refills: 3 | Status: SHIPPED | OUTPATIENT
Start: 2025-07-01

## 2025-07-01 ASSESSMENT — PAIN SCALES - GENERAL: PAINLEVEL_OUTOF10: 0-NO PAIN

## 2025-07-01 NOTE — PROGRESS NOTES
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  She has a past medical history significant for seizure disorder that started at age 2 years well maintained on antiepileptic therapy, history of lymphoma last dose of rituximab approximately 2019 (diagnosed on bone marrow biopsy) and this in remission (has follow-up with oncology), history of right shoulder melanoma status post resection, iron deficiency,  She has been diagnosed with HFrEF detected on echocardiogram 6/2024 with LVEF 20-25%, regional wall motion abnormalities, and on RHC/LHC 7/2024 RA mean 4     PA 43/22/30   PCWP 18     [place] 4.2 / CI 2.2.  She has mild nonobstructive CAD.  Mrs. Vail is on maximally tolerated doses of heart failure pharmacotherapy.  She has had an improvement in LV systolic function on TTE 1/2025 with improvement in LVEF from 22% (6/2024) to 40-45% LVIDD 4.75 cm.  On CPET 12/2024, she exercised for 7: 15 minutes with RER 1.08, MVO2 max 7.4 (61%), and VE/VCO2 35  She is due to have hip surgery 7/16/2025, with  Dr Cj Burton at [facility]    Today, she does not report cardiovascular symptoms and she is cardiovascular symptom-free.  She does relate that she spends much of her time in bed due to discomfort with walking (left hip pain).  She is very clear \"is never my heart that makes me not want to walk\".    She also raised that her  has become neglectful and she does have some concern that if anything happened to her in her home she would not be able to get help.    She is strictly adherent with all heart failure medications    Surgical Hx:  - Batiatric surgery ( [facility])~ 2000  - right shoulder melanoma removal and right axillary lymph node removal    Past Obstetric Hx:  - G3  - No cardiac complications of pregnancy    Social Hx:  - Smoking- never   - ETOH- rare use  - Illicit drugs- " "never   - Lives with  and 8 pets.  She feels safe at home    Family Hx:  Specifically, there is no family history of  CAD, heart failure, ICD, PPM, LVAD, OHT, arrhythmias, CVA, or sudden cardiac death.    Daughter- fatal PE at age 33 years   Maternal gmother - \" enlarged heart\"    Medication reconciliation completed, see below.    Medication Documentation Review Audit       Reviewed by Juliana Loaiza RN (Registered Nurse) on 25 at 0943      Medication Order Taking? Sig Documenting Provider Last Dose Status   atorvastatin (Lipitor) 80 mg tablet [id number] Yes Take 1 tablet (80 mg) by mouth once daily. Antonieta Hewitt MD PhD  Active   busPIRone (Buspar) 5 mg tablet [id number] Yes Take 1 tablet (5 mg) by mouth 2 times a day. Joanna Garza MD PhD  Active   carvedilol (Coreg) 3.125 mg tablet [id number] Yes Take 1 tablet (3.125 mg) by mouth 2 times a day. Michel Ratliff MD Taking Active   chlorhexidine (Peridex) 0.12 % solution [id number] Yes Swish and Spit 15 ml the night before and the morning of surgery. Laury Montague, APRN-CNS  Active   cholecalciferol (Vitamin D-3) 125 mcg (5000 UT) capsule [id number] Yes Take 1 capsule (125 mcg) by mouth once daily. Michel Ratliff MD Taking Active   empagliflozin (Jardiance) 10 mg tablet [id number] Yes Take 1 tablet (10 mg) by mouth once daily. Michel Ratliff MD  Active   fluconazole (Diflucan) 150 mg tablet [id number]  Take 1 tablet (150 mg) by mouth 1 time for 1 dose. Amanda Abdi PA-C   25 [id number]   furosemide (Lasix) 20 mg tablet [id number] Yes Take 1 tablet (20 mg) by mouth once daily. Michel Ratliff MD Taking Active   gabapentin (Neurontin) 300 mg capsule [id number] Yes Take 1 capsule (300 mg) by mouth 4 times a day. Joanna Garza MD PhD  Active   nitrofurantoin, macrocrystal-monohydrate, (Macrobid) 100 mg capsule [id number] Yes Take 1 capsule (100 mg) by mouth 2 times a day for 7 days. Kimberley Wagner PA-C  Active   sacubitriL-valsartan (Entresto) " " mg tablet [id number] Yes Take 1 tablet by mouth 2 times a day. Michel Ratliff MD  Active   spironolactone (Aldactone) 25 mg tablet [id number] Yes Take 1 tablet (25 mg) by mouth once daily. Michel Ratliff MD Taking Active   traZODone (Desyrel) 50 mg tablet [id number] Yes Take 1 tablet (50 mg) by mouth as needed at bedtime for sleep. Joanna Garza MD PhD  Active   valproic acid (Depakene) 250 mg capsule [id number] Yes Take 3 capsules (750 mg) by mouth once daily at bedtime. Michel Ratliff MD Taking Active   venlafaxine (Effexor) 75 mg tablet [id number] Yes Take 1 tablet (75 mg) by mouth once daily at bedtime. Joanna Garza MD PhD  Active                           Allergies   Allergen Reactions    Ace Inhibitors Unknown     Hypotension, Hypotension    Adhesive Tape-Silicones Unknown     Adhesive Tape TAPE and bandages    Beta-Blockers (Beta-Adrenergic Blocking Agts) Unknown     Hypotension, Hypotension    Fluoxetine Unknown     cns, cns    Nsaids (Non-Steroidal Anti-Inflammatory Drug) Unknown    Olanzapine Unknown    Other Omega-3s Unknown     all ssri    Penicillins Hives    Poison Ivy Extract Hives     Per patient has to be hospitalized        ROS   Negative except for as detailed in HPI    Investigations:    The electronic medical record has been reviewed by me for salient history. All cardiovascular imaging and testing available in the electronic medical record, and [employer] has been reviewed.     Visit Vitals  /70 (BP Location: Right arm, Patient Position: Sitting, BP Cuff Size: Adult)   Pulse 95   Resp 16   Ht (!) 1.549 m (5' 1\")   Wt 84.8 kg (187 lb)   LMP  (LMP Unknown)   SpO2 97%   BMI 35.33 kg/m²   OB Status Postmenopausal   Smoking Status Never   BSA 1.91 m²      On examination:    Very pleasant obese elderly  woman in no apparent CP or painful distress.    Well groomed   Neck: No JVD or HJR  CVS: HS 1,2.   No added sounds  Resp: CTA bilaterally. Percussion note resonant  Abdomen: Obese, " SNT, BS wnl  Extremities: No pedal oedema  Skin: warm and dry  CNS: AO x 4, mildly hearing impaired    Investigations:      Transthoracic echo (TTE) complete  Result Date: 6/11/2025   [facility], [address] [place] [address]              Tel [phone] and Fax [phone] TRANSTHORACIC ECHOCARDIOGRAM REPORT  Patient Name:       NATALIE MASTERS          Reading Physician:    [id number] Caleb Gaitan MD Study Date:         6/11/2025           Ordering Provider:    [id number] AUGIE HALL MRN/PID:            [id number]            Fellow: Accession#:         [id number]        Nurse:                Solange Cartagena RN Date of Birth/Age:  1945 / 79      Sonographer:          Jessica ortiz                                     RDCS, RVT Gender assigned at  F                   Additional Staff: Birth: Height:             157.48 cm           Admit Date: Weight:             81.19 kg            Admission Status:     Outpatient BSA / BMI:          1.82 m2 / 32.74     Encounter#:           [id number]                     kg/m2 Blood Pressure:     125/67 mmHg         Department Location:  [facility] Non                                                               Invasive Study Type:    TRANSTHORACIC ECHO (TTE) COMPLETE Diagnosis/ICD: Chronic systolic (congestive) heart failure (CHF)-I50.22 Indication:    Chronic systolic heart failure. CPT Code:      Echo Complete w Full Doppler-[id number] Patient History: Pertinent History: CAD. Lymphoma. Systolic heart failure. CKD. Study Detail: The following Echo studies were performed: 2D, M-Mode, Doppler and               color flow. Technically challenging study due to poor acoustic               windows. Definity used as a contrast agent for endocardial border               definition. Total contrast used for this procedure was  4 mL via IV               push.  PHYSICIAN INTERPRETATION: Left Ventricle: Left ventricular ejection fraction is mildly decreased by visual estimate at 45-50%. There is mild concentric left ventricular hypertrophy. There is global hypokinesis of the left ventricle with minor regional variations. The left ventricular cavity size is normal. There is mildly increased septal and mildly increased posterior left ventricular wall thickness. Spectral Doppler shows a Grade II (pseudonormal pattern) of left ventricular diastolic filling with an elevated left atrial pressure. Left Atrium: The left atrial size is moderately dilated. Right Ventricle: The right ventricle is normal in size. There is normal right ventricular global systolic function. Right Atrium: The right atrium is normal in size. Aortic Valve: The aortic valve is trileaflet. There is no evidence of aortic valve regurgitation. Mitral Valve: The mitral valve is mildly thickened. There is mild mitral annular calcification. There is trace to mild mitral valve regurgitation. The E Vmax is 0.67 m/s. Tricuspid Valve: The tricuspid valve is structurally normal. There is mild tricuspid regurgitation. The Doppler estimated right ventricular systolic pressure (RVSP) is within normal limits at 22 mmHg. Pulmonic Valve: The pulmonic valve is structurally normal. There is no indication of pulmonic valve regurgitation. Pericardium: There is no pericardial effusion noted. Aorta: The aortic root is normal. Systemic Veins: The inferior vena cava appears normal in size, with IVC inspiratory collapse greater than 50%. In comparison to the previous echocardiogram(s): Compared with study dated 1/10/2025, there is slight increase in calculated LVEF.  CONCLUSIONS:  1. Left ventricular ejection fraction is mildly decreased by visual estimate at 45-50%.  2. Poorly visualized anatomical structures due to suboptimal image quality.  3. There is global hypokinesis of the left ventricle with  minor regional variations.  4. Spectral Doppler shows a Grade II (pseudonormal pattern) of left ventricular diastolic filling with an elevated left atrial pressure.  5. There is normal right ventricular global systolic function.  6. The left atrial size is moderately dilated.  7. The Doppler estimated RVSP is within normal limits at 22 mmHg.  8. Compared with study dated 1/10/2025, there is slight increase in calculated LVEF. QUANTITATIVE DATA SUMMARY:  2D MEASUREMENTS:         Normal Ranges: Ao Root d:       3.20 cm (2.0-3.7cm) LAs:             3.69 cm (2.7-4.0cm) IVSd:            1.18 cm (0.6-1.1cm) LVPWd:           1.00 cm (0.6-1.1cm) LVIDd:           4.55 cm (3.9-5.9cm) LVIDs:           3.41 cm LV Mass Index:   97 g/m2 LV % FS          25.0 %  LEFT ATRIUM:                  Normal Ranges: LA Vol A4C:        62.8 ml    (22+/-6mL/m2) LA Vol A2C:        83.7 ml LA Vol BP:         73.1 ml LA Vol Index A4C:  34.4ml/m2 LA Vol Index A2C:  45.9 ml/m2 LA Vol Index BP:   40.1 ml/m2 LA Area A4C:       20.7 cm2 LA Area A2C:       24.1 cm2 LA Major Axis A4C: 5.8 cm LA Major Axis A2C: 5.9 cm LA Vol A4C:        58.9 ml LA Vol A2C:        79.5 ml LA Vol Index BSA:  38.0 ml/m2  AORTA MEASUREMENTS:         Normal Ranges: Asc Ao, d:          3.30 cm (2.1-3.4cm)  LV SYSTOLIC FUNCTION:                      Normal Ranges: EF-A4C View:    45 % (>=55%) EF-Visual:      48 % LV EF Reported: 48 %  LV DIASTOLIC FUNCTION:             Normal Ranges: MV Peak E:             0.67 m/s    (0.7-1.2 m/s) MV Peak A:             1.17 m/s    (0.42-0.7 m/s) E/A Ratio:             0.57        (1.0-2.2) MV e'                  0.050 m/s   (>8.0) MV lateral e'          0.05 m/s MV medial e'           0.05 m/s MV A Dur:              137.01 msec E/e' Ratio:            13.35       (<8.0) PulmV Sys Ángel:         54.51 cm/s PulmV Almeida Ángel:        29.43 cm/s PulmV S/D Ángel:         1.85 PulmV A Revs Ángel:      20.17 cm/s PulmV A Revs Dur:      79.92 msec  MITRAL  VALVE:          Normal Ranges: MV DT:        273 msec (150-240msec)  AORTIC VALVE:           Normal Ranges: AoV Vmax:      1.25 m/s (<=1.7m/s) AoV Peak P.3 mmHg (<20mmHg) LVOT Max Ángel:  0.91 m/s (<=1.1m/s) LVOT VTI:      17.85 cm LVOT Diameter: 1.97 cm  (1.8-2.4cm) AoV Area,Vmax: 2.22 cm2 (2.5-4.5cm2)  RIGHT VENTRICLE: TAPSE: 22.0 mm RV s'  0.10 m/s  TRICUSPID VALVE/RVSP:          Normal Ranges: Peak TR Velocity:     2.19 m/s Est. RA Pressure:     3 RV Syst Pressure:     22       (< 30mmHg) IVC Diam:             1.43 cm  PULMONIC VALVE:          Normal Ranges: PV Accel Time:  114 msec (>120ms) PV Max Ángel:     0.8 m/s  (0.6-0.9m/s) PV Max P.8 mmHg  PULMONARY VEINS: PulmV A Revs Dur: 79.92 msec PulmV A Revs Ángel: 20.17 cm/s PulmV Almeida Ángel:   29.43 cm/s PulmV S/D Ángel:    1.85 PulmV Sys Ángel:    54.51 cm/s  AORTA: Asc Ao Diam 3.34 cm  [id number] Caleb Gaitan MD Electronically signed on 2025 at 5:28:21 PM  ** Final **     Transthoracic echo (TTE) complete  Result Date: 1/10/2025   [facility], [address] [place] [address]              Tel [phone] and Fax [phone] TRANSTHORACIC ECHOCARDIOGRAM REPORT  Patient Name:       NATALIE MASTERS          Janet Physician:    [id number] Caleb Gaitan MD Study Date:         1/10/2025           Ordering Provider:    [id number] HALL MRN/PID:            [id number]            Fellow: Accession#:         [id number]        Nurse:                Solange Cartagena RN Date of Birth/Age:  1945      Sonographer:          Jessica ortiz                                     RDCS, RVT Gender assigned at  F                   Additional Staff: Birth: Height:             157.48 cm           Admit Date: Weight:             96.16 kg            Admission Status:     Outpatient BSA / BMI:          1.96 m2 /  38.77     Encounter#:           [id number]                     kg/m2 Blood Pressure:     108/63 mmHg         Department Location:  [facility] Non                                                               Invasive Study Type:    TRANSTHORACIC ECHO (TTE) COMPLETE Diagnosis/ICD: Chronic systolic (congestive) heart failure (CHF)-I50.22 Indication:    Chronic systolic heart failure. CPT Code:      Echo Complete w Full Doppler-[id number] Patient History: Pertinent History: Lymphoma. Study Detail: The following Echo studies were performed: 2D, M-Mode, Doppler and               color flow. Image quality for this study is less than ideal.               Definity used as a contrast agent for endocardial border               definition. Total contrast used for this procedure was 3 mL via IV               push.  PHYSICIAN INTERPRETATION: Left Ventricle: Left ventricular ejection fraction is moderately decreased, calculated by Sorenson's biplane at 40%. There is global hypokinesis of the left ventricle with minor regional variations. The left ventricular cavity size is normal. There is mildly increased septal and normal posterior left ventricular wall thickness. Spectral Doppler shows a Grade II (pseudonormal pattern) of left ventricular diastolic filling with an elevated left atrial pressure. Left Atrium: The left atrium is moderately dilated. Right Ventricle: The right ventricle is mildly enlarged. There is normal right ventricular global systolic function. Right Atrium: The right atrium is normal in size. Aortic Valve: The aortic valve is trileaflet. There is mild aortic valve cusp calcification. There is trace to mild aortic valve regurgitation. The peak instantaneous gradient of the aortic valve is 10 mmHg. Mitral Valve: The mitral valve is normal in structure. The peak instantaneous gradient of the mitral valve is 5 mmHg. There is no evidence of mitral valve regurgitation. Tricuspid Valve: The tricuspid valve is  structurally normal. There is mild tricuspid regurgitation. Pulmonic Valve: The pulmonic valve is structurally normal. There is no indication of pulmonic valve regurgitation. Pericardium: There is no pericardial effusion noted. Aorta: The aortic root is normal. Pulmonary Artery: The tricuspid regurgitant velocity is 2.40 m/s, and with an estimated right atrial pressure of 3 mmHg, the estimated pulmonary artery pressure is normal with the RVSP at 26.0 mmHg. Systemic Veins: The inferior vena cava appears normal in size, with IVC inspiratory collapse greater than 50%. In comparison to the previous echocardiogram(s): Compared with study dated 7/20/2018, there is a significant reduction in calculated LVEF from 55 to 60% down to 40% with increased right ventricular and left atrial dilation.  CONCLUSIONS:  1. Left ventricular ejection fraction is moderately decreased, calculated by Sorenson's biplane at 40%.  2. There is global hypokinesis of the left ventricle with minor regional variations.  3. Spectral Doppler shows a Grade II (pseudonormal pattern) of left ventricular diastolic filling with an elevated left atrial pressure.  4. There is normal right ventricular global systolic function.  5. Mildly enlarged right ventricle.  6. The left atrium is moderately dilated.  7. Compared with study dated 7/20/2018, there is a significant reduction in calculated LVEF from 55 to 60% down to 40% with increased right ventricular and left atrial dilation. QUANTITATIVE DATA SUMMARY:  2D MEASUREMENTS:          Normal Ranges: Ao Root d:       3.30 cm  (2.0-3.7cm) LAs:             2.85 cm  (2.7-4.0cm) IVSd:            1.08 cm  (0.6-1.1cm) LVPWd:           0.71 cm  (0.6-1.1cm) LVIDd:           4.75 cm  (3.9-5.9cm) LVIDs:           3.81 cm LV Mass Index:   74 g/m2 LVEDV Index:     62 ml/m2 LV % FS          19.8 %  LA VOLUME:                    Normal Ranges: LA Vol A4C:        66.9 ml    (22+/-6mL/m2) LA Vol A2C:        81.6 ml LA Vol BP:          75.9 ml LA Vol Index A4C:  34.2 ml/m2 LA Vol Index A2C:  41.6 ml/m2 LA Vol Index BP:   38.7 ml/m2 LA Area A4C:       21.0 cm2 LA Area A2C:       23.8 cm2 LA Major Axis A4C: 5.6 cm LA Major Axis A2C: 5.9 cm LA Vol A4C:        55.4 ml LA Vol A2C:        85.8 ml LA Vol Index BSA:  36.0 ml/m2  RA VOLUME BY A/L METHOD:          Normal Ranges: RA Area A4C:             14.2 cm2  LV SYSTOLIC FUNCTION BY 2D PLANIMETRY (MOD):                      Normal Ranges: EF-A4C View:    44 % (>=55%) EF-A2C View:    39 % EF-Biplane:     40 % LV EF Reported: 40 %  LV DIASTOLIC FUNCTION:             Normal Ranges: MV Peak E:             0.79 m/s    (0.7-1.2 m/s) MV Peak A:             1.11 m/s    (0.42-0.7 m/s) E/A Ratio:             0.71        (1.0-2.2) MV e'                  0.050 m/s   (>8.0) MV lateral e'          0.06 m/s MV medial e'           0.04 m/s MV A Dur:              117.03 msec E/e' Ratio:            15.85       (<8.0) PulmV Sys Ángel:         48.59 cm/s PulmV Almeida Ángel:        33.32 cm/s PulmV S/D Ángel:         1.46 PulmV A Revs Ángel:      22.21 cm/s PulmV A Revs Dur:      156.99 msec  MITRAL VALVE:          Normal Ranges: MV Vmax:      1.09 m/s (<=1.3m/s) MV peak P.7 mmHg (<5mmHg) MV mean P.0 mmHg (<2mmHg) MV VTI:       26.63 cm (10-13cm) MV DT:        239 msec (150-240msec)  AORTIC VALVE:            Normal Ranges: AoV Vmax:      1.61 m/s  (<=1.7m/s) AoV Peak PG:   10.4 mmHg (<20mmHg) LVOT Max Ángel:  0.85 m/s  (<=1.1m/s) LVOT VTI:      17.29 cm LVOT Diameter: 1.95 cm   (1.8-2.4cm) AoV Area,Vmax: 1.58 cm2  (2.5-4.5cm2)  RIGHT VENTRICLE: RV Basal 4.30 cm RV Mid   3.50 cm RV Major 7.0 cm TAPSE:   26.0 mm RV s'    0.11 m/s  TRICUSPID VALVE/RVSP:          Normal Ranges: Peak TR Velocity:     2.40 m/s RV Syst Pressure:     26 mmHg  (< 30mmHg) IVC Diam:             1.40 cm  PULMONIC VALVE:          Normal Ranges: PV Accel Time:  63 msec  (>120ms) PV Max Ángel:     1.1 m/s  (0.6-0.9m/s) PV Max P.8 mmHg   "Pulmonary Veins: PulmV A Revs Dur: 156.99 msec PulmV A Revs Ángel: 22.21 cm/s PulmV Almeida Ángel:   33.32 cm/s PulmV S/D Ángel:    1.46 PulmV Sys Ángel:    48.59 cm/s  [id number] Caleb Gaitan MD Electronically signed on 1/10/2025 at 4:19:42 PM  ** Final **     IMPRESSION:    79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  She has a past medical history significant for seizure disorder that started at age 2 years well maintained on antiepileptic therapy, history of lymphoma last dose of rituximab approximately 2019 (diagnosed on bone marrow biopsy) and this in remission (has follow-up with oncology), history of right shoulder melanoma status post resection, iron deficiency,  She has been diagnosed with HFrEF detected on echocardiogram 6/2024 with LVEF 20-25%, regional wall motion abnormalities, and on RHC/LHC 7/2024 RA mean 4     PA 43/22/30   PCWP 18     [place] 4.2 / CI 2.2.  She has mild nonobstructive CAD.  Mrs. Vail is on maximally tolerated doses of heart failure pharmacotherapy.  She has had an improvement in LV systolic function on TTE 1/2025 with improvement in LVEF from 22% (6/2024) to 40-45% LVIDD 4.75 cm.  On CPET 12/2024, she exercised for 7: 15 minutes with RER 1.08, MVO2 max 7.4 (61%), and VE/VCO2 35    NYHA Functional Class: 2-3 (limited by right hip pain)  ACC/AHA Stage C heart failure  Volume status: Euvolemic  Perfusion status: Warm to touch  Aetiology: Nonischemic    PLAN:    #HFmrEF (improving)  - Known  left bundle branch block  -Medication optimization as below  -No need for device therapy, improved LVEF  -Will maximize ARNI dose that she is now with sufficient blood pressure  -Heart failure lifestyle modifications discussed and Qs answered.     -Medication optimisation:  BB: Continue carvedilol 3.125 mg twice daily  RAASi: Continue sacubitril/valsartan 97/103 mg twice daily  AA: Continue spironolactone 25 mg once daily, with close " renal function monitoring  SGLT2i: Continue empagliflozin 10 mg once daily  Hydralazine-held to allow for ARNI up titration  Isosorbide dinitrate-held to allow for early up titration    COUNSELING:   We discussed the following non-pharmacological measures during this visit:  ·Smoking and alcohol abstinence/cessation, if applicable.  ·Dietary and medication compliance (in particular, salt restriction)  ·Monitoring daily weights and blood pressures  ·Exercise regimen (walking)    Heart Failure Education Booklet given: 9/5/2024    #Iron deficiency  Referred for IV ferric carboxymaltose previously    #?  Social neglect  We have been in touch with our heart failure  and he will reach out to her today.  She is very amenable to this.    This note was transcribed using the Dragon Dictation system. There may be grammatical, punctuation, or verbiage errors that can occur with voice recognition programs.    Antonieta Hewitt MD PhD

## 2025-07-02 ENCOUNTER — HOSPITAL ENCOUNTER (OUTPATIENT)
Dept: RADIOLOGY | Facility: HOSPITAL | Age: 80
Discharge: HOME | End: 2025-07-02
Payer: MEDICARE

## 2025-07-02 ENCOUNTER — HOSPITAL ENCOUNTER (OUTPATIENT)
Dept: CARDIOLOGY | Facility: HOSPITAL | Age: 80
Discharge: HOME | End: 2025-07-02
Payer: MEDICARE

## 2025-07-02 ENCOUNTER — HOSPITAL ENCOUNTER (OUTPATIENT)
Dept: RADIOLOGY | Facility: CLINIC | Age: 80
End: 2025-07-02
Payer: MEDICARE

## 2025-07-02 DIAGNOSIS — I50.9 CHRONIC CONGESTIVE HEART FAILURE, UNSPECIFIED HEART FAILURE TYPE: Primary | ICD-10-CM

## 2025-07-02 DIAGNOSIS — I50.21 ACUTE SYSTOLIC HEART FAILURE: ICD-10-CM

## 2025-07-02 PROCEDURE — 93016 CV STRESS TEST SUPVJ ONLY: CPT | Performed by: INTERNAL MEDICINE

## 2025-07-02 PROCEDURE — A9502 TC99M TETROFOSMIN: HCPCS | Performed by: STUDENT IN AN ORGANIZED HEALTH CARE EDUCATION/TRAINING PROGRAM

## 2025-07-02 PROCEDURE — 2500000004 HC RX 250 GENERAL PHARMACY W/ HCPCS (ALT 636 FOR OP/ED): Performed by: INTERNAL MEDICINE

## 2025-07-02 PROCEDURE — 93017 CV STRESS TEST TRACING ONLY: CPT

## 2025-07-02 PROCEDURE — 93018 CV STRESS TEST I&R ONLY: CPT | Performed by: INTERNAL MEDICINE

## 2025-07-02 PROCEDURE — 3430000001 HC RX 343 DIAGNOSTIC RADIOPHARMACEUTICALS: Performed by: STUDENT IN AN ORGANIZED HEALTH CARE EDUCATION/TRAINING PROGRAM

## 2025-07-02 PROCEDURE — 78452 HT MUSCLE IMAGE SPECT MULT: CPT

## 2025-07-02 RX ORDER — REGADENOSON 0.08 MG/ML
0.4 INJECTION, SOLUTION INTRAVENOUS ONCE
Status: COMPLETED | OUTPATIENT
Start: 2025-07-02 | End: 2025-07-02

## 2025-07-02 RX ADMIN — REGADENOSON 0.4 MG: 0.08 INJECTION, SOLUTION INTRAVENOUS at 12:13

## 2025-07-02 RX ADMIN — TETROFOSMIN 9.1 MILLICURIE: 0.23 INJECTION, POWDER, LYOPHILIZED, FOR SOLUTION INTRAVENOUS at 09:40

## 2025-07-02 RX ADMIN — TETROFOSMIN 27.2 MILLICURIE: 0.23 INJECTION, POWDER, LYOPHILIZED, FOR SOLUTION INTRAVENOUS at 11:55

## 2025-07-03 ENCOUNTER — TELEPHONE (OUTPATIENT)
Dept: CARDIOLOGY | Facility: HOSPITAL | Age: 80
End: 2025-07-03
Payer: MEDICARE

## 2025-07-03 ENCOUNTER — DOCUMENTATION (OUTPATIENT)
Dept: CARDIOLOGY | Facility: HOSPITAL | Age: 80
End: 2025-07-03
Payer: MEDICARE

## 2025-07-03 PROCEDURE — RXMED WILLOW AMBULATORY MEDICATION CHARGE

## 2025-07-03 NOTE — TELEPHONE ENCOUNTER
"Spoke with Sue as planned at previous call.  She explained that she is having a hip replacement and trying to proactively organize her needs/care as she doesn't know that she can rely on her  to act as a caregiver.  She has picked up various pieces of DME and is attending a class for hip replacement on 7/7/25, feels that she will have a better idea of needs after that.  She is also a member of hip replacement groups on social networks.  She believes to expect about 3-7 days of downtime.  She states that her  will  needed items like medications, groceries, etc., but she also plans to grocery shop/meal prep prior to the surgery, including frozen items and protein supplements.  She states that she feels safe at home, that her  would not \"hit or hurt her\", but concern she could have an emergency situation and him not know.  Discussed having her cell phone nearby, utilizing a \"buzzer\" that she has in her room (first floor) that rings to her 's room (second floor) and his workshop out back.  Her  will also care for their six dogs and cats.  While talking, she mentioned that her 's daughter offered to come for a couple of days.  Encouraged her to strongly consider her offer.  She also remembered that she has access to homemaking services from [employer] through her insurance.  Encouraged her to also look into that.  Reviewed our conversation and she said she was taking notes.  Welcomed offer to follow-up after the hip replacement class to regroup.   "

## 2025-07-07 ENCOUNTER — APPOINTMENT (OUTPATIENT)
Facility: CLINIC | Age: 80
End: 2025-07-07
Payer: MEDICARE

## 2025-07-07 ENCOUNTER — TELEPHONE (OUTPATIENT)
Dept: PREOP | Facility: HOSPITAL | Age: 80
End: 2025-07-07
Payer: MEDICARE

## 2025-07-07 PROCEDURE — RXMED WILLOW AMBULATORY MEDICATION CHARGE

## 2025-07-07 PROCEDURE — AA8060A: Performed by: ORTHOPAEDIC SURGERY

## 2025-07-07 NOTE — TELEPHONE ENCOUNTER
Thank you for attending our Joint Replacement class today in preparation for your upcoming surgery.  Topics discussed include:     MyChart Enrollment  Communication with Care Team  My Chart is the best form of communication to reach all of your caregivers  You can send messages to specific care givers, or a care team  Continued Education  You will be enrolled in a Total Joint Replacement care plan to receive additional education before and after surgery  You can review a short recording of the class content  Access to Medical Records  You can access test results, office notes, appointments, etc.  You can connect to other healthcare systems who use [employer] ([facility], [facility], [facility], etc.)  Workspace  Program Information  Consent to Enroll     Background/Understanding of Joint Replacement Surgery  Potential for same day discharge  Any questions or concerns to be directed to the surgeon's office     How to Prepare for Surgery  Use of Nicotine Products/Smoking  Stop several weeks before surgery  Such products slow down the healing process and increase risk of post-op infection and complications  Clearance for Surgery  Medical Clearance by Specialists  Dental Clearance  Cracked/Broken/Loose teeth left untreated may postpone surgery  The importance of post-op antibiotics for dental visits per surgeon protocol  Preadmission Testing  **Potential for postponed surgery if appropriate clearance is not obtained  Medication Instruction  Follow instructions provided by the doctor who prescribes your medication (typically, but not limited to cardiologist)  Preadmission testing will provide additional instructions during your appointment on what to stop and what to take as you get closer to surgery  For clarification of these instructions, please call preadmission testing directly - [phone]  Tips for Preparing the home for discharge from the hospital  Care Partner  Requirement for surgery, the patient must have a plan to have  help at home  Potential for postponed surgery if plan for home support cannot be established  How the care partner can help after surgery  CHG Body Wash/Mouth Wash  Follow the instructions given at preadmission testing  Body wash is to be used on the body and hair for 5 washes  Mouthwash is to be used the night before and morning of surgery  **This is a system-wide protocol developed by infectious disease professionals, we will not alter our recommendations for those with sensitive skin or those who have special hair needs.  Please follow the instructions as they are written as this will provide the best infection prevention measures for surgery.  Should you have an allergy to one of the products, please discuss with your preadmission team**     What to Expect in the Hospital/At Home  Morning of Surgery NPO Guidelines  Nothing to eat after midnight  Surgical and Post-Surgical Care Team  Surgical Team  Anesthesia Team  Nursing  Physical Therapy  Care Coordinating  Pharmacy  Hospital Arrival Instructions  Arrive at the time provided to you  Consider traffic patterns (rush-hour) based on arrival time  Have arrangements made for a ride home  If discharging same day, care partner should remain at the hospital  Recovering after Surgery  Recovery Room - Visitors are not brought back  Transition to hospital room - 2nd Floor, Visitors will be directed to your room  The presence of and strategies for controlling surgical pain and swelling  The importance of early mobility  Side effects after surgery  What to expect if staying overnight     Discharge Planning  The intended plan for discharge will be for patients to discharge home  All patients require a care partner (family, friend, neighbor, etc.) to stay with the patient for the first few nights after surgery  The inability to secure help at home will postpone surgery  Home Care Services set up per surgeon order  Physical Therapy  Occupational Therapy  **If desired, private  duty care can be arranged by the patient ahead of time**  Outpatient Physical Therapy per surgeon order     Recovering at Home  Wound Care  Follow wound care instructions found in your discharge paperwork  Bandage is water-resistant and you may shower with the bandage  Do not scrub directly over the bandage  Do not submerge in water until cleared (bathtub, hot tub, pool, etc.)     Post-Op Risk Prevention  Infection Prevention  Promptly seek treatment for any infections post-operatively  Routine dental visits must be postponed for 3 months after surgery  Your surgeon may require antibiotics prior to future dental visits  Any concerns for infection not related directly to the knee or the hip should be managed by your primary care provider  Blood Clots  Be sure to complete the course of blood thinning medication as prescribed by your surgeon  Movement every 1-2 hours during the day is encouraged to prevent blood clots  Monitor for signs of blood clots  Wear compression stockings as prescribed by your surgeon  Constipation  Constipation is common following surgery  Drink plenty of fluids  Take stool softener/laxative as prescribed by your surgeon  Move around frequently  Eat foods high in fiber  Fall Prevention  Prepare home ahead of time to clear space to move with walker  Remove throw rugs and electrical cords from walkways  Install railings near any stairways with more than 2 steps  Use night lights for increased visibility at night  Continue to use your assistive device until cleared by surgeon or physical therapy  Dislocation Prevention - Not all procedures will have dislocation precautions  Follow dislocation precautions provided by your surgeon  It is OK to resume sexual activity about 6 weeks following surgery  Be sure to follow any dislocation precautions assigned     Durable Medical Equipment  Cold Therapy  Breg Cold Therapy Machines  Ice/Gel Packs  Assistive Devices  Folding Walker with Wheels (in the front  only)  No Rollators  Crutches if approved by Physical Therapy and Surgeon after surgery  Hip Kits  Raised Toilet Seats  Additional Compression Stockings     Joint Preservation  Healthy Activities when Cleared  Walking  Swimming  Bike Riding  Activities to Avoid  Refrain from repetitive motions which have a high impact on the joint  Gradual Progression  Progress activity slowly, listen to your body  Common Findings - NORMAL after surgery  Clicking/Grinding  Numbness near incision     Physical Therapy  Prehabilitation exercises  START TODAY ON BOTH LEGS  Surgery Specific Precautions  Follow surgery specific precautions found in your discharge paperwork     Follow-Up Visit  All patients will see their surgeon for a follow up visit after surgery  The visit may range from 2-6 weeks after surgery and is surgeon specific        Please don't hesitate to reach out if you have any additional questions or concerns.    Madai Bailey [phone]

## 2025-07-08 ENCOUNTER — TELEPHONE (OUTPATIENT)
Dept: CARDIOLOGY | Facility: HOSPITAL | Age: 80
End: 2025-07-08
Payer: MEDICARE

## 2025-07-08 ENCOUNTER — PHARMACY VISIT (OUTPATIENT)
Dept: PHARMACY | Facility: CLINIC | Age: 80
End: 2025-07-08
Payer: COMMERCIAL

## 2025-07-08 DIAGNOSIS — G47.00 INSOMNIA, UNSPECIFIED TYPE: ICD-10-CM

## 2025-07-08 DIAGNOSIS — M25.552 CHRONIC LEFT HIP PAIN: ICD-10-CM

## 2025-07-08 DIAGNOSIS — G89.29 CHRONIC LEFT HIP PAIN: ICD-10-CM

## 2025-07-08 PROCEDURE — RXMED WILLOW AMBULATORY MEDICATION CHARGE

## 2025-07-08 RX ORDER — TRAZODONE HYDROCHLORIDE 50 MG/1
50 TABLET ORAL NIGHTLY PRN
Qty: 30 TABLET | Refills: 3 | Status: SHIPPED | OUTPATIENT
Start: 2025-07-08 | End: 2026-07-08

## 2025-07-08 RX ORDER — GABAPENTIN 300 MG/1
300 CAPSULE ORAL 4 TIMES DAILY
Qty: 120 CAPSULE | Refills: 5 | Status: SHIPPED | OUTPATIENT
Start: 2025-07-08

## 2025-07-08 NOTE — TELEPHONE ENCOUNTER
"Call placed to Ms. Vail as scheduled.  States that she had her training class yesterday and found it to be informative and helpful.  She states that she has been feeling \"terrified\" by the procedure and she's ready to get it over with.  Encouraged her to seek support through the hip replacement social networking groups she's a member of.  States that it seems everyone is satisfied and glad that they did completed the procedure and she'll remind herself of that.  Feels that she has all of the needed DME and as prepared as she can be.  She states that \"her  as been a saint\" and supportive.  Encouraged her to reach out with questions or any needs.    "

## 2025-07-09 ENCOUNTER — PHARMACY VISIT (OUTPATIENT)
Dept: PHARMACY | Facility: CLINIC | Age: 80
End: 2025-07-09
Payer: COMMERCIAL

## 2025-07-10 ENCOUNTER — APPOINTMENT (OUTPATIENT)
Dept: RADIOLOGY | Facility: CLINIC | Age: 80
End: 2025-07-10
Payer: MEDICARE

## 2025-07-11 ENCOUNTER — TELEPHONE (OUTPATIENT)
Dept: CARDIOLOGY | Facility: HOSPITAL | Age: 80
End: 2025-07-11

## 2025-07-11 ENCOUNTER — ANESTHESIA EVENT (OUTPATIENT)
Dept: OPERATING ROOM | Facility: HOSPITAL | Age: 80
End: 2025-07-11

## 2025-07-11 ENCOUNTER — APPOINTMENT (OUTPATIENT)
Dept: PRIMARY CARE | Facility: CLINIC | Age: 80
End: 2025-07-11
Payer: MEDICARE

## 2025-07-11 RX ORDER — MORPHINE SULFATE 2 MG/ML
2 INJECTION, SOLUTION INTRAMUSCULAR; INTRAVENOUS EVERY 5 MIN PRN
Status: CANCELLED | OUTPATIENT
Start: 2025-07-11

## 2025-07-11 RX ORDER — ONDANSETRON HYDROCHLORIDE 2 MG/ML
4 INJECTION, SOLUTION INTRAVENOUS ONCE AS NEEDED
Status: CANCELLED | OUTPATIENT
Start: 2025-07-11

## 2025-07-11 RX ORDER — MORPHINE SULFATE 2 MG/ML
1 INJECTION, SOLUTION INTRAMUSCULAR; INTRAVENOUS EVERY 5 MIN PRN
Status: CANCELLED | OUTPATIENT
Start: 2025-07-11

## 2025-07-11 NOTE — TELEPHONE ENCOUNTER
Call placed to Sue to check-in before her procedure next week.  She said that she remains anxious but is looking forward to having the procedure completed.  She confirmed that she has the DME she anticipates needing and has meals planned.  Said that her  has been very helpful and that she's shared how much she appreciates him. Said that she welcomes a follow-up call after the procedure. Encouraged to reach out if she has questions or needs anything before then.

## 2025-07-14 ENCOUNTER — APPOINTMENT (OUTPATIENT)
Dept: RADIOLOGY | Facility: CLINIC | Age: 80
End: 2025-07-14
Payer: MEDICARE

## 2025-07-15 RX ORDER — SENNOSIDES 8.6 MG/1
2 TABLET ORAL 2 TIMES DAILY
Status: CANCELLED | OUTPATIENT
Start: 2025-07-15

## 2025-07-15 RX ORDER — TRAMADOL HYDROCHLORIDE 50 MG/1
50 TABLET, FILM COATED ORAL EVERY 6 HOURS PRN
Qty: 28 TABLET | Refills: 0 | Status: ON HOLD | OUTPATIENT
Start: 2025-07-15 | End: 2025-08-06

## 2025-07-15 RX ORDER — OXYCODONE HYDROCHLORIDE 5 MG/1
5 TABLET ORAL EVERY 6 HOURS PRN
Refills: 0 | Status: CANCELLED | OUTPATIENT
Start: 2025-07-15

## 2025-07-15 RX ORDER — PANTOPRAZOLE SODIUM 40 MG/1
40 TABLET, DELAYED RELEASE ORAL
Status: CANCELLED | OUTPATIENT
Start: 2025-07-16 | End: 2025-08-06

## 2025-07-15 RX ORDER — CYCLOBENZAPRINE HCL 10 MG
5 TABLET ORAL 3 TIMES DAILY PRN
Status: CANCELLED | OUTPATIENT
Start: 2025-07-15

## 2025-07-15 RX ORDER — OXYCODONE HYDROCHLORIDE 5 MG/1
5 TABLET ORAL EVERY 6 HOURS PRN
Qty: 28 TABLET | Refills: 0 | Status: ON HOLD | OUTPATIENT
Start: 2025-07-15 | End: 2025-08-06

## 2025-07-15 RX ORDER — TRANEXAMIC ACID 1 G/10ML
1000 INJECTION, SOLUTION INTRAVENOUS 2 TIMES DAILY
Status: CANCELLED | OUTPATIENT
Start: 2025-07-15 | End: 2025-07-16

## 2025-07-15 RX ORDER — ASPIRIN 81 MG/1
81 TABLET ORAL 2 TIMES DAILY
Status: CANCELLED | OUTPATIENT
Start: 2025-07-15

## 2025-07-15 RX ORDER — METOCLOPRAMIDE 10 MG/1
10 TABLET ORAL EVERY 6 HOURS PRN
Status: CANCELLED | OUTPATIENT
Start: 2025-07-15

## 2025-07-15 RX ORDER — ONDANSETRON 4 MG/1
4 TABLET, ORALLY DISINTEGRATING ORAL EVERY 8 HOURS PRN
Status: CANCELLED | OUTPATIENT
Start: 2025-07-15

## 2025-07-15 RX ORDER — BISACODYL 5 MG
10 TABLET, DELAYED RELEASE (ENTERIC COATED) ORAL DAILY PRN
Status: CANCELLED | OUTPATIENT
Start: 2025-07-15

## 2025-07-15 RX ORDER — SODIUM CHLORIDE, SODIUM LACTATE, POTASSIUM CHLORIDE, CALCIUM CHLORIDE 600; 310; 30; 20 MG/100ML; MG/100ML; MG/100ML; MG/100ML
100 INJECTION, SOLUTION INTRAVENOUS CONTINUOUS
Status: CANCELLED | OUTPATIENT
Start: 2025-07-15 | End: 2025-07-16

## 2025-07-15 RX ORDER — DOXYCYCLINE 100 MG/1
100 CAPSULE ORAL 2 TIMES DAILY
Qty: 14 CAPSULE | Refills: 0 | Status: ON HOLD | OUTPATIENT
Start: 2025-07-15 | End: 2025-07-22

## 2025-07-15 RX ORDER — SENNOSIDES 8.6 MG/1
1 TABLET ORAL 2 TIMES DAILY
Qty: 60 TABLET | Refills: 0 | Status: ON HOLD | OUTPATIENT
Start: 2025-07-15 | End: 2025-08-29

## 2025-07-15 RX ORDER — CEFAZOLIN SODIUM 2 G/50ML
2 SOLUTION INTRAVENOUS EVERY 8 HOURS
Status: CANCELLED | OUTPATIENT
Start: 2025-07-15 | End: 2025-07-16

## 2025-07-15 RX ORDER — ONDANSETRON HYDROCHLORIDE 2 MG/ML
4 INJECTION, SOLUTION INTRAVENOUS EVERY 8 HOURS PRN
Status: CANCELLED | OUTPATIENT
Start: 2025-07-15

## 2025-07-15 RX ORDER — PANTOPRAZOLE SODIUM 40 MG/1
40 TABLET, DELAYED RELEASE ORAL DAILY PRN
Qty: 30 TABLET | Refills: 0 | Status: ON HOLD | OUTPATIENT
Start: 2025-07-15 | End: 2025-08-29

## 2025-07-15 RX ORDER — NALOXONE HYDROCHLORIDE 0.4 MG/ML
0.2 INJECTION, SOLUTION INTRAMUSCULAR; INTRAVENOUS; SUBCUTANEOUS EVERY 5 MIN PRN
Status: CANCELLED | OUTPATIENT
Start: 2025-07-15

## 2025-07-15 RX ORDER — OXYCODONE HYDROCHLORIDE 5 MG/1
10 TABLET ORAL EVERY 4 HOURS PRN
Refills: 0 | Status: CANCELLED | OUTPATIENT
Start: 2025-07-15

## 2025-07-15 RX ORDER — CYCLOBENZAPRINE HCL 5 MG
5 TABLET ORAL 3 TIMES DAILY PRN
Qty: 30 TABLET | Refills: 0 | Status: ON HOLD | OUTPATIENT
Start: 2025-07-15 | End: 2025-08-09

## 2025-07-15 RX ORDER — METOCLOPRAMIDE HYDROCHLORIDE 5 MG/ML
10 INJECTION INTRAMUSCULAR; INTRAVENOUS EVERY 6 HOURS PRN
Status: CANCELLED | OUTPATIENT
Start: 2025-07-15

## 2025-07-15 RX ORDER — ACETAMINOPHEN 325 MG/1
1000 TABLET ORAL EVERY 8 HOURS PRN
Qty: 90 TABLET | Refills: 1 | Status: ON HOLD | OUTPATIENT
Start: 2025-07-15

## 2025-07-15 RX ORDER — NAPROXEN SODIUM 220 MG/1
81 TABLET, FILM COATED ORAL 2 TIMES DAILY
Qty: 60 TABLET | Refills: 0 | Status: ON HOLD | OUTPATIENT
Start: 2025-07-15 | End: 2025-08-29

## 2025-07-15 RX ORDER — ACETAMINOPHEN 325 MG/1
650 TABLET ORAL EVERY 6 HOURS SCHEDULED
Status: CANCELLED | OUTPATIENT
Start: 2025-07-15

## 2025-07-15 NOTE — DISCHARGE INSTRUCTIONS
Cj Burton DO  Phone: [phone] Citlalli Holly, Medical Assistant    PLEASE READ CAREFULLY BEFORE CONTACTING YOUR PROVIDER.    WE WORK COLLABORATIVELY AS A TEAM. CALLING MULTIPLE STAFF MEMBERS REGARDING THE SAME ISSUE WILL DELAY YOUR CARE.  MYCHART IS THE PREFERRED COMMUNICATION FOR ALL TEAM MEMBERS.  ________________________________________________________________________________________________________________________________________________________________________    After Surgery Instructions: TOTAL HIP ARTHROPLASTY    The following is a general guide and may be applied to most patients that go home from the hospital after surgery. If you go to a rehab facility the timeline may deviate a little. Please do not hesitate to call our office for any questions or additional guidance. We will work with you to get the best experience from your new joint replacement.     WEEK 1  Relax…Don't Overdo it!  - Get up once an hour for light activity while you are awake. (get a drink, go to the bathroom, etc.)  - Keep the surgical site iced and elevated above your heart, if possible, while you are resting.  - You will have some light exercises given to you from the physical therapist in the hospital. They will teach you posterior hip precautions that you should be mindful of for the first six weeks after surgery.    SWELLING AND BRUISING    BRUISING AND SWELLING AFTER SURGERY IS NORMAL. As the patient becomes more mobile the bruising and swelling will begin to migrate towards the ankle and foot and is usually noticed toward the end of the day.    WEEK 2-3  You will have a prescription for physical therapy given to you or electronically prescribed and you should start outpatient therapy one week after your operation. Be sure to do the home exercises on the days you are not attending physical therapy.    - Continue to progress walking as tolerated.   - Continue to use ice for soreness on the surgical site  - You may wean from  your walker to a cane when you feel safe and comfortable to do so. Your physical therapist will also be helpful with progressing your assistive device.   - Be careful with bending and twisting - If it hurts, stop!!    FOLLOW UP  - Your first post-operative visit with Dr. Cj Burton should have been scheduled already. Please call [phone] for appointment questions  - You do not need new xrays for this visit  - Don't be nervous! This visit is to see how you are doing and make sure your incision is healing nicely and have begun working with physical therapy.       MEDICATION REFILLS - Please call main office number: [phone]    You will not receive a call indicating that your prescription has been filled.  Please contact your pharmacy with any questions.    Medication refills will be filled Monday-Friday 7am to 1pm ONLY. Please call the office or send a [employer] message for a refill request.  Any requests received outside of this timeframe will be handled on the next business day.  The office staff and orthopedic nurses cannot refill medications; messages should be left directly through the office or via my chart.  Please do not call multiple times or call other members of the care team for medication needs, this will cause the refill to take longer.    Per State and Institutional policy, pain medications can only be refilled every 7 days for up to six weeks following surgery.    DRIVING & TRAVEL AFTER SURGERY   Patients should anticipate waiting at least 3-6 weeks before traveling long distances after surgery.  At minimum you cannot be using your walker before considering driving and you cannot take any narcotic medications prior to driving. You will need to stop to walk around ever 1 hour during your travel to help with blood clot prevention.  Please call the office or your joint nurse to discuss prior to post-surgical travel.  Patients may not drive until cleared by the joint nurse or the  office.    DENTAL PROCEDURES & CLEANINGS  You must wait a minimum of 3 months for elective dental appointments (if possible, we understand emergencies happen), including routine cleanings or dental work including bridges, crowns, extractions, etc..  For any dental visit - cleaning or dental procedures - patients must take an antibiotic 1 hour before the appointment.  Antibiotics are a lifelong need before dental appointments.  The antibiotic prescribed will be based on each patient's allergies.    WOUND CARE  You will have an ace wrap on your leg put on during surgery. This compression wrap is for comfort and may be removed 24 hours after surgery. You may continue to use compression throughout your recovery if this is comfortable for you.  You have a waterproof bandage on your wound and may shower with this on. The waterproof bandage is to remain in place for 7 days. You may remove it yourself. You may leave your incision open to air after the bandage has been removed.  Under your waterproof bandage you have a mesh and glue dressin in place. Do not peel this off. This will be on for 3-4 weeks. You may trim the edges as they peel off on their own. You can continue to shower with this on. Let the water run freely over your incision when showering and do not scrub.   You may shower upon discharging from the hospital.  Soap and water is permitted to run over the surgical dressing.  Do not scrub directly over these items.  DO NOT soak your incision in a bath, hot tub, pool or pond/lake for a minimum of 3 weeks following your surgery.  DO NOT use lotions, creams, ointments on your wound for a minimum of 3 weeks following your surgery. At that time you may use vitamin E to assist with softening of your incision.    RESTARTING HOME ROUTINE - DIET & MEDICATIONS  Post-operative constipation can result due to a combination of inactivity, anesthesia and pain medication. To help prevent this, you should increase your water and  fiber intake. Physical activity such as walking will also help stimulate the bowels.   You may resume your normal diet when you discharge home.  Choose foods that help promote good bowel habits and prevent constipation, such as foods high in fiber.  You may restart your home medications the following day after your surgery UNLESS you have been given alternate instructions.  Follow the instructions given to you on your hospital discharge instructions for more information regarding your home medications.    IN-HOME PHYSICAL THERAPY & OUTPATIENT PHYSICAL THERAPY  Continue the exercises you were given in the hospital until you have been seen by in-home therapy.  Make sure to provide a phone number with the ability for the home care staff to leave a message if you do not answer your phone.    Depending on your treatment plan you will begin outpatient or home therapy after surgery. This should be arranged through the office of hospital during discharge  FOR PATIENT DOING HOME THERAPY: Outpatient physical therapy following knee replacement surgery should begin 2-3 weeks after surgery.  You should call to schedule this appointment ASAP if not already scheduled before surgery.  Waiting until you are ready for outpatient physical therapy will cause a delay in your care.  You may choose any outpatient physical therapy location.  Call the office for an order if needed.    EMERGENCIES - WHEN TO CONTACT THE SURGEON'S OFFICE IMMEDIATELY  Fever >101 with chills that has been present for at least 48 hours.   Excessive bleeding from incision that will not slow down. A small amount of drainage is normal and expected.  Once pressure is applied and the area is covered, do not continue to check the area regularly.  This will remove pressure and bleeding will continue.  Leave in place for 4-6 hours.  Signs of infection of incision-excessive drainage that is soaking through your dressing (especially if it is pus-like), redness that is  spreading out from the edges of your incision, or increased warmth around the area.  Excruciating pain for which the pain medication, taken as instructed, is not helping.  Severe calf pain.  Go directly to the emergency room or call 911, if you are experiencing chest pain or difficulty breathing.      ICE & COLD THERAPY INSTRUCTIONS    To assist with pain control and post-op swelling, you should be using ice regularly throughout recovery, especially for the first 6 weeks, regardless of the cold therapy method you use.      Always make sure there is a layer of protection between the cold pad and your skin.    If you are using ICE PACKS or GEL PACKS, you will need to alternate 20 minutes on, 20 minutes off twice per hour.    If you are using an ICE MACHINE, please follow the provided ice machine instructions.  These devices differ from ice or ice packs whereas the mechanism circulates water through tubing and a pad to provide longer periods of cold therapy to the desired site.  You can use your cold devices around the clock for optimal comfort.  We recommend using cold therapy after working with therapy or completing exercises on your own.  There is no set schedule in which you must follow while using cold therapy.  Below are a few points to remember when using a cold therapy device:    You do not need to need to use the 20 on, 20 off method.  Detach the pad from the cooler and ambulate at least once every hour.  You can check your skin under the pad at this time.  You may wear the cold therapy device during periods of sleep including overnight.  If you wake up during the night, you can check the skin at this time.  You do not need to wake up specifically to perform skin checks.  Empty the cooler and pad when device is not in use.  Follow 's instructions for cleaning your cold therapy device.      DISCHARGE MEDICATIONS - Please reference the sample schedule on the reverse side for instructions on how to  best schedule medications.    PAIN MEDICATION    _______ Oxycodone   Oxycodone has been prescribed for post-operative pain control. Take one 5 mg tablet every 6 hours for pain. Alternate with Tramadol. See medication example sheet. This medication will only be refilled ONCE every 7 days for a period of 6 weeks. After 6 weeks, you will transition to acetaminophen (Tylenol) and over -the- counter anti-inflammatories such as Ibuprofen, Advil or Aleve in conjunction with ICE.    Side effects may be constipation and nausea, vomiting, sleepiness, dizziness, lightheadedness, headache, blurred vision, dry mouth sweating, itching (if you have itching, over-the -counter Benadryl can be used as needed).   You may NOT operate a motor vehicle while taking this medication or have been cleared by your surgeon or PA.     ________ Tramadol   Tramadol has been prescribed for post-operative pain control. Take one 50 mg tablet every 6 hours for pain. Alternate with Oxycodone. See medication example sheet. This medication will only be refilled ONCE every 7 days for a period of 6 weeks. After 6 weeks, you will transition to acetaminophen (Tylenol) and over- the- counter anti-inflammatories such as Ibuprofen, Advil or Aleve in conjunction with ICE.    Side effects may be constipation and nausea, vomiting, sleepiness, dizziness, lightheadedness, headache, blurred vision, dry mouth, sweating, itching (if you have itching, over-the -counter Benadryl can be used as needed).   You may NOT operate a motor vehicle while taking this medication or have been cleared by your surgeon or PA.    NON-NARCOTIC PAIN MEDICATION     _________ Celecoxib (Celebrex) or Meloxicam (Mobic) - Prescription anti-inflammatory medication   One of these will be prescribed (if you are able to take it) as an adjunct anti-inflammatory to assist in pain control. Take one twice daily for 4 weeks. See medication example sheet. You will not receive refills on this  medication.   Side effects may include nausea or upset stomach    _________ Acetaminophen (Tylenol)   Acetaminophen has been prescribed as an adjunct for pain control. Take two 500 mg tablet every 6-8 hours for 4 weeks. See medication example sheet. No refills will be given after initial prescription.   Side effects may include nausea, heartburn, drowsiness, and headache.    _________Cyclobenzaprine (Flexeril)   This is a muscle relaxer that will be prescribed    Take this AS NEEDED per instructions on bottle   This will be only prescribed once and is available to help with muscle spasms. There will be no refills of this medication    BLOOD THINNER    __________ Aspirin or Other Blood Thinner   Aspirin or another medication has been prescribed as a blood thinner to prevent blood clots in your leg or lungs. Take as prescribed on the bottle for 4 weeks. You will not receive a refill on this medication.   Do not take this medication if you are on another blood thinner.    ANTI NAUSEA    __________ Pantoprazole   Pantoprazole has been prescribed to help with nausea and protect your stomach while taking pain medication. Take one 40 mg tablet once daily for 4 weeks. You will not receive a refill on this medication.    ANTIBIOTIC     If you are deemed “high risk” for infection after surgery and antibiotic will be prescribed. Please take this as directed for one week.     STOOL SOFTENERS    __________ Senna   Post-operative constipation can result due to a combination of inactivity, anesthesia and pain medication. To help prevent this, you should increase your water and fiber intake. Physical activity such as walking will also help stimulate the bowels.    Senna has been prescribed to help with constipation while on Oxycodone and Tramadol. Take one tablet twice daily for 4 weeks. No refills will be given.   You may also take Miralax in combination with Senna to prevent constipation.  This can be purchased over the counter  at your local pharmacy.  Take as instructed on the bottle.   Pain Medication Refills -[phone] or MyChart- Monday through Friday 7am-1pm    Medication refills will be sent upon receipt of your request during the times listed above. Due to the high call volume, you will not receive a call confirming prescription refills; please do not call multiple times.  Prescription refills may take a few hours to process, you may follow up with your pharmacy for pickup availability.    SAMPLE              The times below are an example of how to organize medications to optimize pain control  Your actual medication schedule may vary based on your last dose taken IN THE HOSPITAL      Time 3:00am 6:00am 9:00am 12:00pm 3:00pm 6:00pm 9:00pm 12:00am   Medications Tramadol Acetaminophen (Tylenol)   Oxycodone   Blood Thinner  Senna  Pantoprazole  Tramadol  Celebrex Acetaminophen (Tylenol)   Oxycodone Tramadol Acetaminophen (Tylenol)   Oxycodone   Blood Thinner  Senna  Tramadol  Celebrex   Acetaminophen (Tylenol)   Oxycodone            You may begin to wean off the pain medication as your pain remains controlled with increased activity.  The schedules provided are meant to serve as an example.  You may wean off based on your pain control.  Please note that pain medications are not filled beyond 6 weeks after surgery.              The times below are an example of how to WEAN OFF medications WHILE CONTINUING TO OPTIMIZE PAIN CONTROL.  Your actual medication schedule may vary based on your last dose taken.  Time 12:00am 4:00am 8:00am 12:00pm 4:00pm 8:00pm   Med Tramadol Oxycodone   Tramadol Oxycodone Tramadol Oxycodone     Time 12:00am 6:00am 12:00pm 6:00pm   Med Tramadol Oxycodone   Tramadol Oxycodone     Time 12:00am 8:00am 4:00pm   Med Tramadol Oxycodone   Tramadol     Time 12:00am 12:00pm   Med Tramadol Tramadol        _________________________________________________________________________________________________________________________________________________________________________    OFFICE INFORMATION    OFFICE LOCATIONS    Location 1: [facility]  [address], [address]  [place] [address]  Office Number: [phone]    Location 2: [facility]  [address] [place] [address] [address]  [facility], [address]  Office Number: [phone]    Location 3: [facility]  [address] [place] [address]  Office Number: [phone]    Location 4:  [facility]  [address] [place] [address]

## 2025-07-16 ENCOUNTER — HOSPITAL ENCOUNTER (OUTPATIENT)
Facility: HOSPITAL | Age: 80
Discharge: HOME | End: 2025-07-16
Attending: ORTHOPAEDIC SURGERY | Admitting: ORTHOPAEDIC SURGERY
Payer: MEDICARE

## 2025-07-16 ENCOUNTER — PREP FOR PROCEDURE (OUTPATIENT)
Dept: ORTHOPEDIC SURGERY | Facility: HOSPITAL | Age: 80
End: 2025-07-16

## 2025-07-16 ENCOUNTER — HOSPITAL ENCOUNTER (OUTPATIENT)
Facility: HOSPITAL | Age: 80
Setting detail: OUTPATIENT SURGERY
End: 2025-07-16
Attending: ORTHOPAEDIC SURGERY | Admitting: ORTHOPAEDIC SURGERY
Payer: MEDICARE

## 2025-07-16 ENCOUNTER — APPOINTMENT (OUTPATIENT)
Dept: NEUROLOGY | Facility: HOSPITAL | Age: 80
End: 2025-07-16
Payer: MEDICARE

## 2025-07-16 ENCOUNTER — ANESTHESIA (OUTPATIENT)
Dept: OPERATING ROOM | Facility: HOSPITAL | Age: 80
End: 2025-07-16

## 2025-07-16 DIAGNOSIS — I50.21 ACUTE SYSTOLIC HEART FAILURE: ICD-10-CM

## 2025-07-16 DIAGNOSIS — M16.12 PRIMARY OSTEOARTHRITIS OF LEFT HIP: Primary | ICD-10-CM

## 2025-07-16 DIAGNOSIS — I95.9 HYPOTENSION: ICD-10-CM

## 2025-07-16 DIAGNOSIS — M16.11 PRIMARY OSTEOARTHRITIS OF RIGHT HIP: ICD-10-CM

## 2025-07-16 PROCEDURE — 7100000011 HC EXTENDED STAY RECOVERY HOURLY - NURSING UNIT

## 2025-07-16 PROCEDURE — RXMED WILLOW AMBULATORY MEDICATION CHARGE

## 2025-07-16 RX ORDER — VANCOMYCIN HYDROCHLORIDE 1 G/20ML
1 INJECTION, POWDER, LYOPHILIZED, FOR SOLUTION INTRAVENOUS ONCE
Status: DISCONTINUED | OUTPATIENT
Start: 2025-07-16 | End: 2025-07-16 | Stop reason: HOSPADM

## 2025-07-16 RX ORDER — FAMOTIDINE 10 MG/ML
20 INJECTION, SOLUTION INTRAVENOUS ONCE
Status: DISCONTINUED | OUTPATIENT
Start: 2025-07-16 | End: 2025-07-16 | Stop reason: HOSPADM

## 2025-07-16 RX ORDER — ALBUTEROL SULFATE 0.83 MG/ML
2.5 SOLUTION RESPIRATORY (INHALATION) ONCE
Status: DISCONTINUED | OUTPATIENT
Start: 2025-07-16 | End: 2025-07-16 | Stop reason: HOSPADM

## 2025-07-16 RX ORDER — CEFAZOLIN SODIUM 2 G/50ML
2 SOLUTION INTRAVENOUS ONCE
Status: DISCONTINUED | OUTPATIENT
Start: 2025-07-16 | End: 2025-07-16 | Stop reason: HOSPADM

## 2025-07-16 RX ORDER — ONDANSETRON HYDROCHLORIDE 2 MG/ML
4 INJECTION, SOLUTION INTRAVENOUS ONCE
Status: DISCONTINUED | OUTPATIENT
Start: 2025-07-16 | End: 2025-07-16 | Stop reason: HOSPADM

## 2025-07-16 RX ORDER — OXYCODONE HYDROCHLORIDE 5 MG/1
10 TABLET ORAL ONCE
Status: DISCONTINUED | OUTPATIENT
Start: 2025-07-16 | End: 2025-07-16 | Stop reason: HOSPADM

## 2025-07-16 RX ORDER — ACETAMINOPHEN 325 MG/1
975 TABLET ORAL ONCE
Status: DISCONTINUED | OUTPATIENT
Start: 2025-07-16 | End: 2025-07-16 | Stop reason: HOSPADM

## 2025-07-16 RX ORDER — METOCLOPRAMIDE HYDROCHLORIDE 5 MG/ML
5 INJECTION INTRAMUSCULAR; INTRAVENOUS ONCE
Status: DISCONTINUED | OUTPATIENT
Start: 2025-07-16 | End: 2025-07-16 | Stop reason: HOSPADM

## 2025-07-16 RX ORDER — SODIUM CHLORIDE 9 MG/ML
50 INJECTION, SOLUTION INTRAVENOUS CONTINUOUS
Status: DISCONTINUED | OUTPATIENT
Start: 2025-07-16 | End: 2025-07-16 | Stop reason: HOSPADM

## 2025-07-16 ASSESSMENT — COLUMBIA-SUICIDE SEVERITY RATING SCALE - C-SSRS
1. IN THE PAST MONTH, HAVE YOU WISHED YOU WERE DEAD OR WISHED YOU COULD GO TO SLEEP AND NOT WAKE UP?: NO
6. HAVE YOU EVER DONE ANYTHING, STARTED TO DO ANYTHING, OR PREPARED TO DO ANYTHING TO END YOUR LIFE?: NO
2. HAVE YOU ACTUALLY HAD ANY THOUGHTS OF KILLING YOURSELF?: NO

## 2025-07-16 NOTE — SIGNIFICANT EVENT
Patient seen this morning in the preoperative bay.  Unfortunately he does take Jardiance and this was only held for 1 day and is to be held 3 days prior to surgery and along with this while she is able to have clear liquids prior to surgery these need to be held at least 2 hours prior to operation she currently was in the preoperative area was drinking ice tea.  Given these 2 instances and this been elective surgery discussed the safest thing is to cancel surgery today until the appropriate preoperative evaluation taken and we will reschedule her next 2 weeks.

## 2025-07-17 ENCOUNTER — ANESTHESIA EVENT (OUTPATIENT)
Dept: OPERATING ROOM | Facility: HOSPITAL | Age: 80
End: 2025-07-17
Payer: MEDICARE

## 2025-07-17 DIAGNOSIS — Z01.818 PRE-OP EVALUATION: Primary | ICD-10-CM

## 2025-07-17 PROCEDURE — RXMED WILLOW AMBULATORY MEDICATION CHARGE

## 2025-07-17 RX ORDER — CHLORHEXIDINE GLUCONATE ORAL RINSE 1.2 MG/ML
SOLUTION DENTAL
Qty: 30 ML | Refills: 0 | Status: SHIPPED | OUTPATIENT
Start: 2025-07-17

## 2025-07-17 NOTE — CPM/PAT PATIENT COMMUNICATION
CPM/PAT Patient Communication       Name: Sue Vail (Sue Vail)  /Age: 1945/80 y.o.     Spoke with pt on phone, instructed on last dose of jardiance .  Reminded to be NPO after MN day of surgery & take entresto & carvedilol am of surgery with just sip of water, no ice tea, no other clear liquids.  Pt needs new peridex rx, Laury aware & put new order in.   Patient verbalized understanding.

## 2025-07-18 ENCOUNTER — PHARMACY VISIT (OUTPATIENT)
Dept: PHARMACY | Facility: CLINIC | Age: 80
End: 2025-07-18
Payer: COMMERCIAL

## 2025-07-21 ENCOUNTER — OFFICE VISIT (OUTPATIENT)
Dept: URGENT CARE | Age: 80
End: 2025-07-21
Payer: MEDICARE

## 2025-07-21 VITALS
OXYGEN SATURATION: 95 % | DIASTOLIC BLOOD PRESSURE: 82 MMHG | HEART RATE: 78 BPM | SYSTOLIC BLOOD PRESSURE: 111 MMHG | TEMPERATURE: 97.8 F

## 2025-07-21 DIAGNOSIS — R39.89 URINARY PROBLEM: ICD-10-CM

## 2025-07-21 DIAGNOSIS — R81 GLUCOSURIA: ICD-10-CM

## 2025-07-21 DIAGNOSIS — N89.8 VAGINAL ITCHING: Primary | ICD-10-CM

## 2025-07-21 LAB
POC APPEARANCE, URINE: CLEAR
POC BILIRUBIN, URINE: NEGATIVE
POC BLOOD, URINE: ABNORMAL
POC COLOR, URINE: ABNORMAL
POC GLUCOSE, URINE: ABNORMAL MG/DL
POC KETONES, URINE: NEGATIVE MG/DL
POC LEUKOCYTES, URINE: NEGATIVE
POC NITRITE,URINE: NEGATIVE
POC PH, URINE: 6 PH
POC PROTEIN, URINE: NEGATIVE MG/DL
POC SPECIFIC GRAVITY, URINE: 1.01
POC UROBILINOGEN, URINE: 0.2 EU/DL

## 2025-07-21 PROCEDURE — 1159F MED LIST DOCD IN RCRD: CPT | Performed by: NURSE PRACTITIONER

## 2025-07-21 PROCEDURE — 99214 OFFICE O/P EST MOD 30 MIN: CPT | Performed by: NURSE PRACTITIONER

## 2025-07-21 PROCEDURE — 81003 URINALYSIS AUTO W/O SCOPE: CPT | Performed by: NURSE PRACTITIONER

## 2025-07-21 RX ORDER — FLUCONAZOLE 150 MG/1
150 TABLET ORAL ONCE
Qty: 1 TABLET | Refills: 0 | Status: SHIPPED | OUTPATIENT
Start: 2025-07-21 | End: 2025-07-21

## 2025-07-21 NOTE — H&P (VIEW-ONLY)
Subjective   Patient ID: Sue Vail is a 80 y.o. female. They present today with a chief complaint of Vaginal Itching (Since last appt stated that antibiotics never took UTI away , pts main complaining is itching ).    History of Present Illness  Patient was here on 6/27 for a UTI.  She was put on Macrobid which the bacteria was susceptible to.  She was put on Fluconazole.  States she continues to have itching.  States the itching did go away with the Fluconazole and then she was put on antibiotics.  She does have glucose in her urine today.  A1C was 6.7 on 7/1/2025.  BG today was 137.      Past Medical History  Allergies as of 07/21/2025 - Reviewed 07/21/2025   Allergen Reaction Noted    Ace inhibitors Unknown 11/03/2016    Adhesive tape-silicones Unknown 10/20/2023    Beta-blockers (beta-adrenergic blocking agts) Unknown 11/03/2016    Fluoxetine Unknown 11/03/2016    Nsaids (non-steroidal anti-inflammatory drug) Unknown 11/03/2016    Olanzapine Unknown 11/03/2016    Other omega-3s Unknown 07/14/2023    Penicillins Hives 06/20/2007    Poison ivy extract Hives 09/05/2024       Prescriptions Prior to Admission[1]     Medical History[2]    Surgical History[3]     reports that she has never smoked. She has never used smokeless tobacco. She reports current alcohol use of about 1.0 standard drink of alcohol per week. She reports that she does not use drugs.    Review of Systems  Review of Systems     See HPI                          Objective    Vitals:    07/21/25 1038   BP: 111/82   Pulse: 78   Temp: 36.6 °C (97.8 °F)   SpO2: 95%     No LMP recorded (lmp unknown). Patient is postmenopausal.    Physical Exam  Constitutional:       Appearance: Normal appearance.   Cardiovascular:      Rate and Rhythm: Normal rate and regular rhythm.      Pulses: Normal pulses.      Heart sounds: Normal heart sounds.   Pulmonary:      Effort: Pulmonary effort is normal.      Breath sounds: Normal breath sounds.     Procedures    Point of  Care Test & Imaging Results from this visit  Results for orders placed or performed in visit on 07/21/25   POCT UA Automated manually resulted   Result Value Ref Range    POC Color, Urine Light-Yellow Straw, Yellow, Light-Yellow    POC Appearance, Urine Clear Clear    POC Glucose, Urine 500 (3+) (A) NEGATIVE mg/dl    POC Bilirubin, Urine NEGATIVE NEGATIVE    POC Ketones, Urine NEGATIVE NEGATIVE mg/dl    POC Specific Gravity, Urine 1.010 1.005 - 1.035    POC Blood, Urine MODERATE (2+) (A) NEGATIVE    POC PH, Urine 6.0 No Reference Range Established PH    POC Protein, Urine NEGATIVE NEGATIVE mg/dl    POC Urobilinogen, Urine 0.2 0.2, 1.0 EU/DL    Poc Nitrite, Urine NEGATIVE NEGATIVE    POC Leukocytes, Urine NEGATIVE NEGATIVE      Imaging  No results found.    Cardiology, Vascular, and Other Imaging  No other imaging results found for the past 2 days      Diagnostic study results (if any) were reviewed by PRISCILA Puentes.    Assessment/Plan   Allergies, medications, history, and pertinent labs/EKGs/Imaging reviewed by PRISCILA Puentes.     Medical Decision Making  Negative urine.  Fluconazole prescribed.  She will follow up with her PCP.      At time of discharge patient was clinically well-appearing and HDS for outpatient management. The patient and/or family was educated regarding diagnosis, supportive care, OTC and Rx medications. The patient and/or family was given the opportunity to ask questions prior to discharge.  They verbalized understanding of my discussion of the plans for treatment, expected course, indications to return to  or seek further evaluation in ED, and the need for timely follow up as directed.   They were provided with a work/school excuse if requested.    Orders and Diagnoses  Diagnoses and all orders for this visit:  Vaginal itching  -     POCT UA Automated manually resulted  -     Urine Culture  -     fluconazole (Diflucan) 150 mg tablet; Take 1 tablet (150 mg) by mouth 1  time for 1 dose.  Urinary problem  -     Urine Culture  Glucosuria      Medical Admin Record      Patient disposition: Home    Electronically signed by PRISCILA Puentes  10:51 AM           [1] (Not in a hospital admission)   [2]   Past Medical History:  Diagnosis Date    Amblyopia of eye, right     Anemia 2019.    Resukt of Lymphoma treatment w/Rituxan.    Anxiety     Arthritis 2013    Bipolar disorder 2017    Dx 2017    Bitten or stung by nonvenomous insect and other nonvenomous arthropods, initial encounter 07/03/2018    Tick bite    Cancer (Multi)     Cataract     Developing    Cervical disc disease     CHF (congestive heart failure)     Cognitive decline 2022    Diagnosis: Mild Cognitive Decline    Coronary artery disease 7/24    Weakness, extreme    Dementia 2023    Depression 1951    Chronic, serious    Diplopia     Divergence insufficiency     Dizziness     Drusen of macula, bilateral     Epilepsy, unspecified, not intractable, without status epilepticus     Epilepsy    Hearing aid worn     Helps a lot.    HL (hearing loss) 1/23    Progressing    Hypertension 9/15/1980    Not a day-to-day issue.    Localized enlarged lymph nodes 08/31/2018    Cervical lymphadenopathy    Macular degeneration     Multiple sclerosis (Multi)     History of multiple sclerosis    Myopia of both eyes     Neuromuscular disorder (Multi) 2013    Essential Tremor    Obesity 1950    Life-long    Old myocardial infarction 12/23/2019    History of myocardial infarction    Personal history of malignant melanoma of skin 04/13/2017    History of malignant melanoma    Personal history of malignant melanoma of skin 11/10/2020    History of malignant melanoma of skin    Personal history of malignant melanoma of skin 11/10/2020    History of malignant melanoma of skin    Personal history of non-Hodgkin lymphomas 12/23/2019    History of lymphoma    Personal history of other diseases of the nervous system and sense organs     History of  benign essential tremor    Personal history of other diseases of the nervous system and sense organs 04/24/2018    History of tinnitus    Personal history of other diseases of the nervous system and sense organs 12/17/2019    History of diplopia    Personal history of other endocrine, nutritional and metabolic disease 07/02/2018    History of vitamin D deficiency    Personal history of other mental and behavioral disorders 10/28/2019    History of depression    PTSD (post-traumatic stress disorder) 1951     said i had it from childhood.    Urge incontinence 04/13/2017    Urge incontinence of urine    Urinary tract infection 6/25    Yeat infection    Vertigo     Vision loss In 2025    Double vision. Eccentric Fixation   [3]   Past Surgical History:  Procedure Laterality Date    CARDIAC CATHETERIZATION N/A 6/27/2024    Procedure: Left And Right Heart Cath, With LV;  Surgeon: Kate Cazares MD;  Location: [facility] Card[facility];  Service: Cardiovascular;  Laterality: N/A;    GASTRIC BYPASS  04/11/2017    Gastric Surgery For Morbid Obesity Bypass With Cecilio-en-Y    OTHER SURGICAL HISTORY  04/11/2017    Axillary Lymphadenectomy    OTHER SURGICAL HISTORY  12/23/2019    Eye surgery    OTHER SURGICAL HISTORY  12/23/2019    Neck surgery

## 2025-07-21 NOTE — PROGRESS NOTES
Subjective   Patient ID: Sue Vail is a 80 y.o. female. They present today with a chief complaint of Vaginal Itching (Since last appt stated that antibiotics never took UTI away , pts main complaining is itching ).    History of Present Illness  Patient was here on 6/27 for a UTI.  She was put on Macrobid which the bacteria was susceptible to.  She was put on Fluconazole.  States she continues to have itching.  States the itching did go away with the Fluconazole and then she was put on antibiotics.  She does have glucose in her urine today.  A1C was 6.7 on 7/1/2025.  BG today was 137.      Past Medical History  Allergies as of 07/21/2025 - Reviewed 07/21/2025   Allergen Reaction Noted    Ace inhibitors Unknown 11/03/2016    Adhesive tape-silicones Unknown 10/20/2023    Beta-blockers (beta-adrenergic blocking agts) Unknown 11/03/2016    Fluoxetine Unknown 11/03/2016    Nsaids (non-steroidal anti-inflammatory drug) Unknown 11/03/2016    Olanzapine Unknown 11/03/2016    Other omega-3s Unknown 07/14/2023    Penicillins Hives 06/20/2007    Poison ivy extract Hives 09/05/2024       Prescriptions Prior to Admission[1]     Medical History[2]    Surgical History[3]     reports that she has never smoked. She has never used smokeless tobacco. She reports current alcohol use of about 1.0 standard drink of alcohol per week. She reports that she does not use drugs.    Review of Systems  Review of Systems     See HPI                          Objective    Vitals:    07/21/25 1038   BP: 111/82   Pulse: 78   Temp: 36.6 °C (97.8 °F)   SpO2: 95%     No LMP recorded (lmp unknown). Patient is postmenopausal.    Physical Exam  Constitutional:       Appearance: Normal appearance.   Cardiovascular:      Rate and Rhythm: Normal rate and regular rhythm.      Pulses: Normal pulses.      Heart sounds: Normal heart sounds.   Pulmonary:      Effort: Pulmonary effort is normal.      Breath sounds: Normal breath sounds.     Procedures    Point of  Care Test & Imaging Results from this visit  Results for orders placed or performed in visit on 07/21/25   POCT UA Automated manually resulted   Result Value Ref Range    POC Color, Urine Light-Yellow Straw, Yellow, Light-Yellow    POC Appearance, Urine Clear Clear    POC Glucose, Urine 500 (3+) (A) NEGATIVE mg/dl    POC Bilirubin, Urine NEGATIVE NEGATIVE    POC Ketones, Urine NEGATIVE NEGATIVE mg/dl    POC Specific Gravity, Urine 1.010 1.005 - 1.035    POC Blood, Urine MODERATE (2+) (A) NEGATIVE    POC PH, Urine 6.0 No Reference Range Established PH    POC Protein, Urine NEGATIVE NEGATIVE mg/dl    POC Urobilinogen, Urine 0.2 0.2, 1.0 EU/DL    Poc Nitrite, Urine NEGATIVE NEGATIVE    POC Leukocytes, Urine NEGATIVE NEGATIVE      Imaging  No results found.    Cardiology, Vascular, and Other Imaging  No other imaging results found for the past 2 days      Diagnostic study results (if any) were reviewed by PRISCILA Puentes.    Assessment/Plan   Allergies, medications, history, and pertinent labs/EKGs/Imaging reviewed by PRISCILA Puentes.     Medical Decision Making  Negative urine.  Fluconazole prescribed.  She will follow up with her PCP.      At time of discharge patient was clinically well-appearing and HDS for outpatient management. The patient and/or family was educated regarding diagnosis, supportive care, OTC and Rx medications. The patient and/or family was given the opportunity to ask questions prior to discharge.  They verbalized understanding of my discussion of the plans for treatment, expected course, indications to return to  or seek further evaluation in ED, and the need for timely follow up as directed.   They were provided with a work/school excuse if requested.    Orders and Diagnoses  Diagnoses and all orders for this visit:  Vaginal itching  -     POCT UA Automated manually resulted  -     Urine Culture  -     fluconazole (Diflucan) 150 mg tablet; Take 1 tablet (150 mg) by mouth 1  time for 1 dose.  Urinary problem  -     Urine Culture  Glucosuria      Medical Admin Record      Patient disposition: Home    Electronically signed by PRISCILA Puentes  10:51 AM           [1] (Not in a hospital admission)   [2]   Past Medical History:  Diagnosis Date    Amblyopia of eye, right     Anemia 2019.    Resukt of Lymphoma treatment w/Rituxan.    Anxiety     Arthritis 2013    Bipolar disorder 2017    Dx 2017    Bitten or stung by nonvenomous insect and other nonvenomous arthropods, initial encounter 07/03/2018    Tick bite    Cancer (Multi)     Cataract     Developing    Cervical disc disease     CHF (congestive heart failure)     Cognitive decline 2022    Diagnosis: Mild Cognitive Decline    Coronary artery disease 7/24    Weakness, extreme    Dementia 2023    Depression 1951    Chronic, serious    Diplopia     Divergence insufficiency     Dizziness     Drusen of macula, bilateral     Epilepsy, unspecified, not intractable, without status epilepticus     Epilepsy    Hearing aid worn     Helps a lot.    HL (hearing loss) 1/23    Progressing    Hypertension 9/15/1980    Not a day-to-day issue.    Localized enlarged lymph nodes 08/31/2018    Cervical lymphadenopathy    Macular degeneration     Multiple sclerosis (Multi)     History of multiple sclerosis    Myopia of both eyes     Neuromuscular disorder (Multi) 2013    Essential Tremor    Obesity 1950    Life-long    Old myocardial infarction 12/23/2019    History of myocardial infarction    Personal history of malignant melanoma of skin 04/13/2017    History of malignant melanoma    Personal history of malignant melanoma of skin 11/10/2020    History of malignant melanoma of skin    Personal history of malignant melanoma of skin 11/10/2020    History of malignant melanoma of skin    Personal history of non-Hodgkin lymphomas 12/23/2019    History of lymphoma    Personal history of other diseases of the nervous system and sense organs     History of  benign essential tremor    Personal history of other diseases of the nervous system and sense organs 04/24/2018    History of tinnitus    Personal history of other diseases of the nervous system and sense organs 12/17/2019    History of diplopia    Personal history of other endocrine, nutritional and metabolic disease 07/02/2018    History of vitamin D deficiency    Personal history of other mental and behavioral disorders 10/28/2019    History of depression    PTSD (post-traumatic stress disorder) 1951     said i had it from childhood.    Urge incontinence 04/13/2017    Urge incontinence of urine    Urinary tract infection 6/25    Yeat infection    Vertigo     Vision loss In 2025    Double vision. Eccentric Fixation   [3]   Past Surgical History:  Procedure Laterality Date    CARDIAC CATHETERIZATION N/A 6/27/2024    Procedure: Left And Right Heart Cath, With LV;  Surgeon: Kate Cazares MD;  Location: [facility];  Service: Cardiovascular;  Laterality: N/A;    GASTRIC BYPASS  04/11/2017    Gastric Surgery For Morbid Obesity Bypass With Cecilio-en-Y    OTHER SURGICAL HISTORY  04/11/2017    Axillary Lymphadenectomy    OTHER SURGICAL HISTORY  12/23/2019    Eye surgery    OTHER SURGICAL HISTORY  12/23/2019    Neck surgery

## 2025-07-23 ENCOUNTER — ANESTHESIA (OUTPATIENT)
Dept: OPERATING ROOM | Facility: HOSPITAL | Age: 80
End: 2025-07-23
Payer: MEDICARE

## 2025-07-23 PROCEDURE — RXMED WILLOW AMBULATORY MEDICATION CHARGE

## 2025-07-24 ENCOUNTER — RESULTS FOLLOW-UP (OUTPATIENT)
Dept: URGENT CARE | Age: 80
End: 2025-07-24

## 2025-07-24 ENCOUNTER — TELEPHONE (OUTPATIENT)
Dept: ORTHOPEDIC SURGERY | Facility: HOSPITAL | Age: 80
End: 2025-07-24
Payer: MEDICARE

## 2025-07-24 DIAGNOSIS — N39.0 URINARY TRACT INFECTION WITHOUT HEMATURIA, SITE UNSPECIFIED: Primary | ICD-10-CM

## 2025-07-24 LAB — BACTERIA UR CULT: ABNORMAL

## 2025-07-24 RX ORDER — NITROFURANTOIN 25; 75 MG/1; MG/1
100 CAPSULE ORAL 2 TIMES DAILY
Qty: 14 CAPSULE | Refills: 0 | Status: SHIPPED | OUTPATIENT
Start: 2025-07-24 | End: 2025-07-31

## 2025-07-24 NOTE — TELEPHONE ENCOUNTER
Patient is scheduled for Left Total Hip Arthroplasty on 07/30/2025 - went to  urgent care on 07/21/2025 and just got a call today that she has a UTI.

## 2025-07-24 NOTE — TELEPHONE ENCOUNTER
Needs antibiotic based on positive urine culture results    Will treat with Macrobid     Patient will  from our in house pharmacy today    Advised to follow up with ortho surgery to discuss if any changes in needed to surgery date based on current UTI.

## 2025-07-28 ENCOUNTER — TELEPHONE (OUTPATIENT)
Dept: URGENT CARE | Age: 80
End: 2025-07-28

## 2025-07-29 ENCOUNTER — TELEPHONE (OUTPATIENT)
Dept: CARDIOLOGY | Facility: HOSPITAL | Age: 80
End: 2025-07-29
Payer: MEDICARE

## 2025-07-29 RX ORDER — PANTOPRAZOLE SODIUM 40 MG/1
40 TABLET, DELAYED RELEASE ORAL DAILY PRN
Qty: 30 TABLET | Refills: 0 | Status: SHIPPED | OUTPATIENT
Start: 2025-07-29 | End: 2025-08-28

## 2025-07-29 RX ORDER — SENNOSIDES 8.6 MG/1
1 TABLET ORAL 2 TIMES DAILY
Qty: 60 TABLET | Refills: 0 | Status: SHIPPED | OUTPATIENT
Start: 2025-07-29 | End: 2025-08-28

## 2025-07-29 RX ORDER — OXYCODONE HYDROCHLORIDE 5 MG/1
5 TABLET ORAL EVERY 6 HOURS PRN
Qty: 28 TABLET | Refills: 0 | Status: SHIPPED | OUTPATIENT
Start: 2025-07-29 | End: 2025-08-05

## 2025-07-29 RX ORDER — TRAMADOL HYDROCHLORIDE 50 MG/1
50 TABLET, FILM COATED ORAL EVERY 6 HOURS PRN
Qty: 28 TABLET | Refills: 0 | Status: SHIPPED | OUTPATIENT
Start: 2025-07-29 | End: 2025-08-05

## 2025-07-29 RX ORDER — NAPROXEN SODIUM 220 MG/1
81 TABLET, FILM COATED ORAL 2 TIMES DAILY
Qty: 60 TABLET | Refills: 0 | Status: SHIPPED | OUTPATIENT
Start: 2025-07-29 | End: 2025-08-28

## 2025-07-29 RX ORDER — CYCLOBENZAPRINE HCL 5 MG
5 TABLET ORAL 3 TIMES DAILY PRN
Qty: 30 TABLET | Refills: 0 | Status: SHIPPED | OUTPATIENT
Start: 2025-07-29 | End: 2025-08-08

## 2025-07-29 RX ORDER — ACETAMINOPHEN 325 MG/1
1000 TABLET ORAL EVERY 8 HOURS PRN
Qty: 90 TABLET | Refills: 1 | Status: SHIPPED | OUTPATIENT
Start: 2025-07-29

## 2025-07-29 RX ORDER — SULFAMETHOXAZOLE AND TRIMETHOPRIM 800; 160 MG/1; MG/1
1 TABLET ORAL 2 TIMES DAILY
Qty: 14 TABLET | Refills: 0 | Status: SHIPPED | OUTPATIENT
Start: 2025-07-29 | End: 2025-08-06

## 2025-07-29 NOTE — DISCHARGE INSTRUCTIONS
Cj Burton DO  Phone: [phone] Citlalli Holly, Medical Assistant    PLEASE READ CAREFULLY BEFORE CONTACTING YOUR PROVIDER.    WE WORK COLLABORATIVELY AS A TEAM. CALLING MULTIPLE STAFF MEMBERS REGARDING THE SAME ISSUE WILL DELAY YOUR CARE.  MYCHART IS THE PREFERRED COMMUNICATION FOR ALL TEAM MEMBERS.  ________________________________________________________________________________________________________________________________________________________________________    After Surgery Instructions: TOTAL HIP ARTHROPLASTY    The following is a general guide and may be applied to most patients that go home from the hospital after surgery. If you go to a rehab facility the timeline may deviate a little. Please do not hesitate to call our office for any questions or additional guidance. We will work with you to get the best experience from your new joint replacement.     WEEK 1  Relax…Don't Overdo it!  - Get up once an hour for light activity while you are awake. (get a drink, go to the bathroom, etc.)  - Keep the surgical site iced and elevated above your heart, if possible, while you are resting.  - You will have some light exercises given to you from the physical therapist in the hospital. They will teach you posterior hip precautions that you should be mindful of for the first six weeks after surgery.    SWELLING AND BRUISING    BRUISING AND SWELLING AFTER SURGERY IS NORMAL. As the patient becomes more mobile the bruising and swelling will begin to migrate towards the ankle and foot and is usually noticed toward the end of the day.    WEEK 2-3  You will have a prescription for physical therapy given to you or electronically prescribed and you should start outpatient therapy one week after your operation. Be sure to do the home exercises on the days you are not attending physical therapy.    - Continue to progress walking as tolerated.   - Continue to use ice for soreness on the surgical site  - You may wean from  your walker to a cane when you feel safe and comfortable to do so. Your physical therapist will also be helpful with progressing your assistive device.   - Be careful with bending and twisting - If it hurts, stop!!    FOLLOW UP  - Your first post-operative visit with Dr. Cj Burton should have been scheduled already. Please call [phone] for appointment questions  - You do not need new xrays for this visit  - Don't be nervous! This visit is to see how you are doing and make sure your incision is healing nicely and have begun working with physical therapy.       MEDICATION REFILLS - Please call main office number: [phone]    You will not receive a call indicating that your prescription has been filled.  Please contact your pharmacy with any questions.    Medication refills will be filled Monday-Friday 7am to 1pm ONLY. Please call the office or send a [employer] message for a refill request.  Any requests received outside of this timeframe will be handled on the next business day.  The office staff and orthopedic nurses cannot refill medications; messages should be left directly through the office or via my chart.  Please do not call multiple times or call other members of the care team for medication needs, this will cause the refill to take longer.    Per State and Institutional policy, pain medications can only be refilled every 7 days for up to six weeks following surgery.    DRIVING & TRAVEL AFTER SURGERY   Patients should anticipate waiting at least 3-6 weeks before traveling long distances after surgery.  At minimum you cannot be using your walker before considering driving and you cannot take any narcotic medications prior to driving. You will need to stop to walk around ever 1 hour during your travel to help with blood clot prevention.  Please call the office or your joint nurse to discuss prior to post-surgical travel.  Patients may not drive until cleared by the joint nurse or the  office.    DENTAL PROCEDURES & CLEANINGS  You must wait a minimum of 3 months for elective dental appointments (if possible, we understand emergencies happen), including routine cleanings or dental work including bridges, crowns, extractions, etc..  For any dental visit - cleaning or dental procedures - patients must take an antibiotic 1 hour before the appointment.  Antibiotics are a lifelong need before dental appointments.  The antibiotic prescribed will be based on each patient's allergies.    WOUND CARE  You will have an ace wrap on your leg put on during surgery. This compression wrap is for comfort and may be removed 24 hours after surgery. You may continue to use compression throughout your recovery if this is comfortable for you.  You have a waterproof bandage on your wound and may shower with this on. The waterproof bandage is to remain in place for 7 days. You may remove it yourself. You may leave your incision open to air after the bandage has been removed.  Under your waterproof bandage you have a mesh and glue dressin in place. Do not peel this off. This will be on for 3-4 weeks. You may trim the edges as they peel off on their own. You can continue to shower with this on. Let the water run freely over your incision when showering and do not scrub.   You may shower upon discharging from the hospital.  Soap and water is permitted to run over the surgical dressing.  Do not scrub directly over these items.  DO NOT soak your incision in a bath, hot tub, pool or pond/lake for a minimum of 3 weeks following your surgery.  DO NOT use lotions, creams, ointments on your wound for a minimum of 3 weeks following your surgery. At that time you may use vitamin E to assist with softening of your incision.    RESTARTING HOME ROUTINE - DIET & MEDICATIONS  Post-operative constipation can result due to a combination of inactivity, anesthesia and pain medication. To help prevent this, you should increase your water and  fiber intake. Physical activity such as walking will also help stimulate the bowels.   You may resume your normal diet when you discharge home.  Choose foods that help promote good bowel habits and prevent constipation, such as foods high in fiber.  You may restart your home medications the following day after your surgery UNLESS you have been given alternate instructions.  Follow the instructions given to you on your hospital discharge instructions for more information regarding your home medications.    IN-HOME PHYSICAL THERAPY & OUTPATIENT PHYSICAL THERAPY  Continue the exercises you were given in the hospital until you have been seen by in-home therapy.  Make sure to provide a phone number with the ability for the home care staff to leave a message if you do not answer your phone.    Depending on your treatment plan you will begin outpatient or home therapy after surgery. This should be arranged through the office of hospital during discharge  FOR PATIENT DOING HOME THERAPY: Outpatient physical therapy following knee replacement surgery should begin 2-3 weeks after surgery.  You should call to schedule this appointment ASAP if not already scheduled before surgery.  Waiting until you are ready for outpatient physical therapy will cause a delay in your care.  You may choose any outpatient physical therapy location.  Call the office for an order if needed.    EMERGENCIES - WHEN TO CONTACT THE SURGEON'S OFFICE IMMEDIATELY  Fever >101 with chills that has been present for at least 48 hours.   Excessive bleeding from incision that will not slow down. A small amount of drainage is normal and expected.  Once pressure is applied and the area is covered, do not continue to check the area regularly.  This will remove pressure and bleeding will continue.  Leave in place for 4-6 hours.  Signs of infection of incision-excessive drainage that is soaking through your dressing (especially if it is pus-like), redness that is  spreading out from the edges of your incision, or increased warmth around the area.  Excruciating pain for which the pain medication, taken as instructed, is not helping.  Severe calf pain.  Go directly to the emergency room or call 911, if you are experiencing chest pain or difficulty breathing.      ICE & COLD THERAPY INSTRUCTIONS    To assist with pain control and post-op swelling, you should be using ice regularly throughout recovery, especially for the first 6 weeks, regardless of the cold therapy method you use.      Always make sure there is a layer of protection between the cold pad and your skin.    If you are using ICE PACKS or GEL PACKS, you will need to alternate 20 minutes on, 20 minutes off twice per hour.    If you are using an ICE MACHINE, please follow the provided ice machine instructions.  These devices differ from ice or ice packs whereas the mechanism circulates water through tubing and a pad to provide longer periods of cold therapy to the desired site.  You can use your cold devices around the clock for optimal comfort.  We recommend using cold therapy after working with therapy or completing exercises on your own.  There is no set schedule in which you must follow while using cold therapy.  Below are a few points to remember when using a cold therapy device:    You do not need to need to use the 20 on, 20 off method.  Detach the pad from the cooler and ambulate at least once every hour.  You can check your skin under the pad at this time.  You may wear the cold therapy device during periods of sleep including overnight.  If you wake up during the night, you can check the skin at this time.  You do not need to wake up specifically to perform skin checks.  Empty the cooler and pad when device is not in use.  Follow 's instructions for cleaning your cold therapy device.      DISCHARGE MEDICATIONS - Please reference the sample schedule on the reverse side for instructions on how to  best schedule medications.    PAIN MEDICATION    _______ Oxycodone   Oxycodone has been prescribed for post-operative pain control. Take one 5 mg tablet every 6 hours for pain. Alternate with Tramadol. See medication example sheet. This medication will only be refilled ONCE every 7 days for a period of 6 weeks. After 6 weeks, you will transition to acetaminophen (Tylenol) and over -the- counter anti-inflammatories such as Ibuprofen, Advil or Aleve in conjunction with ICE.    Side effects may be constipation and nausea, vomiting, sleepiness, dizziness, lightheadedness, headache, blurred vision, dry mouth sweating, itching (if you have itching, over-the -counter Benadryl can be used as needed).   You may NOT operate a motor vehicle while taking this medication or have been cleared by your surgeon or PA.     ________ Tramadol   Tramadol has been prescribed for post-operative pain control. Take one 50 mg tablet every 6 hours for pain. Alternate with Oxycodone. See medication example sheet. This medication will only be refilled ONCE every 7 days for a period of 6 weeks. After 6 weeks, you will transition to acetaminophen (Tylenol) and over- the- counter anti-inflammatories such as Ibuprofen, Advil or Aleve in conjunction with ICE.    Side effects may be constipation and nausea, vomiting, sleepiness, dizziness, lightheadedness, headache, blurred vision, dry mouth, sweating, itching (if you have itching, over-the -counter Benadryl can be used as needed).   You may NOT operate a motor vehicle while taking this medication or have been cleared by your surgeon or PA.    NON-NARCOTIC PAIN MEDICATION     _________ Celecoxib (Celebrex) or Meloxicam (Mobic) - Prescription anti-inflammatory medication   One of these will be prescribed (if you are able to take it) as an adjunct anti-inflammatory to assist in pain control. Take one twice daily for 4 weeks. See medication example sheet. You will not receive refills on this  medication.   Side effects may include nausea or upset stomach    _________ Acetaminophen (Tylenol)   Acetaminophen has been prescribed as an adjunct for pain control. Take two 500 mg tablet every 6-8 hours for 4 weeks. See medication example sheet. No refills will be given after initial prescription.   Side effects may include nausea, heartburn, drowsiness, and headache.    _________Cyclobenzaprine (Flexeril)   This is a muscle relaxer that will be prescribed    Take this AS NEEDED per instructions on bottle   This will be only prescribed once and is available to help with muscle spasms. There will be no refills of this medication    BLOOD THINNER    __________ Aspirin or Other Blood Thinner   Aspirin or another medication has been prescribed as a blood thinner to prevent blood clots in your leg or lungs. Take as prescribed on the bottle for 4 weeks. You will not receive a refill on this medication.   Do not take this medication if you are on another blood thinner.    ANTI NAUSEA    __________ Pantoprazole   Pantoprazole has been prescribed to help with nausea and protect your stomach while taking pain medication. Take one 40 mg tablet once daily for 4 weeks. You will not receive a refill on this medication.    ANTIBIOTIC     If you are deemed “high risk” for infection after surgery and antibiotic will be prescribed. Please take this as directed for one week.     STOOL SOFTENERS    __________ Senna   Post-operative constipation can result due to a combination of inactivity, anesthesia and pain medication. To help prevent this, you should increase your water and fiber intake. Physical activity such as walking will also help stimulate the bowels.    Senna has been prescribed to help with constipation while on Oxycodone and Tramadol. Take one tablet twice daily for 4 weeks. No refills will be given.   You may also take Miralax in combination with Senna to prevent constipation.  This can be purchased over the counter  at your local pharmacy.  Take as instructed on the bottle.   Pain Medication Refills -[phone] or MyChart- Monday through Friday 7am-1pm    Medication refills will be sent upon receipt of your request during the times listed above. Due to the high call volume, you will not receive a call confirming prescription refills; please do not call multiple times.  Prescription refills may take a few hours to process, you may follow up with your pharmacy for pickup availability.    SAMPLE              The times below are an example of how to organize medications to optimize pain control  Your actual medication schedule may vary based on your last dose taken IN THE HOSPITAL      Time 3:00am 6:00am 9:00am 12:00pm 3:00pm 6:00pm 9:00pm 12:00am   Medications Tramadol Acetaminophen (Tylenol)   Oxycodone   Blood Thinner  Senna  Pantoprazole  Tramadol  Celebrex Acetaminophen (Tylenol)   Oxycodone Tramadol Acetaminophen (Tylenol)   Oxycodone   Blood Thinner  Senna  Tramadol  Celebrex   Acetaminophen (Tylenol)   Oxycodone            You may begin to wean off the pain medication as your pain remains controlled with increased activity.  The schedules provided are meant to serve as an example.  You may wean off based on your pain control.  Please note that pain medications are not filled beyond 6 weeks after surgery.              The times below are an example of how to WEAN OFF medications WHILE CONTINUING TO OPTIMIZE PAIN CONTROL.  Your actual medication schedule may vary based on your last dose taken.  Time 12:00am 4:00am 8:00am 12:00pm 4:00pm 8:00pm   Med Tramadol Oxycodone   Tramadol Oxycodone Tramadol Oxycodone     Time 12:00am 6:00am 12:00pm 6:00pm   Med Tramadol Oxycodone   Tramadol Oxycodone     Time 12:00am 8:00am 4:00pm   Med Tramadol Oxycodone   Tramadol     Time 12:00am 12:00pm   Med Tramadol Tramadol        _________________________________________________________________________________________________________________________________________________________________________    OFFICE INFORMATION    OFFICE LOCATIONS    Location 1: [facility]  [address], [address]  [place] [address]  Office Number: [phone]    Location 2: [facility]  [address] [place] [address] [address]  [facility], [address]  Office Number: [phone]    Location 3: [facility]  [address] [place] [address]  Office Number: [phone]    Location 4:  [facility]  [address] [place] [address]

## 2025-07-29 NOTE — TELEPHONE ENCOUNTER
Call placed to check on Sue post-surgery.  She explained that it has been rescheduled for tomorrow.  Still feels as though she and her  are ready for the recovery period.  Encouraged to reach out if she needs anything.

## 2025-07-30 ENCOUNTER — APPOINTMENT (OUTPATIENT)
Dept: RADIOLOGY | Facility: HOSPITAL | Age: 80
End: 2025-07-30
Payer: MEDICARE

## 2025-07-30 ENCOUNTER — PHARMACY VISIT (OUTPATIENT)
Dept: PHARMACY | Facility: CLINIC | Age: 80
End: 2025-07-30
Payer: COMMERCIAL

## 2025-07-30 PROBLEM — I95.9 HYPOTENSION: Status: ACTIVE | Noted: 2025-07-30

## 2025-07-30 LAB
ABO GROUP (TYPE) IN BLOOD: NORMAL
ABO GROUP (TYPE) IN BLOOD: NORMAL
ANTIBODY SCREEN: NORMAL
BASOPHILS # BLD AUTO: 0.05 X10*3/UL (ref 0–0.1)
BASOPHILS NFR BLD AUTO: 0.4 %
EOSINOPHIL # BLD AUTO: 2.82 X10*3/UL (ref 0–0.4)
EOSINOPHIL NFR BLD AUTO: 24.2 %
ERYTHROCYTE [DISTWIDTH] IN BLOOD BY AUTOMATED COUNT: 14.3 % (ref 11.5–14.5)
ERYTHROCYTE [DISTWIDTH] IN BLOOD BY AUTOMATED COUNT: 14.6 % (ref 11.5–14.5)
GLUCOSE BLD MANUAL STRIP-MCNC: 174 MG/DL (ref 74–99)
HCT VFR BLD AUTO: 41 % (ref 36–46)
HCT VFR BLD AUTO: 41.3 % (ref 36–46)
HGB BLD-MCNC: 13.6 G/DL (ref 12–16)
HGB BLD-MCNC: 13.9 G/DL (ref 12–16)
IMM GRANULOCYTES # BLD AUTO: 0.09 X10*3/UL (ref 0–0.5)
IMM GRANULOCYTES NFR BLD AUTO: 0.8 % (ref 0–0.9)
LYMPHOCYTES # BLD AUTO: 0.89 X10*3/UL (ref 0.8–3)
LYMPHOCYTES NFR BLD AUTO: 7.6 %
MCH RBC QN AUTO: 32.9 PG (ref 26–34)
MCH RBC QN AUTO: 33.3 PG (ref 26–34)
MCHC RBC AUTO-ENTMCNC: 33.2 G/DL (ref 32–36)
MCHC RBC AUTO-ENTMCNC: 33.7 G/DL (ref 32–36)
MCV RBC AUTO: 99 FL (ref 80–100)
MCV RBC AUTO: 99 FL (ref 80–100)
MONOCYTES # BLD AUTO: 0.7 X10*3/UL (ref 0.05–0.8)
MONOCYTES NFR BLD AUTO: 6 %
NEUTROPHILS # BLD AUTO: 7.09 X10*3/UL (ref 1.6–5.5)
NEUTROPHILS NFR BLD AUTO: 61 %
NRBC BLD-RTO: 0 /100 WBCS (ref 0–0)
NRBC BLD-RTO: 0 /100 WBCS (ref 0–0)
PLATELET # BLD AUTO: 207 X10*3/UL (ref 150–450)
PLATELET # BLD AUTO: 233 X10*3/UL (ref 150–450)
RBC # BLD AUTO: 4.14 X10*6/UL (ref 4–5.2)
RBC # BLD AUTO: 4.17 X10*6/UL (ref 4–5.2)
RH FACTOR (ANTIGEN D): NORMAL
RH FACTOR (ANTIGEN D): NORMAL
WBC # BLD AUTO: 11.6 X10*3/UL (ref 4.4–11.3)
WBC # BLD AUTO: 12.1 X10*3/UL (ref 4.4–11.3)

## 2025-07-30 PROCEDURE — 2500000004 HC RX 250 GENERAL PHARMACY W/ HCPCS (ALT 636 FOR OP/ED): Performed by: ANESTHESIOLOGY

## 2025-07-30 PROCEDURE — 7100000011 HC EXTENDED STAY RECOVERY HOURLY - NURSING UNIT

## 2025-07-30 PROCEDURE — 99222 1ST HOSP IP/OBS MODERATE 55: CPT | Performed by: NURSE PRACTITIONER

## 2025-07-30 PROCEDURE — C1713 ANCHOR/SCREW BN/BN,TIS/BN: HCPCS | Performed by: ORTHOPAEDIC SURGERY

## 2025-07-30 PROCEDURE — 72170 X-RAY EXAM OF PELVIS: CPT

## 2025-07-30 PROCEDURE — 2060000001 HC INTERMEDIATE ICU ROOM DAILY

## 2025-07-30 PROCEDURE — A6213 FOAM DRG >16<=48 SQ IN W/BDR: HCPCS | Performed by: ORTHOPAEDIC SURGERY

## 2025-07-30 PROCEDURE — 3600000017 HC OR TIME - EACH INCREMENTAL 1 MINUTE - PROCEDURE LEVEL SIX: Performed by: ORTHOPAEDIC SURGERY

## 2025-07-30 PROCEDURE — 2780000003 HC OR 278 NO HCPCS: Performed by: ORTHOPAEDIC SURGERY

## 2025-07-30 PROCEDURE — 2500000004 HC RX 250 GENERAL PHARMACY W/ HCPCS (ALT 636 FOR OP/ED): Performed by: ORTHOPAEDIC SURGERY

## 2025-07-30 PROCEDURE — 72170 X-RAY EXAM OF PELVIS: CPT | Performed by: RADIOLOGY

## 2025-07-30 PROCEDURE — 27130 TOTAL HIP ARTHROPLASTY: CPT

## 2025-07-30 PROCEDURE — 73501 X-RAY EXAM HIP UNI 1 VIEW: CPT | Mod: LT

## 2025-07-30 PROCEDURE — 2500000004 HC RX 250 GENERAL PHARMACY W/ HCPCS (ALT 636 FOR OP/ED): Performed by: REGISTERED NURSE

## 2025-07-30 PROCEDURE — 2500000004 HC RX 250 GENERAL PHARMACY W/ HCPCS (ALT 636 FOR OP/ED): Performed by: NURSE ANESTHETIST, CERTIFIED REGISTERED

## 2025-07-30 PROCEDURE — 82947 ASSAY GLUCOSE BLOOD QUANT: CPT

## 2025-07-30 PROCEDURE — C1776 JOINT DEVICE (IMPLANTABLE): HCPCS | Performed by: ORTHOPAEDIC SURGERY

## 2025-07-30 PROCEDURE — 2500000001 HC RX 250 WO HCPCS SELF ADMINISTERED DRUGS (ALT 637 FOR MEDICARE OP): Performed by: ORTHOPAEDIC SURGERY

## 2025-07-30 PROCEDURE — 36415 COLL VENOUS BLD VENIPUNCTURE: CPT | Performed by: ANESTHESIOLOGY

## 2025-07-30 PROCEDURE — RXMED WILLOW AMBULATORY MEDICATION CHARGE

## 2025-07-30 PROCEDURE — 7100000001 HC RECOVERY ROOM TIME - INITIAL BASE CHARGE: Performed by: ORTHOPAEDIC SURGERY

## 2025-07-30 PROCEDURE — 86901 BLOOD TYPING SEROLOGIC RH(D): CPT | Performed by: ANESTHESIOLOGY

## 2025-07-30 PROCEDURE — 2720000007 HC OR 272 NO HCPCS: Performed by: ORTHOPAEDIC SURGERY

## 2025-07-30 PROCEDURE — 2500000001 HC RX 250 WO HCPCS SELF ADMINISTERED DRUGS (ALT 637 FOR MEDICARE OP): Performed by: REGISTERED NURSE

## 2025-07-30 PROCEDURE — 3700000002 HC GENERAL ANESTHESIA TIME - EACH INCREMENTAL 1 MINUTE: Performed by: ORTHOPAEDIC SURGERY

## 2025-07-30 PROCEDURE — 85027 COMPLETE CBC AUTOMATED: CPT | Performed by: ANESTHESIOLOGY

## 2025-07-30 PROCEDURE — 2500000004 HC RX 250 GENERAL PHARMACY W/ HCPCS (ALT 636 FOR OP/ED): Performed by: NURSE PRACTITIONER

## 2025-07-30 PROCEDURE — 85025 COMPLETE CBC W/AUTO DIFF WBC: CPT | Performed by: ANESTHESIOLOGY

## 2025-07-30 PROCEDURE — 7100000002 HC RECOVERY ROOM TIME - EACH INCREMENTAL 1 MINUTE: Performed by: ORTHOPAEDIC SURGERY

## 2025-07-30 PROCEDURE — 3700000001 HC GENERAL ANESTHESIA TIME - INITIAL BASE CHARGE: Performed by: ORTHOPAEDIC SURGERY

## 2025-07-30 PROCEDURE — 2500000005 HC RX 250 GENERAL PHARMACY W/O HCPCS: Performed by: ORTHOPAEDIC SURGERY

## 2025-07-30 PROCEDURE — 2500000001 HC RX 250 WO HCPCS SELF ADMINISTERED DRUGS (ALT 637 FOR MEDICARE OP): Performed by: NURSE PRACTITIONER

## 2025-07-30 PROCEDURE — 27130 TOTAL HIP ARTHROPLASTY: CPT | Performed by: ORTHOPAEDIC SURGERY

## 2025-07-30 PROCEDURE — 3600000018 HC OR TIME - INITIAL BASE CHARGE - PROCEDURE LEVEL SIX: Performed by: ORTHOPAEDIC SURGERY

## 2025-07-30 DEVICE — STEM, FEMUR 127D SZ 5: Type: IMPLANTABLE DEVICE | Site: HIP | Status: FUNCTIONAL

## 2025-07-30 DEVICE — INSERT, TRIDENT X3 POLYETHYLENE, 0 DEG, 36MM D: Type: IMPLANTABLE DEVICE | Site: HIP | Status: NON-FUNCTIONAL

## 2025-07-30 DEVICE — SHELL, TRIDENT II, CLUSTERHOLE, 50D: Type: IMPLANTABLE DEVICE | Site: HIP | Status: FUNCTIONAL

## 2025-07-30 DEVICE — HEAD, FEMUR V40 36MM -2.5MM BIOLOX DELTA: Type: IMPLANTABLE DEVICE | Site: HIP | Status: FUNCTIONAL

## 2025-07-30 DEVICE — SCREW, LOW PROFILE HEX, 6.5 X 25 MM: Type: IMPLANTABLE DEVICE | Site: HIP | Status: FUNCTIONAL

## 2025-07-30 RX ORDER — ACETAMINOPHEN 325 MG/1
975 TABLET ORAL ONCE
Status: COMPLETED | OUTPATIENT
Start: 2025-07-30 | End: 2025-07-30

## 2025-07-30 RX ORDER — ROCURONIUM BROMIDE 10 MG/ML
INJECTION, SOLUTION INTRAVENOUS AS NEEDED
Status: DISCONTINUED | OUTPATIENT
Start: 2025-07-30 | End: 2025-07-30

## 2025-07-30 RX ORDER — FAMOTIDINE 10 MG/ML
20 INJECTION, SOLUTION INTRAVENOUS ONCE
Status: COMPLETED | OUTPATIENT
Start: 2025-07-30 | End: 2025-07-30

## 2025-07-30 RX ORDER — OXYCODONE HYDROCHLORIDE 5 MG/1
10 TABLET ORAL ONCE
Status: COMPLETED | OUTPATIENT
Start: 2025-07-30 | End: 2025-07-30

## 2025-07-30 RX ORDER — PHENYLEPHRINE HCL IN 0.9% NACL 0.4MG/10ML
SYRINGE (ML) INTRAVENOUS AS NEEDED
Status: DISCONTINUED | OUTPATIENT
Start: 2025-07-30 | End: 2025-07-30

## 2025-07-30 RX ORDER — MIDODRINE HYDROCHLORIDE 10 MG/1
10 TABLET ORAL ONCE
Status: COMPLETED | OUTPATIENT
Start: 2025-07-30 | End: 2025-07-30

## 2025-07-30 RX ORDER — PANTOPRAZOLE SODIUM 40 MG/1
40 TABLET, DELAYED RELEASE ORAL
Status: DISCONTINUED | OUTPATIENT
Start: 2025-07-31 | End: 2025-08-04 | Stop reason: HOSPADM

## 2025-07-30 RX ORDER — METOCLOPRAMIDE HYDROCHLORIDE 5 MG/ML
10 INJECTION INTRAMUSCULAR; INTRAVENOUS EVERY 6 HOURS PRN
Status: DISCONTINUED | OUTPATIENT
Start: 2025-07-30 | End: 2025-08-04 | Stop reason: HOSPADM

## 2025-07-30 RX ORDER — ROPIVACAINE/EPI/CLONIDINE/KET 2.46-0.005
SYRINGE (ML) INJECTION AS NEEDED
Status: DISCONTINUED | OUTPATIENT
Start: 2025-07-30 | End: 2025-07-30 | Stop reason: HOSPADM

## 2025-07-30 RX ORDER — ACETAMINOPHEN 325 MG/1
650 TABLET ORAL EVERY 6 HOURS SCHEDULED
Status: DISCONTINUED | OUTPATIENT
Start: 2025-07-30 | End: 2025-08-01

## 2025-07-30 RX ORDER — VALPROIC ACID 250 MG/5ML
750 SOLUTION ORAL NIGHTLY
Status: DISCONTINUED | OUTPATIENT
Start: 2025-07-30 | End: 2025-07-30

## 2025-07-30 RX ORDER — ONDANSETRON HYDROCHLORIDE 2 MG/ML
4 INJECTION, SOLUTION INTRAVENOUS ONCE
Status: COMPLETED | OUTPATIENT
Start: 2025-07-30 | End: 2025-07-30

## 2025-07-30 RX ORDER — ASPIRIN 81 MG/1
81 TABLET ORAL 2 TIMES DAILY
Status: DISCONTINUED | OUTPATIENT
Start: 2025-07-30 | End: 2025-08-04 | Stop reason: HOSPADM

## 2025-07-30 RX ORDER — SODIUM CHLORIDE, SODIUM LACTATE, POTASSIUM CHLORIDE, CALCIUM CHLORIDE 600; 310; 30; 20 MG/100ML; MG/100ML; MG/100ML; MG/100ML
100 INJECTION, SOLUTION INTRAVENOUS CONTINUOUS
Status: DISCONTINUED | OUTPATIENT
Start: 2025-07-30 | End: 2025-07-31

## 2025-07-30 RX ORDER — VANCOMYCIN HYDROCHLORIDE 1 G/20ML
1 INJECTION, POWDER, LYOPHILIZED, FOR SOLUTION INTRAVENOUS ONCE
Status: DISCONTINUED | OUTPATIENT
Start: 2025-07-30 | End: 2025-07-30 | Stop reason: HOSPADM

## 2025-07-30 RX ORDER — OXYCODONE HYDROCHLORIDE 10 MG/1
10 TABLET ORAL EVERY 4 HOURS PRN
Status: DISCONTINUED | OUTPATIENT
Start: 2025-07-30 | End: 2025-08-04 | Stop reason: HOSPADM

## 2025-07-30 RX ORDER — SODIUM CHLORIDE 9 MG/ML
75 INJECTION, SOLUTION INTRAVENOUS CONTINUOUS
Status: DISCONTINUED | OUTPATIENT
Start: 2025-07-30 | End: 2025-07-30

## 2025-07-30 RX ORDER — CYCLOBENZAPRINE HCL 10 MG
5 TABLET ORAL 3 TIMES DAILY PRN
Status: DISCONTINUED | OUTPATIENT
Start: 2025-07-30 | End: 2025-08-04 | Stop reason: HOSPADM

## 2025-07-30 RX ORDER — PHENYLEPHRINE 10 MG/250 ML(40 MCG/ML)IN 0.9 % SOD.CHLORIDE INTRAVENOUS
CONTINUOUS PRN
Status: DISCONTINUED | OUTPATIENT
Start: 2025-07-30 | End: 2025-07-30

## 2025-07-30 RX ORDER — ACETAMINOPHEN 325 MG/1
650 TABLET ORAL EVERY 4 HOURS PRN
Status: DISCONTINUED | OUTPATIENT
Start: 2025-07-30 | End: 2025-07-30

## 2025-07-30 RX ORDER — CEFAZOLIN SODIUM 2 G/50ML
2 SOLUTION INTRAVENOUS ONCE
Status: COMPLETED | OUTPATIENT
Start: 2025-07-30 | End: 2025-07-30

## 2025-07-30 RX ORDER — ALBUTEROL SULFATE 0.83 MG/ML
2.5 SOLUTION RESPIRATORY (INHALATION) ONCE
Status: DISCONTINUED | OUTPATIENT
Start: 2025-07-30 | End: 2025-07-30 | Stop reason: HOSPADM

## 2025-07-30 RX ORDER — NALOXONE HYDROCHLORIDE 0.4 MG/ML
0.2 INJECTION, SOLUTION INTRAMUSCULAR; INTRAVENOUS; SUBCUTANEOUS EVERY 5 MIN PRN
Status: DISCONTINUED | OUTPATIENT
Start: 2025-07-30 | End: 2025-08-01

## 2025-07-30 RX ORDER — VALPROIC ACID 250 MG/1
750 CAPSULE, LIQUID FILLED ORAL NIGHTLY
Status: DISCONTINUED | OUTPATIENT
Start: 2025-07-30 | End: 2025-07-30 | Stop reason: ALTCHOICE

## 2025-07-30 RX ORDER — AMOXICILLIN 250 MG
1 CAPSULE ORAL NIGHTLY
Status: DISCONTINUED | OUTPATIENT
Start: 2025-07-30 | End: 2025-07-30

## 2025-07-30 RX ORDER — MORPHINE SULFATE 2 MG/ML
1 INJECTION, SOLUTION INTRAMUSCULAR; INTRAVENOUS EVERY 5 MIN PRN
Status: DISCONTINUED | OUTPATIENT
Start: 2025-07-30 | End: 2025-07-30 | Stop reason: HOSPADM

## 2025-07-30 RX ORDER — ONDANSETRON HYDROCHLORIDE 2 MG/ML
4 INJECTION, SOLUTION INTRAVENOUS EVERY 8 HOURS PRN
Status: DISCONTINUED | OUTPATIENT
Start: 2025-07-30 | End: 2025-08-04 | Stop reason: HOSPADM

## 2025-07-30 RX ORDER — ONDANSETRON HYDROCHLORIDE 2 MG/ML
4 INJECTION, SOLUTION INTRAVENOUS ONCE AS NEEDED
Status: COMPLETED | OUTPATIENT
Start: 2025-07-30 | End: 2025-07-30

## 2025-07-30 RX ORDER — SULFAMETHOXAZOLE AND TRIMETHOPRIM 800; 160 MG/1; MG/1
1 TABLET ORAL 2 TIMES DAILY
COMMUNITY

## 2025-07-30 RX ORDER — TRANEXAMIC ACID 1 G/10ML
1000 INJECTION, SOLUTION INTRAVENOUS 2 TIMES DAILY
Status: DISCONTINUED | OUTPATIENT
Start: 2025-07-30 | End: 2025-07-30 | Stop reason: HOSPADM

## 2025-07-30 RX ORDER — DIPHENHYDRAMINE HCL 25 MG
25 CAPSULE ORAL EVERY 6 HOURS PRN
Status: DISCONTINUED | OUTPATIENT
Start: 2025-07-30 | End: 2025-08-04 | Stop reason: HOSPADM

## 2025-07-30 RX ORDER — SENNOSIDES 8.6 MG/1
2 TABLET ORAL 2 TIMES DAILY
Status: DISCONTINUED | OUTPATIENT
Start: 2025-07-30 | End: 2025-08-04 | Stop reason: HOSPADM

## 2025-07-30 RX ORDER — VASOPRESSIN 20 [USP'U]/ML
INJECTION, SOLUTION INTRAVENOUS AS NEEDED
Status: DISCONTINUED | OUTPATIENT
Start: 2025-07-30 | End: 2025-07-30

## 2025-07-30 RX ORDER — MORPHINE SULFATE 2 MG/ML
2 INJECTION, SOLUTION INTRAMUSCULAR; INTRAVENOUS EVERY 5 MIN PRN
Status: DISCONTINUED | OUTPATIENT
Start: 2025-07-30 | End: 2025-07-30 | Stop reason: HOSPADM

## 2025-07-30 RX ORDER — OXYCODONE HYDROCHLORIDE 5 MG/1
5 TABLET ORAL EVERY 6 HOURS PRN
Status: DISCONTINUED | OUTPATIENT
Start: 2025-07-30 | End: 2025-08-04 | Stop reason: HOSPADM

## 2025-07-30 RX ORDER — ATORVASTATIN CALCIUM 40 MG/1
80 TABLET, FILM COATED ORAL NIGHTLY
Status: DISCONTINUED | OUTPATIENT
Start: 2025-07-30 | End: 2025-08-01

## 2025-07-30 RX ORDER — LIDOCAINE HYDROCHLORIDE 20 MG/ML
INJECTION, SOLUTION INFILTRATION; PERINEURAL AS NEEDED
Status: DISCONTINUED | OUTPATIENT
Start: 2025-07-30 | End: 2025-07-30

## 2025-07-30 RX ORDER — VENLAFAXINE HYDROCHLORIDE 75 MG/1
75 CAPSULE, EXTENDED RELEASE ORAL
Status: DISCONTINUED | OUTPATIENT
Start: 2025-07-31 | End: 2025-08-01

## 2025-07-30 RX ORDER — CEFAZOLIN SODIUM 2 G/50ML
2 SOLUTION INTRAVENOUS EVERY 8 HOURS
Status: COMPLETED | OUTPATIENT
Start: 2025-07-30 | End: 2025-07-31

## 2025-07-30 RX ORDER — FENTANYL CITRATE 50 UG/ML
INJECTION, SOLUTION INTRAMUSCULAR; INTRAVENOUS AS NEEDED
Status: DISCONTINUED | OUTPATIENT
Start: 2025-07-30 | End: 2025-07-30

## 2025-07-30 RX ORDER — SODIUM CHLORIDE, SODIUM LACTATE, POTASSIUM CHLORIDE, CALCIUM CHLORIDE 600; 310; 30; 20 MG/100ML; MG/100ML; MG/100ML; MG/100ML
100 INJECTION, SOLUTION INTRAVENOUS CONTINUOUS
Status: DISCONTINUED | OUTPATIENT
Start: 2025-07-30 | End: 2025-07-30

## 2025-07-30 RX ORDER — METOCLOPRAMIDE 5 MG/1
10 TABLET ORAL EVERY 6 HOURS PRN
Status: DISCONTINUED | OUTPATIENT
Start: 2025-07-30 | End: 2025-08-04 | Stop reason: HOSPADM

## 2025-07-30 RX ORDER — ONDANSETRON 4 MG/1
4 TABLET, ORALLY DISINTEGRATING ORAL EVERY 8 HOURS PRN
Status: DISCONTINUED | OUTPATIENT
Start: 2025-07-30 | End: 2025-08-04 | Stop reason: HOSPADM

## 2025-07-30 RX ORDER — BUSPIRONE HYDROCHLORIDE 5 MG/1
5 TABLET ORAL 2 TIMES DAILY
Status: DISCONTINUED | OUTPATIENT
Start: 2025-07-30 | End: 2025-08-01

## 2025-07-30 RX ORDER — VANCOMYCIN HYDROCHLORIDE 1 G/20ML
INJECTION, POWDER, LYOPHILIZED, FOR SOLUTION INTRAVENOUS AS NEEDED
Status: DISCONTINUED | OUTPATIENT
Start: 2025-07-30 | End: 2025-07-30 | Stop reason: HOSPADM

## 2025-07-30 RX ORDER — BISACODYL 5 MG
10 TABLET, DELAYED RELEASE (ENTERIC COATED) ORAL DAILY PRN
Status: DISCONTINUED | OUTPATIENT
Start: 2025-07-30 | End: 2025-08-04 | Stop reason: HOSPADM

## 2025-07-30 RX ORDER — PROPOFOL 10 MG/ML
INJECTION, EMULSION INTRAVENOUS AS NEEDED
Status: DISCONTINUED | OUTPATIENT
Start: 2025-07-30 | End: 2025-07-30

## 2025-07-30 RX ORDER — TRANEXAMIC ACID 10 MG/ML
INJECTION, SOLUTION INTRAVENOUS AS NEEDED
Status: DISCONTINUED | OUTPATIENT
Start: 2025-07-30 | End: 2025-07-30

## 2025-07-30 RX ORDER — VALPROIC ACID 250 MG/1
750 CAPSULE, LIQUID FILLED ORAL NIGHTLY
Status: DISCONTINUED | OUTPATIENT
Start: 2025-07-30 | End: 2025-08-01

## 2025-07-30 RX ADMIN — Medication 100 MCG: at 09:31

## 2025-07-30 RX ADMIN — SODIUM CHLORIDE, SODIUM LACTATE, POTASSIUM CHLORIDE, AND CALCIUM CHLORIDE 250 ML: .6; .31; .03; .02 INJECTION, SOLUTION INTRAVENOUS at 11:35

## 2025-07-30 RX ADMIN — Medication 200 MCG: at 09:18

## 2025-07-30 RX ADMIN — SODIUM CHLORIDE 75 ML/HR: 9 INJECTION, SOLUTION INTRAVENOUS at 07:17

## 2025-07-30 RX ADMIN — SUGAMMADEX 200 MG: 100 INJECTION, SOLUTION INTRAVENOUS at 09:50

## 2025-07-30 RX ADMIN — CEFAZOLIN SODIUM 2 G: 2 SOLUTION INTRAVENOUS at 17:33

## 2025-07-30 RX ADMIN — SODIUM CHLORIDE, SODIUM LACTATE, POTASSIUM CHLORIDE, AND CALCIUM CHLORIDE 250 ML: .6; .31; .03; .02 INJECTION, SOLUTION INTRAVENOUS at 12:44

## 2025-07-30 RX ADMIN — Medication 100 MCG: at 08:50

## 2025-07-30 RX ADMIN — VASOPRESSIN 5 UNITS: 20 INJECTION INTRAVENOUS at 08:46

## 2025-07-30 RX ADMIN — MORPHINE SULFATE 2 MG: 2 INJECTION, SOLUTION INTRAMUSCULAR; INTRAVENOUS at 10:54

## 2025-07-30 RX ADMIN — Medication 100 MCG: at 09:21

## 2025-07-30 RX ADMIN — MORPHINE SULFATE 2 MG: 2 INJECTION, SOLUTION INTRAMUSCULAR; INTRAVENOUS at 10:21

## 2025-07-30 RX ADMIN — SODIUM CHLORIDE, SODIUM LACTATE, POTASSIUM CHLORIDE, AND CALCIUM CHLORIDE 500 ML: .6; .31; .03; .02 INJECTION, SOLUTION INTRAVENOUS at 21:11

## 2025-07-30 RX ADMIN — ASPIRIN 81 MG: 81 TABLET, DELAYED RELEASE ORAL at 20:22

## 2025-07-30 RX ADMIN — PHENYLEPHRINE-NACL IV SOLUTION 10 MG/250ML-0.9% 0.2 MCG/KG/MIN: 10-0.9/25 SOLUTION at 08:08

## 2025-07-30 RX ADMIN — CEFAZOLIN SODIUM 2 G: 2 SOLUTION INTRAVENOUS at 07:51

## 2025-07-30 RX ADMIN — FAMOTIDINE 20 MG: 10 INJECTION, SOLUTION INTRAVENOUS at 07:13

## 2025-07-30 RX ADMIN — FENTANYL CITRATE 25 MCG: 50 INJECTION INTRAMUSCULAR; INTRAVENOUS at 07:57

## 2025-07-30 RX ADMIN — POVIDONE-IODINE 1 APPLICATION: 5 SOLUTION TOPICAL at 07:15

## 2025-07-30 RX ADMIN — FENTANYL CITRATE 25 MCG: 50 INJECTION INTRAMUSCULAR; INTRAVENOUS at 09:55

## 2025-07-30 RX ADMIN — ACETAMINOPHEN 975 MG: 325 TABLET ORAL at 07:13

## 2025-07-30 RX ADMIN — VALPROIC ACID 750 MG: 250 CAPSULE, LIQUID FILLED ORAL at 22:12

## 2025-07-30 RX ADMIN — MIDODRINE HYDROCHLORIDE 10 MG: 10 TABLET ORAL at 22:29

## 2025-07-30 RX ADMIN — TRANEXAMIC ACID 1000 MG: 10 INJECTION, SOLUTION INTRAVENOUS at 08:00

## 2025-07-30 RX ADMIN — DIPHENHYDRAMINE HYDROCHLORIDE 25 MG: 25 CAPSULE ORAL at 22:29

## 2025-07-30 RX ADMIN — DEXAMETHASONE SODIUM PHOSPHATE 4 MG: 4 INJECTION INTRA-ARTICULAR; INTRALESIONAL; INTRAMUSCULAR; INTRAVENOUS; SOFT TISSUE at 08:08

## 2025-07-30 RX ADMIN — PROPOFOL 70 MG: 10 INJECTION, EMULSION INTRAVENOUS at 07:45

## 2025-07-30 RX ADMIN — VASOPRESSIN 3 UNITS: 20 INJECTION INTRAVENOUS at 09:00

## 2025-07-30 RX ADMIN — ACETAMINOPHEN 650 MG: 325 TABLET ORAL at 17:32

## 2025-07-30 RX ADMIN — VASOPRESSIN 3 UNITS: 20 INJECTION INTRAVENOUS at 09:26

## 2025-07-30 RX ADMIN — SODIUM CHLORIDE 500 ML: 0.9 INJECTION, SOLUTION INTRAVENOUS at 15:02

## 2025-07-30 RX ADMIN — FENTANYL CITRATE 25 MCG: 50 INJECTION INTRAMUSCULAR; INTRAVENOUS at 07:54

## 2025-07-30 RX ADMIN — TRANEXAMIC ACID 1000 MG: 10 INJECTION, SOLUTION INTRAVENOUS at 09:25

## 2025-07-30 RX ADMIN — Medication 100 MCG: at 08:04

## 2025-07-30 RX ADMIN — Medication 100 MCG: at 09:26

## 2025-07-30 RX ADMIN — Medication 100 MCG: at 08:00

## 2025-07-30 RX ADMIN — SENNOSIDES 17.2 MG: 8.6 TABLET, FILM COATED ORAL at 17:32

## 2025-07-30 RX ADMIN — ROCURONIUM BROMIDE 10 MG: 10 INJECTION, SOLUTION INTRAVENOUS at 09:01

## 2025-07-30 RX ADMIN — LIDOCAINE HYDROCHLORIDE 70 MG: 20 INJECTION, SOLUTION INFILTRATION; PERINEURAL at 07:45

## 2025-07-30 RX ADMIN — Medication 100 MCG: at 08:57

## 2025-07-30 RX ADMIN — ONDANSETRON 4 MG: 2 INJECTION, SOLUTION INTRAMUSCULAR; INTRAVENOUS at 10:27

## 2025-07-30 RX ADMIN — Medication 100 MCG: at 07:45

## 2025-07-30 RX ADMIN — Medication 100 MCG: at 07:56

## 2025-07-30 RX ADMIN — MORPHINE SULFATE 2 MG: 2 INJECTION, SOLUTION INTRAMUSCULAR; INTRAVENOUS at 10:29

## 2025-07-30 RX ADMIN — ONDANSETRON 4 MG: 2 INJECTION, SOLUTION INTRAMUSCULAR; INTRAVENOUS at 07:13

## 2025-07-30 RX ADMIN — ROCURONIUM BROMIDE 40 MG: 10 INJECTION, SOLUTION INTRAVENOUS at 07:45

## 2025-07-30 RX ADMIN — OXYCODONE 10 MG: 5 TABLET ORAL at 07:17

## 2025-07-30 RX ADMIN — VASOPRESSIN 5 UNITS: 20 INJECTION INTRAVENOUS at 09:32

## 2025-07-30 SDOH — SOCIAL STABILITY: SOCIAL INSECURITY: HAS ANYONE EVER THREATENED TO HURT YOUR FAMILY OR YOUR PETS?: NO

## 2025-07-30 SDOH — ECONOMIC STABILITY: INCOME INSECURITY: IN THE PAST 12 MONTHS HAS THE ELECTRIC, GAS, OIL, OR WATER COMPANY THREATENED TO SHUT OFF SERVICES IN YOUR HOME?: NO

## 2025-07-30 SDOH — SOCIAL STABILITY: SOCIAL INSECURITY
WITHIN THE LAST YEAR, HAVE YOU BEEN RAPED OR FORCED TO HAVE ANY KIND OF SEXUAL ACTIVITY BY YOUR PARTNER OR EX-PARTNER?: NO

## 2025-07-30 SDOH — SOCIAL STABILITY: SOCIAL INSECURITY: DO YOU FEEL UNSAFE GOING BACK TO THE PLACE WHERE YOU ARE LIVING?: NO

## 2025-07-30 SDOH — SOCIAL STABILITY: SOCIAL INSECURITY: WITHIN THE LAST YEAR, HAVE YOU BEEN AFRAID OF YOUR PARTNER OR EX-PARTNER?: NO

## 2025-07-30 SDOH — SOCIAL STABILITY: SOCIAL INSECURITY: WITHIN THE LAST YEAR, HAVE YOU BEEN HUMILIATED OR EMOTIONALLY ABUSED IN OTHER WAYS BY YOUR PARTNER OR EX-PARTNER?: NO

## 2025-07-30 SDOH — ECONOMIC STABILITY: FOOD INSECURITY: WITHIN THE PAST 12 MONTHS, YOU WORRIED THAT YOUR FOOD WOULD RUN OUT BEFORE YOU GOT THE MONEY TO BUY MORE.: NEVER TRUE

## 2025-07-30 SDOH — SOCIAL STABILITY: SOCIAL INSECURITY: HAVE YOU HAD THOUGHTS OF HARMING ANYONE ELSE?: NO

## 2025-07-30 SDOH — SOCIAL STABILITY: SOCIAL INSECURITY: DOES ANYONE TRY TO KEEP YOU FROM HAVING/CONTACTING OTHER FRIENDS OR DOING THINGS OUTSIDE YOUR HOME?: NO

## 2025-07-30 SDOH — SOCIAL STABILITY: SOCIAL INSECURITY: ARE YOU OR HAVE YOU BEEN THREATENED OR ABUSED PHYSICALLY, EMOTIONALLY, OR SEXUALLY BY ANYONE?: NO

## 2025-07-30 SDOH — ECONOMIC STABILITY: FOOD INSECURITY: WITHIN THE PAST 12 MONTHS, THE FOOD YOU BOUGHT JUST DIDN'T LAST AND YOU DIDN'T HAVE MONEY TO GET MORE.: NEVER TRUE

## 2025-07-30 SDOH — SOCIAL STABILITY: SOCIAL INSECURITY: DO YOU FEEL ANYONE HAS EXPLOITED OR TAKEN ADVANTAGE OF YOU FINANCIALLY OR OF YOUR PERSONAL PROPERTY?: NO

## 2025-07-30 SDOH — SOCIAL STABILITY: SOCIAL INSECURITY: ARE THERE ANY APPARENT SIGNS OF INJURIES/BEHAVIORS THAT COULD BE RELATED TO ABUSE/NEGLECT?: NO

## 2025-07-30 SDOH — SOCIAL STABILITY: SOCIAL INSECURITY: ABUSE: ADULT

## 2025-07-30 SDOH — SOCIAL STABILITY: SOCIAL INSECURITY: WERE YOU ABLE TO COMPLETE ALL THE BEHAVIORAL HEALTH SCREENINGS?: YES

## 2025-07-30 SDOH — HEALTH STABILITY: MENTAL HEALTH: CURRENT SMOKER: 0

## 2025-07-30 ASSESSMENT — PAIN - FUNCTIONAL ASSESSMENT
PAIN_FUNCTIONAL_ASSESSMENT: 0-10

## 2025-07-30 ASSESSMENT — PAIN SCALES - GENERAL
PAINLEVEL_OUTOF10: 8
PAIN_LEVEL: 5
PAINLEVEL_OUTOF10: 5 - MODERATE PAIN
PAINLEVEL_OUTOF10: 0 - NO PAIN
PAINLEVEL_OUTOF10: 8
PAINLEVEL_OUTOF10: 0 - NO PAIN
PAINLEVEL_OUTOF10: 7
PAINLEVEL_OUTOF10: 7

## 2025-07-30 ASSESSMENT — ACTIVITIES OF DAILY LIVING (ADL)
JUDGMENT_ADEQUATE_SAFELY_COMPLETE_DAILY_ACTIVITIES: YES
BATHING: INDEPENDENT
ADEQUATE_TO_COMPLETE_ADL: YES
ASSISTIVE_DEVICE: EYEGLASSES
LACK_OF_TRANSPORTATION: NO
HEARING - LEFT EAR: HEARING AID
PATIENT'S MEMORY ADEQUATE TO SAFELY COMPLETE DAILY ACTIVITIES?: YES
WALKS IN HOME: INDEPENDENT
HEARING - RIGHT EAR: FUNCTIONAL
FEEDING YOURSELF: INDEPENDENT
TOILETING: INDEPENDENT
DRESSING YOURSELF: INDEPENDENT
GROOMING: INDEPENDENT

## 2025-07-30 ASSESSMENT — PATIENT HEALTH QUESTIONNAIRE - PHQ9
SUM OF ALL RESPONSES TO PHQ9 QUESTIONS 1 & 2: 0
1. LITTLE INTEREST OR PLEASURE IN DOING THINGS: NOT AT ALL
2. FEELING DOWN, DEPRESSED OR HOPELESS: NOT AT ALL

## 2025-07-30 ASSESSMENT — LIFESTYLE VARIABLES
SKIP TO QUESTIONS 9-10: 1
HOW MANY STANDARD DRINKS CONTAINING ALCOHOL DO YOU HAVE ON A TYPICAL DAY: PATIENT DOES NOT DRINK
AUDIT-C TOTAL SCORE: 0
HOW OFTEN DO YOU HAVE A DRINK CONTAINING ALCOHOL: NEVER
AUDIT-C TOTAL SCORE: 0
HOW OFTEN DO YOU HAVE 6 OR MORE DRINKS ON ONE OCCASION: NEVER

## 2025-07-30 ASSESSMENT — COGNITIVE AND FUNCTIONAL STATUS - GENERAL
HELP NEEDED FOR BATHING: A LOT
MOVING FROM LYING ON BACK TO SITTING ON SIDE OF FLAT BED WITH BEDRAILS: A LOT
MOBILITY SCORE: 12
STANDING UP FROM CHAIR USING ARMS: A LOT
WALKING IN HOSPITAL ROOM: A LOT
DAILY ACTIVITIY SCORE: 17
DRESSING REGULAR LOWER BODY CLOTHING: A LOT
TOILETING: A LOT
PATIENT BASELINE BEDBOUND: NO
CLIMB 3 TO 5 STEPS WITH RAILING: A LOT
DRESSING REGULAR UPPER BODY CLOTHING: A LITTLE
MOVING TO AND FROM BED TO CHAIR: A LOT
TURNING FROM BACK TO SIDE WHILE IN FLAT BAD: A LOT

## 2025-07-30 ASSESSMENT — PAIN DESCRIPTION - DESCRIPTORS: DESCRIPTORS: DISCOMFORT

## 2025-07-30 ASSESSMENT — COLUMBIA-SUICIDE SEVERITY RATING SCALE - C-SSRS
6. HAVE YOU EVER DONE ANYTHING, STARTED TO DO ANYTHING, OR PREPARED TO DO ANYTHING TO END YOUR LIFE?: NO
1. IN THE PAST MONTH, HAVE YOU WISHED YOU WERE DEAD OR WISHED YOU COULD GO TO SLEEP AND NOT WAKE UP?: NO
2. HAVE YOU ACTUALLY HAD ANY THOUGHTS OF KILLING YOURSELF?: NO

## 2025-07-30 ASSESSMENT — PAIN DESCRIPTION - ORIENTATION: ORIENTATION: LEFT

## 2025-07-30 ASSESSMENT — PAIN DESCRIPTION - LOCATION: LOCATION: HIP

## 2025-07-30 NOTE — CONSULTS
"[facility] MEDICINE CONSULT NOTE    History Of Present Illness     Sue Vail is an 80 y.o. female with PMHx of seizure disorder, NHL, melanoma, HFrEF, anxiety, PTSD and nonobstructive CAD who is POD #0 left total hip arthroplasty.  Postoperatively her blood pressures were persistently low to the 80s-90s systolic, lowest reading was 76/42. She was given  ml bolus. At the bedside she continues to be asymptomatic. She denies dyspnea, chest pain, nausea and dizziness. She says her past systolic readings were 120-130 mmHg but she stopped regularly checking her BP about a year ago. EMR review reveals her BP since January until June had been 120-132/64-84. On 7/1/25 her cardiologist maximized her Entresto dose. Her BP was 116/70 that day and was 111/82 at a medical visit on 7/21/25. Remainder of ROS reviewed and negative except as indicated in HPI.     Objective     Visit Vitals  BP (!) 93/45 (BP Location: Right arm) Comment: Simultaneous filing. User may not have seen previous data.   Pulse 80   Temp 36.3 °C (97.3 °F) (Temporal)   Resp 14   Ht 1.575 m (5' 2\")   Wt 81.2 kg (179 lb)   LMP  (LMP Unknown)   SpO2 98%   BMI 32.74 kg/m²   OB Status Postmenopausal   Smoking Status Never   BSA 1.88 m²       Vitals:    07/30/25 0636   Weight: 81.2 kg (179 lb)       Scheduled medications  Scheduled Medications[1]  Continuous medications  Continuous Medications[2]  PRN medications  PRN Medications[3]    The following labwork was reviewed:  Results for orders placed or performed during the hospital encounter of 07/30/25 (from the past 24 hours)   POCT GLUCOSE   Result Value Ref Range    POCT Glucose 174 (H) 74 - 99 mg/dL   Type And Screen   Result Value Ref Range    ABO TYPE A     Rh TYPE POS     ANTIBODY SCREEN NEG    CBC   Result Value Ref Range    WBC 12.1 (H) 4.4 - 11.3 x10*3/uL    nRBC 0.0 0.0 - 0.0 /100 WBCs    RBC 4.17 4.00 - 5.20 x10*6/uL    Hemoglobin 13.9 12.0 - 16.0 g/dL    Hematocrit 41.3 36.0 - 46.0 %    " MCV 99 80 - 100 fL    MCH 33.3 26.0 - 34.0 pg    MCHC 33.7 32.0 - 36.0 g/dL    RDW 14.3 11.5 - 14.5 %    Platelets 207 150 - 450 x10*3/uL   VERIFY ABO/Rh Group Test   Result Value Ref Range    ABO TYPE A     Rh TYPE POS    CBC and Auto Differential   Result Value Ref Range    WBC 11.6 (H) 4.4 - 11.3 x10*3/uL    nRBC 0.0 0.0 - 0.0 /100 WBCs    RBC 4.14 4.00 - 5.20 x10*6/uL    Hemoglobin 13.6 12.0 - 16.0 g/dL    Hematocrit 41.0 36.0 - 46.0 %    MCV 99 80 - 100 fL    MCH 32.9 26.0 - 34.0 pg    MCHC 33.2 32.0 - 36.0 g/dL    RDW 14.6 (H) 11.5 - 14.5 %    Platelets 233 150 - 450 x10*3/uL    Neutrophils % 61.0 40.0 - 80.0 %    Immature Granulocytes %, Automated 0.8 0.0 - 0.9 %    Lymphocytes % 7.6 13.0 - 44.0 %    Monocytes % 6.0 2.0 - 10.0 %    Eosinophils % 24.2 0.0 - 6.0 %    Basophils % 0.4 0.0 - 2.0 %    Neutrophils Absolute 7.09 (H) 1.60 - 5.50 x10*3/uL    Immature Granulocytes Absolute, Automated 0.09 0.00 - 0.50 x10*3/uL    Lymphocytes Absolute 0.89 0.80 - 3.00 x10*3/uL    Monocytes Absolute 0.70 0.05 - 0.80 x10*3/uL    Eosinophils Absolute 2.82 (H) 0.00 - 0.40 x10*3/uL    Basophils Absolute 0.05 0.00 - 0.10 x10*3/uL       The following imaging was reviewed:  XR pelvis 1-2 views  Result Date: 7/30/2025  Satisfactory appearance status post left hip arthroplasty.   Signed by: Edward Walters 7/30/2025 10:59 AM Dictation workstation:   [id number]      Medical History[4]    Surgical History[5]    Social History[6]    Family History[7]    Allergies  Ace inhibitors, Adhesive tape-silicones, Beta-blockers (beta-adrenergic blocking agts), Fluoxetine, Nsaids (non-steroidal anti-inflammatory drug), Olanzapine, Penicillins, and Poison ivy extract    I personally reviewed all pertinent labwork, imaging and vital signs, as well as medications, nursing, therapy, discharge planning and consult notes.     Constitutional: Well developed, awake, alert, calm, oriented x4, no acute distress, cooperative   Eyes: EOMI, clear sclerae    ENMT: mucous membranes moist, no lesions seen   Head/Neck: Neck supple, no apparent injury, head atraumatic   Respiratory/Thorax: CTAB, good chest expansion, respirations even and unlabored   Cardiovascular: Regular rate and rhythm, no murmurs/rubs/gallops, normal S1 and S 2   Gastrointestinal: Abdomen nondistended, soft, nontender, +BS, no bruits   Musculoskeletal: ROM intact, no joint swelling, normal  strength, LLE deferred   Extremities: no cyanosis, contusions or clubbing, or edema   Neurological: no focal deficit, pt alert and oriented x4   Psychological: Appropriate affect and behavior, pleasant   Skin: Warm and dry, no lesions, no rashes       Assessment/Plan     Postoperative hypotension  Pt has no prior hx of hypotension  Hold home vasoactive meds for now (see below) and monitor on telemetry  BP did respond appropriately to fluid resuscitation   Suspect her hypotension is anesthesia-mediated    Osteoarthritis POD #0 left total hip arthroplasty  Management per primary surgery service   Continue pain and bowel regimen, pt advised to stay ahead of pain    Seizure disorder   Anxiety/PTSD   Hx nonobstructive CAD   Continue VPA, Buspar, Effexor, ASA and Lipitor     Chronic HFrEF   On 7/1/25 her cardiologist maximized pt's Entresto dose  Hold carvedilol, Entresto, spironolactone, empagliflozin, Lasix and trazodone for now  Hydralazine and Imdur have been held to allow for uptitration of Entresto per pt's cardiologist    DVT ppx  ASA 81mg BID x 4 weeks per ortho surgery protocol    Nilda Robles, CNP  [facility] Medicine         [1] acetaminophen, 650 mg, oral, q6h FAN  aspirin, 81 mg, oral, BID  atorvastatin, 80 mg, oral, Nightly  busPIRone, 5 mg, oral, BID  ceFAZolin, 2 g, intravenous, q8h  lactated Ringer's, 250 mL, intravenous, Once  [START ON 7/31/2025] pantoprazole, 40 mg, oral, Daily before breakfast  sennosides, 2 tablet, oral, BID  valproic acid, 750 mg, oral, Nightly  vancomycin, 1 g,  Topical, Once  [START ON 7/31/2025] venlafaxine XR, 75 mg, oral, Daily with breakfast  [2] lactated Ringer's, 100 mL/hr  oxygen,   sodium chloride 0.9%, 75 mL/hr, Last Rate: Stopped (07/30/25 1006)  [3] PRN medications: bisacodyl, cyclobenzaprine, metoclopramide **OR** metoclopramide, morphine, morphine, naloxone, ondansetron ODT **OR** ondansetron, oxyCODONE, oxyCODONE  [4]   Past Medical History:  Diagnosis Date    Bipolar disorder     CAD (coronary artery disease)     nonobstructive    HFrEF (heart failure with reduced ejection fraction)     Melanoma (Multi)     NHL (non-Hodgkin's lymphoma)     PTSD (post-traumatic stress disorder)     Seizure disorder (Multi)    [5]   Past Surgical History:  Procedure Laterality Date    CARDIAC CATHETERIZATION N/A 06/27/2024    Procedure: Left And Right Heart Cath, With LV;  Surgeon: Kate Cazares MD;  Location: [facility];  Service: Cardiovascular;  Laterality: N/A;    EYE SURGERY      GASTRIC BYPASS      Cecilio-en-Y    NECK SURGERY      TOTAL HIP ARTHROPLASTY Left 07/30/2025   [6]   Social History  Tobacco Use    Smoking status: Never    Smokeless tobacco: Never   Vaping Use    Vaping status: Never Used   Substance Use Topics    Alcohol use: Yes     Alcohol/week: 1.0 standard drink of alcohol     Types: 1 Glasses of wine per week     Comment: Only celebrations. One bottle with  over 1 week.    Drug use: Never   [7]   Family History  Problem Relation Name Age of Onset    Breast cancer Mother      Dementia Father      Alzheimer's disease Father      Prostate cancer Father      Tremor Father      Pulmonary embolism Daughter      Pulmonary embolism Daughter      Other (cardiomegaly) Maternal Grandmother      Alcohol abuse Mother Lyn Vail 10 - 19    Depression Mother Lyn Vail     Cancer Mother Lyn Vail     Osteoporosis Mother Lyn Yovanny Vail     Alcohol abuse Father Julian Lockwood Genna, Sr. 40 - 49     COPD Father Julian Fabián Vail, Sr. 10 - 19    Depression Father Julian Fabián Vail, Sr. 10 - 19    Cancer Father Julian Fabián Vail, Sr.     Asthma Father Julian Fabián Vail, Sr.     Hearing loss Father Julian Fabián Vail, Sr. 60 - 69    Obesity Father Julian Fabián Vail, Sr.     Lung disease Father Julian Fabián Vail, Sr.     Heart disease Maternal Grandmother Jeni Yovanny 40 - 49    Abnormal EKG Maternal Grandmother Jeni Yovanny     Cancer Paternal Grandmother Brook Fabián Vail Young     Diabetes Mother's Sister Cora Yovanny     Depression Brother Julian Vail, Sr.     Depression Sister Amrita Hairston Genna     Diabetes type I Mother's Sister Don't know     Asthma Brother Julian Vail Jr.     Cancer Paternal Grandfather Thomas Genna     Diabetes type I Mother's Sister Don't know     Cancer Other Self 30 - 39    Vision loss Other Self 40 - 49

## 2025-07-30 NOTE — PROGRESS NOTES
Physical Therapy                 Therapy Communication Note    Patient Name: Sue Vail  MRN: [id number]  Department: [facility]   Room: 2012/2012-A  Today's Date: 7/30/2025     Discipline: Physical Therapy    Missed Visit: PT Missed Visit: Yes     Missed Visit Reason: Missed Visit Reason: Patient placed on medical hold (Discussed with RN. Patient recent blood pressure was 76/43. Will hold therapy at this time and will re-attempt PT eval when appropriate.)    Missed Time: Attempt    Comment:        Completion of this session, clinical decision making, and documentation performed under the supervision/direction of Zeynep Landaverde.      JOVAN FOSTER-PT

## 2025-07-30 NOTE — ANESTHESIA PROCEDURE NOTES
Airway  Date/Time: 7/30/2025 7:46 AM  Reason: elective      Staffing  Performed: CRNA   Authorized by: RAÚL Hernandez    Performed by: RAÚL Hernandez  Patient location during procedure: OR    Patient Condition  Indications for airway management: anesthesia  Patient position: sniffing  Sedation level: deep     Final Airway Details   Preoxygenated: yes  Final airway type: endotracheal airway  Successful airway: ETT  Cuffed: yes   Successful intubation technique: video laryngoscopy  Adjuncts used in placement: intubating stylet  Endotracheal tube insertion site: oral  Blade: Ricardo  Blade size: #3  ETT size (mm): 7.0  Cormack-Lehane Classification: grade I - full view of glottis  Placement verified by: chest auscultation and capnometry   Measured from: gums  ETT to gums (cm): 22  Number of attempts at approach: 1

## 2025-07-30 NOTE — ANESTHESIA POSTPROCEDURE EVALUATION
Patient: Sue Vail    Procedure Summary       Date: 07/30/25 Room / Location: [facility] OR 07 / Virtual [facility]    Anesthesia Start: 0736 Anesthesia Stop: 1007    Procedure: Left Total Hip Arthroplasty *23 HR OBS* (CARMEN INSIGNIA) (Left: Hip) Diagnosis:       Primary osteoarthritis of left hip      (Primary osteoarthritis of left hip [M16.12])    Surgeons: Cj Burton DO Responsible Provider: RAÚL Hernandez    Anesthesia Type: general ASA Status: 3            Anesthesia Type: general    Vitals Value Taken Time   BP 91/41 07/30/25 13:00   Temp 36.3 °C (97.3 °F) 07/30/25 12:00   Pulse 81 07/30/25 13:14   Resp 16 07/30/25 13:14   SpO2 98 % 07/30/25 13:14   Vitals shown include unfiled device data.    Anesthesia Post Evaluation    Patient location during evaluation: PACU  Patient participation: complete - patient participated  Level of consciousness: awake and alert  Pain score: 5  Pain management: satisfactory to patient  Multimodal analgesia pain management approach  Airway patency: patent  Cardiovascular status: hemodynamically stable  Respiratory status: room air  Hydration status: acceptable  Postoperative Nausea and Vomiting: none  Comments: PT'S VITAL SIGNS DO NOT REFLECT THE INTENSITY OF PAIN PROCLAIMED.  RECOMMENDED TO IM THAT PT BE ADMITTED TO SDU.  IM HERE TO EVALUATE PT AT 1315 pm.        There were no known notable events for this encounter.

## 2025-07-30 NOTE — CARE PLAN
The patient's goals for the shift include get rest and stay informed.    The clinical goals for the shift include Pt will maintain systolic BP>100 throughout the shift

## 2025-07-30 NOTE — PROGRESS NOTES
Sue Vail is a 80 y.o. female admitted for Primary osteoarthritis of left hip. Pharmacy reviewed the patient's [id number] medications and allergies for accuracy.    The list below reflects the PTA list prior to pharmacy medication history. A summary a changes to the PTA medication list has been listed below. Please review each medication in order reconciliation for additional clarification and justification.    Source of information: surescripts, t2p     Medications added:  Bactrim 800 - 1 bid x7 days     Medications modified:  Gabapentin 300mg --> added prn     Medications to be removed:  Peridex solution   Doxycycline 100mg  Macrobid 100mg     Medications of concern:      Prior to Admission Medications   Prescriptions Last Dose Informant Patient Reported? Taking?   acetaminophen (Tylenol) 325 mg tablet More than a month  No Yes   Sig: Take 3 tablets (975 mg) by mouth every 8 hours if needed for mild pain (1 - 3).   aspirin 81 mg chewable tablet Not Taking  No No   Sig: Chew and swallow 1 tablet (81 mg) 2 times a day. To prevent blood clots   Patient not taking: Reported on 7/30/2025   atorvastatin (Lipitor) 80 mg tablet 7/29/2025 Bedtime  No Yes   Sig: Take 1 tablet (80 mg) by mouth once daily.   busPIRone (Buspar) 5 mg tablet 7/29/2025 Bedtime  No Yes   Sig: Take 1 tablet (5 mg) by mouth 2 times a day.   carvedilol (Coreg) 3.125 mg tablet 7/30/2025  No Yes   Sig: Take 1 tablet (3.125 mg) by mouth 2 times a day.   chlorhexidine (Peridex) 0.12 % solution 7/30/2025 Morning  No Yes   Sig: Swish and Spit 15 ml the night before and the morning of surgery. (2 Doses)   cholecalciferol (Vitamin D-3) 125 mcg (5000 UT) capsule Not Taking  No No   Sig: Take 1 capsule (125 mcg) by mouth once daily.   Patient not taking: Reported on 7/30/2025   cyclobenzaprine (Flexeril) 5 mg tablet   No No   Sig: Take 1 tablet (5 mg) by mouth 3 times a day as needed for muscle spasms for up to 10 days.   doxycycline (Monodox) 100 mg  capsule   No No   Sig: Take 1 capsule (100 mg) by mouth 2 times a day for 7 days. To prevent infection   empagliflozin (Jardiance) 10 mg tablet 7/26/2025  No Yes   Sig: Take 1 tablet (10 mg) by mouth once daily.   furosemide (Lasix) 20 mg tablet Past Week  No Yes   Sig: Take 1 tablet (20 mg) by mouth once daily.   gabapentin (Neurontin) 300 mg capsule   No No   Sig: Take 1 capsule (300 mg) by mouth 4 times a day.   naloxone (Narcan) 4 mg/0.1 mL nasal spray Not Taking  No No   Sig: Instill one spray intranasally for opioid overdose; repeat in 5 minutes if no response   Patient not taking: Reported on 7/30/2025   naloxone (Narcan) 4 mg/0.1 mL nasal spray Not Taking  No No   Sig: Administer 1 spray (4 mg) into affected nostril(s) for opioid overdose; repeat in 5 minutes if no response.   Patient not taking: Reported on 7/30/2025   nitrofurantoin, macrocrystal-monohydrate, (Macrobid) 100 mg capsule 7/29/2025 Bedtime  No Yes   Sig: Take 1 capsule (100 mg) by mouth 2 times a day for 7 days.   oxyCODONE (Roxicodone) 5 mg immediate release tablet   No No   Sig: Take 1 tablet (5 mg) by mouth every 6 hours if needed for severe pain (7 - 10) for up to 7 days.   pantoprazole (ProtoNix) 40 mg EC tablet Not Taking  No No   Sig: Take 1 tablet (40 mg) by mouth once daily as needed (heartburn). Do not crush, chew, or split.   Patient not taking: Reported on 7/30/2025   sacubitriL-valsartan (Entresto)  mg tablet   No No   Sig: Take 1 tablet by mouth 2 times a day.   sennosides (Senokot) 8.6 mg tablet 7/29/2025 Morning  No Yes   Sig: Take 1 tablet (8.6 mg) by mouth 2 times a day. To prevent constipation   spironolactone (Aldactone) 25 mg tablet 7/29/2025 Evening  No Yes   Sig: Take 1 tablet (25 mg) by mouth once daily.   traMADol (Ultram) 50 mg tablet   No No   Sig: Take 1 tablet (50 mg) by mouth every 6 hours if needed for severe pain (7 - 10) for up to 7 days.   traZODone (Desyrel) 50 mg tablet 7/29/2025 Bedtime  No Yes   Sig:  Take 1 tablet (50 mg) by mouth as needed at bedtime for sleep.   valproic acid (Depakene) 250 mg capsule 7/29/2025 Bedtime  No Yes   Sig: Take 3 capsules (750 mg) by mouth once daily at bedtime.   venlafaxine (Effexor) 75 mg tablet Not Taking  No No   Sig: Take 1 tablet (75 mg) by mouth once daily at bedtime.   Patient not taking: Reported on 7/30/2025      Facility-Administered Medications: None       RIVERA WILKINSON

## 2025-07-30 NOTE — ANESTHESIA PREPROCEDURE EVALUATION
Patient: Sue Vail    Procedure Information       Date/Time: 25    Procedure: Left Total Hip Arthroplasty *23 HR OBS* (CARMEN INSIGNIA) (Left: Hip) - SCHEDULE: 2 HOURS, Carmen insignia, Spinal, 23 obs    Location: [facility] 07  [facility]    Surgeons: Cj Burton, DO            Relevant Problems   Cardiac   (+) Congestive heart failure      Neuro   (+) Anxiety   (+) Generalized anxiety disorder with panic attacks   (+) PTSD (post-traumatic stress disorder)   (+) Seizure disorder (Multi)      Endocrine   (+) Morbid obesity (Multi)      Hematology   (+) Iron deficiency anemia      Musculoskeletal   (+) Chronic midline low back pain with sciatica   (+) Primary osteoarthritis of left hip   (+) Primary osteoarthritis of right hip      HEENT   (+) Sensorineural hearing loss, bilateral      ID   (+) Vaginal candidiasis       Clinical information reviewed:   Tobacco  Allergies  Meds   Med Hx  Surg Hx  OB Status  Fam Hx  Soc   Hx        NPO Detail:  NPO/Void Status  Date of Last Liquid: 25  Time of Last Liquid: 400  Date of Last Solid: 25  Time of Last Solid:   Last Intake Type: Clear fluids         Physical Exam    Airway  Mallampati: II  TM distance: >3 FB  Neck ROM: full  Mouth openin finger widths     Cardiovascular    Dental    Pulmonary    Abdominal            Anesthesia Plan    History of general anesthesia?: yes  History of complications of general anesthesia?: yes    ASA 3     general   (Pt refused spinal, all benefits and complications of GA explained)  The patient is not a current smoker.    Anesthetic plan and risks discussed with patient.  Use of blood products discussed with patient who.    Plan discussed with attending.

## 2025-07-31 ENCOUNTER — HOME HEALTH ADMISSION (OUTPATIENT)
Dept: HOME HEALTH SERVICES | Facility: HOME HEALTH | Age: 80
End: 2025-07-31
Payer: MEDICARE

## 2025-07-31 LAB
ANION GAP SERPL CALC-SCNC: 9 MMOL/L (ref 10–20)
BUN SERPL-MCNC: 24 MG/DL (ref 6–23)
CALCIUM SERPL-MCNC: 7.6 MG/DL (ref 8.6–10.3)
CHLORIDE SERPL-SCNC: 111 MMOL/L (ref 98–107)
CO2 SERPL-SCNC: 21 MMOL/L (ref 21–32)
CREAT SERPL-MCNC: 0.84 MG/DL (ref 0.5–1.05)
EGFRCR SERPLBLD CKD-EPI 2021: 70 ML/MIN/1.73M*2
ERYTHROCYTE [DISTWIDTH] IN BLOOD BY AUTOMATED COUNT: 14.6 % (ref 11.5–14.5)
GLUCOSE SERPL-MCNC: 123 MG/DL (ref 74–99)
HCT VFR BLD AUTO: 30.6 % (ref 36–46)
HGB BLD-MCNC: 10.3 G/DL (ref 12–16)
MCH RBC QN AUTO: 33.6 PG (ref 26–34)
MCHC RBC AUTO-ENTMCNC: 33.7 G/DL (ref 32–36)
MCV RBC AUTO: 100 FL (ref 80–100)
NRBC BLD-RTO: 0 /100 WBCS (ref 0–0)
PLATELET # BLD AUTO: 155 X10*3/UL (ref 150–450)
POTASSIUM SERPL-SCNC: 3.8 MMOL/L (ref 3.5–5.3)
RBC # BLD AUTO: 3.07 X10*6/UL (ref 4–5.2)
SODIUM SERPL-SCNC: 137 MMOL/L (ref 136–145)
WBC # BLD AUTO: 9.4 X10*3/UL (ref 4.4–11.3)

## 2025-07-31 PROCEDURE — 2500000001 HC RX 250 WO HCPCS SELF ADMINISTERED DRUGS (ALT 637 FOR MEDICARE OP): Performed by: NURSE PRACTITIONER

## 2025-07-31 PROCEDURE — 97165 OT EVAL LOW COMPLEX 30 MIN: CPT | Mod: GO | Performed by: OCCUPATIONAL THERAPIST

## 2025-07-31 PROCEDURE — 97110 THERAPEUTIC EXERCISES: CPT | Mod: GP

## 2025-07-31 PROCEDURE — 97535 SELF CARE MNGMENT TRAINING: CPT | Mod: GO | Performed by: OCCUPATIONAL THERAPIST

## 2025-07-31 PROCEDURE — 99233 SBSQ HOSP IP/OBS HIGH 50: CPT | Performed by: INTERNAL MEDICINE

## 2025-07-31 PROCEDURE — 97162 PT EVAL MOD COMPLEX 30 MIN: CPT | Mod: GP

## 2025-07-31 PROCEDURE — 80048 BASIC METABOLIC PNL TOTAL CA: CPT | Performed by: NURSE PRACTITIONER

## 2025-07-31 PROCEDURE — 2500000004 HC RX 250 GENERAL PHARMACY W/ HCPCS (ALT 636 FOR OP/ED): Performed by: ORTHOPAEDIC SURGERY

## 2025-07-31 PROCEDURE — 97112 NEUROMUSCULAR REEDUCATION: CPT | Mod: GP

## 2025-07-31 PROCEDURE — 7100000011 HC EXTENDED STAY RECOVERY HOURLY - NURSING UNIT

## 2025-07-31 PROCEDURE — 2500000001 HC RX 250 WO HCPCS SELF ADMINISTERED DRUGS (ALT 637 FOR MEDICARE OP): Performed by: ORTHOPAEDIC SURGERY

## 2025-07-31 PROCEDURE — 36415 COLL VENOUS BLD VENIPUNCTURE: CPT | Performed by: NURSE PRACTITIONER

## 2025-07-31 PROCEDURE — 97530 THERAPEUTIC ACTIVITIES: CPT | Mod: GP

## 2025-07-31 PROCEDURE — 85027 COMPLETE CBC AUTOMATED: CPT | Performed by: NURSE PRACTITIONER

## 2025-07-31 RX ADMIN — OXYCODONE HYDROCHLORIDE 5 MG: 5 TABLET ORAL at 22:09

## 2025-07-31 RX ADMIN — ACETAMINOPHEN 650 MG: 325 TABLET ORAL at 00:32

## 2025-07-31 RX ADMIN — CEFAZOLIN SODIUM 2 G: 2 SOLUTION INTRAVENOUS at 00:32

## 2025-07-31 RX ADMIN — ACETAMINOPHEN 650 MG: 325 TABLET ORAL at 17:07

## 2025-07-31 RX ADMIN — ACETAMINOPHEN 650 MG: 325 TABLET ORAL at 11:11

## 2025-07-31 RX ADMIN — ASPIRIN 81 MG: 81 TABLET, DELAYED RELEASE ORAL at 09:21

## 2025-07-31 RX ADMIN — ASPIRIN 81 MG: 81 TABLET, DELAYED RELEASE ORAL at 20:22

## 2025-07-31 RX ADMIN — SENNOSIDES 17.2 MG: 8.6 TABLET, FILM COATED ORAL at 09:21

## 2025-07-31 RX ADMIN — VALPROIC ACID 750 MG: 250 CAPSULE, LIQUID FILLED ORAL at 22:09

## 2025-07-31 RX ADMIN — ACETAMINOPHEN 650 MG: 325 TABLET ORAL at 06:26

## 2025-07-31 RX ADMIN — OXYCODONE HYDROCHLORIDE 10 MG: 10 TABLET ORAL at 11:12

## 2025-07-31 RX ADMIN — PANTOPRAZOLE SODIUM 40 MG: 40 TABLET, DELAYED RELEASE ORAL at 06:26

## 2025-07-31 RX ADMIN — OXYCODONE HYDROCHLORIDE 10 MG: 10 TABLET ORAL at 17:07

## 2025-07-31 ASSESSMENT — COGNITIVE AND FUNCTIONAL STATUS - GENERAL
STANDING UP FROM CHAIR USING ARMS: A LOT
DAILY ACTIVITIY SCORE: 11
MOVING FROM LYING ON BACK TO SITTING ON SIDE OF FLAT BED WITH BEDRAILS: A LOT
CLIMB 3 TO 5 STEPS WITH RAILING: TOTAL
MOBILITY SCORE: 12
TOILETING: A LOT
DAILY ACTIVITIY SCORE: 16
DRESSING REGULAR UPPER BODY CLOTHING: A LOT
DRESSING REGULAR UPPER BODY CLOTHING: A LOT
CLIMB 3 TO 5 STEPS WITH RAILING: A LOT
DRESSING REGULAR LOWER BODY CLOTHING: TOTAL
MOVING FROM LYING ON BACK TO SITTING ON SIDE OF FLAT BED WITH BEDRAILS: TOTAL
MOVING TO AND FROM BED TO CHAIR: A LOT
WALKING IN HOSPITAL ROOM: A LOT
STANDING UP FROM CHAIR USING ARMS: TOTAL
PERSONAL GROOMING: A LOT
MOVING FROM LYING ON BACK TO SITTING ON SIDE OF FLAT BED WITH BEDRAILS: A LOT
WALKING IN HOSPITAL ROOM: A LOT
MOBILITY SCORE: 12
MOBILITY SCORE: 11
HELP NEEDED FOR BATHING: A LOT
HELP NEEDED FOR BATHING: TOTAL
TOILETING: TOTAL
MOVING TO AND FROM BED TO CHAIR: A LOT
MOVING TO AND FROM BED TO CHAIR: A LOT
DAILY ACTIVITIY SCORE: 16
STANDING UP FROM CHAIR USING ARMS: A LOT
DRESSING REGULAR LOWER BODY CLOTHING: A LOT
TURNING FROM BACK TO SIDE WHILE IN FLAT BAD: A LOT
CLIMB 3 TO 5 STEPS WITH RAILING: A LOT
STANDING UP FROM CHAIR USING ARMS: A LOT
WALKING IN HOSPITAL ROOM: A LOT
TURNING FROM BACK TO SIDE WHILE IN FLAT BAD: TOTAL
CLIMB 3 TO 5 STEPS WITH RAILING: TOTAL
TURNING FROM BACK TO SIDE WHILE IN FLAT BAD: A LOT
HELP NEEDED FOR BATHING: A LOT
MOBILITY SCORE: 6
DRESSING REGULAR LOWER BODY CLOTHING: A LOT
MOVING FROM LYING ON BACK TO SITTING ON SIDE OF FLAT BED WITH BEDRAILS: A LOT
MOVING TO AND FROM BED TO CHAIR: TOTAL
TURNING FROM BACK TO SIDE WHILE IN FLAT BAD: A LOT
TOILETING: A LOT
WALKING IN HOSPITAL ROOM: TOTAL
DRESSING REGULAR UPPER BODY CLOTHING: A LOT

## 2025-07-31 ASSESSMENT — PAIN SCALES - GENERAL
PAINLEVEL_OUTOF10: 8
PAINLEVEL_OUTOF10: 3
PAINLEVEL_OUTOF10: 0 - NO PAIN
PAINLEVEL_OUTOF10: 8
PAINLEVEL_OUTOF10: 3
PAINLEVEL_OUTOF10: 0 - NO PAIN
PAINLEVEL_OUTOF10: 5 - MODERATE PAIN
PAINLEVEL_OUTOF10: 4

## 2025-07-31 ASSESSMENT — PAIN - FUNCTIONAL ASSESSMENT
PAIN_FUNCTIONAL_ASSESSMENT: 0-10

## 2025-07-31 ASSESSMENT — ACTIVITIES OF DAILY LIVING (ADL)
ADL_ASSISTANCE: INDEPENDENT
BATHING_ASSISTANCE: MAXIMAL
HOME_MANAGEMENT_TIME_ENTRY: 14

## 2025-07-31 ASSESSMENT — PAIN DESCRIPTION - DESCRIPTORS: DESCRIPTORS: ACHING

## 2025-07-31 NOTE — PROGRESS NOTES
Physical Therapy    Physical Therapy Treatment    Patient Name: Sue Vail  MRN: [id number]  Department: [address]  Room: 2012/2012-A  Today's Date: 7/31/2025  Time Calculation  Start Time: 1509  Stop Time: 1555  Time Calculation (min): 46 min         Assessment/Plan   PT Assessment  PT Assessment Results: Decreased strength, Decreased range of motion, Decreased endurance, Impaired balance, Decreased mobility, Decreased safety awareness, Pain  Rehab Prognosis: Fair  Barriers to Discharge Home: Caregiver assistance, Cognition needs, Physical needs  Caregiver Assistance: Caregiver assistance needed per identified barriers - however, level of patient's required assistance exceeds assistance available at home  Cognition Needs: 24hr supervision for safety awareness needed, Recollection or understanding of precautions/restrictions limited, Recollection or understanding of home exercise program limited, Insight of patient limited regarding functional ability/needs  Physical Needs: Stair navigation into home limited by function/safety, Ambulating household distances limited by function/safety, 24hr mobility assistance needed, 24hr ADL assistance needed, High falls risk due to function or environment, Weight bearing precautions unable to be safely maintained  Evaluation/Treatment Tolerance: Patient limited by fatigue  Medical Staff Made Aware: Yes  End of Session Communication: Bedside nurse  Assessment Comment: Patient mod A x 2 for all mobility this session with moderate anxiety with movement. Patient educated on importance of movement and safety with precautions post hip surgery. Would benefit from MOD rehab. Patient stated interested in MOD rehab this session and will discuss with TCC.  End of Session Patient Position: Bed, 3 rail up, Alarm on  PT Plan  Inpatient/Swing Bed or Outpatient: Inpatient  PT Plan  Treatment/Interventions: Transfer training, Bed mobility, Gait training, Stair training, Balance training,  Neuromuscular re-education, Strengthening, Endurance training, Range of motion, Therapeutic exercise, Therapeutic activity, Home exercise program  PT Plan: Ongoing PT  PT Frequency: BID (During this acute hospital stay)  PT Discharge Recommendations: Moderate intensity level of continued care (Based on current functional status and rehab potential, patient is anticipated to tolerate and benefit from 5 or more days per week of skilled rehabilitative therapy after discharge from this acute inpatient hospitalization.)  Equipment Recommended upon Discharge: Bedside commode  PT Recommended Transfer Status: Assist x2  PT - OK to Discharge: Yes (When medically cleared)    PT Visit Info:  PT Received On: 07/31/25     General Visit Information:   General  Caregiver Feedback:  present, supportive  Prior to Session Communication: Bedside nurse  Patient Position Received: Bed, 3 rail up, Alarm on  General Comment: Room 2012. Patient agreeable to therapy.  present. Request to move slowly with activity.    Subjective   Precautions:  Precautions  LE Weight Bearing Status: Weight Bearing as Tolerated (BLE)  Medical Precautions: Fall precautions  Post-Surgical Precautions: Left hip precautions      Objective   Pain:  Pain Assessment  0-10 (Numeric) Pain Score: 4 (L hip)  Cognition:  Cognition  Overall Cognitive Status: Within Functional Limits  Arousal/Alertness: Appropriate responses to stimuli  Orientation Level: Oriented X4  Following Commands: Follows one step commands with increased time  Cognition Comments: Improved understanding of precautions this session  Attention: Within Functional Limits  Safety/Judgement: Exceptions to WFL  Complex Functional Tasks: Moderate  Novel Situations: Moderate  Routine Tasks: Moderate  Unable to Self-Monitor and Self-Correct Consistently: Moderate  Insight: Mild  Task Initiation: Initiates with cues  Flexibility of Thought: Reduced flexibility  Processing Speed:  Delayed  Coordination:     Postural Control:  Postural Control  Posture Comment: Mild flexed posture with standing, cueing for pushing through arms to maintain upright posture  Static Sitting Balance  Static Sitting-Comment/Number of Minutes: Fair SBA; 10 min  Dynamic Sitting Balance  Dynamic Sitting-Comments: Fair  Static Standing Balance  Static Standing-Comment/Number of Minutes: Fair- FWW  Dynamic Standing Balance  Dynamic Standing-Comments: Poor+    Activity Tolerance:  Activity Tolerance  Endurance: Decreased tolerance for upright activites  Treatments:  Therapeutic Exercise  Therapeutic Exercise Performed: Yes  Therapeutic Exercise Activity 1: Ankle pumps left leg 1 set x 15 reps  Therapeutic Exercise Activity 2: Glute set 10 reps with 5 second hold  Therapeutic Exercise Activity 3: Quad set 10 reps with 5 second hold  Therapeutic Exercise Activity 4: Heel slides AAROM min A from therapist 10 reps L leg    Therapeutic Activity  Therapeutic Activity Performed:  (Patient performed functional task including bed mobility supine to sit EOB, sit EOB to supine, sit to stands from EOB w/FWW, and AMB FWW this session)    Balance/Neuromuscular Re-Education  Balance/Neuromuscular Re-Education Activity Performed:  (Sitting EOB activity for balance 10 min, balance with standing FWW)    Bed Mobility  Bed Mobility:  (Supine to sit EOB w/HOB elevated and use of rails mod A x 2, with mod A x 2 sit EOB to supine, cueing throughout for hand placement, sequencing, and precautions)    Ambulation/Gait Training  Ambulation/Gait Training Performed:  (AMB FWW 4ft forward, 4ft backward WBAT BLE mod A x 2, with cueing for safety, FWW use, and sequencing. REST 3 min. AMB 2ft forward, 2ft backward, patient stated needed to sit back down. Increased anxiety with AMB)  Transfers  Transfer:  (Sit to stand from EOB w/FWW mod A x 2, 2 times, with cueing for hand placement on bed, FWW use, and safety with L hip precautions and WBAT. Moderate  anxiety with sit to stands)    Stairs  Stairs: No (Not appropriate)         Outcome Measures:  [facility] Basic Mobility  Turning from your back to your side while in a flat bed without using bedrails: A lot  Moving from lying on your back to sitting on the side of a flat bed without using bedrails: A lot  Moving to and from bed to chair (including a wheelchair): A lot  Standing up from a chair using your arms (e.g. wheelchair or bedside chair): A lot  To walk in hospital room: A lot  Climbing 3-5 steps with railing: Total  Basic Mobility - Total Score: 11    Education Documentation  Handouts, taught by MALLIKA Handy at 7/31/2025  4:23 PM.  Learner: Patient  Readiness: Acceptance  Method: Explanation  Response: Needs Reinforcement  Comment: For increased safety with WBAT BLE and L hip precautions    Precautions, taught by MALLIKA Handy at 7/31/2025  4:23 PM.  Learner: Patient  Readiness: Acceptance  Method: Explanation  Response: Needs Reinforcement  Comment: For increased safety with WBAT BLE and L hip precautions    Home Exercise Program, taught by MALLIKA Handy at 7/31/2025  4:23 PM.  Learner: Patient  Readiness: Acceptance  Method: Explanation  Response: Needs Reinforcement  Comment: For increased safety with WBAT BLE and L hip precautions    Mobility Training, taught by MALLIKA Handy at 7/31/2025  4:23 PM.  Learner: Patient  Readiness: Acceptance  Method: Explanation  Response: Needs Reinforcement  Comment: For increased safety with WBAT BLE and L hip precautions    Education Comments  No comments found.        OP EDUCATION:       Encounter Problems       Encounter Problems (Active)       Mobility       STG - Patient will ambulate (Progressing)       Start:  07/31/25    Expected End:  08/15/25       FWW MIN A X1 WBAT LLE 50+ FT         STG - Patient will ascend and descend four to six stairs (Not Progressing)       Start:  07/31/25    Expected End:  08/15/25       MAX X2 A NO RAILS WBAT LLE,  CANE PRN FOR STABILTIY         Goal 1 (Progressing)       Start:  07/31/25    Expected End:  08/22/25       20 REPS AROM LLE, 20 REPS RROM RLE INCREASING STRENGTH TO STABILIZE OOB ACTIVITIES            PT Transfers       STG - Transfer from bed to chair (Progressing)       Start:  07/31/25    Expected End:  08/02/25       MOD X2 A WBAT LLE  FWW         STG - Patient to transfer to and from sit to supine (Progressing)       Start:  07/31/25    Expected End:  08/05/25       HOB FLAT NO RAILS MIN A X2         STG - Patient will transfer sit to and from stand (Progressing)       Start:  07/31/25    Expected End:  08/06/25       MIN A X2 FWW WBAT LLE, USING PROPER TECHNIQUE                    Completion of this session, clinical decision making, and documentation performed under the supervision/direction of Zeynep Landaverde.        JOVAN FOSTER-PT

## 2025-07-31 NOTE — DISCHARGE SUMMARY
Discharge Diagnosis  Left Total Hip Arthroplasty    Issues Requiring Follow-Up  Home care services to start within 48 hours. Outpatient PT to start per surgeon instructions.    Test Results Pending At Discharge  Pending Labs       No current pending labs.            Hospital Course  Patient underwent surgery for Left Total Hip Arthroplasty without complications. Patient was then takent to the PACU in stabe condition. Patient did have postoperative hypotension, and was transferred to stepdown from the PACU.  Pressures were controlled and responsive to fluid bolus and midodrine.  Blood pressure has been stable since.  Pain was appropriately controlled and weaned to oral medications. Diet was advanced as tolerated. PT/OT was consulted to begin on post operative day 0 and recommended Home for patient on discharge. Patient had uneventful hospital course. Patient is to follow-up in 3 weeks at scheduled post-op visit.      Face to Face following surgical procedure on 07/31/25. The patient was awake and alert. VSS, afebrile. Sensation intact bilaterally, sural/saph/sp/tibal n. Motor intact flexion/extension/DF/PF/EHL/FHL bilaterally. Palpable symmetric DP/PT pulse bilaterally.    Pertinent Physical Exam At Time of Discharge  Physical Exam  Vitals and nursing note reviewed.  Constitutional:       Appearance: Normal appearance.   HENT:      Head: Normocephalic and atraumatic.   Eyes:      Extraocular Movements: Extraocular movements intact.   Cardiovascular:      Pulses: Normal pulses.   Pulmonary:      Effort: Pulmonary effort is normal.   Musculoskeletal:      Operative lower extremity in neutral alignment.  Hip dressing is clean, dry, intact.  Passive range of motion with mild discomfort, appropriate for postoperative timeframe.  Dermis is intact.  Neurovascular status is intact.  Dorsalis pedis pulses 2+, capillary refill <2 seconds.  Minimal swelling, no signs of compartment syndrome.  Negative Homans.   Skin:      General: Skin is warm and dry.      Capillary Refill: Capillary refill takes less than 2 seconds.   Neurological:      General: No focal deficit present.      Mental Status: Patient is alert and oriented to person, place, and time. Mental status is at baseline.   Psychiatric:         Mood and Affect: Mood normal.         Thought Content: Thought content normal.         Judgment: Judgment normal.           Home Medications  Medication List      Your medication list        CONTINUE taking these medications        Instructions Last Dose Given Next Dose Due   acetaminophen 325 mg tablet  Commonly known as: Tylenol      Take 3 tablets (975 mg) by mouth every 8 hours if needed for mild pain (1 - 3).       aspirin 81 mg chewable tablet      Chew and swallow 1 tablet (81 mg) 2 times a day. To prevent blood clots       atorvastatin 80 mg tablet  Commonly known as: Lipitor      Take 1 tablet (80 mg) by mouth once daily.       busPIRone 5 mg tablet  Commonly known as: Buspar      Take 1 tablet (5 mg) by mouth 2 times a day.       carvedilol 3.125 mg tablet  Commonly known as: Coreg      Take 1 tablet (3.125 mg) by mouth 2 times a day.       cholecalciferol 125 mcg (5,000 units) capsule  Commonly known as: Vitamin D-3      Take 1 capsule (125 mcg) by mouth once daily.       cyclobenzaprine 5 mg tablet  Commonly known as: Flexeril      Take 1 tablet (5 mg) by mouth 3 times a day as needed for muscle spasms for up to 10 days.       Entresto  mg tablet  Generic drug: sacubitriL-valsartan      Take 1 tablet by mouth 2 times a day.       furosemide 20 mg tablet  Commonly known as: Lasix      Take 1 tablet (20 mg) by mouth once daily.       Jardiance 10 mg tablet  Generic drug: empagliflozin      Take 1 tablet (10 mg) by mouth once daily.       naloxone 4 mg/0.1 mL nasal spray  Commonly known as: Narcan      Instill one spray intranasally for opioid overdose; repeat in 5 minutes if no response       nitrofurantoin  (macrocrystal-monohydrate) 100 mg capsule  Commonly known as: Macrobid      Take 1 capsule (100 mg) by mouth 2 times a day for 7 days.       oxyCODONE 5 mg immediate release tablet  Commonly known as: Roxicodone      Take 1 tablet (5 mg) by mouth every 6 hours if needed for severe pain (7 - 10) for up to 7 days.       pantoprazole 40 mg EC tablet  Commonly known as: ProtoNix      Take 1 tablet (40 mg) by mouth once daily as needed (heartburn). Do not crush, chew, or split.       sennosides 8.6 mg tablet  Commonly known as: Senokot      Take 1 tablet (8.6 mg) by mouth 2 times a day. To prevent constipation       spironolactone 25 mg tablet  Commonly known as: Aldactone      Take 1 tablet (25 mg) by mouth once daily.       traMADol 50 mg tablet  Commonly known as: Ultram      Take 1 tablet (50 mg) by mouth every 6 hours if needed for severe pain (7 - 10) for up to 7 days.       traZODone 50 mg tablet  Commonly known as: Desyrel      Take 1 tablet (50 mg) by mouth as needed at bedtime for sleep.       valproic acid 250 mg capsule  Commonly known as: Depakene      Take 3 capsules (750 mg) by mouth once daily at bedtime.       venlafaxine 75 mg tablet  Commonly known as: Effexor      Take 1 tablet (75 mg) by mouth once daily at bedtime.              STOP taking these medications      chlorhexidine 0.12 % solution  Commonly known as: Peridex        doxycycline 100 mg capsule  Commonly known as: Monodox        gabapentin 300 mg capsule  Commonly known as: Neurontin               ASK your doctor about these medications        Instructions Last Dose Given Next Dose Due   sulfamethoxazole-trimethoprim 800-160 mg tablet  Commonly known as: Bactrim DS  Ask about: Which instructions should I use?           sulfamethoxazole-trimethoprim 800-160 mg tablet  Commonly known as: Bactrim DS  Ask about: Which instructions should I use?      Take 1 tablet by mouth 2 times a day for 7 days.                 Where to Get Your Medications         These medications were sent to [facility]  [address] [address]      Hours: 8AM to 6PM Mon-Fri, 8AM to 4PM Sat-Sun Phone: [phone]   acetaminophen 325 mg tablet  aspirin 81 mg chewable tablet  cyclobenzaprine 5 mg tablet  oxyCODONE 5 mg immediate release tablet  pantoprazole 40 mg EC tablet  sennosides 8.6 mg tablet  sulfamethoxazole-trimethoprim 800-160 mg tablet  traMADol 50 mg tablet             Outpatient Follow-Up  Patient to follow up in three weeks in the clinic  Special Instructions    Please read discharge instructions provided by your surgeon before calling with questions as this will delay care.    Medication refills-Narcotic pain medication will be refilled every 7 days per state law. Please request refills through [employer] preferably/[phone]. All medication requests may take up to 72 hours to refill and refills after Friday 1pm will be refilled on the next business day.

## 2025-07-31 NOTE — NURSING NOTE
End of Shift Report  7/31/25     Admission  Sue Vail was admitted on 7/30/2025 for the following diagnoses:   Primary osteoarthritis of left hip [M16.12]  Hypotension [I95.9]    Significant Events  BP low at beginning of shift. 500cc LR bolus and midodrine given. Bp improved to 103/60    Interventions      Response to Interventions       Recent Vital Signs  Patient Vitals for the past 12 hrs:   BP Temp Temp src Pulse Resp SpO2 Weight   07/30/25 2018 (!) 86/40 -- -- -- -- -- --   07/30/25 2217 (!) 88/42 -- -- -- -- -- --   07/31/25 0110 94/58 36.2 °C (97.2 °F) Temporal 87 17 94 % --   07/31/25 0355 103/60 36.2 °C (97.2 °F) Temporal 84 17 93 % --   07/31/25 0647 -- -- -- -- -- -- 97.1 kg (214 lb)      0-10 (Numeric) Pain Score: 0 - No pain     Latest labs  Lab Results   Component Value Date    WBC 9.4 07/31/2025    HGB 10.3 (L) 07/31/2025    HCT 30.6 (L) 07/31/2025     07/31/2025    CHOL 204 (H) 05/02/2024    TRIG 104 05/02/2024    HDL 63.4 05/02/2024    ALT 27 03/24/2025    AST 22 03/24/2025     07/31/2025    K 3.8 07/31/2025     (H) 07/31/2025    CREATININE 0.84 07/31/2025    BUN 24 (H) 07/31/2025    CO2 21 07/31/2025    TSH 2.41 12/13/2024    INR 1.0 06/25/2024    HGBA1C 6.7 (H) 06/27/2025       Other Results      Scheduled Medications:  Scheduled Medications[1]   Continuous Medications[2]         [1] acetaminophen, 650 mg, oral, q6h FAN  aspirin, 81 mg, oral, BID  [Transfer Hold] atorvastatin, 80 mg, oral, Nightly  [Transfer Hold] busPIRone, 5 mg, oral, BID  pantoprazole, 40 mg, oral, Daily before breakfast  perflutren lipid microspheres, 0.5-10 mL of dilution, intravenous, Once in imaging  perflutren protein A microsphere, 0.5 mL, intravenous, Once in imaging  sennosides, 2 tablet, oral, BID  sulfur hexafluoride microsphr, 2 mL, intravenous, Once in imaging  valproic acid, 750 mg, oral, Nightly  [Transfer Hold] venlafaxine XR, 75 mg, oral, Daily with breakfast  [2] oxygen,

## 2025-07-31 NOTE — CARE PLAN
Problem: Mobility  Goal: STG - Patient will ambulate  Description: FWW MIN A X1 WBAT LLE 50+ FT  Outcome: Not Progressing  Goal: STG - Patient will ascend and descend four to six stairs  Description: MAX X2 A NO RAILS WBAT LLE, CANE PRN FOR STABILTIY  Outcome: Not Progressing  Goal: Goal 1  Description: 20 REPS AROM LLE, 20 REPS RROM RLE INCREASING STRENGTH TO STABILIZE OOB ACTIVITIES  Outcome: Not Progressing     Problem: PT Transfers  Goal: STG - Transfer from bed to chair  Description: MOD X2 A WBAT LLE  FWW  Outcome: Not Progressing  Goal: STG - Patient to transfer to and from sit to supine  Description: HOB FLAT NO RAILS MIN A X2  Outcome: Not Progressing  Goal: STG - Patient will transfer sit to and from stand  Description: MIN A X2 FWW WBAT LLE, USING PROPER TECHNIQUE  Outcome: Not Progressing

## 2025-07-31 NOTE — PROGRESS NOTES
Physical Therapy    Physical Therapy Evaluation    Patient Name: Sue Vail  MRN: [id number]  Department: [facility] PACU  Room: 2012/2012-A  Today's Date: 7/31/2025   Time Calculation  Start Time: 1025  Stop Time: 1116  Time Calculation (min): 51 min    Assessment/Plan   PT Assessment  PT Assessment Results: Decreased strength, Decreased range of motion, Decreased endurance, Impaired balance, Decreased mobility, Decreased safety awareness, Impaired hearing, Pain  Rehab Prognosis: Fair  Barriers to Discharge Home: Caregiver assistance, Cognition needs, Physical needs  Caregiver Assistance: Caregiver assistance needed per identified barriers - however, level of patient's required assistance exceeds assistance available at home  Cognition Needs: 24hr supervision for safety awareness needed, Recollection or understanding of precautions/restrictions limited, Recollection or understanding of home exercise program limited, Insight of patient limited regarding functional ability/needs  Physical Needs: Stair navigation into home limited by function/safety, Ambulating household distances limited by function/safety, 24hr mobility assistance needed, 24hr ADL assistance needed, High falls risk due to function or environment, Weight bearing precautions unable to be safely maintained  Evaluation/Treatment Tolerance: Patient limited by fatigue, Patient limited by pain (ANXIETY)  End of Session Communication: Bedside nurse, Physician  Assessment Comment: MAX X2 FOR BED MOBILTY  MOD X2 TO STAND EOB FWW WBAT LLE, REVIEWED PRECAUTIONS W/ VC -GOT 1OF3, ANXIETY INTERFERS W/ SESSION, DISCUSSED REHAB ON DISCH, STATES SHE WILL NOT GO TO SNF, SHE WILL GO HOME AND STRUGGLE; HIGH FALLRISK, RECOMMEND MOD REHAB  End of Session Patient Position: Bed, 3 rail up, Alarm on (CALLIGHTIN REACH)  IP OR SWING BED PT PLAN  Inpatient or Swing Bed: Inpatient  PT Plan  Treatment/Interventions: Bed mobility, Transfer training, Gait training, Stair training, Balance  training, Strengthening, Endurance training, Therapeutic exercise, Therapeutic activity, Home exercise program  PT Plan: Ongoing PT  PT Frequency: BID (6X/WK, WHILE IN HOSPITAL)  PT Discharge Recommendations: Moderate intensity level of continued care; Based on current functional status and rehab potential, patient is anticipated to tolerate and benefit from 5 or more days per week of skilled rehabilitative therapy after discharge from this acute inpatient hospitalization.   Equipment Recommended upon Discharge: Bedside commode (HHA FOR ADL ASSIST)  PT Recommended Transfer Status: Assist x2 (FWW WBAT LLE, HECTOR PRECAUTIONS)  PT - OK to Discharge: Yes (WHEN MEDICALLY CLEARED)    Subjective     PT Visit Info:  PT Received On: 07/31/25  General Visit Information:  General  Reason for Referral: RECENT SURG, HECTOR, WBAT  Referred By: ANNY  Past Medical History Relevant to Rehab: L HIP OA, DX: L HECTOR; HX: SEIZURES, NHL, PTSD, CAD, OA  Family/Caregiver Present: Yes  Caregiver Feedback: SPOUSE PRESENT & SUPPORTIVE  Co-Treatment: OT  Co-Treatment Reason: FACILITATE MOBILITY SAFETY  Prior to Session Communication: Bedside nurse (OK FOR THERAPY)  Patient Position Received: Bed, 3 rail up, Alarm on (ROOM 2012 AWAKE, STATES SHE IS STILL WAKING UP; AGREES TO THERAPY)  General Comment: PT. REQUUEST THAT THERAPIST MOVE SLOWLY W/ ALL ACTIVITIES  Home Living:  Home Living  Home Living Comments: SPOUSE 2 LEVEL HOME, STAYS FIRST FLOOR, 2 RAUL NO RAILS, CANE MAB, TUB W/ SEAT, STATES SHE WILLL SPONGE BATHE, RAISED TOILET SEAT W/ HANDLES,  SHARES CHORES W/ SPOUSE, HAS FWW, REACHER IN HOME, SPOUSE DRIVES  Prior Level of Function:  Prior Function Per Pt/Caregiver Report  Prior Function Comments: SPOUSE DOES MOST IADL'S, THEY HAVE NO ONE ELSE TO ASSIT THEM WHEN PT. GOES HONME, PER SPOUSE SHE MUST BE ABLE TO AMB TO BATHROOM  Precautions:  Precautions  Hearing/Visual Limitations: ? [place]  LE Weight Bearing Status:  (WBAT LLE)  Medical Precautions:  Fall precautions  Post-Surgical Precautions: Left hip precautions             Objective   Pain:  Pain Assessment  0-10 (Numeric) Pain Score:  (L HIP- DID HAVE PAIN MEDS THIS AM)  Pain Interventions: Repositioned, Emotional support  Cognition:  Cognition  Overall Cognitive Status: Within Functional Limits  Arousal/Alertness: Generalized responses  Orientation Level: Disoriented to situation (FOR SAFETY PRECAUTIONS, IMPORTANCE OF MOBILTIY  THOUGH SHE IS IN PAIN)  Following Commands:  (INCRESAED TIME & REPETITION)  Cognition Comments: ABLE TO GIVE 1OF 3 HECTOR PRECAUTIONS, NEEDS VC TO GET THE OTHER 2 PRECAUTIONS  Attention: Exceptions to WFL  Other (Comment): MILDLY DROWSY, SLOW  TO MOBILIZE W/ COMMANDS  Memory: Exceptions to WFL  Memory Comments: DECREASED ABILITY TO REMEMBER THE PRECAUTIONS THOUGH THERAPIST WENT OVER THEM AT BEGINNING OF SESSIN AND SEH WENT TO JOINT CLASS  Problem Solving: Exceptions to WFL  Complex Functional Tasks: Impaired  Safety/Judgement: Exceptions to WFL  Complex Functional Tasks: Maximal  Novel Situations: Maximal  Routine Tasks: Maximal  Unable to Self-Monitor and Self-Correct Consistently: Maximal  Insight: Moderate  Task Initiation: Initiates with cues  Flexibility of Thought: Reduced flexibility  Planning: Reduced planning skills  Organization: Moderately disorganized  Processing Speed: Delayed    General Assessments:  General Observation  General Observation: HIGHLY ANXIOUS THROUGHOUT SESSION; NEEDS EACH MOVEMENT EXPLAINED TO HER BEFORE IT IS TRIED, AND NEEDS TALKED THROUGH THE MOVEMENT AS IT WAS DONE; TOOK BE IN SUPINE 116/71  AND IN SITTING 127/79; EX IN SUPINE 10 REPS BARBARA LE ACTIVE GS/QS AP, ACTIVE 10 REPS HS ON RLE MAX A ON LLE               Activity Tolerance  Endurance: Decreased tolerance for upright activites    Sensation  Sensation Comment: DENIES SENSATION DEFEICITS    Strength  Strength Comments: ROM RLE WFL, RLE STRENGTH 3-/5 DEMONSTRATED TODAY;  ROM L HIP 0-90 DEGREES 2/2 HECTOR  YESTERDAY, L KNEE/ANKLE WFL, STRENGTH IN L HIP DEMONSTRATED W/ ACTIVITY 2-/5, L KNEE 2/5, L ANKLE 3/5  Strength  Strength Comments: ROM RLE WFL, RLE STRENGTH 3-/5 DEMONSTRATED TODAY;  ROM L HIP 0-90 DEGREES 2/2 HECTOR YESTERDAY, L KNEE/ANKLE WFL, STRENGTH IN L HIP DEMONSTRATED W/ ACTIVITY 2-/5, L KNEE 2/5, L ANKLE 3/5                     Static Sitting Balance  Static Sitting-Comment/Number of Minutes: FAIR  Dynamic Sitting Balance  Dynamic Sitting-Comments: FAIR/FAIR-    Static Standing Balance  Static Standing-Comment/Number of Minutes: POOR  Dynamic Standing Balance  Dynamic Standing-Comments: POOR  Functional Assessments:  Bed Mobility  Bed Mobility:  (SUPINE<.SIT MAX X2 VC FOR SEQUENCING; SAT EOB 12 MIN SBA/CGA, INITAILLY LEANS R TO TAKE WT OFF L HIP, MAX X2 TO SCOOT HIPS FORWARD TO EOB USING PAD; ROLLING IN BED MAX X1-2 A AND VC FOR SEQUENCING OF MOVEMENT)    Transfers  Transfer:  (MOD X2 A FWW WBAT LLE STOOD FOR 45 SEC, MAX VC FOR SEQUENCING OF THE TASK & FOR PRECAUTIONS)    Ambulation/Gait Training  Ambulation/Gait Training Performed:  (FWW WBAT LLE, TOOK 1 STEP BACKWARD TO GET CLOSER TO BED SO SHE COULD SIT, PT. YELPED IN PAIN AS SHIFTED HER WT TO HER LLE)  Extremity/Trunk Assessments:     Outcome Measures:  [facility] Basic Mobility  Turning from your back to your side while in a flat bed without using bedrails: Total  Moving from lying on your back to sitting on the side of a flat bed without using bedrails: Total  Moving to and from bed to chair (including a wheelchair): Total  Standing up from a chair using your arms (e.g. wheelchair or bedside chair): Total  To walk in hospital room: Total  Climbing 3-5 steps with railing: Total  Basic Mobility - Total Score: 6    Encounter Problems       Encounter Problems (Active)       Mobility       STG - Patient will ambulate (Not Progressing)       Start:  07/31/25    Expected End:  08/15/25       FWW MIN A X1 WBAT LLE 50+ FT         STG - Patient will ascend and descend  four to six stairs (Not Progressing)       Start:  07/31/25    Expected End:  08/15/25       MAX X2 A NO RAILS WBAT LLE, CANE PRN FOR STABILTIY         Goal 1 (Not Progressing)       Start:  07/31/25    Expected End:  08/22/25       20 REPS AROM LLE, 20 REPS RROM RLE INCREASING STRENGTH TO STABILIZE OOB ACTIVITIES            PT Transfers       STG - Transfer from bed to chair (Not Progressing)       Start:  07/31/25    Expected End:  08/02/25       MOD X2 A WBAT LLE  FWW         STG - Patient to transfer to and from sit to supine (Not Progressing)       Start:  07/31/25    Expected End:  08/05/25       HOB FLAT NO RAILS MIN A X2         STG - Patient will transfer sit to and from stand (Not Progressing)       Start:  07/31/25    Expected End:  08/06/25       MIN A X2 FWW WBAT LLE, USING PROPER TECHNIQUE                Education Documentation  Handouts, taught by Zeynep Landaverde, PT at 7/31/2025  1:18 PM.  Learner: Patient  Readiness: Acceptance  Method: Explanation, Handout  Response: Needs Reinforcement  Comment: HECTOR PRECAUTIONS, MOBILITY SAFETY W/ FWW & WBAT LLE POST HECTOR    Precautions, taught by Zeynep Landaverde, PT at 7/31/2025  1:18 PM.  Learner: Patient  Readiness: Acceptance  Method: Explanation, Handout  Response: Needs Reinforcement  Comment: HECTOR PRECAUTIONS, MOBILITY SAFETY W/ FWW & WBAT LLE POST HECTOR    Home Exercise Program, taught by Zeynep Landaverde, PT at 7/31/2025  1:18 PM.  Learner: Patient  Readiness: Acceptance  Method: Explanation, Handout  Response: Needs Reinforcement  Comment: HECTOR PRECAUTIONS, MOBILITY SAFETY W/ FWW & WBAT LLE POST HECTOR    Mobility Training, taught by Zeynep Landaverde, PT at 7/31/2025  1:18 PM.  Learner: Patient  Readiness: Acceptance  Method: Explanation, Handout  Response: Needs Reinforcement  Comment: HECTOR PRECAUTIONS, MOBILITY SAFETY W/ FWW & WBAT LLE POST HECTOR    Education Comments  No comments found.

## 2025-07-31 NOTE — PROGRESS NOTES
07/31/25 0958   Discharge Planning   Living Arrangements Spouse/significant other   Support Systems Spouse/significant other   Assistance Needed none PTA   Type of Residence Private residence   Number of Stairs to Enter Residence 2   Number of Stairs Within Residence 1   Do you have animals or pets at home? Yes   Type of Animals or Pets 3 dogs inside   Home or Post Acute Services In home services   Type of Home Care Services Home OT;Home PT   Does the patient need discharge transport arranged? No   Intensity of Service   Intensity of Service 0-30 min     Discharge planning assessment completed with patient and spouse at bedside. Patient was independent prior to admission, used a cane due to hip pain. She does have a walker at home. There is one step on the first floor of the home, and also a second floor. Patient will be staying on the first floor where there is a bedroom and bathroom available. She follows with PCP UMANG Garza. PT/OT evals are pending. HHC briefly discussed with patient and spouse. TCC to follow up once PT/OT evals are complete.     PT recommending MOD intensity therapies at discharge. Note is not available yet but PT messaged me that patient required max assist x 2 to stand, and was fearful of movement. I spoke with patient and spouse at bedside. Patient adamantly refusing SNF, states she is not leaving today, and feels confident she'll do better when therapy returns. Patient and spouse state MD said she could stay another day. Discharge order is still active. Will discuss with Dr Bobo.

## 2025-07-31 NOTE — PROGRESS NOTES
Occupational Therapy  Evaluation    Patient Name: Sue Vail  MRN: [id number]  Today's Date: 7/31/2025  Time Calculation  Start Time: 1026  Stop Time: 1115  Time Calculation (min): 49 min    Current Problem:   1. Primary osteoarthritis of left hip    2. Hypotension    3. Acute systolic heart failure    4. Primary osteoarthritis of right hip        OT order: OT eval and treat   Referred by: Micheal  Reason for referral: Recent surgery, L HECTOR  Past medical history related to rehab:  has a past medical history of Bipolar disorder, CAD (coronary artery disease), HFrEF (heart failure with reduced ejection fraction), Melanoma (Multi), NHL (non-Hodgkin's lymphoma), PTSD (post-traumatic stress disorder), and Seizure disorder (Multi).     Precautions:   LE Weight Bearing Status: Weight Bearing as Tolerated (LLE)  Medical Precautions: Fall precautions  Post-Surgical Precautions: Left hip precautions    ASSESSMENT  OT Assessment: OT eval completed. L HECTOR surgery on 7/30/25. Pt demonstrating decreased funcitonal mobility, funcitonal transfers, and ADLs which impact safety and independence. Pt able to recall 1 out of 3 hip precautions after being instructed in them. Pt fearful and anxious of experiencing pain. Continued skilled OT services can benefit pt to improve pt independence and occupational performance.. Pt with Decreased ADL status, Decreased safe judgment during ADL, Decreased endurance, Decreased functional mobility, Decreased trunk control for functional activities    Barriers to discharge home: Caregiver assistance, Physical needs  Caregiver assistance needed per identified barriers - however, level of patient's required assistance exceeds assistance available at home  24hr mobility assistance needed, 24hr ADL assistance needed, High falls risk due to function or environment     Tolerance: Patient limited by pain    PLAN  Frequency: 5 times per week (during this acute hospital stay)  Treatment Interventions: ADL  retraining, Functional transfer training, UE strengthening/ROM, Endurance training, Patient/family training, Equipment evaluation/education, Compensatory technique education  Discharge Recommendations: Moderate intensity level of continued care (Based on current functional status and rehab potential, patient is anticipated to tolerate and benefit from 5 or more days per week of skilled rehabilitative therapy after discharge from this acute inpatient hospitalization.)  OT OK to discharge: Yes    GENERAL VISIT INFORMATION   Start of session communication: Bedside nurse  End of session communication: Bedside nurse  Family/caregiver present: Yes  Caregiver feedback:  present, supportive  Co-Treatment: PT  Reason for co-treatment:  (to maximize pt safety and mobility)   Position Pt Received:  Bed, 3 rail up, Alarm on  End of session position: Bed, 3 rail up, Alarm on    SUBJECTIVE  Home Living:  Type of Home: House  Lives With: Spouse  Home Layout: Two level, Able to live on main level with bedroom/bathroom (1 step between family room and main level)  Home Access: Stairs to enter without rails  Entrance Stairs-Rails: None  Entrance Stairs-Number of Steps: 2  Bathroom Shower/Tub:  (Jacuzzi tub only, pt plans to sponge bath when returning home)  Bathroom Toilet:  (Raised toilet seat with handles)     Prior Level of Function:  Level of [place]: Independent with ADLs and functional transfers, Needs assistance with homemaking  Receives Help From:  ( help pt, doesn't have other people to assist)  ADL Assistance: Independent  Homemaking Assistance: Needs assistance ( completes most IADLs)  Ambulatory Assistance: Independent (with cane)  Prior Function Comments: pt does not drive,  does driving    Pain:  Assessment: 0-10  Score: 8 (with movement)  Type: Surgical pain    Interventions: Repositioned    OBJECTIVE    Cognition:  Orientation Level: Oriented X4    Current ADL function:   EATING:  Stand  by     GROOMING: Minimal     BATHING: Maximal     UB DRESSING: Moderate     LB DRESSING: Maximal (Doffed B socks sitting EOB using reacher with instructions for technique and hip precautions)     TOILETING: Total        Activity Tolerance:  Endurance: Decreased tolerance for upright activites    Bed Mobility/Transfers:   Bed Mobility  Bed Mobility:  (Supine <> sit MAXA x2, rolling to R and L MAX x 1-2, scooting hips forward sitting EOB with MAXA x 2)  Transfers  Transfer:  (sit <> stand MODA x 2)    Ambulation/Gait Training:  Functional Mobility  Functional Mobility Performed:  (Functional mobility with FWW taking one step back with RLE to back up to bed)    Sitting Balance:  Static Sitting Balance  Static Sitting-Level of Assistance:  (sitting EOB x 12 mins with Mod to CGA for balance.)    Standing Balance:  Static Standing Balance  Static Standing-Level of Assistance: Maximum assistance (with FWW)    Sensation:  Sensation Comment: denies numbness or tingling    Strength:  Strength Comments:  (functional UE assessment BUE strength WFL)    Hand Function:  Hand Function  Gross Grasp: Functional    Extremities: RUE   RUE : Within Functional Limits and LUE   LUE: Within Functional Limits    Outcome Measures: [facility] Daily Activity   Putting on and taking off regular lower body clothing: Total  Bathing (including washing, rinsing, drying): Total  Putting on and taking off regular upper body clothing: A lot  Toileting, which includes using toilet, bedpan or urinal: Total  Taking care of personal grooming such as brushing teeth: A lot   Eating Meals: None   Daily Activity - Total Score: 11    EDUCATION:     Education Documentation  Body Mechanics, taught by ZIGGY Pereira at 7/31/2025  1:56 PM.  Learner: Family, Patient  Readiness: Acceptance  Method: Explanation, Demonstration, Handout  Response: Needs Reinforcement    Precautions, taught by ZIGGY Pereira at 7/31/2025  1:56 PM.  Learner: Family,  Patient  Readiness: Acceptance  Method: Explanation, Demonstration, Handout  Response: Needs Reinforcement    ADL Training, taught by ZIGGY Pereira at 7/31/2025  1:56 PM.  Learner: Family, Patient  Readiness: Acceptance  Method: Explanation, Demonstration, Handout  Response: Needs Reinforcement    Education Comments  No comments found.        Goals:   Encounter Problems       Encounter Problems (Active)       ADLs       Patient will complete LB dressing with RAPHAEL donning and doffing all LE clothes  with PRN adaptive equipment while supported sitting       Start:  07/31/25    Expected End:  08/14/25            Patient will complete toileting including hygiene & clothing management with MODA and bedside commode.       Start:  07/31/25    Expected End:  08/14/25               BALANCE       Patient will maintain static standing balance for 3 minutes during ADL task with RAPHAEL in order to demonstrate decreased risk of falling and improved postural control.       Start:  07/31/25    Expected End:  08/14/25               COGNITION/SAFETY       Patient will recall and adhere to hip precautions during all functional mobility/ADL tasks in order to demonstrate improved understanding and promote healing post op       Start:  07/31/25    Expected End:  08/14/25               MOBILITY       Patient will perform Functional mobility Min household distances with MODA and least restrictive device in order to improve safety and functional mobility.       Start:  07/31/25    Expected End:  08/14/25               TRANSFERS       Patient will perform functional transfers MODA in order to improve safety and independence with mobility       Start:  07/31/25    Expected End:  08/14/25               Completion of this session, clinical decision making, and documentation performed under the supervision/direction of ANT Sosa/ZIGGY Randhawa

## 2025-07-31 NOTE — PROGRESS NOTES
Sue Vail is a 80 y.o. female on day 0 of admission presenting with Primary osteoarthritis of left hip.      Subjective   ROS negative       Objective     Last Recorded Vitals  /58 (BP Location: Left arm, Patient Position: Lying)   Pulse 102   Temp 36.5 °C (97.7 °F) (Temporal)   Resp 16   Wt 97.1 kg (214 lb)   SpO2 93%   Intake/Output last 3 Shifts:    Intake/Output Summary (Last 24 hours) at 7/31/2025 1141  Last data filed at 7/31/2025 0815  Gross per 24 hour   Intake 1380 ml   Output 400 ml   Net 980 ml       Admission Weight  Weight: 81.2 kg (179 lb) (07/30/25 0636)    Daily Weight  07/31/25 : 97.1 kg (214 lb)    Image Results  XR pelvis 1-2 views  Narrative: Interpreted By:  Edward Walters,   STUDY:  XR PELVIS 1-2 VIEWS      INDICATION:  Signs/Symptoms:Post op hip.      COMPARISON:  Earlier the same day      ACCESSION NUMBER(S):  [id number]      ORDERING CLINICIAN:  SHAWNEE MCCORMICK      FINDINGS:  Expected postsurgical changes of left hip arthroplasty without  fracture or malalignment.      Impression: Satisfactory appearance status post left hip arthroplasty.      Signed by: Edward Walters 7/30/2025 10:59 AM  Dictation workstation:   [id number]      Physical Exam    Relevant Results  Results for orders placed or performed during the hospital encounter of 07/30/25 (from the past 24 hours)   Basic Metabolic Panel   Result Value Ref Range    Glucose 123 (H) 74 - 99 mg/dL    Sodium 137 136 - 145 mmol/L    Potassium 3.8 3.5 - 5.3 mmol/L    Chloride 111 (H) 98 - 107 mmol/L    Bicarbonate 21 21 - 32 mmol/L    Anion Gap 9 (L) 10 - 20 mmol/L    Urea Nitrogen 24 (H) 6 - 23 mg/dL    Creatinine 0.84 0.50 - 1.05 mg/dL    eGFR 70 >60 mL/min/1.73m*2    Calcium 7.6 (L) 8.6 - 10.3 mg/dL   CBC   Result Value Ref Range    WBC 9.4 4.4 - 11.3 x10*3/uL    nRBC 0.0 0.0 - 0.0 /100 WBCs    RBC 3.07 (L) 4.00 - 5.20 x10*6/uL    Hemoglobin 10.3 (L) 12.0 - 16.0 g/dL    Hematocrit 30.6 (L) 36.0 - 46.0 %     80 - 100 fL    MCH  33.6 26.0 - 34.0 pg    MCHC 33.7 32.0 - 36.0 g/dL    RDW 14.6 (H) 11.5 - 14.5 %    Platelets 155 150 - 450 x10*3/uL     *Note: Due to a large number of results and/or encounters for the requested time period, some results have not been displayed. A complete set of results can be found in Results Review.            This patient currently has cardiac telemetry ordered; if you would like to modify or discontinue the telemetry order, click here to go to the orders activity to modify/discontinue the order.              Assessment & Plan  Primary osteoarthritis of left hip    Hypotension    Ms. Sue Vail is an 80 y.o. female with PMHx of seizure disorder, NHL, melanoma, HFrEF, anxiety, PTSD and nonobstructive CAD who is POD #1 left total hip arthroplasty.   BP improved, pain still 8/10, need help with ADL  Pt/OT eval rec SNF, patient decline want to go home Hhc, will continue monitoring today, BP and H&H if stable she can dc tomorrow SNF/HHC.      I reviewed the resident/fellow's documentation and discussed the patient with the resident/fellow. I agree with the resident/fellow's medical decision making as documented in the note.  I saw and evaluated the patient.  I personally obtained the key and critical portions of the history and physical exam or was physically present for key and critical portions performed by the resident. I reviewed the resident's documentation and discussed the patient with the resident.  I agree with the resident's medical decision making as documented in the note.   spoke to the pt, explained the medical and social conditions and the reason for this admission explained our plan and recommendations.  Time spent on the assessment of patient, gathering and interpreting data, review of medical record/patient history, personally reviewing radiographic imaging. With greater than 50% spent in personal discussion with patient.  Disclaimer:   Portions of this note may have been generated using Dragon  voice recognition software. Reasonable efforts were made to correct any dictation errors that resulted due to the programming of this software but some may still be present.   Portions of this note including HPI, ROS, impression/plan, and examination may have been copied forward from previous notes as to provide important historical information essential in contributing to medical decision making. Documentation has been reviewed and edited as necessary to support clinical decision making for today's visit and to reflect my own independent evaluation of this patient.    The time of this note does not reflect the time I saw the patient but the time that this note was written   Time > 45 min    Kristian Bobo MD

## 2025-08-01 ENCOUNTER — DOCUMENTATION (OUTPATIENT)
Dept: HOME HEALTH SERVICES | Facility: HOME HEALTH | Age: 80
End: 2025-08-01

## 2025-08-01 ENCOUNTER — APPOINTMENT (OUTPATIENT)
Dept: BEHAVIORAL HEALTH | Facility: CLINIC | Age: 80
End: 2025-08-01
Payer: MEDICARE

## 2025-08-01 PROCEDURE — 97110 THERAPEUTIC EXERCISES: CPT | Mod: GP

## 2025-08-01 PROCEDURE — 2500000001 HC RX 250 WO HCPCS SELF ADMINISTERED DRUGS (ALT 637 FOR MEDICARE OP): Performed by: INTERNAL MEDICINE

## 2025-08-01 PROCEDURE — 97535 SELF CARE MNGMENT TRAINING: CPT | Mod: GO,CO

## 2025-08-01 PROCEDURE — 99233 SBSQ HOSP IP/OBS HIGH 50: CPT | Performed by: INTERNAL MEDICINE

## 2025-08-01 PROCEDURE — 2500000001 HC RX 250 WO HCPCS SELF ADMINISTERED DRUGS (ALT 637 FOR MEDICARE OP): Performed by: ORTHOPAEDIC SURGERY

## 2025-08-01 PROCEDURE — 97530 THERAPEUTIC ACTIVITIES: CPT | Mod: GP

## 2025-08-01 PROCEDURE — 97530 THERAPEUTIC ACTIVITIES: CPT | Mod: GO,CO

## 2025-08-01 PROCEDURE — 7100000011 HC EXTENDED STAY RECOVERY HOURLY - NURSING UNIT

## 2025-08-01 RX ORDER — TRAZODONE HYDROCHLORIDE 50 MG/1
50 TABLET ORAL NIGHTLY PRN
Status: DISCONTINUED | OUTPATIENT
Start: 2025-08-01 | End: 2025-08-04 | Stop reason: HOSPADM

## 2025-08-01 RX ORDER — FUROSEMIDE 20 MG/1
20 TABLET ORAL DAILY
Status: DISCONTINUED | OUTPATIENT
Start: 2025-08-01 | End: 2025-08-04 | Stop reason: HOSPADM

## 2025-08-01 RX ORDER — BUSPIRONE HYDROCHLORIDE 5 MG/1
5 TABLET ORAL 2 TIMES DAILY
Status: DISCONTINUED | OUTPATIENT
Start: 2025-08-01 | End: 2025-08-04 | Stop reason: HOSPADM

## 2025-08-01 RX ORDER — VALPROIC ACID 250 MG/1
750 CAPSULE, LIQUID FILLED ORAL NIGHTLY
Status: DISCONTINUED | OUTPATIENT
Start: 2025-08-01 | End: 2025-08-04 | Stop reason: HOSPADM

## 2025-08-01 RX ORDER — ATORVASTATIN CALCIUM 40 MG/1
80 TABLET, FILM COATED ORAL DAILY
Status: DISCONTINUED | OUTPATIENT
Start: 2025-08-01 | End: 2025-08-04 | Stop reason: HOSPADM

## 2025-08-01 RX ORDER — CARVEDILOL 3.12 MG/1
3.12 TABLET ORAL 2 TIMES DAILY
Status: DISCONTINUED | OUTPATIENT
Start: 2025-08-01 | End: 2025-08-04 | Stop reason: HOSPADM

## 2025-08-01 RX ORDER — ACETAMINOPHEN 325 MG/1
650 TABLET ORAL EVERY 4 HOURS PRN
Status: DISCONTINUED | OUTPATIENT
Start: 2025-08-01 | End: 2025-08-04 | Stop reason: HOSPADM

## 2025-08-01 RX ORDER — VENLAFAXINE 37.5 MG/1
75 TABLET ORAL NIGHTLY
Status: DISCONTINUED | OUTPATIENT
Start: 2025-08-01 | End: 2025-08-04 | Stop reason: HOSPADM

## 2025-08-01 RX ORDER — ACETAMINOPHEN 500 MG
125 TABLET ORAL DAILY
Status: DISCONTINUED | OUTPATIENT
Start: 2025-08-01 | End: 2025-08-04 | Stop reason: HOSPADM

## 2025-08-01 RX ORDER — NALOXONE HYDROCHLORIDE 4 MG/.1ML
4 SPRAY NASAL AS NEEDED
Status: DISCONTINUED | OUTPATIENT
Start: 2025-08-01 | End: 2025-08-01

## 2025-08-01 RX ADMIN — VENLAFAXINE 75 MG: 37.5 TABLET ORAL at 20:51

## 2025-08-01 RX ADMIN — ACETAMINOPHEN 650 MG: 325 TABLET ORAL at 05:39

## 2025-08-01 RX ADMIN — CARVEDILOL 3.12 MG: 3.12 TABLET, FILM COATED ORAL at 20:51

## 2025-08-01 RX ADMIN — PANTOPRAZOLE SODIUM 40 MG: 40 TABLET, DELAYED RELEASE ORAL at 05:39

## 2025-08-01 RX ADMIN — OXYCODONE HYDROCHLORIDE 10 MG: 10 TABLET ORAL at 20:51

## 2025-08-01 RX ADMIN — EMPAGLIFLOZIN 10 MG: 10 TABLET, FILM COATED ORAL at 08:59

## 2025-08-01 RX ADMIN — VALPROIC ACID 750 MG: 250 CAPSULE, LIQUID FILLED ORAL at 20:51

## 2025-08-01 RX ADMIN — BUSPIRONE HYDROCHLORIDE 5 MG: 5 TABLET ORAL at 20:51

## 2025-08-01 RX ADMIN — CARVEDILOL 3.12 MG: 3.12 TABLET, FILM COATED ORAL at 08:58

## 2025-08-01 RX ADMIN — ASPIRIN 81 MG: 81 TABLET, DELAYED RELEASE ORAL at 20:51

## 2025-08-01 RX ADMIN — CHOLECALCIFEROL TAB 125 MCG (5000 UNIT) 125 MCG: 125 TAB at 08:59

## 2025-08-01 RX ADMIN — BUSPIRONE HYDROCHLORIDE 5 MG: 5 TABLET ORAL at 09:00

## 2025-08-01 RX ADMIN — ASPIRIN 81 MG: 81 TABLET, DELAYED RELEASE ORAL at 08:41

## 2025-08-01 RX ADMIN — FUROSEMIDE 20 MG: 20 TABLET ORAL at 08:59

## 2025-08-01 RX ADMIN — SENNOSIDES 17.2 MG: 8.6 TABLET, FILM COATED ORAL at 20:51

## 2025-08-01 RX ADMIN — ATORVASTATIN CALCIUM 80 MG: 40 TABLET, FILM COATED ORAL at 20:51

## 2025-08-01 RX ADMIN — ACETAMINOPHEN 650 MG: 325 TABLET ORAL at 17:15

## 2025-08-01 RX ADMIN — OXYCODONE HYDROCHLORIDE 10 MG: 10 TABLET ORAL at 03:31

## 2025-08-01 RX ADMIN — OXYCODONE HYDROCHLORIDE 10 MG: 10 TABLET ORAL at 10:35

## 2025-08-01 ASSESSMENT — COGNITIVE AND FUNCTIONAL STATUS - GENERAL
TOILETING: A LOT
MOBILITY SCORE: 11
MOBILITY SCORE: 12
TOILETING: TOTAL
DRESSING REGULAR LOWER BODY CLOTHING: A LOT
DRESSING REGULAR LOWER BODY CLOTHING: TOTAL
MOVING FROM LYING ON BACK TO SITTING ON SIDE OF FLAT BED WITH BEDRAILS: A LOT
STANDING UP FROM CHAIR USING ARMS: A LOT
WALKING IN HOSPITAL ROOM: A LOT
MOBILITY SCORE: 12
DRESSING REGULAR UPPER BODY CLOTHING: A LITTLE
MOVING TO AND FROM BED TO CHAIR: A LOT
DAILY ACTIVITIY SCORE: 18
DRESSING REGULAR UPPER BODY CLOTHING: A LITTLE
TURNING FROM BACK TO SIDE WHILE IN FLAT BAD: A LOT
MOVING TO AND FROM BED TO CHAIR: A LOT
TURNING FROM BACK TO SIDE WHILE IN FLAT BAD: A LOT
TURNING FROM BACK TO SIDE WHILE IN FLAT BAD: A LOT
EATING MEALS: A LITTLE
HELP NEEDED FOR BATHING: A LITTLE
MOVING FROM LYING ON BACK TO SITTING ON SIDE OF FLAT BED WITH BEDRAILS: A LOT
CLIMB 3 TO 5 STEPS WITH RAILING: A LOT
STANDING UP FROM CHAIR USING ARMS: A LOT
DRESSING REGULAR UPPER BODY CLOTHING: A LOT
PERSONAL GROOMING: A LITTLE
STANDING UP FROM CHAIR USING ARMS: A LOT
TOILETING: A LOT
CLIMB 3 TO 5 STEPS WITH RAILING: A LOT
MOVING FROM LYING ON BACK TO SITTING ON SIDE OF FLAT BED WITH BEDRAILS: A LOT
MOVING TO AND FROM BED TO CHAIR: A LOT
HELP NEEDED FOR BATHING: A LOT
DAILY ACTIVITIY SCORE: 12
WALKING IN HOSPITAL ROOM: A LOT
DRESSING REGULAR LOWER BODY CLOTHING: A LOT
CLIMB 3 TO 5 STEPS WITH RAILING: TOTAL
HELP NEEDED FOR BATHING: A LITTLE
DAILY ACTIVITIY SCORE: 18
WALKING IN HOSPITAL ROOM: A LOT

## 2025-08-01 ASSESSMENT — PAIN - FUNCTIONAL ASSESSMENT
PAIN_FUNCTIONAL_ASSESSMENT: 0-10

## 2025-08-01 ASSESSMENT — PAIN SCALES - GENERAL
PAINLEVEL_OUTOF10: 8
PAINLEVEL_OUTOF10: 7
PAINLEVEL_OUTOF10: 2
PAINLEVEL_OUTOF10: 7
PAINLEVEL_OUTOF10: 0 - NO PAIN
PAINLEVEL_OUTOF10: 4
PAINLEVEL_OUTOF10: 2
PAINLEVEL_OUTOF10: 7
PAINLEVEL_OUTOF10: 3
PAINLEVEL_OUTOF10: 0 - NO PAIN

## 2025-08-01 ASSESSMENT — PAIN DESCRIPTION - DESCRIPTORS: DESCRIPTORS: ACHING

## 2025-08-01 ASSESSMENT — ACTIVITIES OF DAILY LIVING (ADL): HOME_MANAGEMENT_TIME_ENTRY: 15

## 2025-08-01 ASSESSMENT — PAIN DESCRIPTION - LOCATION: LOCATION: LEG

## 2025-08-01 NOTE — NURSING NOTE
No overnight events.  Pt remained stable throughout the night. VSS.  Refused blood draw in AM.  Will inform AM nurse of refusal.

## 2025-08-01 NOTE — PROGRESS NOTES
Sue Vail is a 80 y.o. female on day 0 of admission presenting with Primary osteoarthritis of left hip.      Subjective   Patient is sitting on the bed comfortably, denies any lower extremity pain.  No other significant overnight issues.    Objective     Last Recorded Vitals  BP (!) 164/94 (BP Location: Left arm, Patient Position: Lying)   Pulse 91   Temp 36.2 °C (97.2 °F) (Temporal)   Resp 19   Wt 99.6 kg (219 lb 9.3 oz)   SpO2 96%   Intake/Output last 3 Shifts:    Intake/Output Summary (Last 24 hours) at 8/1/2025 1621  Last data filed at 8/1/2025 1533  Gross per 24 hour   Intake 240 ml   Output 3250 ml   Net -3010 ml       Admission Weight  Weight: 81.2 kg (179 lb) (07/30/25 0636)    Daily Weight  08/01/25 : 99.6 kg (219 lb 9.3 oz)    Image Results  XR hip left intraoperative  Narrative: Interpreted By:  Ana M Zuniga,   STUDY:  XR HIP LEFT INTRAOPERATIVE; ;  7/30/2025 9:13 am      INDICATION:  Signs/Symptoms:intraoperative.          COMPARISON:  03/11/2025      ACCESSION NUMBER(S):  [id number]      ORDERING CLINICIAN:  SHAWNEE MCCORMICK      FINDINGS:  Single intraoperative radiograph of the pelvis was obtained  demonstrating the acetabular and femoral stem components in place.      Impression: Single intraoperative radiograph of the pelvis demonstrating left hip  arthroplasty in progress.          MACRO:  None      Signed by: Ana M Zuniga 7/31/2025 1:17 PM  Dictation workstation:   [id number]      Physical Exam  General: No distress.  Neck: Supple.  HEENT: Normocephalic atraumatic pupils equal react light.  Heart: S1-S2 heard no murmurs gallops regurgitation.  Lungs: Clear to auscultation bilaterally no wheezing rales or rhonchi.  Abdomen: Nondistended nontender bowel sounds are present.  Musculoskeletal: No deformities.  Extremities: No edema pulses are equal bilaterally.  Neuro: No focal deficits.    Relevant Results  Lab Results   Component Value Date    WBC 9.4 07/31/2025    HGB 10.3 (L)  07/31/2025    HCT 30.6 (L) 07/31/2025     07/31/2025     07/31/2025     Lab Results   Component Value Date    GLUCOSE 123 (H) 07/31/2025    CALCIUM 7.6 (L) 07/31/2025     07/31/2025    K 3.8 07/31/2025    CO2 21 07/31/2025     (H) 07/31/2025    BUN 24 (H) 07/31/2025    CREATININE 0.84 07/31/2025     Assessment:  Left hip degenerative joint disease-s/p left total hip arthroplasty completed on 7/30/2025    Postop hypotension-resolved    History of:  Seizures.  Non-Hodgkin's lymphoma  Melanoma.  Heart failure with moderately reduced ejection fraction-45 to 50%.  PTSD.  Nonobstructive coronary artery disease.  Anxiety.    Plan:  Pain is well-controlled with the current regimen.  Postop patient had hypotension episodes, resolved blood pressure stabilized-resumed home antihypertensive medications today.  Continue to monitor  Other home medications reviewed and resumed appropriately.  PT OT evaluation.    DVT prophylaxis:  Aspirin twice daily.    Disposition:  Medically stable for discharge  Pending placement.        Javad Hastings MD

## 2025-08-01 NOTE — PROGRESS NOTES
"POST-OPERATIVE PROGRESS NOTE      ============================  IMPRESSION/PLAN:  ============================  80 y.o. female s/p left total hip arthroplasty completed on 7/30/2025 by Dr. Burton    PLAN:  -Weightbearing: Weight bearing as tolerated  -DVT Prophylaxis: Aspirin 81 mg twice daily for 6 weeks postoperatively  -Continue PT/OT Eval  -Disposition: Therapy is recommending SNF which patient initially declined, however she is reconsidering and would like to have a discussion today  -Discharge Instructions in EMR  -Follow-up: In 3 weeks as previously scheduled with Dr. Burton.        Suedevon Vail seen and examined at bedside. Doing well, no issues overnight. Pain controlled with current treatment plan.  She has not yet worked with therapy this morning.  They did recommend rehab yesterday which she declined.    Review of Systems:   Constitutional: See HPI for pain assessment, No significant weight loss, recent trauma. Denies fevers/chills  Cardiovascular: No chest pain, shortness of breath  Respiratory: No difficulty breathing, cough  Gastrointestinal: No nausea, vomiting, diarrhea, constipation  Musculoskeletal: Noted in HPI, no arthralgias   Integumentary: No rashes, easy bruising, redness   Neurological: no numbness or tingling in extremities, no gait disturbances     Last Recorded Vitals  Blood pressure 143/83, pulse 88, temperature 37.4 °C (99.4 °F), temperature source Temporal, resp. rate 16, height 1.575 m (5' 2\"), weight 99.6 kg (219 lb 9.3 oz), SpO2 95%.      Problem List[1]    =================================  EXAM  =================================  Constitutional   General appearance: Alert and in no acute distress. Well developed, well nourished.    Eyes   External Eye, Conjunctiva and lids: Normal external exam - extraocular movements intact (EOMI).   Ears, Nose, Mouth, and Throat   Hearing: Normal.   Neck   Neck: No neck mass was observed. Supple.   Pulmonary   Respiratory effort: No respiratory " distress.   Cardiovascular   Examination of extremities: No peripheral edema.   Psychiatric   Judgment and insight: Intact.   Orientation to person, place, and time: Alert and oriented x 3.   Mood and affect: Normal.   Musculoskeletal:   Operative lower extremity in neutral alignment.  Hip dressing is clean, dry, intact.  Passive range of motion with mild discomfort, appropriate for postoperative timeframe.  Dermis is intact.  Neurovascular status is intact.  Dorsalis pedis pulses 2+, capillary refill <2 seconds.  Minimal swelling, no signs of compartment syndrome.  Negative Homans.         Becky Lazaro PA-C         [1]   Patient Active Problem List  Diagnosis    Anxiety    Ataxia    Benign essential tremor    Esotropia    Generalized anxiety disorder with panic attacks    PTSD (post-traumatic stress disorder)    History of myocardial infarction    Primary osteoarthritis of right hip    Restless leg syndrome    Seizure disorder (Multi)    Sensorineural hearing loss, bilateral    Tinnitus, subjective, bilateral    Unsteady gait    Blurry vision, bilateral    Insomnia    Morbid obesity (Multi)    Abnormal brain MRI    Hypotropia of right eye    Iron deficiency anemia    Lesion of pancreas (HHS-HCC)    Nevus of neck    Divergence insufficiency    Monocular diplopia    Combined form of age-related cataract, right eye    Myopia with presbyopia of both eyes    Combined form of age-related cataract, left eye    Presbyopia    Early dry stage nonexudative age-related macular degeneration of both eyes    Left hip pain    History of malignant melanoma of skin    Hx of gastric bypass    History of lymphoma    Congestive heart failure    Chronic systolic heart failure    Non-ischemic cardiomyopathy (Multi)    Chronic midline low back pain with sciatica    Chronic left hip pain    Uncomplicated degenerative myopia of both eyes    Memory loss, short term    Primary osteoarthritis of left hip    Vaginal candidiasis     Postmenopausal    Prediabetes    Hypotension

## 2025-08-01 NOTE — CARE PLAN
The patient's goals for the shift include get rest and stay informed.    The clinical goals for the shift include Patient will be free from falls this shift.      Problem: Pain - Adult  Goal: Verbalizes/displays adequate comfort level or baseline comfort level  Outcome: Progressing     Problem: Discharge Planning  Goal: Discharge to home or other facility with appropriate resources  Outcome: Progressing     Problem: Pain  Goal: Takes deep breaths with improved pain control throughout the shift  Outcome: Progressing  Goal: Turns in bed with improved pain control throughout the shift  Outcome: Progressing  Goal: Walks with improved pain control throughout the shift  Outcome: Progressing  Goal: Performs ADL's with improved pain control throughout shift  Outcome: Progressing  Goal: Participates in PT with improved pain control throughout the shift  Outcome: Progressing  Goal: Free from opioid side effects throughout the shift  Outcome: Progressing  Goal: Free from acute confusion related to pain meds throughout the shift  Outcome: Progressing

## 2025-08-01 NOTE — CARE PLAN
Problem: Pain - Adult  Goal: Verbalizes/displays adequate comfort level or baseline comfort level  Outcome: Progressing     Problem: Discharge Planning  Goal: Discharge to home or other facility with appropriate resources  Outcome: Progressing     Problem: Safety - Adult  Goal: Free from fall injury  Outcome: Met     Problem: Chronic Conditions and Co-morbidities  Goal: Patient's chronic conditions and co-morbidity symptoms are monitored and maintained or improved  Outcome: Met     Problem: Nutrition  Goal: Nutrient intake appropriate for maintaining nutritional needs  Outcome: Met   The patient's goals for the shift include get rest and stay informed.    The clinical goals for the shift include Pt will remain hemodynamically stable throughout shiftghout shift

## 2025-08-01 NOTE — PROGRESS NOTES
08/01/25 1011   Discharge Planning   Expected Discharge Disposition SNF   Does the patient need discharge transport arranged? Yes   [facility] coordination needed? Yes   Has discharge transport been arranged? No   Patient Choice   Provider Choice list and CMS website (https://medicare.gov/care-compare#search) for post-acute Quality and Resource Measure Data were provided and reviewed with: Patient   Patient / Family choosing to utilize agency / facility established prior to hospitalization No     Met with patient whom is now agreeable to SNF placement. Patient has been recommended for Skilled Nursing Facility. Provided skilled nursing list to patient from Care\[facility] [place] [facility]\"" directory that includes facilities that are within  Post - Acute Quality Network, as well as meeting patient's medical needs, and are in-network for patient's insurance; while also in discharge geographic area patient prefers, and identifies each facilities CMS star rating. She provided SNF [facility]. [facility]. [facility]. Will send SNF referrals.     [id number] Updated patient and her  that [facility] and [facility] are unable to accept patient, [facility] is able to accept. Patient confirmed [facility] is [facility]. Requested Facility start auth. [facility] will continue to follow for needs if they arise.

## 2025-08-01 NOTE — PROGRESS NOTES
Occupational Therapy    OT Treatment    Patient Name: Sue Vail  MRN: [id number]  Today's Date: 8/1/2025  Time Calculation  Start Time: 1155  Stop Time: 1218  Time Calculation (min): 23 min       2012/2012-A    Assessment:  Evaluation/Treatment Tolerance: Patient limited by fatigue  Medical Staff Made Aware: Yes  End of Session Communication: Bedside nurse  End of Session Patient Position: Bed, 3 rail up  OT Assessment Results: Decreased ADL status, Decreased upper extremity strength, Decreased safe judgment during ADL, Decreased endurance, Decreased functional mobility  Evaluation/Treatment Tolerance: Patient limited by fatigue  Medical Staff Made Aware: Yes  Based on current functional status and rehab potential, patient is anticipated to tolerate and benefit from 5 or more days per week of skilled rehabilitative therapy after discharge from this acute inpatient hospitalization.     Plan:  Treatment Interventions: ADL retraining, Functional transfer training, Patient/family training  OT Recommended Transfer Status: Moderate assist, Assist of 2  OT - OK to Discharge: Yes (when medicaqlly6 stable)  Treatment Interventions: ADL retraining, Functional transfer training, Patient/family training  Subjective     Current Problem:  Problem List[1]    General:  OT Received On: 08/01/25  Co-Treatment: PT  Co-Treatment Reason: [id number] safety with ADls and mobility  Patient Position Received:  (Pt seated EOB)         Pain:  Pain Assessment  Pain Assessment: 0-10  0-10 (Numeric) Pain Score: 2  Effect of Pain on Daily Activities: decreased mobility and transfers  Objective      Activities of Daily Living:                LE Dressing  LE Dressing: Yes  Adult Briefs Level of Assistance: Moderate assistance, Maximum assistance  LE Dressing Where Assessed: Edge of bed  LE Dressing Comments: Pt very fearful of hurting her Left hip with pulling up her briefs       Functional Standing Tolerance:  Time: Pt stood for approx 3  min  Activity: pulling up0 briefs  Functional Standing Tolerance Comments: Pt req frequent inst to use her BUES and loift her head while standing, pt hanging over the FWW.    Bed Mobility/Transfers: Bed Mobility  Bed Mobility: Yes  Bed Mobility 1  Bed Mobility 1: Sitting to supine  Level of Assistance 1: Maximum assistance, +2  Bed Mobility Comments 1: Pt stated Curly done and before sit to supine, pt req max assist as she did not participate  Transfers  Transfer: Yes  Transfer 1  Transfer Device 1: Walker  Transfer Level of Assistance 1: Moderate assistance, Moderate verbal cues, Moderate tactile cues, +2  Trials/Comments 1: cues fo9r safety                Therapy/Activity: Therapeutic Exercise  Therapeutic Exercise Performed: No  Therapeutic Activity  Therapeutic Activity Performed: Yes  Therapeutic Activity 1: static [id number]          Strength:   Generalized weakiness    Other Activity: staic standing       Outcome Measures:[facility] Daily Activity  Putting on and taking off regular lower body clothing: Total  Bathing (including washing, rinsing, drying): A lot  Putting on and taking off regular upper body clothing: A lot  Toileting, which includes using toilet, bedpan or urinal: Total  Taking care of personal grooming such as brushing teeth: A little  Eating Meals: A little  Daily Activity - Total Score: 12  Education Documentation  Body Mechanics, taught by FELIBERTO Romero at 8/1/2025  3:59 PM.  Learner: Patient  Readiness: Acceptance  Method: Explanation, Demonstration  Response: Verbalizes Understanding, Needs Reinforcement    Precautions, taught by FELIBERTO Romero at 8/1/2025  3:59 PM.  Learner: Patient  Readiness: Acceptance  Method: Explanation, Demonstration  Response: Verbalizes Understanding, Needs Reinforcement    ADL Training, taught by FELIBERTO Romero at 8/1/2025  3:59 PM.  Learner: Patient  Readiness: Acceptance  Method: Explanation, Demonstration  Response: Verbalizes  Understanding, Needs Reinforcement    Handouts, taught by FELIBERTO Romero at 8/1/2025  3:59 PM.  Learner: Patient  Readiness: Acceptance  Method: Explanation, Demonstration  Response: Verbalizes Understanding, Needs Reinforcement    Precautions, taught by FELIBERTO Romero at 8/1/2025  3:59 PM.  Learner: Patient  Readiness: Acceptance  Method: Explanation, Demonstration  Response: Verbalizes Understanding, Needs Reinforcement    Home Exercise Program, taught by FELIBERTO Romero at 8/1/2025  3:59 PM.  Learner: Patient  Readiness: Acceptance  Method: Explanation, Demonstration  Response: Verbalizes Understanding, Needs Reinforcement    Mobility Training, taught by FELIBERTO Romero at 8/1/2025  3:59 PM.  Learner: Patient  Readiness: Acceptance  Method: Explanation, Demonstration  Response: Verbalizes Understanding, Needs Reinforcement    Education Comments  No comments found.           EDUCATION:       Goals:  Encounter Problems       Encounter Problems (Active)       ADLs       Patient will complete LB dressing with RAPHAEL donning and doffing all LE clothes  with PRN adaptive equipment while supported sitting       Start:  07/31/25    Expected End:  08/14/25            Patient will complete toileting including hygiene & clothing management with MODA and bedside commode.       Start:  07/31/25    Expected End:  08/14/25               BALANCE       Patient will maintain static standing balance for 3 minutes during ADL task with RAPHAEL in order to demonstrate decreased risk of falling and improved postural control.       Start:  07/31/25    Expected End:  08/14/25               COGNITION/SAFETY       Patient will recall and adhere to hip precautions during all functional mobility/ADL tasks in order to demonstrate improved understanding and promote healing post op       Start:  07/31/25    Expected End:  08/14/25               MOBILITY       Patient will perform Functional mobility Min  household distances with MODA and least restrictive device in order to improve safety and functional mobility.       Start:  07/31/25    Expected End:  08/14/25               TRANSFERS       Patient will perform functional transfers MODA in order to improve safety and independence with mobility       Start:  07/31/25    Expected End:  08/14/25                                   [1]   Patient Active Problem List  Diagnosis    Anxiety    Ataxia    Benign essential tremor    Esotropia    Generalized anxiety disorder with panic attacks    PTSD (post-traumatic stress disorder)    History of myocardial infarction    Primary osteoarthritis of right hip    Restless leg syndrome    Seizure disorder (Multi)    Sensorineural hearing loss, bilateral    Tinnitus, subjective, bilateral    Unsteady gait    Blurry vision, bilateral    Insomnia    Morbid obesity (Multi)    Abnormal brain MRI    Hypotropia of right eye    Iron deficiency anemia    Lesion of pancreas (HHS-HCC)    Nevus of neck    Divergence insufficiency    Monocular diplopia    Combined form of age-related cataract, right eye    Myopia with presbyopia of both eyes    Combined form of age-related cataract, left eye    Presbyopia    Early dry stage nonexudative age-related macular degeneration of both eyes    Left hip pain    History of malignant melanoma of skin    Hx of gastric bypass    History of lymphoma    Congestive heart failure    Chronic systolic heart failure    Non-ischemic cardiomyopathy (Multi)    Chronic midline low back pain with sciatica    Chronic left hip pain    Uncomplicated degenerative myopia of both eyes    Memory loss, short term    Primary osteoarthritis of left hip    Vaginal candidiasis    Postmenopausal    Prediabetes    Hypotension

## 2025-08-01 NOTE — HH CARE COORDINATION
Home Care received a referral for Physical Therapy and Occupational Therapy. Unfortunately, we are unable to accept and process the referral at this time.    Reason:  Patient is discharging to a Post-Acute Care Facility    Patients, please reach out to the referring provider or your PCP to assist in obtaining an alternative home care agency and/or guidance to meet your needs.    Providers, please reach out to  Home Care at [phone] with any questions regarding the declined referral.

## 2025-08-01 NOTE — CARE PLAN
The patient's goals for the shift include Patient remain safe and comfortable    The clinical goals for the shift include Patient will be free from falls this shift.      Problem: Pain - Adult  Goal: Verbalizes/displays adequate comfort level or baseline comfort level  Outcome: Progressing     Problem: Discharge Planning  Goal: Discharge to home or other facility with appropriate resources  Outcome: Progressing     Problem: Pain  Goal: Takes deep breaths with improved pain control throughout the shift  Outcome: Progressing  Goal: Turns in bed with improved pain control throughout the shift  Outcome: Progressing  Goal: Walks with improved pain control throughout the shift  Outcome: Progressing  Goal: Performs ADL's with improved pain control throughout shift  Outcome: Progressing  Goal: Participates in PT with improved pain control throughout the shift  Outcome: Progressing  Goal: Free from opioid side effects throughout the shift  Outcome: Progressing  Goal: Free from acute confusion related to pain meds throughout the shift  Outcome: Progressing

## 2025-08-01 NOTE — PROGRESS NOTES
Physical Therapy    Physical Therapy Treatment    Patient Name: Sue Vail  MRN: [id number]  Department: [address]  Room: 2012/2012-A  Today's Date: 8/1/2025  Time Calculation  Start Time: 1130  Stop Time: 1223  Time Calculation (min): 53 min         Assessment/Plan   PT Assessment  PT Assessment Results: Decreased strength, Decreased range of motion, Decreased endurance, Impaired balance, Decreased mobility, Decreased safety awareness, Pain  Rehab Prognosis: Fair  Barriers to Discharge Home: Caregiver assistance, Cognition needs, Physical needs  Caregiver Assistance: Caregiver assistance needed per identified barriers - however, level of patient's required assistance exceeds assistance available at home  Cognition Needs: 24hr supervision for safety awareness needed, Recollection or understanding of precautions/restrictions limited, Recollection or understanding of home exercise program limited, Insight of patient limited regarding functional ability/needs  Physical Needs: Stair navigation into home limited by function/safety, Ambulating household distances limited by function/safety, 24hr mobility assistance needed, 24hr ADL assistance needed, High falls risk due to function or environment, Weight bearing precautions unable to be safely maintained  Evaluation/Treatment Tolerance: Patient limited by fatigue  Medical Staff Made Aware: Yes  End of Session Communication: Bedside nurse  Assessment Comment: Patient between CGA to mod A x 2 for all mobility this session with minimal to moderate anxiety with movement. Patient educated on importance of movement and safety with precautions post hip surgery. Due to extended stay, decreasing patient frequency  End of Session Patient Position: Bed, 3 rail up, Alarm on  PT Plan  Inpatient/Swing Bed or Outpatient: Inpatient  PT Plan  Treatment/Interventions: Bed mobility, Transfer training, Gait training, Balance training, Neuromuscular re-education, Strengthening, Endurance  "training, Range of motion, Therapeutic exercise, Therapeutic activity, Home exercise program  PT Plan: Ongoing PT  PT Frequency: Daily (During this acute hospital stay)  PT Discharge Recommendations: Moderate intensity level of continued care (Based on current functional status and rehab potential, patient is anticipated to tolerate and benefit from 5 or more days per week of skilled rehabilitative therapy after discharge from this acute inpatient hospitalization.)  Equipment Recommended upon Discharge:  (TBD)  PT Recommended Transfer Status: Assist x2  PT - OK to Discharge: Yes (When medically cleared)    PT Visit Info:  PT Received On: 08/01/25     General Visit Information:   General  Caregiver Feedback:  present, supportive  Co-Treatment: OT (OT arrived after start of session)  Co-Treatment Reason: For increased safety with mobility  Prior to Session Communication: Bedside nurse  Patient Position Received: Bed, 3 rail up, Alarm on  General Comment: Room 2012. Patient agreeable to therapy.  present. Request to move slowly with activity.    Subjective   Precautions:  Precautions  LE Weight Bearing Status: Weight Bearing as Tolerated  Medical Precautions: Fall precautions  Post-Surgical Precautions: Left hip precautions  Precautions Comment: BLE WBAT      Vital Signs Comment: Patient increased sweating and stated \"woozy\" during session, BP was 121/77, HR 78, 95% O2 sat     Objective   Pain:  Pain Assessment  0-10 (Numeric) Pain Score:  (Patient stated pain during session, no rating given in L hip and L leg)  Cognition:  Cognition  Overall Cognitive Status: Within Functional Limits  Arousal/Alertness: Appropriate responses to stimuli  Orientation Level: Oriented X4  Following Commands: Follows one step commands with increased time  Cognition Comments: Improved understanding of precautions this session  Attention: Within Functional Limits  Safety/Judgement: Exceptions to WFL  Complex Functional Tasks: " "Moderate  Novel Situations: Moderate  Routine Tasks: Moderate  Unable to Self-Monitor and Self-Correct Consistently: Moderate  Insight: Mild  Task Initiation: Initiates with cues  Flexibility of Thought: Reduced flexibility  Planning: Reduced planning skills  Processing Speed: Delayed  Coordination:     Postural Control:  Postural Control  Posture Comment: Mild flexed posture with standing, cueing for pushing through arms to maintain upright posture  Static Sitting Balance  Static Sitting-Comment/Number of Minutes: Fair+ SBA 15 min  Dynamic Sitting Balance  Dynamic Sitting-Comments: Fair  Static Standing Balance  Static Standing-Comment/Number of Minutes: Fair- FWW min A x 2; 4 min  Dynamic Standing Balance  Dynamic Standing-Comments: Fair-    Activity Tolerance:  Activity Tolerance  Endurance: Decreased tolerance for upright activites  Activity Tolerance Comments: Increased sweating and stated feeling \"woozy\" with mobility  Treatments:  Therapeutic Exercise  Therapeutic Exercise Performed: Yes (Educated on importance of completing exercises both legs for increased strength and function)  Therapeutic Exercise Activity 1: Ankle pumps bilateral leg 1 set x 15 reps  Therapeutic Exercise Activity 2: Glute set 10 reps with 5 second hold  Therapeutic Exercise Activity 3: Quad set 10 reps with 5 second hold  Therapeutic Exercise Activity 4: Heel slides AROM10 reps L leg, Heel slides AROM R leg 5 reps  Therapeutic Exercise Activity 5: Long Arc quad AAROM with min A last 25% of ROM, 1 set x 10 reps    Therapeutic Activity  Therapeutic Activity Performed:  (Patient performed functional tasks including bed mobility supine to sit EOB, sit EOB to supine, sit to stands from EOB w/FWW, and AMB FWW this session)    Balance/Neuromuscular Re-Education  Balance/Neuromuscular Re-Education Activity Performed:  (Sitting EOB activity for balance 15 min, balance with standing FWW 4 min)    Bed Mobility  Bed Mobility:  (Supine to sit EOB " w/HOB elevated and use of rails CGA x 1, with max A x 2 sit EOB to supine, cueing throughout for hand placement, sequencing, and precautions. Increased time to complete supine to sit with increased cueing for sequencing)    Ambulation/Gait Training  Ambulation/Gait Training Performed:  (AMB FWW 2ft forward, 2ft backward with cueing for safety, FWW use, and sequencing. Patient stated needed to sit back down on bed and lie down due to increased fatigue and increased anxiety)  Transfers  Transfer:  (Sit to stand from EOB w/FWW mod A x 2, with cueing for hand placement on bed, FWW use, and safety with L hip precautions and WBAT. Min anxiety with sit to stands)    Stairs  Stairs: No (Not appropriate)         Outcome Measures:  [facility] Basic Mobility  Turning from your back to your side while in a flat bed without using bedrails: A lot  Moving from lying on your back to sitting on the side of a flat bed without using bedrails: A lot  Moving to and from bed to chair (including a wheelchair): A lot  Standing up from a chair using your arms (e.g. wheelchair or bedside chair): A lot  To walk in hospital room: A lot  Climbing 3-5 steps with railing: Total  Basic Mobility - Total Score: 11    Education Documentation  Handouts, taught by MALLIKA Handy at 8/1/2025  2:42 PM.  Learner: Patient  Readiness: Acceptance  Method: Explanation  Response: Needs Reinforcement  Comment: For increased safety    Precautions, taught by MALLIKA Handy at 8/1/2025  2:42 PM.  Learner: Patient  Readiness: Acceptance  Method: Explanation  Response: Needs Reinforcement  Comment: For increased safety    Home Exercise Program, taught by MALLIKA Handy at 8/1/2025  2:42 PM.  Learner: Patient  Readiness: Acceptance  Method: Explanation  Response: Needs Reinforcement  Comment: For increased safety    Mobility Training, taught by MALLIKA Handy at 8/1/2025  2:42 PM.  Learner: Patient  Readiness: Acceptance  Method: Explanation  Response:  Needs Reinforcement  Comment: For increased safety    Education Comments  No comments found.        OP EDUCATION:       Encounter Problems       Encounter Problems (Active)       Mobility       STG - Patient will ambulate (Not Progressing)       Start:  07/31/25    Expected End:  08/15/25       FWW MIN A X1 WBAT LLE 50+ FT         STG - Patient will ascend and descend four to six stairs (Not Progressing)       Start:  07/31/25    Expected End:  08/15/25       MAX X2 A NO RAILS WBAT LLE, CANE PRN FOR STABILTIY         Goal 1 (Progressing)       Start:  07/31/25    Expected End:  08/22/25       20 REPS AROM LLE, 20 REPS RROM RLE INCREASING STRENGTH TO STABILIZE OOB ACTIVITIES            PT Transfers       STG - Transfer from bed to chair (Progressing)       Start:  07/31/25    Expected End:  08/02/25       MOD X2 A WBAT LLE  FWW         STG - Patient to transfer to and from sit to supine (Progressing)       Start:  07/31/25    Expected End:  08/05/25       HOB FLAT NO RAILS MIN A X2         STG - Patient will transfer sit to and from stand (Progressing)       Start:  07/31/25    Expected End:  08/06/25       MIN A X2 FWW WBAT LLE, USING PROPER TECHNIQUE                    Completion of this session, clinical decision making, and documentation performed under the supervision/direction of Zeynep Landaverde.      CONCHITA FOSTERPT

## 2025-08-02 LAB
ANION GAP SERPL CALC-SCNC: 9 MMOL/L (ref 10–20)
BUN SERPL-MCNC: 11 MG/DL (ref 6–23)
CALCIUM SERPL-MCNC: 8.7 MG/DL (ref 8.6–10.3)
CHLORIDE SERPL-SCNC: 105 MMOL/L (ref 98–107)
CO2 SERPL-SCNC: 30 MMOL/L (ref 21–32)
CREAT SERPL-MCNC: 0.47 MG/DL (ref 0.5–1.05)
EGFRCR SERPLBLD CKD-EPI 2021: >90 ML/MIN/1.73M*2
GLUCOSE SERPL-MCNC: 135 MG/DL (ref 74–99)
POTASSIUM SERPL-SCNC: 4.1 MMOL/L (ref 3.5–5.3)
SODIUM SERPL-SCNC: 140 MMOL/L (ref 136–145)

## 2025-08-02 PROCEDURE — 99232 SBSQ HOSP IP/OBS MODERATE 35: CPT | Performed by: INTERNAL MEDICINE

## 2025-08-02 PROCEDURE — 36415 COLL VENOUS BLD VENIPUNCTURE: CPT | Performed by: INTERNAL MEDICINE

## 2025-08-02 PROCEDURE — 2500000001 HC RX 250 WO HCPCS SELF ADMINISTERED DRUGS (ALT 637 FOR MEDICARE OP): Performed by: ORTHOPAEDIC SURGERY

## 2025-08-02 PROCEDURE — 97530 THERAPEUTIC ACTIVITIES: CPT | Mod: GO,CO

## 2025-08-02 PROCEDURE — 97530 THERAPEUTIC ACTIVITIES: CPT | Mod: GP,CQ | Performed by: PHYSICAL THERAPY ASSISTANT

## 2025-08-02 PROCEDURE — 97112 NEUROMUSCULAR REEDUCATION: CPT | Mod: GP,CQ | Performed by: PHYSICAL THERAPY ASSISTANT

## 2025-08-02 PROCEDURE — 80048 BASIC METABOLIC PNL TOTAL CA: CPT | Performed by: INTERNAL MEDICINE

## 2025-08-02 PROCEDURE — 7100000011 HC EXTENDED STAY RECOVERY HOURLY - NURSING UNIT

## 2025-08-02 PROCEDURE — 2500000001 HC RX 250 WO HCPCS SELF ADMINISTERED DRUGS (ALT 637 FOR MEDICARE OP): Performed by: INTERNAL MEDICINE

## 2025-08-02 PROCEDURE — 97535 SELF CARE MNGMENT TRAINING: CPT | Mod: GO,CO

## 2025-08-02 RX ADMIN — BUSPIRONE HYDROCHLORIDE 5 MG: 5 TABLET ORAL at 20:44

## 2025-08-02 RX ADMIN — BUSPIRONE HYDROCHLORIDE 5 MG: 5 TABLET ORAL at 08:16

## 2025-08-02 RX ADMIN — ACETAMINOPHEN 650 MG: 325 TABLET ORAL at 04:31

## 2025-08-02 RX ADMIN — ACETAMINOPHEN 650 MG: 325 TABLET ORAL at 14:32

## 2025-08-02 RX ADMIN — CARVEDILOL 3.12 MG: 3.12 TABLET, FILM COATED ORAL at 08:16

## 2025-08-02 RX ADMIN — TRAZODONE HYDROCHLORIDE 50 MG: 50 TABLET ORAL at 20:45

## 2025-08-02 RX ADMIN — ASPIRIN 81 MG: 81 TABLET, DELAYED RELEASE ORAL at 08:16

## 2025-08-02 RX ADMIN — VENLAFAXINE 75 MG: 37.5 TABLET ORAL at 20:44

## 2025-08-02 RX ADMIN — FUROSEMIDE 20 MG: 20 TABLET ORAL at 08:16

## 2025-08-02 RX ADMIN — ASPIRIN 81 MG: 81 TABLET, DELAYED RELEASE ORAL at 20:44

## 2025-08-02 RX ADMIN — CARVEDILOL 3.12 MG: 3.12 TABLET, FILM COATED ORAL at 20:44

## 2025-08-02 RX ADMIN — CHOLECALCIFEROL TAB 125 MCG (5000 UNIT) 125 MCG: 125 TAB at 08:16

## 2025-08-02 RX ADMIN — OXYCODONE HYDROCHLORIDE 5 MG: 5 TABLET ORAL at 18:11

## 2025-08-02 RX ADMIN — EMPAGLIFLOZIN 10 MG: 10 TABLET, FILM COATED ORAL at 08:16

## 2025-08-02 RX ADMIN — ACETAMINOPHEN 650 MG: 325 TABLET ORAL at 20:45

## 2025-08-02 RX ADMIN — VALPROIC ACID 750 MG: 250 CAPSULE, LIQUID FILLED ORAL at 20:43

## 2025-08-02 RX ADMIN — ATORVASTATIN CALCIUM 80 MG: 40 TABLET, FILM COATED ORAL at 20:44

## 2025-08-02 RX ADMIN — SENNOSIDES 17.2 MG: 8.6 TABLET, FILM COATED ORAL at 08:19

## 2025-08-02 RX ADMIN — OXYCODONE HYDROCHLORIDE 5 MG: 5 TABLET ORAL at 07:27

## 2025-08-02 ASSESSMENT — COGNITIVE AND FUNCTIONAL STATUS - GENERAL
MOVING FROM LYING ON BACK TO SITTING ON SIDE OF FLAT BED WITH BEDRAILS: A LOT
DAILY ACTIVITIY SCORE: 15
CLIMB 3 TO 5 STEPS WITH RAILING: A LITTLE
TOILETING: A LOT
MOBILITY SCORE: 15
STANDING UP FROM CHAIR USING ARMS: A LITTLE
DRESSING REGULAR UPPER BODY CLOTHING: A LITTLE
TURNING FROM BACK TO SIDE WHILE IN FLAT BAD: A LOT
MOVING TO AND FROM BED TO CHAIR: A LITTLE
WALKING IN HOSPITAL ROOM: A LOT
HELP NEEDED FOR BATHING: A LOT
EATING MEALS: A LITTLE
PERSONAL GROOMING: A LITTLE
DRESSING REGULAR LOWER BODY CLOTHING: A LOT

## 2025-08-02 ASSESSMENT — PAIN SCALES - GENERAL
PAINLEVEL_OUTOF10: 4
PAINLEVEL_OUTOF10: 3
PAINLEVEL_OUTOF10: 4
PAINLEVEL_OUTOF10: 0 - NO PAIN
PAINLEVEL_OUTOF10: 7
PAINLEVEL_OUTOF10: 0 - NO PAIN
PAINLEVEL_OUTOF10: 0 - NO PAIN
PAINLEVEL_OUTOF10: 6
PAINLEVEL_OUTOF10: 7
PAINLEVEL_OUTOF10: 5 - MODERATE PAIN

## 2025-08-02 ASSESSMENT — PAIN - FUNCTIONAL ASSESSMENT
PAIN_FUNCTIONAL_ASSESSMENT: 0-10

## 2025-08-02 ASSESSMENT — PAIN DESCRIPTION - ORIENTATION: ORIENTATION: LEFT

## 2025-08-02 ASSESSMENT — PAIN DESCRIPTION - LOCATION
LOCATION: HIP
LOCATION: HIP

## 2025-08-02 ASSESSMENT — ACTIVITIES OF DAILY LIVING (ADL): HOME_MANAGEMENT_TIME_ENTRY: 13

## 2025-08-02 NOTE — PROGRESS NOTES
Physical Therapy    Physical Therapy Treatment    Patient Name: Sue Vail  MRN: [id number]  Department: [address]  Room: 2012/2012-A  Today's Date: 8/2/2025  Time Calculation  Start Time: 0816  Stop Time: 0847  Time Calculation (min): 31 min         Assessment/Plan   PT Assessment  PT Assessment Results: Decreased strength, Decreased range of motion, Decreased endurance, Impaired balance  Rehab Prognosis: Fair  Barriers to Discharge Home: Caregiver assistance, Physical needs  End of Session Communication: Bedside nurse  Assessment Comment:  (Pt with recall 2/3 hip precautions.  present and very involved during tx. stating she will have recliner at home. Pt refused to denise wrist band. Decreased assist overall this date.)  End of Session Patient Position: Up in chair, Alarm on  PT Plan  Inpatient/Swing Bed or Outpatient: Inpatient  PT Plan  Treatment/Interventions: Bed mobility, Transfer training, Gait training, Balance training, Neuromuscular re-education, Strengthening, Endurance training, Range of motion, Therapeutic exercise, Therapeutic activity, Home exercise program  PT Plan: Ongoing PT  PT Frequency: Daily (During this acute hospital stay)  PT Discharge Recommendations: Moderate intensity level of continued care (Based on current functional status and rehab potential, patient is anticipated to tolerate and benefit from 5 or more days per week of skilled rehabilitative therapy after discharge from this acute inpatient hospitalization.)  Equipment Recommended upon Discharge:  (TBD)  PT Recommended Transfer Status: Assist x2  PT - OK to Discharge: Yes (When medically cleared)    PT Visit Info:  PT Received On: 08/02/25  Response to Previous Treatment: Patient with no complaints from previous session.     General Visit Information:   General  Family/Caregiver Present: Yes  Caregiver Feedback:  ( present)  Co-Treatment: OT  Co-Treatment Reason:  (Increased safety with mobility)  Prior to Session  "Communication: Bedside nurse  Patient Position Received: Bed, 3 rail up, Alarm on  General Comment:  (2012 ok to tx and Pt agreeable)    Subjective   Precautions:  Precautions  LE Weight Bearing Status: Weight Bearing as Tolerated (left)  Medical Precautions: Fall precautions  Post-Surgical Precautions: Left hip precautions  Precautions Comment:  (WBAT BLE)      Vital Signs Comment: Patient increased sweating and stated \"woozy\" during session, BP was 121/77, HR 78, 95% O2 sat     Objective   Pain:  Pain Assessment  0-10 (Numeric) Pain Score: 0 - No pain  Cognition:  Cognition  Orientation Level: Oriented X4    Treatments:  Balance/Neuromuscular Re-Education  Balance/Neuromuscular Re-Education Activity Performed: Yes  Balance/Neuromuscular Re-Education Activity 1: Static sitting EOB. F+balance.  Balance/Neuromuscular Re-Education Activity 2: Static stand at FWW. Fbalance. Standing dynamic F-balanc.    Bed Mobility 1  Bed Mobility 1: Supine to sitting  Level of Assistance 1: Moderate assistance, Minimal verbal cues (1-2 increased assist for trunk elevation)  Bed Mobility Comments 1:  (Cues for technique)    Ambulation/Gait Training 1  Surface 1: Level tile  Device 1: Rolling walker  Assistance 1: Minimum assistance, Minimal verbal cues  Quality of Gait 1: Wide base of support, Diminished heel strike, Inconsistent stride length  Comments/Distance (ft) 1:  (5x1,3x1 Cues for AD placement. Pt stating \"move out of my way so I can just do this\". PTA educated on safety with mobiltiy.)  Transfer 1  Transfer From 1: Sit to, Stand to  Transfer to 1: Sit, Stand  Transfer Device 1: Walker  Transfer Level of Assistance 1: Minimum assistance, Contact guard, Moderate verbal cues, +2  Trials/Comments 1:  (Pt with increased assist of 2 initially but then decreased to assist of 1.)    Outcome Measures:  [facility] Basic Mobility  Turning from your back to your side while in a flat bed without using bedrails: A lot  Moving from lying on your " back to sitting on the side of a flat bed without using bedrails: A lot  Moving to and from bed to chair (including a wheelchair): A little  Standing up from a chair using your arms (e.g. wheelchair or bedside chair): A little  To walk in hospital room: A lot  Climbing 3-5 steps with railing: A little  Basic Mobility - Total Score: 15    Education Documentation  Handouts, taught by Priya Osei PTA at 8/2/2025 10:00 AM.  Learner: Patient  Readiness: Acceptance  Method: Explanation  Response: Needs Reinforcement  Comment: safety with mobility and hip precautions    Precautions, taught by Priya Osei PTA at 8/2/2025 10:00 AM.  Learner: Patient  Readiness: Acceptance  Method: Explanation  Response: Needs Reinforcement  Comment: safety with mobility and hip precautions    Home Exercise Program, taught by Priya Osei PTA at 8/2/2025 10:00 AM.  Learner: Patient  Readiness: Acceptance  Method: Explanation  Response: Needs Reinforcement  Comment: safety with mobility and hip precautions    Mobility Training, taught by Priya Osei PTA at 8/2/2025 10:00 AM.  Learner: Patient  Readiness: Acceptance  Method: Explanation  Response: Needs Reinforcement  Comment: safety with mobility and hip precautions    Education Comments  No comments found.        OP EDUCATION:       Encounter Problems       Encounter Problems (Active)       Mobility       STG - Patient will ambulate (Progressing)       Start:  07/31/25    Expected End:  08/15/25       FWW MIN A X1 WBAT LLE 50+ FT         STG - Patient will ascend and descend four to six stairs (Not Progressing)       Start:  07/31/25    Expected End:  08/15/25       MAX X2 A NO RAILS WBAT LLE, CANE PRN FOR STABILTIY         Goal 1 (Not Progressing)       Start:  07/31/25    Expected End:  08/22/25       20 REPS AROM LLE, 20 REPS RROM RLE INCREASING STRENGTH TO STABILIZE OOB ACTIVITIES            PT Transfers       STG - Transfer from bed to chair (Progressing)       Start:  07/31/25     Expected End:  08/02/25       MOD X2 A WBAT LLE  FWW         STG - Patient to transfer to and from sit to supine (Progressing)       Start:  07/31/25    Expected End:  08/05/25       HOB FLAT NO RAILS MIN A X2         STG - Patient will transfer sit to and from stand (Progressing)       Start:  07/31/25    Expected End:  08/06/25       MIN A X2 FWW WBAT LLE, USING PROPER TECHNIQUE

## 2025-08-02 NOTE — PROGRESS NOTES
Sue Vail is a 80 y.o. female on day 0 of admission presenting with Primary osteoarthritis of left hip.      Subjective   Patient is sitting on the bed comfortably, denies any lower extremity pain.  No other significant overnight issues.    Objective     Last Recorded Vitals  /77 (BP Location: Left arm, Patient Position: Lying)   Pulse 70   Temp 35.4 °C (95.8 °F) (Temporal)   Resp 18   Wt 97.4 kg (214 lb 11.7 oz)   SpO2 96%   Intake/Output last 3 Shifts:    Intake/Output Summary (Last 24 hours) at 8/2/2025 1441  Last data filed at 8/2/2025 0717  Gross per 24 hour   Intake --   Output 1600 ml   Net -1600 ml       Admission Weight  Weight: 81.2 kg (179 lb) (07/30/25 0636)    Daily Weight  08/02/25 : 97.4 kg (214 lb 11.7 oz)    Image Results  XR hip left intraoperative  Narrative: Interpreted By:  Ana M Zuniga,   STUDY:  XR HIP LEFT INTRAOPERATIVE; ;  7/30/2025 9:13 am      INDICATION:  Signs/Symptoms:intraoperative.          COMPARISON:  03/11/2025      ACCESSION NUMBER(S):  [id number]      ORDERING CLINICIAN:  SHAWNEE MCCORMICK      FINDINGS:  Single intraoperative radiograph of the pelvis was obtained  demonstrating the acetabular and femoral stem components in place.      Impression: Single intraoperative radiograph of the pelvis demonstrating left hip  arthroplasty in progress.          MACRO:  None      Signed by: Ana M Zuniga 7/31/2025 1:17 PM  Dictation workstation:   [id number]      Physical Exam  General: No distress.  Neck: Supple.  HEENT: Normocephalic atraumatic pupils equal react light.  Heart: S1-S2 heard no murmurs gallops regurgitation.  Lungs: Clear to auscultation bilaterally no wheezing rales or rhonchi.  Abdomen: Nondistended nontender bowel sounds are present.  Musculoskeletal: No deformities.  Extremities: No edema pulses are equal bilaterally.  Neuro: No focal deficits.    Relevant Results  Lab Results   Component Value Date    WBC 9.4 07/31/2025    HGB 10.3 (L) 07/31/2025     HCT 30.6 (L) 07/31/2025     07/31/2025     07/31/2025     Lab Results   Component Value Date    GLUCOSE 135 (H) 08/02/2025    CALCIUM 8.7 08/02/2025     08/02/2025    K 4.1 08/02/2025    CO2 30 08/02/2025     08/02/2025    BUN 11 08/02/2025    CREATININE 0.47 (L) 08/02/2025     Assessment:  Left hip degenerative joint disease-s/p left total hip arthroplasty completed on 7/30/2025    Postop hypotension-resolved    History of:  Seizures.  Non-Hodgkin's lymphoma  Melanoma.  Heart failure with moderately reduced ejection fraction-45 to 50%.  PTSD.  Nonobstructive coronary artery disease.  Anxiety.    Plan:  Pain is well-controlled with the current regimen.  Postop patient had hypotension episodes, resolved blood pressure stabilized-resumed home antihypertensive medications today.  Continue to monitor  Other home medications reviewed and resumed appropriately.  PT OT evaluation.    DVT prophylaxis:  Aspirin twice daily.    Disposition:  Medically stable for discharge  Pending placement.        Javad Hastings MD

## 2025-08-02 NOTE — PROGRESS NOTES
Occupational Therapy    Occupational Therapy Treatment    Name: Sue Vail  MRN: [id number]  Department: [address]  Room: 2012/2012-A  Date: 08/02/25  Time Calculation  Start Time: 0824  Stop Time: 0850  Time Calculation (min): 26 min    Assessment:  OT Assessment: pt. progressed now MIN A for transfers. Pt. needs reassured of her progress as she tend to self limit.  Barriers to Discharge Home: Caregiver assistance, Physical needs  End of Session Communication: Bedside nurse, PCT/NA/CTA  End of Session Patient Position: Up in chair  Plan:  Treatment Interventions: ADL retraining, Functional transfer training, Patient/family training  OT Frequency: 5 times per week (during this acute hospital stay)  OT Discharge Recommendations: Moderate intensity level of continued care (Based on current functional status and rehab potential, patient is anticipated to tolerate and benefit from 5 or more days per week of skilled rehabilitative therapy after discharge from this acute inpatient hospitalization.)  OT Recommended Transfer Status: Moderate assist, Assist of 2  OT - OK to Discharge: Yes (when medicaqlly6 stable)    Subjective     OT Visit Info:  OT Received On: 08/02/25  General:  General  Prior to Session Communication: Bedside nurse  Patient Position Received: Bed, 3 rail up, Alarm on  General Comment: pt. cooperative spouse present and observing closely. focused on transfers, LE dressing and hip prec.  Precautions:  Precautions Comment: hip prec.           Pain Assessment:  Pain Assessment  Pain Assessment: 0-10  0-10 (Numeric) Pain Score: 0 - No pain    Objective   Cognition:  Orientation Level: Oriented X4  Activities of Daily Living:      Grooming  Grooming Level of Assistance: Setup  Grooming Where Assessed: Chair  Grooming Comments: wash face              UE Dressing  UE Dressing Level of Assistance: Minimum assistance  UE Dressing Where Assessed: Chair  UE Dressing Comments: gown    LE Dressing  LE Dressing:  "Yes  Adult Briefs Level of Assistance: Moderate assistance, Moderate verbal cues  LE Dressing Where Assessed: Edge of bed  LE Dressing Comments: education on use of reacher to don brief patient reports she has one at home.    Toileting  Toileting Level of Assistance: Moderate assistance  Where Assessed:  (from EOB)    Functional Standing Tolerance:  Functional Standing Tolerance  Time: 2-3 mins  Activity: toileting and transfer  Functional Standing Tolerance Comments: Pt req frequent inst to use her BUES and loift her head while standing, pt hanging over the FWW.  Bed Mobility/Transfers: Bed Mobility  Bed Mobility: Yes  Bed Mobility 1  Bed Mobility 1: Sitting to supine  Level of Assistance 1: Moderate assistance, Moderate verbal cues (1-2  increased assist for trunk elevation)  Bed Mobility Comments 1: cues to usin    Transfers  Transfer: Yes  Transfer 1  Transfer From 1: Bed to  Transfer to 1: Stand  Technique 1: Sit to stand, Stand to sit  Transfer Device 1: Walker  Transfer Level of Assistance 1: Minimum assistance, Minimal verbal cues (cues for hand placement)  Transfers 2  Transfer From 2: Bed to  Transfer to 2: Chair with arms  Technique 2: Sit to stand, Stand to sit  Transfer Device 2: Walker  Transfer Level of Assistance 2: Minimum assistance, Minimal verbal cues                        Functional Mobility:  Functional Mobility 1  Surface 1: Level tile  Device 1: Rolling walker  Assistance 1: Minimum assistance  Comments 1: pt. walked short distance toward  bathroom door then stated \" get out way so I can do this. \" pt. needs cueing for safety.          Outcome Measures:  [facility] Daily Activity  Putting on and taking off regular lower body clothing: A lot  Bathing (including washing, rinsing, drying): A lot  Putting on and taking off regular upper body clothing: A little  Toileting, which includes using toilet, bedpan or urinal: A lot  Taking care of personal grooming such as brushing teeth: A little  Eating " Meals: A little  Daily Activity - Total Score: 15        Education Documentation  Body Mechanics, taught by FELIBERTO Ozuna at 8/2/2025  9:56 AM.  Learner: Family, Patient  Readiness: Acceptance  Method: Explanation  Response: Verbalizes Understanding  Comment: hip prec. walker safety    Precautions, taught by FELIBERTO Ozuna at 8/2/2025  9:56 AM.  Learner: Family, Patient  Readiness: Acceptance  Method: Explanation  Response: Verbalizes Understanding  Comment: hip prec. walker safety    ADL Training, taught by FELIBERTO Ozuna at 8/2/2025  9:56 AM.  Learner: Family, Patient  Readiness: Acceptance  Method: Explanation  Response: Verbalizes Understanding  Comment: hip prec. walker safety    Body Mechanics, taught by FELIBERTO Ozuna at 8/2/2025  9:55 AM.  Learner: Patient  Readiness: Acceptance  Method: Explanation  Response: Verbalizes Understanding, Needs Reinforcement    Precautions, taught by FELIBERTO Ozuna at 8/2/2025  9:55 AM.  Learner: Patient  Readiness: Acceptance  Method: Explanation  Response: Verbalizes Understanding, Needs Reinforcement    ADL Training, taught by FELIBERTO Ozuna at 8/2/2025  9:55 AM.  Learner: Patient  Readiness: Acceptance  Method: Explanation  Response: Verbalizes Understanding, Needs Reinforcement    Education Comments  No comments found.      Goals:  Encounter Problems       Encounter Problems (Active)       ADLs       Patient will complete LB dressing with RAPHAEL donning and doffing all LE clothes  with PRN adaptive equipment while supported sitting (Progressing)       Start:  07/31/25    Expected End:  08/14/25            Patient will complete toileting including hygiene & clothing management with MODA and bedside commode. (Progressing)       Start:  07/31/25    Expected End:  08/14/25               BALANCE       Patient will maintain static standing balance for 3 minutes during ADL task with RAPHAEL in order to demonstrate decreased  risk of falling and improved postural control. (Progressing)       Start:  07/31/25    Expected End:  08/14/25               COGNITION/SAFETY       Patient will recall and adhere to hip precautions during all functional mobility/ADL tasks in order to demonstrate improved understanding and promote healing post op (Progressing)       Start:  07/31/25    Expected End:  08/14/25               MOBILITY       Patient will perform Functional mobility Min household distances with MODA and least restrictive device in order to improve safety and functional mobility. (Progressing)       Start:  07/31/25    Expected End:  08/14/25               TRANSFERS       Patient will perform functional transfers MODA in order to improve safety and independence with mobility (Progressing)       Start:  07/31/25    Expected End:  08/14/25

## 2025-08-03 VITALS
OXYGEN SATURATION: 94 % | RESPIRATION RATE: 18 BRPM | WEIGHT: 210.54 LBS | SYSTOLIC BLOOD PRESSURE: 161 MMHG | TEMPERATURE: 97.6 F | HEART RATE: 84 BPM | DIASTOLIC BLOOD PRESSURE: 78 MMHG | HEIGHT: 62 IN | BODY MASS INDEX: 38.74 KG/M2

## 2025-08-03 LAB
ANION GAP SERPL CALC-SCNC: 8 MMOL/L (ref 10–20)
BUN SERPL-MCNC: 11 MG/DL (ref 6–23)
CALCIUM SERPL-MCNC: 8.3 MG/DL (ref 8.6–10.3)
CHLORIDE SERPL-SCNC: 104 MMOL/L (ref 98–107)
CO2 SERPL-SCNC: 30 MMOL/L (ref 21–32)
CREAT SERPL-MCNC: 0.52 MG/DL (ref 0.5–1.05)
EGFRCR SERPLBLD CKD-EPI 2021: >90 ML/MIN/1.73M*2
ERYTHROCYTE [DISTWIDTH] IN BLOOD BY AUTOMATED COUNT: 14.5 % (ref 11.5–14.5)
GLUCOSE SERPL-MCNC: 119 MG/DL (ref 74–99)
HCT VFR BLD AUTO: 31.2 % (ref 36–46)
HGB BLD-MCNC: 10.6 G/DL (ref 12–16)
MCH RBC QN AUTO: 33.7 PG (ref 26–34)
MCHC RBC AUTO-ENTMCNC: 34 G/DL (ref 32–36)
MCV RBC AUTO: 99 FL (ref 80–100)
NRBC BLD-RTO: 0 /100 WBCS (ref 0–0)
PLATELET # BLD AUTO: 263 X10*3/UL (ref 150–450)
POTASSIUM SERPL-SCNC: 4.3 MMOL/L (ref 3.5–5.3)
RBC # BLD AUTO: 3.15 X10*6/UL (ref 4–5.2)
SODIUM SERPL-SCNC: 138 MMOL/L (ref 136–145)
WBC # BLD AUTO: 9.3 X10*3/UL (ref 4.4–11.3)

## 2025-08-03 PROCEDURE — 7100000011 HC EXTENDED STAY RECOVERY HOURLY - NURSING UNIT

## 2025-08-03 PROCEDURE — 36415 COLL VENOUS BLD VENIPUNCTURE: CPT | Performed by: INTERNAL MEDICINE

## 2025-08-03 PROCEDURE — 99232 SBSQ HOSP IP/OBS MODERATE 35: CPT | Performed by: INTERNAL MEDICINE

## 2025-08-03 PROCEDURE — 85027 COMPLETE CBC AUTOMATED: CPT | Performed by: INTERNAL MEDICINE

## 2025-08-03 PROCEDURE — 97530 THERAPEUTIC ACTIVITIES: CPT | Mod: GP,CQ | Performed by: PHYSICAL THERAPY ASSISTANT

## 2025-08-03 PROCEDURE — 2500000001 HC RX 250 WO HCPCS SELF ADMINISTERED DRUGS (ALT 637 FOR MEDICARE OP): Performed by: ORTHOPAEDIC SURGERY

## 2025-08-03 PROCEDURE — 82374 ASSAY BLOOD CARBON DIOXIDE: CPT | Performed by: INTERNAL MEDICINE

## 2025-08-03 PROCEDURE — 2500000001 HC RX 250 WO HCPCS SELF ADMINISTERED DRUGS (ALT 637 FOR MEDICARE OP): Performed by: INTERNAL MEDICINE

## 2025-08-03 RX ADMIN — ACETAMINOPHEN 650 MG: 325 TABLET ORAL at 15:26

## 2025-08-03 RX ADMIN — OXYCODONE HYDROCHLORIDE 5 MG: 5 TABLET ORAL at 04:34

## 2025-08-03 RX ADMIN — OXYCODONE HYDROCHLORIDE 5 MG: 5 TABLET ORAL at 19:25

## 2025-08-03 RX ADMIN — VALPROIC ACID 750 MG: 250 CAPSULE, LIQUID FILLED ORAL at 22:03

## 2025-08-03 RX ADMIN — EMPAGLIFLOZIN 10 MG: 10 TABLET, FILM COATED ORAL at 08:29

## 2025-08-03 RX ADMIN — ATORVASTATIN CALCIUM 80 MG: 40 TABLET, FILM COATED ORAL at 22:03

## 2025-08-03 RX ADMIN — CARVEDILOL 3.12 MG: 3.12 TABLET, FILM COATED ORAL at 22:04

## 2025-08-03 RX ADMIN — BUSPIRONE HYDROCHLORIDE 5 MG: 5 TABLET ORAL at 08:30

## 2025-08-03 RX ADMIN — ASPIRIN 81 MG: 81 TABLET, DELAYED RELEASE ORAL at 22:04

## 2025-08-03 RX ADMIN — BUSPIRONE HYDROCHLORIDE 5 MG: 5 TABLET ORAL at 22:03

## 2025-08-03 RX ADMIN — ACETAMINOPHEN 650 MG: 325 TABLET ORAL at 07:32

## 2025-08-03 RX ADMIN — CHOLECALCIFEROL TAB 125 MCG (5000 UNIT) 125 MCG: 125 TAB at 08:29

## 2025-08-03 RX ADMIN — VENLAFAXINE 75 MG: 37.5 TABLET ORAL at 22:03

## 2025-08-03 RX ADMIN — ASPIRIN 81 MG: 81 TABLET, DELAYED RELEASE ORAL at 08:29

## 2025-08-03 RX ADMIN — CARVEDILOL 3.12 MG: 3.12 TABLET, FILM COATED ORAL at 08:29

## 2025-08-03 RX ADMIN — SENNOSIDES 17.2 MG: 8.6 TABLET, FILM COATED ORAL at 22:03

## 2025-08-03 ASSESSMENT — PAIN DESCRIPTION - LOCATION
LOCATION: HIP
LOCATION: HIP

## 2025-08-03 ASSESSMENT — PAIN - FUNCTIONAL ASSESSMENT
PAIN_FUNCTIONAL_ASSESSMENT: 0-10

## 2025-08-03 ASSESSMENT — PAIN SCALES - GENERAL
PAINLEVEL_OUTOF10: 3
PAINLEVEL_OUTOF10: 7
PAINLEVEL_OUTOF10: 6
PAINLEVEL_OUTOF10: 2
PAINLEVEL_OUTOF10: 4
PAINLEVEL_OUTOF10: 6
PAINLEVEL_OUTOF10: 2
PAINLEVEL_OUTOF10: 6

## 2025-08-03 ASSESSMENT — COGNITIVE AND FUNCTIONAL STATUS - GENERAL
TOILETING: A LITTLE
STANDING UP FROM CHAIR USING ARMS: A LITTLE
TURNING FROM BACK TO SIDE WHILE IN FLAT BAD: A LITTLE
MOVING FROM LYING ON BACK TO SITTING ON SIDE OF FLAT BED WITH BEDRAILS: A LITTLE
MOVING TO AND FROM BED TO CHAIR: A LITTLE
MOBILITY SCORE: 16
TURNING FROM BACK TO SIDE WHILE IN FLAT BAD: A LITTLE
MOVING TO AND FROM BED TO CHAIR: A LITTLE
WALKING IN HOSPITAL ROOM: A LOT
CLIMB 3 TO 5 STEPS WITH RAILING: TOTAL
HELP NEEDED FOR BATHING: A LOT
DAILY ACTIVITIY SCORE: 18
MOVING FROM LYING ON BACK TO SITTING ON SIDE OF FLAT BED WITH BEDRAILS: A LITTLE
WALKING IN HOSPITAL ROOM: A LITTLE
MOBILITY SCORE: 16
DRESSING REGULAR LOWER BODY CLOTHING: A LOT
CLIMB 3 TO 5 STEPS WITH RAILING: A LOT
STANDING UP FROM CHAIR USING ARMS: A LITTLE
DRESSING REGULAR UPPER BODY CLOTHING: A LITTLE

## 2025-08-03 ASSESSMENT — PAIN DESCRIPTION - ORIENTATION
ORIENTATION: LEFT
ORIENTATION: LEFT

## 2025-08-03 NOTE — PROGRESS NOTES
"Physical Therapy    Physical Therapy Treatment    Patient Name: Sue Vail  MRN: [id number]  Department: [address]  Room: 2012/2012-A  Today's Date: 8/3/2025  Time Calculation  Start Time: 0740  Stop Time: 0751  Time Calculation (min): 11 min         Assessment/Plan   PT Assessment  PT Assessment Results: Decreased strength, Decreased range of motion, Impaired balance, Decreased safety awareness  Rehab Prognosis: Fair  Barriers to Discharge Home: Physical needs  End of Session Communication: Bedside nurse  Assessment Comment:  (Pt required encouragement for participation. Post gait training Pt went to bed and stated \"i'm done\". Pt declined further interventions (ther ex) despite education. Pt returned herself to bed. Nursing informed.)  End of Session Patient Position: Bed, 3 rail up, Alarm on  PT Plan  Inpatient/Swing Bed or Outpatient: Inpatient  PT Plan  Treatment/Interventions: Bed mobility, Transfer training, Gait training, Balance training, Neuromuscular re-education, Strengthening, Endurance training, Range of motion, Therapeutic exercise, Therapeutic activity, Home exercise program  PT Plan: Ongoing PT  PT Frequency: Daily (During this acute hospital stay)  PT Discharge Recommendations: Moderate intensity level of continued care (Based on current functional status and rehab potential, patient is anticipated to tolerate and benefit from 5 or more days per week of skilled rehabilitative therapy after discharge from this acute inpatient hospitalization.)  Equipment Recommended upon Discharge:  (TBD)  PT Recommended Transfer Status: Assist x2  PT - OK to Discharge: Yes (When medically cleared)    PT Visit Info:  PT Received On: 08/03/25  Response to Previous Treatment: Patient with no complaints from previous session.     General Visit Information:   General  Family/Caregiver Present: Yes  Caregiver Feedback:  ( present)  Co-Treatment: OT  Co-Treatment Reason:  (Increased safety with mobility)  Prior to " Session Communication: Bedside nurse  Patient Position Received: Bed, 3 rail up, Alarm on  General Comment:  (2012 ok to tx per nursing. Pt tired per PCA but sitting conversing with  beside.)    Subjective   Precautions:  Precautions  LE Weight Bearing Status: Weight Bearing as Tolerated (left)  Medical Precautions: Fall precautions  Post-Surgical Precautions: Left hip precautions  Precautions Comment:  (WBAT, HECTOR precautions)     Date/Time Vitals Session Patient Position Pulse Resp SpO2 BP MAP (mmHg)    08/03/25 0717 --  --  76  18  93 %  145/66  93            Objective   Pain:  Pain Assessment  0-10 (Numeric) Pain Score:  (no c/o voiced)  Cognition:  Cognition  Orientation Level: Oriented X4  :     Treatments:  Bed Mobility 1  Bed Mobility 1: Supine to sitting, Sitting to supine  Level of Assistance 1: Close supervision  Bed Mobility Comments 1:  (Pt able to facilitate but would wait for PTA to help. cues for initiation)    Ambulation/Gait Training 1  Surface 1: Level tile  Device 1: Rolling walker  Assistance 1: Contact guard  Quality of Gait 1: Wide base of support, Diminished heel strike, Inconsistent stride length  Comments/Distance (ft) 1:  (20x1 Pt took of impulsively. Cues for safety awarness.)  Transfer 1  Transfer From 1: Sit to, Stand to  Transfer to 1: Sit, Commode-standard  Transfer Device 1: Walker  Transfer Level of Assistance 1: Contact guard  Trials/Comments 1:  (cues for hand placement for safety)  Transfers 2  Technique 2: Stand pivot  Transfer Device 2: Walker  Transfer Level of Assistance 2: Contact guard, Minimal verbal cues  Trials/Comments 2:  (cues for safety)    Outcome Measures:  [facility] Basic Mobility  Turning from your back to your side while in a flat bed without using bedrails: A little  Moving from lying on your back to sitting on the side of a flat bed without using bedrails: A little  Moving to and from bed to chair (including a wheelchair): A little  Standing up from a chair  using your arms (e.g. wheelchair or bedside chair): A little  To walk in hospital room: A little  Climbing 3-5 steps with railing: Total  Basic Mobility - Total Score: 16    Education Documentation  Handouts, taught by Priya Osei PTA at 8/3/2025  8:04 AM.  Learner: Patient  Readiness: Acceptance  Method: Explanation  Response: Needs Reinforcement  Comment: safety and increased mobilty    Precautions, taught by Priya Osei PTA at 8/3/2025  8:04 AM.  Learner: Patient  Readiness: Acceptance  Method: Explanation  Response: Needs Reinforcement  Comment: safety and increased mobilty    Home Exercise Program, taught by Priya Osei PTA at 8/3/2025  8:04 AM.  Learner: Patient  Readiness: Acceptance  Method: Explanation  Response: Needs Reinforcement  Comment: safety and increased mobilty    Mobility Training, taught by Priya Osei PTA at 8/3/2025  8:04 AM.  Learner: Patient  Readiness: Acceptance  Method: Explanation  Response: Needs Reinforcement  Comment: safety and increased mobilty    Education Comments  No comments found.        OP EDUCATION:       Encounter Problems       Encounter Problems (Active)       Mobility       STG - Patient will ambulate (Progressing)       Start:  07/31/25    Expected End:  08/15/25       FWW MIN A X1 WBAT LLE 50+ FT         STG - Patient will ascend and descend four to six stairs (Not Progressing)       Start:  07/31/25    Expected End:  08/15/25       MAX X2 A NO RAILS WBAT LLE, CANE PRN FOR STABILTIY         Goal 1 (Not Progressing)       Start:  07/31/25    Expected End:  08/22/25       20 REPS AROM LLE, 20 REPS RROM RLE INCREASING STRENGTH TO STABILIZE OOB ACTIVITIES            PT Transfers       STG - Transfer from bed to chair (Not Progressing)       Start:  07/31/25    Expected End:  08/02/25       MOD X2 A WBAT LLE  FWW         STG - Patient to transfer to and from sit to supine (Progressing)       Start:  07/31/25    Expected End:  08/05/25       HOB FLAT NO RAILS MIN A X2          STG - Patient will transfer sit to and from stand (Progressing)       Start:  07/31/25    Expected End:  08/06/25       MIN A X2 FWW WBAT LLE, USING PROPER TECHNIQUE

## 2025-08-03 NOTE — NURSING NOTE
End of Shift Report  8/3/25     Admission  Seu Vail was admitted on 7/30/2025 for the following diagnoses:   Primary osteoarthritis of left hip [M16.12]  Hypotension [I95.9]    Significant Events  There were no significant events    Interventions      Response to Interventions       Recent Vital Signs  Patient Vitals for the past 12 hrs:   BP Temp Temp src Pulse Resp SpO2 Weight   08/02/25 1929 130/70 36.1 °C (97 °F) Temporal 54 18 92 % --   08/02/25 2317 125/71 36 °C (96.8 °F) Temporal 78 16 95 % --   08/03/25 0425 126/67 36.4 °C (97.5 °F) Temporal 75 18 92 % 95.5 kg (210 lb 8.6 oz)   08/03/25 0717 145/66 36.4 °C (97.5 °F) Temporal 76 18 93 % --      0-10 (Numeric) Pain Score: 3     Latest labs  Lab Results   Component Value Date    WBC 9.4 07/31/2025    HGB 10.3 (L) 07/31/2025    HCT 30.6 (L) 07/31/2025     07/31/2025    CHOL 204 (H) 05/02/2024    TRIG 104 05/02/2024    HDL 63.4 05/02/2024    ALT 27 03/24/2025    AST 22 03/24/2025     08/03/2025    K 4.3 08/03/2025     08/03/2025    CREATININE 0.52 08/03/2025    BUN 11 08/03/2025    CO2 30 08/03/2025    TSH 2.41 12/13/2024    INR 1.0 06/25/2024    HGBA1C 6.7 (H) 06/27/2025       Other Results      Scheduled Medications:  Scheduled Medications[1]   Continuous Medications[2]         [1] aspirin, 81 mg, oral, BID  atorvastatin, 80 mg, oral, Daily  busPIRone, 5 mg, oral, BID  carvedilol, 3.125 mg, oral, BID  cholecalciferol, 125 mcg, oral, Daily  empagliflozin, 10 mg, oral, Daily  furosemide, 20 mg, oral, Daily  pantoprazole, 40 mg, oral, Daily before breakfast  perflutren lipid microspheres, 0.5-10 mL of dilution, intravenous, Once in imaging  perflutren protein A microsphere, 0.5 mL, intravenous, Once in imaging  sennosides, 2 tablet, oral, BID  sulfur hexafluoride microsphr, 2 mL, intravenous, Once in imaging  valproic acid, 750 mg, oral, Nightly  venlafaxine, 75 mg, oral, Nightly  [2] oxygen,

## 2025-08-03 NOTE — NURSING NOTE
Patient reporting no BM since 7/30 but is passing flatus and has positive bowel sounds, abdomen soft. Dulcolax and scheduled sennokot offered, patient declining at this time. Education provided, patient verbalizes understanding.

## 2025-08-03 NOTE — CARE PLAN
The patient's goals for the shift include get rest and stay informed.    The clinical goals for the shift include patient will remain free fall/injury this shift      Problem: Pain - Adult  Goal: Verbalizes/displays adequate comfort level or baseline comfort level  Outcome: Progressing     Problem: Discharge Planning  Goal: Discharge to home or other facility with appropriate resources  Outcome: Progressing     Problem: Pain  Goal: Takes deep breaths with improved pain control throughout the shift  Outcome: Progressing  Goal: Turns in bed with improved pain control throughout the shift  Outcome: Progressing  Goal: Walks with improved pain control throughout the shift  Outcome: Progressing  Goal: Performs ADL's with improved pain control throughout shift  Outcome: Progressing  Goal: Participates in PT with improved pain control throughout the shift  Outcome: Progressing  Goal: Free from opioid side effects throughout the shift  Outcome: Progressing  Goal: Free from acute confusion related to pain meds throughout the shift  Outcome: Progressing

## 2025-08-03 NOTE — PROGRESS NOTES
Sue Vail is a 80 y.o. female on day 0 of admission presenting with Primary osteoarthritis of left hip.      Subjective   Patient is sitting on the bed comfortably, denies any lower extremity pain.  No other significant overnight issues.    Objective     Last Recorded Vitals  /51 (BP Location: Left arm, Patient Position: Lying)   Pulse 69   Temp 36.2 °C (97.2 °F) (Temporal)   Resp 18   Wt 95.5 kg (210 lb 8.6 oz)   SpO2 95%   Intake/Output last 3 Shifts:    Intake/Output Summary (Last 24 hours) at 8/3/2025 1307  Last data filed at 8/3/2025 0930  Gross per 24 hour   Intake 118 ml   Output --   Net 118 ml       Admission Weight  Weight: 81.2 kg (179 lb) (07/30/25 0636)    Daily Weight  08/03/25 : 95.5 kg (210 lb 8.6 oz)    Image Results  XR hip left intraoperative  Narrative: Interpreted By:  Ana M Zuniga,   STUDY:  XR HIP LEFT INTRAOPERATIVE; ;  7/30/2025 9:13 am      INDICATION:  Signs/Symptoms:intraoperative.          COMPARISON:  03/11/2025      ACCESSION NUMBER(S):  [id number]      ORDERING CLINICIAN:  SHAWNEE MCCORMICK      FINDINGS:  Single intraoperative radiograph of the pelvis was obtained  demonstrating the acetabular and femoral stem components in place.      Impression: Single intraoperative radiograph of the pelvis demonstrating left hip  arthroplasty in progress.          MACRO:  None      Signed by: Ana M Zuniga 7/31/2025 1:17 PM  Dictation workstation:   [employer]      Physical Exam  General: No distress.  Neck: Supple.  HEENT: Normocephalic atraumatic pupils equal react light.  Heart: S1-S2 heard no murmurs gallops regurgitation.  Lungs: Clear to auscultation bilaterally no wheezing rales or rhonchi.  Abdomen: Nondistended nontender bowel sounds are present.  Musculoskeletal: No deformities.  Extremities: No edema pulses are equal bilaterally.  Neuro: No focal deficits.    Relevant Results  Lab Results   Component Value Date    WBC 9.3 08/03/2025    HGB 10.6 (L) 08/03/2025    HCT  31.2 (L) 08/03/2025    MCV 99 08/03/2025     08/03/2025     Lab Results   Component Value Date    GLUCOSE 119 (H) 08/03/2025    CALCIUM 8.3 (L) 08/03/2025     08/03/2025    K 4.3 08/03/2025    CO2 30 08/03/2025     08/03/2025    BUN 11 08/03/2025    CREATININE 0.52 08/03/2025     Assessment:  Left hip degenerative joint disease-s/p left total hip arthroplasty completed on 7/30/2025    Postop hypotension-resolved    History of:  Seizures.  Non-Hodgkin's lymphoma  Melanoma.  Heart failure with moderately reduced ejection fraction-45 to 50%.  PTSD.  Nonobstructive coronary artery disease.  Anxiety.    Plan:  Pain is well-controlled with the current regimen.  Postop patient had hypotension episodes, resolved blood pressure stabilized-resumed home antihypertensive medications today.  Continue to monitor  Other home medications reviewed and resumed appropriately.  Continue PT OT evaluation, mobility score 16    DVT prophylaxis:  Aspirin twice daily.    Disposition:  Medically stable for discharge  Pending placement vs home with home health/PT        Javad Hastings MD

## 2025-08-03 NOTE — CARE PLAN
The patient's goals for the shift include get rest and stay informed.    The clinical goals for the shift include Patient will be free from falls this shift

## 2025-08-04 VITALS
OXYGEN SATURATION: 95 % | WEIGHT: 215.61 LBS | HEIGHT: 62 IN | BODY MASS INDEX: 39.68 KG/M2 | TEMPERATURE: 98.2 F | SYSTOLIC BLOOD PRESSURE: 116 MMHG | RESPIRATION RATE: 16 BRPM | DIASTOLIC BLOOD PRESSURE: 68 MMHG | HEART RATE: 78 BPM

## 2025-08-04 LAB
ANION GAP SERPL CALC-SCNC: 11 MMOL/L (ref 10–20)
BUN SERPL-MCNC: 10 MG/DL (ref 6–23)
CALCIUM SERPL-MCNC: 7.5 MG/DL (ref 8.6–10.3)
CHLORIDE SERPL-SCNC: 104 MMOL/L (ref 98–107)
CO2 SERPL-SCNC: 26 MMOL/L (ref 21–32)
CREAT SERPL-MCNC: 0.41 MG/DL (ref 0.5–1.05)
EGFRCR SERPLBLD CKD-EPI 2021: >90 ML/MIN/1.73M*2
GLUCOSE SERPL-MCNC: 105 MG/DL (ref 74–99)
POTASSIUM SERPL-SCNC: 3.5 MMOL/L (ref 3.5–5.3)
SODIUM SERPL-SCNC: 137 MMOL/L (ref 136–145)

## 2025-08-04 PROCEDURE — 2500000001 HC RX 250 WO HCPCS SELF ADMINISTERED DRUGS (ALT 637 FOR MEDICARE OP): Performed by: ORTHOPAEDIC SURGERY

## 2025-08-04 PROCEDURE — 80048 BASIC METABOLIC PNL TOTAL CA: CPT | Performed by: INTERNAL MEDICINE

## 2025-08-04 PROCEDURE — 7100000011 HC EXTENDED STAY RECOVERY HOURLY - NURSING UNIT

## 2025-08-04 PROCEDURE — 2500000001 HC RX 250 WO HCPCS SELF ADMINISTERED DRUGS (ALT 637 FOR MEDICARE OP): Performed by: INTERNAL MEDICINE

## 2025-08-04 PROCEDURE — 99239 HOSP IP/OBS DSCHRG MGMT >30: CPT | Performed by: PHYSICIAN ASSISTANT

## 2025-08-04 PROCEDURE — 36415 COLL VENOUS BLD VENIPUNCTURE: CPT | Performed by: INTERNAL MEDICINE

## 2025-08-04 RX ADMIN — ACETAMINOPHEN 650 MG: 325 TABLET ORAL at 04:42

## 2025-08-04 RX ADMIN — ASPIRIN 81 MG: 81 TABLET, DELAYED RELEASE ORAL at 09:44

## 2025-08-04 RX ADMIN — BUSPIRONE HYDROCHLORIDE 5 MG: 5 TABLET ORAL at 09:44

## 2025-08-04 RX ADMIN — CHOLECALCIFEROL TAB 125 MCG (5000 UNIT) 125 MCG: 125 TAB at 09:44

## 2025-08-04 RX ADMIN — SENNOSIDES 17.2 MG: 8.6 TABLET, FILM COATED ORAL at 09:44

## 2025-08-04 RX ADMIN — OXYCODONE HYDROCHLORIDE 5 MG: 5 TABLET ORAL at 02:22

## 2025-08-04 RX ADMIN — FUROSEMIDE 20 MG: 20 TABLET ORAL at 09:44

## 2025-08-04 RX ADMIN — CARVEDILOL 3.12 MG: 3.12 TABLET, FILM COATED ORAL at 09:44

## 2025-08-04 RX ADMIN — EMPAGLIFLOZIN 10 MG: 10 TABLET, FILM COATED ORAL at 09:44

## 2025-08-04 ASSESSMENT — ENCOUNTER SYMPTOMS
CHILLS: 0
LIGHT-HEADEDNESS: 0
CHEST TIGHTNESS: 0
BLOOD IN STOOL: 0
TROUBLE SWALLOWING: 0
CONSTIPATION: 0
WHEEZING: 0
HEADACHES: 0
PALPITATIONS: 0
APPETITE CHANGE: 0
WEAKNESS: 0
VOMITING: 0
FLANK PAIN: 0
NAUSEA: 0
SHORTNESS OF BREATH: 0
DIARRHEA: 0
FEVER: 0
JOINT SWELLING: 0
BRUISES/BLEEDS EASILY: 0
DIZZINESS: 0
DYSURIA: 0
HEMATURIA: 0
BACK PAIN: 0
FREQUENCY: 0
DIAPHORESIS: 0
SORE THROAT: 0
COUGH: 0
FATIGUE: 0
HALLUCINATIONS: 0
FACIAL SWELLING: 0
EYE PAIN: 0
WOUND: 0
NUMBNESS: 0
ABDOMINAL PAIN: 0

## 2025-08-04 ASSESSMENT — PAIN SCALES - GENERAL
PAINLEVEL_OUTOF10: 0 - NO PAIN
PAINLEVEL_OUTOF10: 0 - NO PAIN
PAINLEVEL_OUTOF10: 4
PAINLEVEL_OUTOF10: 6
PAINLEVEL_OUTOF10: 0 - NO PAIN

## 2025-08-04 ASSESSMENT — PAIN - FUNCTIONAL ASSESSMENT
PAIN_FUNCTIONAL_ASSESSMENT: 0-10

## 2025-08-04 NOTE — DISCHARGE SUMMARY
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            Hospital Course   Ms. Sue Vail is an 80 y.o. female with PMHx of seizure disorder, NHL, melanoma, HFrEF, anxiety, PTSD and nonobstructive CAD was admitted 7/30/25 s/p left total hip arthroplasty. Noted to have post-operative hypotension. Postoperatively her blood pressures were persistently low to the 80s-90s systolic, lowest reading was 76/42. She was given  ml bolus. At the bedside she continues to be asymptomatic. She denies dyspnea, chest pain, nausea and dizziness. She says her past systolic readings were 120-130 mmHg but she stopped regularly checking her BP about a year ago. EMR review reveals her BP since January until June had been 120-132/64-84. On 7/1/25 her cardiologist maximized her Entresto dose. Her BP was 116/70 that day and was 111/82 at a medical visit on 7/21/25.  Blood pressure gradually started to improve and her home blood pressure medications were slowly reintroduced.  She is stable back on majority of her home medications.  She is recommended for SNF but is asking for home with home health care.  She will follow-up with her primary orthopedic surgeon Dr. Burton in 3 weeks.  Continue aspirin 81 mg twice daily for 6 weeks per his recommendations.    Consultations: Orthopedic Surgery consulted- treatment options were discussed and plan of care agreed upon.    Pertinent Physical Exam At Time of Discharge  Constitutional:       General: She is not in acute distress.     Appearance: Normal appearance.      Comments: Ambulating with walker in  therapy upon my initial evaluation  Sitting upright in bedside chair after she had gone to the bathroom and back with therapy   HENT:      Head: Normocephalic and atraumatic.      Right Ear: External ear normal.      Left Ear: External ear normal.      Nose: Nose normal.      Mouth/Throat:      Mouth: Mucous membranes are moist.      Pharynx: Oropharynx is clear.      Eyes:      Extraocular Movements: Extraocular movements intact.      Conjunctiva/sclera: Conjunctivae normal.      Pupils: Pupils are equal, round, and reactive to light.         Cardiovascular:      Rate and Rhythm: Normal rate and regular rhythm.      Pulses: Normal pulses.      Heart sounds: Normal heart sounds.   Pulmonary:      Effort: Pulmonary effort is normal. No respiratory distress.      Breath sounds: Normal breath sounds. No wheezing, rhonchi or rales.   Abdominal:      General: Bowel sounds are normal.      Palpations: Abdomen is soft.      Tenderness: There is no abdominal tenderness. There is no right CVA tenderness, left CVA tenderness, guarding or rebound.      Musculoskeletal:      Cervical back: Normal range of motion and neck supple.      Comments: Post-operative dressing is C/D/I, did not remove dressing  NVS intact distal to operative site       Skin:     General: Skin is warm and dry.      Capillary Refill: Capillary refill takes less than 2 seconds.      Findings: No rash.      Neurological:      General: No focal deficit present.      Mental Status: She is alert and oriented to person, place, and time. Mental status is at baseline.      Psychiatric:         Mood and Affect: Mood normal.         Behavior: Behavior normal.     Home Medications     Medication List      CHANGE how you take these medications     * sulfamethoxazole-trimethoprim 800-160 mg tablet; Commonly known as:   Bactrim DS; What changed: Another medication with the same name was added.   Make sure you understand how and when to take each.   *  sulfamethoxazole-trimethoprim 800-160 mg tablet; Commonly known as:   Bactrim DS; Take 1 tablet by mouth 2 times a day for 7 days.; What   changed: You were already taking a medication with the same name, and this   prescription was added. Make sure you understand how and when to take   each.  * This list has 2 medication(s) that are the same as other medications   prescribed for you. Read the directions carefully, and ask your doctor or   other care provider to review them with you.     CONTINUE taking these medications     acetaminophen 325 mg tablet; Commonly known as: Tylenol; Take 3 tablets   (975 mg) by mouth every 8 hours if needed for mild pain (1 - 3).   aspirin 81 mg chewable tablet; Chew and swallow 1 tablet (81 mg) 2 times   a day. To prevent blood clots   atorvastatin 80 mg tablet; Commonly known as: Lipitor; Take 1 tablet (80   mg) by mouth once daily.   busPIRone 5 mg tablet; Commonly known as: Buspar; Take 1 tablet (5 mg)   by mouth 2 times a day.   carvedilol 3.125 mg tablet; Commonly known as: Coreg; Take 1 tablet   (3.125 mg) by mouth 2 times a day.   cholecalciferol 125 mcg (5,000 units) capsule; Commonly known as:   Vitamin D-3; Take 1 capsule (125 mcg) by mouth once daily.   cyclobenzaprine 5 mg tablet; Commonly known as: Flexeril; Take 1 tablet   (5 mg) by mouth 3 times a day as needed for muscle spasms for up to 10   days.   Entresto  mg tablet; Generic drug: sacubitriL-valsartan; Take 1   tablet by mouth 2 times a day.   furosemide 20 mg tablet; Commonly known as: Lasix; Take 1 tablet (20 mg)   by mouth once daily.   Jardiance 10 mg tablet; Generic drug: empagliflozin; Take 1 tablet (10   mg) by mouth once daily.   naloxone 4 mg/0.1 mL nasal spray; Commonly known as: Narcan; Instill one   spray intranasally for opioid overdose; repeat in 5 minutes if no response   oxyCODONE 5 mg immediate release tablet; Commonly known as: Roxicodone;   Take 1 tablet (5 mg) by mouth every 6 hours if  needed for severe pain (7 -   10) for up to 7 days.   pantoprazole 40 mg EC tablet; Commonly known as: ProtoNix; Take 1 tablet   (40 mg) by mouth once daily as needed (heartburn). Do not crush, chew, or   split.   sennosides 8.6 mg tablet; Commonly known as: Senokot; Take 1 tablet (8.6   mg) by mouth 2 times a day. To prevent constipation   spironolactone 25 mg tablet; Commonly known as: Aldactone; Take 1 tablet   (25 mg) by mouth once daily.   traMADol 50 mg tablet; Commonly known as: Ultram; Take 1 tablet (50 mg)   by mouth every 6 hours if needed for severe pain (7 - 10) for up to 7   days.   traZODone 50 mg tablet; Commonly known as: Desyrel; Take 1 tablet (50   mg) by mouth as needed at bedtime for sleep.   valproic acid 250 mg capsule; Commonly known as: Depakene; Take 3   capsules (750 mg) by mouth once daily at bedtime.   venlafaxine 75 mg tablet; Commonly known as: Effexor; Take 1 tablet (75   mg) by mouth once daily at bedtime.     STOP taking these medications     chlorhexidine 0.12 % solution; Commonly known as: Peridex   doxycycline 100 mg capsule; Commonly known as: Monodox   gabapentin 300 mg capsule; Commonly known as: Neurontin     ASK your doctor about these medications     nitrofurantoin (macrocrystal-monohydrate) 100 mg capsule; Commonly known   as: Macrobid; Take 1 capsule (100 mg) by mouth 2 times a day for 7 days.;   Ask about: Should I take this medication?       Outpatient Follow-Up  Future Appointments   Date Time Provider Department Center   8/11/2025 10:00 AM Donell Leroy MD [id number] [facility]   8/12/2025 12:30 PM Cj Burton DO [id number] [facility]   9/25/2025  9:15 AM POR MOBILE ADMIN ROOM PET CT [facility] [place] [facility]   9/25/2025 10:15 AM POR MOBILE PET CT [facility] [place] [facility]   9/29/2025  3:00 PM Leanne Padron MD [id number] [facility]   10/9/2025  8:00 AM Kwaku Farris, PhD [id number] [facility]   12/5/2025  9:00 AM Francis Garay MD [id number] [facility]   1/26/2026  3:40  PM Tae Justice MD [id number] [facility]   6/19/2026  1:00 PM Joanna Garza MD PhD [id number] [facility]   6/30/2026  2:00 PM GEA [id number] ECHO [id number] [facility]   7/2/2026  1:40 PM Antonieta Hewitt MD PhD [id number] [facility]         At the time of discharge, patient's pain was controlled with oral analgesia, patient was urinating, having BMs, sleeping, and eating well. Follow up recommendations are in discharge paperwork. Discharge plan was discussed with the patient/family and all of the questions were answered. Medications were ordered to be delivered to bedside prior to discharge.     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

## 2025-08-04 NOTE — PROGRESS NOTES
Sue Vail is a 80 y.o. female on day 0 of admission presenting with Primary osteoarthritis of left hip.      Subjective   MsFiona Vail is an 80 y.o. female with PMHx of seizure disorder, NHL, melanoma, HFrEF, anxiety, PTSD and nonobstructive CAD was admitted 7/30/25 s/p left total hip arthroplasty. Noted to have post-operative hypotension. Postoperatively her blood pressures were persistently low to the 80s-90s systolic, lowest reading was 76/42. She was given  ml bolus. At the bedside she continues to be asymptomatic. She denies dyspnea, chest pain, nausea and dizziness. She says her past systolic readings were 120-130 mmHg but she stopped regularly checking her BP about a year ago. EMR review reveals her BP since January until June had been 120-132/64-84. On 7/1/25 her cardiologist maximized her Entresto dose. Her BP was 116/70 that day and was 111/82 at a medical visit on 7/21/25.    8/4/2025: No acute events overnight.  BP improved, pain still 0-4/10. BMP reviewed, largely unremarkable. Am glucose 105. Pt/OT eval rec SNF although [facility] 16, patient declined, and would prefer to go home with [facility].        Patient evaluated in the presence of RN, Patient's family, and Patient advocate    Review of Systems   Constitutional:  Negative for appetite change, chills, diaphoresis, fatigue and fever.   HENT:  Negative for congestion, ear pain, facial swelling, hearing loss, nosebleeds, sore throat, tinnitus and trouble swallowing.    Eyes:  Negative for pain.   Respiratory:  Negative for cough, chest tightness, shortness of breath and wheezing.    Cardiovascular:  Negative for chest pain, palpitations and leg swelling.   Gastrointestinal:  Negative for abdominal pain, blood in stool, constipation, diarrhea, nausea and vomiting.   Genitourinary:  Negative for dysuria, flank pain, frequency, hematuria and urgency.   Musculoskeletal:  Negative for back pain and joint swelling.        + Left hip pain   Skin:  Negative  for rash and wound.   Neurological:  Negative for dizziness, syncope, weakness, light-headedness, numbness and headaches.   Hematological:  Does not bruise/bleed easily.   Psychiatric/Behavioral:  Negative for behavioral problems, hallucinations and suicidal ideas.           Objective     Last Recorded Vitals  /69 (BP Location: Right arm, Patient Position: Lying)   Pulse 83   Temp 36.7 °C (98 °F) (Temporal)   Resp 16   Wt 97.8 kg (215 lb 9.8 oz)   SpO2 93%     Image Results  Imaging  XR hip left intraoperative  Result Date: 7/31/2025  Single intraoperative radiograph of the pelvis demonstrating left hip arthroplasty in progress.     MACRO: None   Signed by: Ana M Zuniga 7/31/2025 1:17 PM Dictation workstation:   [id number]    XR pelvis 1-2 views  Result Date: 7/30/2025  Satisfactory appearance status post left hip arthroplasty.   Signed by: Edward Walters 7/30/2025 10:59 AM Dictation workstation:   [id number]      Cardiology, Vascular, and Other Imaging  No other imaging results found for the past 7 days       Lab Results  Results for orders placed or performed during the hospital encounter of 07/30/25 (from the past 24 hours)   Basic Metabolic Panel   Result Value Ref Range    Glucose 105 (H) 74 - 99 mg/dL    Sodium 137 136 - 145 mmol/L    Potassium 3.5 3.5 - 5.3 mmol/L    Chloride 104 98 - 107 mmol/L    Bicarbonate 26 21 - 32 mmol/L    Anion Gap 11 10 - 20 mmol/L    Urea Nitrogen 10 6 - 23 mg/dL    Creatinine 0.41 (L) 0.50 - 1.05 mg/dL    eGFR >90 >60 mL/min/1.73m*2    Calcium 7.5 (L) 8.6 - 10.3 mg/dL     *Note: Due to a large number of results and/or encounters for the requested time period, some results have not been displayed. A complete set of results can be found in Results Review.        Medications  Scheduled medications:  Scheduled Medications[1]  Continuous medications:  Continuous Medications[2]  PRN medications:  PRN Medications[3]     Physical Exam  Constitutional:       General: She is  not in acute distress.     Appearance: Normal appearance.      Comments: Ambulating with walker in therapy upon my initial evaluation  Sitting upright in bedside chair after she had gone to the bathroom and back with therapy   HENT:      Head: Normocephalic and atraumatic.      Right Ear: External ear normal.      Left Ear: External ear normal.      Nose: Nose normal.      Mouth/Throat:      Mouth: Mucous membranes are moist.      Pharynx: Oropharynx is clear.     Eyes:      Extraocular Movements: Extraocular movements intact.      Conjunctiva/sclera: Conjunctivae normal.      Pupils: Pupils are equal, round, and reactive to light.       Cardiovascular:      Rate and Rhythm: Normal rate and regular rhythm.      Pulses: Normal pulses.      Heart sounds: Normal heart sounds.   Pulmonary:      Effort: Pulmonary effort is normal. No respiratory distress.      Breath sounds: Normal breath sounds. No wheezing, rhonchi or rales.   Abdominal:      General: Bowel sounds are normal.      Palpations: Abdomen is soft.      Tenderness: There is no abdominal tenderness. There is no right CVA tenderness, left CVA tenderness, guarding or rebound.     Musculoskeletal:      Cervical back: Normal range of motion and neck supple.      Comments: Post-operative dressing is C/D/I, did not remove dressing  NVS intact distal to operative site      Skin:     General: Skin is warm and dry.      Capillary Refill: Capillary refill takes less than 2 seconds.      Findings: No rash.     Neurological:      General: No focal deficit present.      Mental Status: She is alert and oriented to person, place, and time. Mental status is at baseline.     Psychiatric:         Mood and Affect: Mood normal.         Behavior: Behavior normal.       Wound 07/30/25 Surgical Hip Left (Active)   Site Assessment Clean;Dry;Intact 07/31/25 2100   Drainage Amount None 07/31/25 2100   Dressing Silver dressing 08/01/25 1940   Dressing Status Clean;Dry;Occlusive  08/04/25 0544   Number of days: 5                     Assessment/Plan      Osteoarthritis POD s/p left total hip arthroplasty 7/30/25  Post-op course per surgeon  Pain control  Constipation ppx  DVT ppx  PT/OT  Incentive Spirometry     Postoperative hypotension- resolved  Postop patient had hypotension episodes, resolved blood pressure stabilized-resumed home antihypertensive medications 8/3/25  Blood pressure stable, she should continue all home medications at home.  We had a discussion that if she should feel any lightheadedness, dizziness etc. 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%  Reintroducing hoe medications   On 7/1/25 her cardiologist maximized pt's Entresto dose  Hydralazine and Imdur have been held to allow for uptitration of Entresto per pt's cardiologist    Non-Hodgkin's lymphoma  Melanoma    Code Status: Full Code                 DVT ppx: SCDs, Asa 81mg twice daily for 6 weeks per her orthopedic surgeon      Please see orders for more complete plan    Beatriz Galindo PA-C         [1] aspirin, 81 mg, oral, BID  atorvastatin, 80 mg, oral, Daily  busPIRone, 5 mg, oral, BID  carvedilol, 3.125 mg, oral, BID  cholecalciferol, 125 mcg, oral, Daily  empagliflozin, 10 mg, oral, Daily  furosemide, 20 mg, oral, Daily  pantoprazole, 40 mg, oral, Daily before breakfast  perflutren lipid microspheres, 0.5-10 mL of dilution, intravenous, Once in imaging  perflutren protein A microsphere, 0.5 mL, intravenous, Once in imaging  sennosides, 2 tablet, oral, BID  sulfur hexafluoride microsphr, 2 mL, intravenous, Once in imaging  valproic acid, 750 mg, oral, Nightly  venlafaxine, 75 mg, oral, Nightly    [2] oxygen,     [3] PRN medications: acetaminophen, bisacodyl, cyclobenzaprine, diphenhydrAMINE, metoclopramide **OR**  metoclopramide, ondansetron ODT **OR** ondansetron, oxyCODONE, oxyCODONE, traZODone

## 2025-08-04 NOTE — PROGRESS NOTES
Auth remains pending with [facility], updates were sent. Will escalate this today with  [facility] Team. Patient medically ready for DC to SNF when auth obtained. She remains under Extended Recovery.     Met with patient at bedside, she is now wanting to DC home with home care. She feels much more mobile and safe to go home with spouse. She was offered [facility] list, she declines and chooses [facility]. PCP is Bettie Garza. Patient will DC home today with spouse. [facility] was updated.

## 2025-08-04 NOTE — PROGRESS NOTES
Physical Therapy                 Therapy Communication Note    Patient Name: Sue Vail  MRN: [id number]  Department: [facility] 2   Room: 2012/2012-A  Today's Date: 8/4/2025     Discipline: Physical Therapy    Missed Visit: PT Missed Visit: Yes     Missed Visit Reason: Missed Visit Reason: Patient refused (pt declining PT despite encouragement. pt reporting she is to dc home today and she just wants to go. PTA attempted to encourage pt to perform stair training but she declined. (pt has stairs to enter home and inside the home))    Missed Time: Attempt    Comment:

## 2025-08-04 NOTE — CARE PLAN
The patient's goals for the shift include get rest and stay informed.    The clinical goals for the shift include patient will remain free fall/injury this shift

## 2025-08-04 NOTE — NURSING NOTE
End of Shift Report  8/4/25     Admission  Sue Vail was admitted on 7/30/2025 for the following diagnoses:   Primary osteoarthritis of left hip [M16.12]  Hypotension [I95.9]    Significant Events  There were no significant events    Interventions    Response to Interventions       Recent Vital Signs  Patient Vitals for the past 12 hrs:   BP Temp Temp src Pulse Resp SpO2 Weight   08/03/25 2314 161/78 36.4 °C (97.6 °F) Temporal 84 18 94 % --   08/04/25 0414 155/66 37 °C (98.6 °F) Temporal 78 18 94 % 97.8 kg (215 lb 9.8 oz)      0-10 (Numeric) Pain Score: 0 - No pain     Latest labs  Lab Results   Component Value Date    WBC 9.3 08/03/2025    HGB 10.6 (L) 08/03/2025    HCT 31.2 (L) 08/03/2025     08/03/2025    CHOL 204 (H) 05/02/2024    TRIG 104 05/02/2024    HDL 63.4 05/02/2024    ALT 27 03/24/2025    AST 22 03/24/2025     08/04/2025    K 3.5 08/04/2025     08/04/2025    CREATININE 0.41 (L) 08/04/2025    BUN 10 08/04/2025    CO2 26 08/04/2025    TSH 2.41 12/13/2024    INR 1.0 06/25/2024    HGBA1C 6.7 (H) 06/27/2025       Other Results      Scheduled Medications:  Scheduled Medications[1]   Continuous Medications[2]         [1] aspirin, 81 mg, oral, BID  atorvastatin, 80 mg, oral, Daily  busPIRone, 5 mg, oral, BID  carvedilol, 3.125 mg, oral, BID  cholecalciferol, 125 mcg, oral, Daily  empagliflozin, 10 mg, oral, Daily  furosemide, 20 mg, oral, Daily  pantoprazole, 40 mg, oral, Daily before breakfast  perflutren lipid microspheres, 0.5-10 mL of dilution, intravenous, Once in imaging  perflutren protein A microsphere, 0.5 mL, intravenous, Once in imaging  sennosides, 2 tablet, oral, BID  sulfur hexafluoride microsphr, 2 mL, intravenous, Once in imaging  valproic acid, 750 mg, oral, Nightly  venlafaxine, 75 mg, oral, Nightly  [2] oxygen,

## 2025-08-04 NOTE — PROGRESS NOTES
Occupational Therapy                 Therapy Communication Note    Patient Name: Seu Vail  MRN: [id number]  Department: [facility] 2 W  Room: 2012/2012-A  Today's Date: 8/4/2025     Discipline: Occupational Therapy    Missed Visit:   Yes    Missed Visit Reason: Missed Visit Reason: Patient refused (Pt declining OT treatment of note, stating she is going homeand denies concerns for home going. Educated on benefits of participation, continued to decline.)    Missed Time: Attempt

## 2025-08-05 ENCOUNTER — APPOINTMENT (OUTPATIENT)
Dept: ORTHOPEDIC SURGERY | Facility: CLINIC | Age: 80
End: 2025-08-05
Payer: MEDICARE

## 2025-08-06 ENCOUNTER — DOCUMENTATION (OUTPATIENT)
Dept: HOME HEALTH SERVICES | Facility: HOME HEALTH | Age: 80
End: 2025-08-06
Payer: MEDICARE

## 2025-08-06 NOTE — HH CARE COORDINATION
Home Care received a Referral for Physical Therapy and Occupational Therapy. We have processed the referral for a Start of Care within 48 hours of 8/7/25 .     If you have any questions or concerns, please feel free to contact us at [phone]. Follow the prompts, enter your five digit zip code, and you will be directed to your care team on [facility].

## 2025-08-07 ENCOUNTER — HOME CARE VISIT (OUTPATIENT)
Dept: HOME HEALTH SERVICES | Facility: HOME HEALTH | Age: 80
End: 2025-08-07
Payer: MEDICARE

## 2025-08-07 VITALS
TEMPERATURE: 97.6 F | SYSTOLIC BLOOD PRESSURE: 132 MMHG | RESPIRATION RATE: 16 BRPM | DIASTOLIC BLOOD PRESSURE: 60 MMHG | HEART RATE: 68 BPM

## 2025-08-07 PROCEDURE — G0151 HHCP-SERV OF PT,EA 15 MIN: HCPCS

## 2025-08-07 SDOH — HEALTH STABILITY: PHYSICAL HEALTH
EXERCISE COMMENTS: SUPINE QUAD AND GLUT SETS, ANKLE PUMPS X 15 REPS, LEFT HEEL SLIDES X 5 REPS  SITTING LEFT LAQ X 10 REPS, ANKLE PUMPS X 10

## 2025-08-07 SDOH — HEALTH STABILITY: PHYSICAL HEALTH: EXERCISE TYPE: SEE COMMENTS BELOW

## 2025-08-07 ASSESSMENT — ACTIVITIES OF DAILY LIVING (ADL)
AMBULATION ASSISTANCE ON FLAT SURFACES: 1
OASIS_M1830: 05
AMBULATION ASSISTANCE: 1
AMBULATION ASSISTANCE: STAND BY ASSIST
PHYSICAL TRANSFERS ASSESSED: 1
CURRENT_FUNCTION: STAND BY ASSIST
AMBULATION_DISTANCE/DURATION_TOLERATED: 75 FEET
ENTERING_EXITING_HOME: MINIMUM ASSIST

## 2025-08-07 ASSESSMENT — ENCOUNTER SYMPTOMS
PAIN LOCATION - PAIN FREQUENCY: INTERMITTENT
PAIN SEVERITY GOAL: 0/10
AGGRESSION WITHIN DEFINED LIMITS: 1
PERSON REPORTING PAIN: PATIENT
SLEEP QUALITY: ADEQUATE
MUSCLE WEAKNESS: 1
PAIN LOCATION - PAIN SEVERITY: 2/10
ANGER WITHIN DEFINED LIMITS: 1
PAIN LOCATION - PAIN DURATION: VARIES
PAIN LOCATION - EXACERBATING FACTORS: MVT
HYPERTENSION: 1
HIGHEST PAIN SEVERITY IN PAST 24 HOURS: 4/10
SUBJECTIVE PAIN PROGRESSION: GRADUALLY IMPROVING
LOWEST PAIN SEVERITY IN PAST 24 HOURS: 2/10
PAIN LOCATION: LEFT HIP
PAIN LOCATION - PAIN QUALITY: ACHES
PAIN: 1

## 2025-08-09 ENCOUNTER — HOME CARE VISIT (OUTPATIENT)
Dept: HOME HEALTH SERVICES | Facility: HOME HEALTH | Age: 80
End: 2025-08-09
Payer: MEDICARE

## 2025-08-09 VITALS — SYSTOLIC BLOOD PRESSURE: 120 MMHG | DIASTOLIC BLOOD PRESSURE: 85 MMHG | HEART RATE: 72 BPM | TEMPERATURE: 97.8 F

## 2025-08-09 PROCEDURE — G0152 HHCP-SERV OF OT,EA 15 MIN: HCPCS

## 2025-08-09 ASSESSMENT — ACTIVITIES OF DAILY LIVING (ADL)
ADLS_COMMENTS: THE PATIENT MAY USE A BATHTUB, A SHOWER, OR TAKE A COMPLETE SPONGE BATH. THE PATIENT MUST BE ABLE TO DO ALL THE STEPS OF WHICHEVER METHOD IS EMPLOYED WITHOUT ANOTHER PERSON BEING PRESENT.     DRESSING   0     THE PATIENT IS DEPENDENT IN ALL ASPECTS
ADLS_COMMENTS: INIMAL ASSISTANCE IS REQUIRED WITH FASTENING CLOTHING SUCH AS BUTTONS, ZIPS, BRA, SHOES, ETC.   10   THE PATIENT IS ABLE TO PUT ON, REMOVE, CORSET, BRACES, AS PRESCRIBED.     PERSONAL HYGIENE (GROOMING)   0    THE PATIENT IS UNABLE TO ATTEND TO PERSO
ADLS_COMMENTS: BLADDER CONTROL   0    THE PATIENT IS DEPENDENT IN BLADDER MANAGEMENT, IS INCONTINENT, OR HAS INDWELLING CATHETER.   2    THE PATIENT IS INCONTINENT BUT IS ABLE TO ASSIST WITH THE APPLICATION OF AN INTERNAL OR EXTERNAL DEVICE.   5    THE PATIENT IS
ADLS_COMMENTS: EMENT OF CLOTHING, TRANSFERRING, OR WASHING HANDS.   8    SUP MAY BE REQUIRED FOR SAFETY WITH NORMAL TOILET. BSC MAY BE USED AT NIGHT, ASSIST FOR EMPTYING AND CLEANING.   10   THE PATIENT IS ABLE TO GET ON/OFF THE TOILET, FASTEN CLOTHING AND USE TOIL
ADLS_COMMENTS: ET PAPER WITHOUT HELP. IF NECESSARY, THE PATIENT MAY USE A BED PAN OR COMMODE OR URINAL AT NIGHT, BUT MUST BE ABLE TO EMPTY IT AND CLEAN IT.     BOWEL CONTROL   0   THE PATIENT IS BOWEL INCONTINENT.   2   THE PATIENT NEEDS HELP TO ASSUME APPROPRIATE
ADLS_COMMENTS: CHAIR/BED TRANSFERS  0    UNABLE TO PARTICIPATE IN A TRANSFER. TWO ATTENDANTS ARE REQUIRED TO TRANSFER THE PATIENT WITH OR WITHOUT A MECHANICAL DEVICE.   3    ABLE TO PARTICIPATE BUT MAXIMUM ASSISTANCE OF ONE OTHER PERSON IS REQUIRE IN ALL ASPECTS OF
FEEDING_WITHIN_DEFINED_LIMITS: 1
ADLS_COMMENTS: NAL HYGIENE AND IS DEPENDENT IN ALL ASPECTS.   1    ASSISTANCE IS REQUIRED IN ALL STEPS OF PERSONAL HYGIENE, BUT PATIENT ABLE TO MAKE SOME CONTRIBUTION.   3    SOME ASSISTANCE IS REQUIRED IN ONE OR MORE STEPS OF PERSONAL HYGIENE.   4    PATIENT IS AB
ADLS_COMMENTS: OF DRESSING AND IS UNABLE TO PARTICIPATE IN THE ACTIVITY.   2     THE PATIENT IS ABLE TO PARTICIPATE TO SOME DEGREE, BUT IS DEPENDENT IN ALL ASPECTS OF DRESSING.   5     ASSISTANCE IS NEEDED IN PUTTING ON, AND/OR REMOVING ANY CLOTHING.   8     ONLY M
ADLS_COMMENTS: RIC NEEDS TO BE ADMINISTERED.   2    CAN MANIPULATE AN EATING DEVICE, USUALLY A SPOON, BUT SOMEONE MUST PROVIDE ACTIVE ASSISTANCE DURING THE MEAL.   5    ABLE TO FEED SELF WITH SUPERVISION. ASSISTANCE IS REQUIRED WITH ASSOCIATED TASKS SUCH AS PUTTING
ADLS_COMMENTS: ISION AND ASSISTANCE.   8    GENERALLY NO ASSISTANCE IS REQUIRED. AT TIMES SUP IS REQUIRED FOR SAFETY DUE TO MORNING STIFFNESS, SOB, ETC  10   THE PATIENT IS ABLE TO GO UP/DOWN A FLIGHT OF STAIRS SAFELY WITHOUT HELP OR SUPERVISION,USE HAND RAILS, CAN
GROOMING_WITHIN_DEFINED_LIMITS: 1
ADLS_COMMENTS: ANCE IS REQUIRED TO MANIPULATE CHAIR TO TABLE, BED, ETC.   4    THE PATIENT CAN PROPEL SELF FOR A REASONABLE DURATION OVER REGULARLY ENCOUNTERED TERRAIN. MINIMAL ASSISTANCE MAY STILL BE REQUIRED IN “TIGHT CORNERS” OR TO NEGOTIATE A KERB 100MM HIGH.
ADLS_COMMENTS: WHERE PATIENTS MOVE FROM DEPENDENCY TO ASSISTED INDEPENDENCE.   **60 - 80    IF LIVING ALONE WILL PROBABLY NEED A NUMBER OF COMMUNITY SERVICES TO COPE.   MORE THAN 85     LIKELY TO BE DISCHARGED TO COMMUNITY LIVING - INDEPENDENT IN TRANSFERS AND ABL
ADLS_COMMENTS: POSITION, AND WITH BOWEL MOVEMENT FACILITATORY TECHNIQUES.   5   THE PATIENT CAN ASSUME APPROPRIATE POSITION, BUT CANNOT USE FACILITATORY TECHNIQUES OR CLEAN SELF WITHOUT ASSISTANCE AND HAS FREQUENT ACCIDENTS.   8    ASSISTANCE IS REQUIRED WITH INCON
ADLS_COMMENTS: GENERALLY DRY BY DAY, BUT NOT AT NIGHT AND NEEDS SOME ASSISTANCE WITH THE DEVICES.   8    GENERALLY DRY BY DAY AND NIGHT, MAY HAVE OCCAS ACCIDENT OR NEED MIN ASSIST WITH INTERNAL OR EXTERNAL DEVICES.   10   THE PATIENT IS ABLE TO CONTROL BLADDER DAY
ADLS_COMMENTS: UIRED WITH EITHER TRANSFER TO SHOWER/BATH OR WITH WASHING OR DRYING; INCLUDING INABILITY TO COMPLETE A TASK BECAUSE OF CONDITION OR DISEASE, ETC.   4    SUPERVISION IS REQUIRED FOR SAFETY IN ADJUSTING THE WATER TEMPERATURE, OR IN THE TRANSFER.   5
ADLS_COMMENTS: AFETY.   15  THE PATIENT CAN SAFELY APPROACH THE BED WALKING OR IN A WHEELCHAIR, LOCK BRAKES, LIFT FOOTRESTS, OR POSITION WALKING AID, MOVE SAFELY TO BED, LIE DOWN, COME TO A SITTING POSITION ON THE SIDE OF THE BED, CHANGE THE POSITION OF THE WHEELCH
ADLS_COMMENTS: AND NIGHT, AND/OR IS INDEPENDENT WITH INTERNAL OR EXTERNAL DEVICES.     BATHING   0    TOTAL DEPENDENCE IN BATHING SELF.   1    ASSISTANCE IS REQUIRED IN ALL ASPECTS OF BATHING, BUT PATIENT IS ABLE TO MAKE SOME CONTRIBUTION.   3    ASSISTANCE IS REQ
ADLS_COMMENTS: LE TO CONDUCT OWN PERSONAL HYGIENE BUT REQUIRES MIN ASSIST BEFORE AND/OR AFTER THE OPERATION.   5    THE PATIENT CAN WASH HIS/HER HANDS AND FACE, COMB HAIR, CLEAN TEETH AND SHAVE. A MALE PATIENT MAY USE ANY KIND OF RAZOR BUT MUST INSERT THE BLADE, OR
ADLS_COMMENTS: MILK/SUGAR INTO TEA, SALT, PEPPER, SPREADING BUTTER, TURNING A PLATE OR OTHER “SET UP” ACTIVITIES.   8    INDEP WITH PREPARED TRAY, MAY NEED MEAT CUT, MILK CARTON/JAR LID OPENED. ANOTHER PERSON IS NOT REQUIRED  10   THE PATIENT CAN FEED SELF FROM A
ADLS_COMMENTS: ES, CANES, OR A WALKARETTE, AND WALK 50 METRES WITHOUT HELP OR SUPERVISION.     AMBULATION/WHEELCHAIR * (IF UNABLE TO WALK) ONLY USE THIS ITEM IF THE PATIENT IS RATED “0” FOR AMBULATION, AND THEN ONLY IF THE PATIENT HAS BEEN TRAINED IN WHEELCHAIR MAN
ADLS_COMMENTS: E TO WALK OR USE WHEELCHAIR INDEPENDENTLY.
ADLS_COMMENTS: N.   8    ASSISTANCE IS REQUIRED WITH REACHING AIDS AND/OR THEIR MANIPULATION. ONE PERSON IS REQUIRED TO OFFER ASSISTANCE.   12   THE PATIENT IS INDEPENDENT IN AMBULATION BUT UNABLE TO WALK 50 METRES WITHOUT HELP, OR SUPERVISION IS NEEDED FOR CONFIDE
ADLS_COMMENTS: TINENCE AIDS SUCH AS PAD, ETC. THE PATIENT MAY REQUIRE SUPERVISION WITH THE USE OF SUPPOSITORY OR ENEMA AND HAS OCCASIONAL ACCIDENTS.   10   THE PATIENT CAN CONTROL BOWELS AND HAS NO ACCIDENTS, CAN USE SUPPOSITORY, OR TAKE AN ENEMA WHEN NECESSARY.
ADLS_COMMENTS: PLUG IN THE RAZOR WITHOUT HELP, AS WELL AS RETRIEVE IT FROM THE DRAWER OR CABINET. A FEMALE PATIENT MUST APPLY HER OWN MAKE-UP, IF USED, BUT NEED NOT BRAID OR STYLE HER HAIR.     FEEDING    0    DEPENDENT IN ALL ASPECTS AND NEEDS TO BE FED, NASOGAST
ADLS_COMMENTS: AGEMENT.   0    DEPENDENT IN WHEELCHAIR AMBULATION.   1    PATIENT CAN PROPEL SELF SHORT DISTANCES ON FLAT SURFACE, BUT ASSISTANCE IS REQUIRED FOR ALL OTHER STEPS OF WHEELCHAIR MANAGEMENT.  3    PRESENCE OF ONE PERSON IS NECESSARY AND CONSTANT ASSIST
ADLS_COMMENTS: THE TRANSFER.   8    THE TRANSFER REQUIRES THE ASSISTANCE OF ONE OTHER PERSON. ASSISTANCE MAY BE REQUIRED IN ANY ASPECT OF THE TRANSFER.   12  THE PRESENCE OF ANOTHER PERSON IS REQUIRED EITHER AS A CONFIDENCE MEASURE, OR TO PROVIDE SUPERVISION FOR S
ADLS_COMMENTS: NCE OR SAFETY IN HAZARDOUS SITUATIONS.   15   THE PATIENT MUST BE ABLE TO WEAR BRACES IF REQUIRED, LOCK AND UNLOCK THESE BRACES ASSUME STANDING POSITION, SIT DOWN, AND PLACE THE NECESSARY AIDS INTO POSITION FOR USE. THE PATIENT MUST BE ABLE TO CRUTCH
ADLS_COMMENTS: NG   0    THE PATIENT IS UNABLE TO CLIMB STAIRS.   2    ASSISTANCE IS REQUIRED IN ALL ASPECTS OF CHAIR CLIMBING, INCLUDING ASSISTANCE WITH WALKING AIDS.   5    THE PATIENT IS ABLE TO ASCEND/DESCEND BUT IS UNABLE TO CARRY WALKING AIDS AND NEEDS SUPERV
ADLS_COMMENTS: EVERE DEPENDENCE     21 - 60  **MODERATE DEPENDENCE     61 - 90  SLIGHT DEPENDENCE      91 - 99  INDEPENDENCE        100    SCORE PREDICTION    LESS THAN 40    UNLIKELY TO GO HOME - DEPENDENT IN MOBILITY - DEPENDENT IN SELF CARE   60    PIVOTAL SCORE
ADLS_COMMENTS: 5    TO PROPEL WHEELCHAIR INDEPENDENTLY, THE PATIENT MUST BE ABLE TO GO AROUND CORNERS, TURN AROUND, MANOEUVRE THE CHAIR TO A TABLE, BED, TOILET, ETC. THE PATIENT MUST BE ABLE TO PUSH A CHAIR AT LEAST 50 METRES AND NEGOTIATE KERB.       STAIR CLIMBI
ADLS_COMMENTS: AIR, TRANSFER BACK INTO IT SAFELY AND/OR GRASP AID AND STAND. THE PATIENT MUST BE INDEPENDENT IN ALL PHASES OF THIS ACTIVITY.     AMBULATION    0    DEPENDENT IN AMBULATION  3    CONSTANT PRESENCE OF ONE OR MORE ASSISTANT IS REQUIRED DURING AMBULATIO

## 2025-08-09 ASSESSMENT — ENCOUNTER SYMPTOMS
PAIN SEVERITY GOAL: 0/10
HIGHEST PAIN SEVERITY IN PAST 24 HOURS: 6/10
AGGRESSION WITHIN DEFINED LIMITS: 1
PAIN LOCATION - PAIN SEVERITY: 1/10
PAIN LOCATION: LEFT HIP
PAIN LOCATION - PAIN FREQUENCY: FREQUENT
PAIN LOCATION - RELIEVING FACTORS: REST
SUBJECTIVE PAIN PROGRESSION: UNCHANGED
PAIN: 1
ANGER WITHIN DEFINED LIMITS: 1
PAIN LOCATION - PAIN QUALITY: ACHING
PAIN LOCATION - PAIN DURATION: ACUTE
PERSON REPORTING PAIN: PATIENT
LOWEST PAIN SEVERITY IN PAST 24 HOURS: 0/10
PAIN LOCATION - EXACERBATING FACTORS: ACTIVITY

## 2025-08-11 ENCOUNTER — APPOINTMENT (OUTPATIENT)
Dept: OPHTHALMOLOGY | Age: 80
End: 2025-08-11
Payer: MEDICARE

## 2025-08-12 ENCOUNTER — TELEPHONE (OUTPATIENT)
Dept: CARDIOLOGY | Facility: HOSPITAL | Age: 80
End: 2025-08-12

## 2025-08-12 ENCOUNTER — APPOINTMENT (OUTPATIENT)
Dept: ORTHOPEDIC SURGERY | Facility: CLINIC | Age: 80
End: 2025-08-12
Payer: MEDICARE

## 2025-08-12 VITALS — WEIGHT: 215.61 LBS | BODY MASS INDEX: 39.68 KG/M2 | HEIGHT: 62 IN

## 2025-08-12 DIAGNOSIS — M16.12 PRIMARY OSTEOARTHRITIS OF LEFT HIP: Primary | ICD-10-CM

## 2025-08-12 PROCEDURE — 1159F MED LIST DOCD IN RCRD: CPT | Performed by: ORTHOPAEDIC SURGERY

## 2025-08-12 PROCEDURE — 99024 POSTOP FOLLOW-UP VISIT: CPT | Performed by: ORTHOPAEDIC SURGERY

## 2025-08-12 PROCEDURE — 1036F TOBACCO NON-USER: CPT | Performed by: ORTHOPAEDIC SURGERY

## 2025-08-12 RX ORDER — CLINDAMYCIN HYDROCHLORIDE 300 MG/1
600 CAPSULE ORAL ONCE
Qty: 10 CAPSULE | Refills: 0 | Status: SHIPPED | OUTPATIENT
Start: 2025-08-12 | End: 2025-08-12

## 2025-08-13 ENCOUNTER — HOME CARE VISIT (OUTPATIENT)
Dept: HOME HEALTH SERVICES | Facility: HOME HEALTH | Age: 80
End: 2025-08-13
Payer: MEDICARE

## 2025-08-14 ENCOUNTER — TELEPHONE (OUTPATIENT)
Dept: CARDIOLOGY | Facility: HOSPITAL | Age: 80
End: 2025-08-14
Payer: MEDICARE

## 2025-08-14 ENCOUNTER — HOME CARE VISIT (OUTPATIENT)
Dept: HOME HEALTH SERVICES | Facility: HOME HEALTH | Age: 80
End: 2025-08-14
Payer: MEDICARE

## 2025-08-14 VITALS
TEMPERATURE: 97.4 F | HEART RATE: 78 BPM | RESPIRATION RATE: 16 BRPM | SYSTOLIC BLOOD PRESSURE: 108 MMHG | DIASTOLIC BLOOD PRESSURE: 60 MMHG

## 2025-08-14 PROCEDURE — G0151 HHCP-SERV OF PT,EA 15 MIN: HCPCS | Mod: HHH

## 2025-08-14 SDOH — HEALTH STABILITY: PHYSICAL HEALTH
EXERCISE COMMENTS: SITTING LEFT HIP FLEXION HALF RANGE(NO PRECUATIONS PER SURGEON), LAQ X 10 REPS  STANDING BILATERAL HEEL RAISES, HIP ABD, HIP EXT(MODIFIED), HAMSTRING CURLS HIP FLEXIOON LEFT X 10 REPS

## 2025-08-14 SDOH — HEALTH STABILITY: PHYSICAL HEALTH: EXERCISE TYPE: SEE COMMENTS BELOW

## 2025-08-14 ASSESSMENT — ENCOUNTER SYMPTOMS
MUSCLE WEAKNESS: 1
PERSON REPORTING PAIN: PATIENT
DENIES PAIN: 1

## 2025-08-14 ASSESSMENT — ACTIVITIES OF DAILY LIVING (ADL)
AMBULATION ASSISTANCE ON FLAT SURFACES: 1
CURRENT_FUNCTION: INDEPENDENT
AMBULATION ASSISTANCE: STAND BY ASSIST
AMBULATION ASSISTANCE: 1
AMBULATION_DISTANCE/DURATION_TOLERATED: 100 FEET
PHYSICAL TRANSFERS ASSESSED: 1

## 2025-08-15 ENCOUNTER — TELEPHONE (OUTPATIENT)
Dept: CARDIOLOGY | Facility: HOSPITAL | Age: 80
End: 2025-08-15
Payer: MEDICARE

## 2025-08-18 ENCOUNTER — HOME CARE VISIT (OUTPATIENT)
Dept: HOME HEALTH SERVICES | Facility: HOME HEALTH | Age: 80
End: 2025-08-18
Payer: MEDICARE

## 2025-08-18 VITALS — RESPIRATION RATE: 16 BRPM | TEMPERATURE: 97.3 F

## 2025-08-18 PROCEDURE — G0151 HHCP-SERV OF PT,EA 15 MIN: HCPCS | Mod: HHH

## 2025-08-18 SDOH — HEALTH STABILITY: PHYSICAL HEALTH: EXERCISE TYPE: SEE THER EX COMMENTS BELOW

## 2025-08-18 SDOH — HEALTH STABILITY: PHYSICAL HEALTH
EXERCISE COMMENTS: SITTING HIP FLEXION (OK PER ORTHO) 10 REPS WITH EFFORT ON LEFT, LAQ X 10 REPS  SUPINE LEFT HEEL SLIDES X 10, AAORM X 10, RIGHT SLR X 10 REPS  SUPINE QUAD AND GLUT SETS, ANKLE PUMPS X 10 REPS  DECLINED STANDING EXS DUE TO FATIGUE

## 2025-08-18 ASSESSMENT — ACTIVITIES OF DAILY LIVING (ADL)
AMBULATION ASSISTANCE: 1
PHYSICAL TRANSFERS ASSESSED: 1
AMBULATION ASSISTANCE: SUPERVISION
CURRENT_FUNCTION: INDEPENDENT
AMBULATION ASSISTANCE ON FLAT SURFACES: 1
AMBULATION_DISTANCE/DURATION_TOLERATED: 100 FEET

## 2025-08-18 ASSESSMENT — ENCOUNTER SYMPTOMS
PAIN SEVERITY GOAL: 0/10
PERSON REPORTING PAIN: PATIENT
DENIES PAIN: 1
MUSCLE WEAKNESS: 1
HIGHEST PAIN SEVERITY IN PAST 24 HOURS: 2/10
SUBJECTIVE PAIN PROGRESSION: GRADUALLY IMPROVING
LOWEST PAIN SEVERITY IN PAST 24 HOURS: 0/10

## 2025-08-20 ENCOUNTER — HOME CARE VISIT (OUTPATIENT)
Dept: HOME HEALTH SERVICES | Facility: HOME HEALTH | Age: 80
End: 2025-08-20
Payer: MEDICARE

## 2025-08-20 VITALS
DIASTOLIC BLOOD PRESSURE: 66 MMHG | TEMPERATURE: 97.4 F | HEART RATE: 76 BPM | RESPIRATION RATE: 16 BRPM | SYSTOLIC BLOOD PRESSURE: 124 MMHG

## 2025-08-20 PROCEDURE — G0151 HHCP-SERV OF PT,EA 15 MIN: HCPCS | Mod: HHH

## 2025-08-20 SDOH — HEALTH STABILITY: PHYSICAL HEALTH
EXERCISE COMMENTS: SITTING LAQ, HIP FLEXION, ANKLE PUMPS X 20 REPS  SUPINE QUAD AND GLUT SETS X 20, LEFT HEEL SLIDES X 15, RIGHT SLR X 15 REPS  DECLINED STANDING EXS DUE TO FATIGUE

## 2025-08-20 SDOH — HEALTH STABILITY: PHYSICAL HEALTH: EXERCISE TYPE: SEE THER EX COMMENTS

## 2025-08-20 ASSESSMENT — BALANCE ASSESSMENTS
IMMEDIATE STANDING BALANCE FIRST 5 SECONDS: 2 - STEADY WITHOUT WALKER OR OTHER SUPPORT
SITTING DOWN: 1 - USES ARMS OR NOT SMOOTH MOTION
ATTEMPTS TO ARISE: 2 - ABLE TO RISE, ONE ATTEMPT
NUDGED: 2 - STEADY
ARISES: 1 - ABLE, USES ARMS TO HELP
NUDGED SCORE: 2
SITTING BALANCE: 1 - STEADY, SAFE
ARISING SCORE: 1
TURNING 360 DEGREES STEPS: 1 - CONTINUOUS STEPS
STANDING BALANCE: 1 - STEADY BUT WIDE STANCE AND USES CANE OR OTHER SUPPORT
BALANCE SCORE: 13
EYES CLOSED AT MAXIMUM POSITION NUDGED: 1 - STEADY

## 2025-08-20 ASSESSMENT — ACTIVITIES OF DAILY LIVING (ADL)
PHYSICAL TRANSFERS ASSESSED: 1
AMBULATION_DISTANCE/DURATION_TOLERATED: 150 FEET
AMBULATION ASSISTANCE: INDEPENDENT
AMBULATION ASSISTANCE: 1
AMBULATION ASSISTANCE ON FLAT SURFACES: 1
CURRENT_FUNCTION: INDEPENDENT

## 2025-08-20 ASSESSMENT — GAIT ASSESSMENTS
WALKING STANCE: 0 - HEELS APART
GAIT SCORE: 8
STEP CONTINUITY: 1 - STEPS APPEAR CONTINUOUS
STEP SYMMETRY: 1 - RIGHT AND LEFT STEP LENGTH APPEAR EQUAL
TRUNK: 0 - MARKED SWAY OR USES WALKING AID
PATH: 1 - MILD/MODERATE DEVIATION OR USES WALKING AID
TRUNK SCORE: 0
INITIATION OF GAIT IMMEDIATELY AFTER GO: 1 - NO HESITANCY
BALANCE AND GAIT SCORE: 21
PATH SCORE: 1

## 2025-08-20 ASSESSMENT — ENCOUNTER SYMPTOMS
PERSON REPORTING PAIN: PATIENT
MUSCLE WEAKNESS: 1
DENIES PAIN: 1
LIMITED RANGE OF MOTION: 1

## 2025-08-21 ENCOUNTER — TELEPHONE (OUTPATIENT)
Dept: CARDIOLOGY | Facility: HOSPITAL | Age: 80
End: 2025-08-21
Payer: MEDICARE

## 2025-08-27 ENCOUNTER — HOME CARE VISIT (OUTPATIENT)
Dept: HOME HEALTH SERVICES | Facility: HOME HEALTH | Age: 80
End: 2025-08-27
Payer: MEDICARE

## 2025-08-27 VITALS
TEMPERATURE: 97.1 F | DIASTOLIC BLOOD PRESSURE: 50 MMHG | HEART RATE: 88 BPM | RESPIRATION RATE: 16 BRPM | SYSTOLIC BLOOD PRESSURE: 98 MMHG

## 2025-08-27 PROCEDURE — G0151 HHCP-SERV OF PT,EA 15 MIN: HCPCS | Mod: HHH

## 2025-08-27 SDOH — HEALTH STABILITY: PHYSICAL HEALTH: EXERCISE TYPE: SEE COMMENTS BELOW

## 2025-08-27 SDOH — HEALTH STABILITY: PHYSICAL HEALTH
EXERCISE COMMENTS: RECLINED QUAD AND GLUT SETS X 20, ANKLE PUMPS X 20, LEFT HEEL SLIDES, LEFT HIP ABD X 20 REPS  SITTING HIP FLEXION (ALLOWED), KNEE EXT, ANKLE PUMPS X 20 REPS  PT DECLINED STANDING EXS DUE TO COKKING IN THE KITCHEN AND FATIGUE PRIOR TO PTS ARRIVAL

## 2025-08-27 ASSESSMENT — BALANCE ASSESSMENTS
ATTEMPTS TO ARISE: 2 - ABLE TO RISE, ONE ATTEMPT
ARISES: 1 - ABLE, USES ARMS TO HELP
EYES CLOSED AT MAXIMUM POSITION NUDGED: 1 - STEADY
STANDING BALANCE: 1 - STEADY BUT WIDE STANCE AND USES CANE OR OTHER SUPPORT
SITTING BALANCE: 1 - STEADY, SAFE
ARISING SCORE: 1
NUDGED: 2 - STEADY
IMMEDIATE STANDING BALANCE FIRST 5 SECONDS: 2 - STEADY WITHOUT WALKER OR OTHER SUPPORT
BALANCE SCORE: 13
SITTING DOWN: 1 - USES ARMS OR NOT SMOOTH MOTION
NUDGED SCORE: 2
TURNING 360 DEGREES STEPS: 1 - CONTINUOUS STEPS

## 2025-08-27 ASSESSMENT — GAIT ASSESSMENTS
PATH SCORE: 1
GAIT SCORE: 9
BALANCE AND GAIT SCORE: 22
STEP SYMMETRY: 1 - RIGHT AND LEFT STEP LENGTH APPEAR EQUAL
PATH: 1 - MILD/MODERATE DEVIATION OR USES WALKING AID
INITIATION OF GAIT IMMEDIATELY AFTER GO: 1 - NO HESITANCY
TRUNK SCORE: 0
TRUNK: 0 - MARKED SWAY OR USES WALKING AID
STEP CONTINUITY: 1 - STEPS APPEAR CONTINUOUS
WALKING STANCE: 1 - HEELS ALMOST TOUCHING WHILE WALKING

## 2025-08-27 ASSESSMENT — ACTIVITIES OF DAILY LIVING (ADL)
PHYSICAL TRANSFERS ASSESSED: 1
AMBULATION ASSISTANCE ON FLAT SURFACES: 1
HOME_HEALTH_OASIS: 00
AMBULATION ASSISTANCE: INDEPENDENT
OASIS_M1830: 01
AMBULATION ASSISTANCE: 1
AMBULATION_DISTANCE/DURATION_TOLERATED: 150 FEET
CURRENT_FUNCTION: INDEPENDENT

## 2025-08-27 ASSESSMENT — ENCOUNTER SYMPTOMS
DEPRESSION: 0
LOSS OF SENSATION IN FEET: 0
OCCASIONAL FEELINGS OF UNSTEADINESS: 0
PERSON REPORTING PAIN: PATIENT
DENIES PAIN: 1

## 2025-09-01 PROCEDURE — RXMED WILLOW AMBULATORY MEDICATION CHARGE

## 2025-09-02 ENCOUNTER — TELEPHONE (OUTPATIENT)
Dept: CARDIOLOGY | Facility: HOSPITAL | Age: 80
End: 2025-09-02
Payer: MEDICARE

## 2025-09-04 ENCOUNTER — PHARMACY VISIT (OUTPATIENT)
Dept: PHARMACY | Facility: CLINIC | Age: 80
End: 2025-09-04
Payer: COMMERCIAL

## 2025-09-25 ENCOUNTER — APPOINTMENT (OUTPATIENT)
Dept: RADIOLOGY | Facility: HOSPITAL | Age: 80
End: 2025-09-25
Payer: MEDICARE

## 2025-10-07 ENCOUNTER — APPOINTMENT (OUTPATIENT)
Dept: ORTHOPEDIC SURGERY | Facility: CLINIC | Age: 80
End: 2025-10-07
Payer: MEDICARE

## 2025-11-07 ENCOUNTER — APPOINTMENT (OUTPATIENT)
Dept: NEUROLOGY | Facility: HOSPITAL | Age: 80
End: 2025-11-07
Payer: MEDICARE

## 2026-06-19 ENCOUNTER — APPOINTMENT (OUTPATIENT)
Dept: PRIMARY CARE | Facility: CLINIC | Age: 81
End: 2026-06-19
Payer: MEDICARE

## (undated) DEVICE — CATHETER, THERMODILUTION, 7FR X 110CM, MULTIFLEX

## (undated) DEVICE — DRILL BIT, 3.3 X 25MM

## (undated) DEVICE — COVER HANDLE LIGHT, STERIS, BLUE, STERILE

## (undated) DEVICE — SYRINGE, 50 CC, LUER LOCK

## (undated) DEVICE — HOOD, SURGICAL, FLYTE, T7 PLUS, PEEL AWAY SHIELD

## (undated) DEVICE — TIP, SUCTION, FRAZIER, EXTRA WIDE, W/O CONTROL VENT, W/OBTURATOR, 18 FR, DISPOSABLE

## (undated) DEVICE — DRAPE, SHEET, THREE QUARTER, FAN FOLD, 57 X 77 IN

## (undated) DEVICE — DRAPE, INCISE, ANTIMICROBIAL, IOBAN 2, STERI DRAPE, 23 X 33 IN, DISPOSABLE, STERILE

## (undated) DEVICE — SUTURE, VICRYL, 1, 36 IN, CT-1, UNDYED

## (undated) DEVICE — DRAPE, INSTRUMENT, W/POUCH, STERI DRAPE, 7 X 11 IN, DISPOSABLE, STERILE

## (undated) DEVICE — GLOVE, SURGICAL, PI ORTHO PRO, BIOGEL, SZ 8.0

## (undated) DEVICE — GUIDEWIRE, INQWIRE, 3MM J, .035, 260

## (undated) DEVICE — BLADE, SAW, OSC TIP, FALCON, 19.5 X 1.28 X 90MM

## (undated) DEVICE — ELECTRODE, ELECTROSURGICAL, BLADE, EXTENDED

## (undated) DEVICE — ACCESS KIT, S-MAK MINI, 4FR 10CM 0.018IN 40CM, NT/PT, ECHO ENHANCE NEEDLE

## (undated) DEVICE — KIT, TOTAL JOINT, CUSTOM, PORTAGE

## (undated) DEVICE — DRAPE, SHEET, U, STERI DRAPE, 47 X 51 IN, DISPOSABLE, STERILE

## (undated) DEVICE — CLOSURE SYSTEM, DERMABOND, PRINEO, 22CM, STERILE

## (undated) DEVICE — NEEDLE, HYPODERMIC, REGULAR WALL, REGULAR BEVEL, 18 G X 1.5 IN

## (undated) DEVICE — SUTURE, VICRYL, 2-0, 36 IN, CT-1, UNDYED

## (undated) DEVICE — SHEATH, PINNACLE, 10 CM,  6FR INTRODUCER, 6FR DIA, 2.5 CM DIALATOR

## (undated) DEVICE — SUTURE, VICRYL, 1, 27 IN, CT-1 CR, UNDYED

## (undated) DEVICE — CATHETER, DIAGNOSTIC, DXTERITY, 6 FR, JL 4.0, 100C

## (undated) DEVICE — SUTURE, STRATAFIX, SPIRAL MONOCRYL PLUS, 3-0, PS-1 45CM, UNDYED

## (undated) DEVICE — SUTURE, STRATAFIX, SYMMETRIC, SIZE 1, 45CM, KNOTLESS

## (undated) DEVICE — CATHETER, DIAGNOSTIC, DXTERITY, 6FR JR 4.0, 100CM

## (undated) DEVICE — APPLICATOR, CHLORAPREP, W/ORANGE TINT, 26ML

## (undated) DEVICE — DRAPE, C-ARM IMAGE

## (undated) DEVICE — 6FR X 110CM IMPULSE ANGIO DIAG CATH, PIG 145-DEG CURVE, (UPN H74916599410, EA/1 UOM)

## (undated) DEVICE — SOLUTION, IRRIGATION, SODIUM CHLORIDE 0.9%, 1000 ML, POUR BOTTLE

## (undated) DEVICE — PAD, GROUNDING, ELECTROSURGICAL, W/9 FT CABLE, POLYHESIVE II, ADULT, LF

## (undated) DEVICE — Device

## (undated) DEVICE — GLOVE, PROTEXIS PI CLASSIC, SZ-7.5, PF, LF

## (undated) DEVICE — COVER, MAYO STAND, W/PAD, 23 IN, DISPOSABLE, PLASTIC, LF, STERILE

## (undated) DEVICE — SHEATH, PINNACLE, 10 CM,  7FR INTRODUCER, 7FR DIA, 2.5 CM DIALATOR

## (undated) DEVICE — DRAPE, INCISE, ANTIMICROBIAL, IOBAN 2, LARGE, 17 X 23 IN, DISPOSABLE, STERILE

## (undated) DEVICE — DRESSING, MEPILEX BORDER, POST-OP AG, 4 X 10 IN

## (undated) DEVICE — DRAPE, TIBURON, HIP W/POUCHES

## (undated) DEVICE — CONTAINER, SPECIMEN, 4 OZ, OR PEEL PACK, STERILE